# Patient Record
Sex: MALE | Race: WHITE | NOT HISPANIC OR LATINO | Employment: OTHER | URBAN - METROPOLITAN AREA
[De-identification: names, ages, dates, MRNs, and addresses within clinical notes are randomized per-mention and may not be internally consistent; named-entity substitution may affect disease eponyms.]

---

## 2016-02-15 LAB — HM COLONOSCOPY: NORMAL

## 2017-01-26 ENCOUNTER — GENERIC CONVERSION - ENCOUNTER (OUTPATIENT)
Dept: OTHER | Facility: OTHER | Age: 82
End: 2017-01-26

## 2017-03-01 ENCOUNTER — APPOINTMENT (OUTPATIENT)
Dept: LAB | Facility: HOSPITAL | Age: 82
End: 2017-03-01
Payer: COMMERCIAL

## 2017-03-01 ENCOUNTER — GENERIC CONVERSION - ENCOUNTER (OUTPATIENT)
Dept: OTHER | Facility: OTHER | Age: 82
End: 2017-03-01

## 2017-03-01 DIAGNOSIS — I10 ESSENTIAL (PRIMARY) HYPERTENSION: ICD-10-CM

## 2017-03-01 DIAGNOSIS — M25.473 EFFUSION OF ANKLE: ICD-10-CM

## 2017-03-01 DIAGNOSIS — N18.30 CHRONIC KIDNEY DISEASE, STAGE III (MODERATE) (HCC): ICD-10-CM

## 2017-03-01 LAB
ANION GAP SERPL CALCULATED.3IONS-SCNC: 9 MMOL/L (ref 4–13)
BUN SERPL-MCNC: 31 MG/DL (ref 5–25)
CALCIUM SERPL-MCNC: 8.6 MG/DL (ref 8.3–10.1)
CHLORIDE SERPL-SCNC: 106 MMOL/L (ref 100–108)
CO2 SERPL-SCNC: 30 MMOL/L (ref 21–32)
CREAT SERPL-MCNC: 1.8 MG/DL (ref 0.6–1.3)
GFR SERPL CREATININE-BSD FRML MDRD: 36.3 ML/MIN/1.73SQ M
GLUCOSE SERPL-MCNC: 128 MG/DL (ref 65–140)
POTASSIUM SERPL-SCNC: 4 MMOL/L (ref 3.5–5.3)
SODIUM SERPL-SCNC: 145 MMOL/L (ref 136–145)

## 2017-03-01 PROCEDURE — 80048 BASIC METABOLIC PNL TOTAL CA: CPT

## 2017-03-01 PROCEDURE — 36415 COLL VENOUS BLD VENIPUNCTURE: CPT

## 2017-03-17 ENCOUNTER — ALLSCRIPTS OFFICE VISIT (OUTPATIENT)
Dept: OTHER | Facility: OTHER | Age: 82
End: 2017-03-17

## 2017-04-18 ENCOUNTER — GENERIC CONVERSION - ENCOUNTER (OUTPATIENT)
Dept: OTHER | Facility: OTHER | Age: 82
End: 2017-04-18

## 2017-04-29 DIAGNOSIS — R73.01 IMPAIRED FASTING GLUCOSE: ICD-10-CM

## 2017-04-29 DIAGNOSIS — N18.30 CHRONIC KIDNEY DISEASE, STAGE III (MODERATE) (HCC): ICD-10-CM

## 2017-04-29 DIAGNOSIS — E66.9 OBESITY: ICD-10-CM

## 2017-04-29 DIAGNOSIS — M10.9 GOUT: ICD-10-CM

## 2017-04-29 DIAGNOSIS — I10 ESSENTIAL (PRIMARY) HYPERTENSION: ICD-10-CM

## 2017-04-29 DIAGNOSIS — M25.473 EFFUSION OF ANKLE: ICD-10-CM

## 2017-04-29 DIAGNOSIS — K51.90 ULCERATIVE COLITIS WITHOUT COMPLICATIONS (HCC): ICD-10-CM

## 2017-05-11 ENCOUNTER — APPOINTMENT (OUTPATIENT)
Dept: LAB | Facility: HOSPITAL | Age: 82
End: 2017-05-11
Payer: COMMERCIAL

## 2017-05-11 ENCOUNTER — TRANSCRIBE ORDERS (OUTPATIENT)
Dept: ADMINISTRATIVE | Facility: HOSPITAL | Age: 82
End: 2017-05-11

## 2017-05-11 DIAGNOSIS — I10 ESSENTIAL (PRIMARY) HYPERTENSION: ICD-10-CM

## 2017-05-11 DIAGNOSIS — M25.473 EFFUSION OF ANKLE: ICD-10-CM

## 2017-05-11 DIAGNOSIS — K51.90 ULCERATIVE COLITIS WITHOUT COMPLICATIONS (HCC): ICD-10-CM

## 2017-05-11 DIAGNOSIS — R73.01 IMPAIRED FASTING GLUCOSE: ICD-10-CM

## 2017-05-11 DIAGNOSIS — M10.9 GOUT: ICD-10-CM

## 2017-05-11 DIAGNOSIS — E66.9 OBESITY: ICD-10-CM

## 2017-05-11 DIAGNOSIS — N18.30 CHRONIC KIDNEY DISEASE, STAGE III (MODERATE) (HCC): ICD-10-CM

## 2017-05-11 LAB
ALBUMIN SERPL BCP-MCNC: 3.4 G/DL (ref 3.5–5)
ALP SERPL-CCNC: 74 U/L (ref 46–116)
ALT SERPL W P-5'-P-CCNC: 25 U/L (ref 12–78)
ANION GAP SERPL CALCULATED.3IONS-SCNC: 5 MMOL/L (ref 4–13)
AST SERPL W P-5'-P-CCNC: 23 U/L (ref 5–45)
BILIRUB SERPL-MCNC: 0.6 MG/DL (ref 0.2–1)
BUN SERPL-MCNC: 25 MG/DL (ref 5–25)
CALCIUM SERPL-MCNC: 9 MG/DL (ref 8.3–10.1)
CHLORIDE SERPL-SCNC: 106 MMOL/L (ref 100–108)
CHOLEST SERPL-MCNC: 178 MG/DL (ref 50–200)
CO2 SERPL-SCNC: 32 MMOL/L (ref 21–32)
CREAT SERPL-MCNC: 1.5 MG/DL (ref 0.6–1.3)
ERYTHROCYTE [DISTWIDTH] IN BLOOD BY AUTOMATED COUNT: 14.1 % (ref 11.6–15.1)
EST. AVERAGE GLUCOSE BLD GHB EST-MCNC: 97 MG/DL
GFR SERPL CREATININE-BSD FRML MDRD: 44.8 ML/MIN/1.73SQ M
GLUCOSE P FAST SERPL-MCNC: 96 MG/DL (ref 65–99)
HBA1C MFR BLD: 5 % (ref 4.2–6.3)
HCT VFR BLD AUTO: 39.4 % (ref 36.5–49.3)
HDLC SERPL-MCNC: 38 MG/DL (ref 40–60)
HGB BLD-MCNC: 12.8 G/DL (ref 12–17)
LDLC SERPL CALC-MCNC: 105 MG/DL (ref 0–100)
MCH RBC QN AUTO: 29.4 PG (ref 26.8–34.3)
MCHC RBC AUTO-ENTMCNC: 32.5 G/DL (ref 31.4–37.4)
MCV RBC AUTO: 91 FL (ref 82–98)
PLATELET # BLD AUTO: 218 THOUSANDS/UL (ref 149–390)
PMV BLD AUTO: 10 FL (ref 8.9–12.7)
POTASSIUM SERPL-SCNC: 4.7 MMOL/L (ref 3.5–5.3)
PROT SERPL-MCNC: 7.7 G/DL (ref 6.4–8.2)
RBC # BLD AUTO: 4.35 MILLION/UL (ref 3.88–5.62)
SODIUM SERPL-SCNC: 143 MMOL/L (ref 136–145)
TRIGL SERPL-MCNC: 177 MG/DL
TSH SERPL DL<=0.05 MIU/L-ACNC: 3.18 UIU/ML (ref 0.36–3.74)
URATE SERPL-MCNC: 5.7 MG/DL (ref 4.2–8)
WBC # BLD AUTO: 6.76 THOUSAND/UL (ref 4.31–10.16)

## 2017-05-11 PROCEDURE — 80061 LIPID PANEL: CPT

## 2017-05-11 PROCEDURE — 84550 ASSAY OF BLOOD/URIC ACID: CPT

## 2017-05-11 PROCEDURE — 84443 ASSAY THYROID STIM HORMONE: CPT

## 2017-05-11 PROCEDURE — 80053 COMPREHEN METABOLIC PANEL: CPT

## 2017-05-11 PROCEDURE — 85027 COMPLETE CBC AUTOMATED: CPT

## 2017-05-11 PROCEDURE — 36415 COLL VENOUS BLD VENIPUNCTURE: CPT

## 2017-05-11 PROCEDURE — 83036 HEMOGLOBIN GLYCOSYLATED A1C: CPT

## 2017-05-15 ENCOUNTER — ALLSCRIPTS OFFICE VISIT (OUTPATIENT)
Dept: OTHER | Facility: OTHER | Age: 82
End: 2017-05-15

## 2017-06-01 ENCOUNTER — GENERIC CONVERSION - ENCOUNTER (OUTPATIENT)
Dept: OTHER | Facility: OTHER | Age: 82
End: 2017-06-01

## 2017-07-18 ENCOUNTER — GENERIC CONVERSION - ENCOUNTER (OUTPATIENT)
Dept: OTHER | Facility: OTHER | Age: 82
End: 2017-07-18

## 2017-09-12 ENCOUNTER — GENERIC CONVERSION - ENCOUNTER (OUTPATIENT)
Dept: OTHER | Facility: OTHER | Age: 82
End: 2017-09-12

## 2017-11-16 ENCOUNTER — GENERIC CONVERSION - ENCOUNTER (OUTPATIENT)
Dept: OTHER | Facility: OTHER | Age: 82
End: 2017-11-16

## 2017-12-11 ENCOUNTER — ALLSCRIPTS OFFICE VISIT (OUTPATIENT)
Dept: OTHER | Facility: OTHER | Age: 82
End: 2017-12-11

## 2018-01-10 NOTE — PROGRESS NOTES
Assessment    1  Impaired fasting glucose (790 21) (R73 01)   2  Chronic kidney disease, stage 3 (585 3) (N18 3)   3  Gout (274 9) (M10 9)   4  Hypertension (401 9) (I10)   5  Ulcerative colitis without complications (470 7) (I20 05)    Discussion/Summary  Discussion Summary:   IFG - sugars better and in nml range, urged to con't watching sugars/carbs in diet and keep active and get wgt down, recheck BW annually as it has been nml and stable    CKD stage 3 - back to baseline after recent bump, again urged no NSAIDs and avoid contrast, con't current ARB for now, keeping hydrated was encouraged    Gout - uric acid at goal with Uloric, no significant flares recently; con't current meds    HTN - BP at goal; con't current meds    UC - no recent symptoms/flares, on sulfasalazine and has decreased dose w/o SE  Counseling Documentation With Imm: The patient was counseled regarding diagnostic results, instructions for management, risk factor reductions, prognosis, patient and family education, impressions, risks and benefits of treatment options, importance of compliance with treatment  Medication SE Review and Pt Understands Tx: Possible side effects of new medications were reviewed with the patient/guardian today  The treatment plan was reviewed with the patient/guardian  The patient/guardian understands and agrees with the treatment plan   Self Referrals:   Self Referrals: No      Chief Complaint  Chief Complaint Chronic Condition [facility] Caryn: Patient is here today for follow up of chronic conditions described in HPI  History of Present Illness  HPI: Pt here for routine follow upa ppt and BW results    BW results were d/w pt in detail: FBS/A1C improved at 96/5 0, BUN/Cr at baseline at 25/1 5 (GFR 44 8) rest of CMP/CBC/TSH was wnl, FLP with TC/LDL at goal at 178/105 but HDL low at 38 and TG up at 177, uric acid at goal at 5 7  Def of nml vs IFG vs DM was d/w pt in detail   Diet was reviewed - wgt up 4 lbs from last year  He admits he could be better with diet  He [place]  BP at goal today and meds were reviewed and no changes have occurred  He notes no HA/dizziness/double vision/CP  BUN/Cr was reviewed as was CKD stage 3 and avoidance of NSAIDs/contrast and keeping hydrated  He con't to take his Uloric as directed  Goal uric acid with tx was reviewed  He notes some recent pain in his R foot - [place] at big toe  He notes it will get warm but only last a few days or so  He has decreased his sulfasalazine and is now taking 4 tabs a day instead of 6 tab  He has decreased his folic acid to 272 mcg with this decrease  He notes no abd pain/D/F/C/blood in stool  He is UTD on [place]      Review of Systems  Complete-Male:   Constitutional: no fever and no chills  Eyes: no eyesight problems  ENT: no sore throat and no nasal discharge  Cardiovascular: no chest pain, no palpitations and no extremity edema  Respiratory: no shortness of breath, no cough and no wheezing  Gastrointestinal: no abdominal pain, no constipation and no diarrhea  Genitourinary: no dysuria  Musculoskeletal: joint stiffness, but no joint swelling  Integumentary: no rashes  Neurological: no headache and no dizziness  Psychiatric: no anxiety and no depression  Endocrine: no muscle weakness  Hematologic/Lymphatic: no tendency for easy bleeding and no tendency for easy bruising  Active Problems    1  History of Ankle edema (719 07) (M25 473)   2  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)   3  Chronic kidney disease, stage 3 (585 3) (N18 3)   4  Colon cancer screening (V76 51) (Z12 11)   5  Colon cancer screening (V76 51) (Z12 11)   6  Colonoscopy (Fiberoptic)   7  Colonoscopy (Fiberoptic) Screening   8  Colonoscopy (Fiberoptic) Screening   9  Complete tear of rotator cuff, left   10  Flu vaccine need (V04 81) (Z23)   11  Gout (274 9) (M10 9)   12   Hypertension (401 9) (I10)   13  Impaired fasting glucose (790 21) (R73 01)   14  Macular degeneration (362 50) (H35 30)   15  Nocturia (788 43) (R35 1)   16  Obesity (278 00) (E66 9)   17  Pain in joint of left shoulder (719 41) (M25 512)   18  Snoring (786 09) (R06 83)   19  Ulcerative colitis without complications (660 4) (Q67 52)    Past Medical History    1  History of Acute foot pain, left (729 5) (M79 672)   2  History of Acute pain of left knee (719 46) (M25 562)   3  History of Ankle edema (719 07) (M25 473)   4  History of Depression (311) (F32 9)   5  History of Nephrolithiasis (V13 01)   6  History of Posterior tibial tendinitis of right lower extremity (726 72) (M76 821)   7  History of Streptococcus pneumoniae vaccination indicated (V49 89) (Z91 89)  Active Problems And Past Medical History Reviewed: The active problems and past medical history were reviewed and updated today  Surgical History    1  History of Anal Fissurectomy   2  History of Anal Fissurectomy   3  History of Complete Colonoscopy   4  History of Complete Colonoscopy   5  History of Complete Colonoscopy   6  History of Shoulder Surgery  Surgical History Reviewed: The surgical history was reviewed and updated today  Family History  Sister    1  Family history of Diabetes mellitus  Family History Reviewed: The family history was reviewed and updated today  Social History    · Being A Social Drinker   · Marital History - Currently    · Never A Smoker   · Occupation: Retired  Social History Reviewed: The social history was reviewed and is unchanged  Current Meds   1  Folic Acid 735 MCG Oral Tablet; TAKE 1 TABLET DAILY AS DIRECTED; Therapy: 50Knw5189 to (Evaluate:78Wco1878) Recorded   2  Furosemide 20 MG Oral Tablet; 1 tab po q day; Therapy: 61Zyu0355 to (Jez Daniel)  Requested for: 35Khf1696; Last Rx:75Kmp7426   Ordered   3  Losartan Potassium 100 MG Oral Tablet; take 1 tablet by mouth once daily;    Therapy: 58NKG1240 to (Evaluate:12Mar2018)  Requested for: 69YZX7595; Last Rx:17Mar2017   Ordered   4  PreserVision AREDS Oral Capsule; TAKE 1 CAPSULE DAILY; Therapy: 28Bff7783 to Recorded   5  SulfaSALAzine 500 MG Oral Tablet; 1 tab PO q am, 2 tab PO q noon and 1 tab PO q pm;   Therapy: 42HYR4450 to (Evaluate:12Nov2017)  Requested for: 53CQK3163 Recorded   6  Tamsulosin HCl - 0 4 MG Oral Capsule; take 1 capsule daily; Therapy: 85BQW3703 to (Evaluate:10Jun2017)  Requested for: 74Qgm1852; Last Rx:11Hft3865   Ordered   7  Uloric 40 MG Oral Tablet; TAKE 1 TABLET DAILY; Therapy: 08EPX0789 to (Evaluate:12Jan2018)  Requested for: 92JSO2518; Last Rx:17Jan2017   Ordered   8  Vitamin D3 1000 UNIT Oral Tablet; TAKE 1 TABLET DAILY; Therapy: 26Ilf6389 to (Evaluate:19Sep2012) Recorded  Medication List Reviewed: The medication list was reviewed and updated today  Allergies    1  No Known Drug Allergies    Vitals  Vital Signs    Recorded: 41BCM0134 12:56PM   Temperature 98 2 F   Heart Rate 461   Systolic 807   Diastolic 62   Height 5 ft 6 in   Weight 243 lb 12 8 oz   BMI Calculated 39 35   BSA Calculated 2 18     Physical Exam    Constitutional   General appearance: No acute distress, well appearing and well nourished  Pulmonary   Respiratory effort: No increased work of breathing or signs of respiratory distress  Auscultation of lungs: Clear to auscultation, equal breath sounds bilaterally, no wheezes, no rales, no rhonci  Cardiovascular   Auscultation of heart: Normal rate and rhythm, normal S1 and S2, without murmurs  Examination of extremities for edema and/or varicosities: Normal     Abdomen   Abdomen: Non-tender, no masses  obese  Musculoskeletal   Gait and station: Normal     Psychiatric   Mood and affect: Normal          Health Management  Colonoscopy (Fiberoptic) Screening   COLONOSCOPY; every 2 years; Last 13ZHR9113; Next Due: 27MTO5260;  Active    Signatures  Electronically signed by : Massimo Zaman DO Danita; May 15 2017  1:00PM EST                       (Author)  Electronically signed by : Ayla Ribeiro DO; May 15 2017  1:13PM EST                       (Author)

## 2018-01-12 VITALS
DIASTOLIC BLOOD PRESSURE: 78 MMHG | WEIGHT: 240 LBS | TEMPERATURE: 97 F | BODY MASS INDEX: 38.57 KG/M2 | HEART RATE: 82 BPM | HEIGHT: 66 IN | SYSTOLIC BLOOD PRESSURE: 132 MMHG

## 2018-01-13 VITALS
HEIGHT: 66 IN | SYSTOLIC BLOOD PRESSURE: 112 MMHG | HEART RATE: 104 BPM | TEMPERATURE: 98.2 F | WEIGHT: 243.8 LBS | DIASTOLIC BLOOD PRESSURE: 62 MMHG | BODY MASS INDEX: 39.18 KG/M2

## 2018-01-13 NOTE — RESULT NOTES
Verified Results  (1) BASIC METABOLIC PROFILE 24MJY7276 02:55PM Cindy Foster     Test Name Result Flag Reference   GLUCOSE,RANDM 95 mg/dL     If the patient is fasting, the ADA then defines impaired fasting glucose as > 100 mg/dL and diabetes as > or equal to 123 mg/dL  SODIUM 143 mmol/L  136-145   POTASSIUM 4 6 mmol/L  3 5-5 3   CHLORIDE 108 mmol/L  100-108   CARBON DIOXIDE 31 mmol/L  21-32   ANION GAP (CALC) 4 mmol/L  4-13   BLOOD UREA NITROGEN 29 mg/dL H 5-25   CREATININE 1 43 mg/dL H 0 60-1 30   Standardized to IDMS reference method   CALCIUM 9 0 mg/dL  8 3-10 1   eGFR Non-African American 47 3 ml/min/1 73sq [facility] Disease Education Program recommendations are as follows:  GFR calculation is accurate only with a steady state creatinine  Chronic Kidney disease less than 60 ml/min/1 73 sq  meters  Kidney failure less than 15 ml/min/1 73 sq  meters  Discussion/Summary   please notify pt that his Bw showed his kidney tests and electrolytes are stable with the recent med change - how is the swelling in the legs doing?

## 2018-01-15 NOTE — RESULT NOTES
Verified Results  (1) BASIC METABOLIC PROFILE [id number] 01:54PM Ryan Rodriguez Order Number: [id number]     Test Name Result Flag Reference   GLUCOSE,RANDM 128 mg/dL     If the patient is fasting, the ADA then defines impaired fasting glucose as > 100 mg/dL and diabetes as > or equal to 123 mg/dL  SODIUM 145 mmol/L  136-145   POTASSIUM 4 0 mmol/L  3 5-5 3   18Slightly Hemolyzed; Results May be Affected   CHLORIDE 106 mmol/L  100-108   CARBON DIOXIDE 30 mmol/L  21-32   ANION GAP (CALC) 9 mmol/L  4-13   BLOOD UREA NITROGEN 31 mg/dL H 5-25   CREATININE 1 80 mg/dL H 0 60-1 30   Standardized to IDMS reference method   CALCIUM 8 6 mg/dL  8 3-10 1   eGFR Non-African American 36 3 ml/min/1 73sq [facility] Disease Education Program recommendations are as follows:  GFR calculation is accurate only with a steady state creatinine  Chronic Kidney disease less than 60 ml/min/1 73 sq  meters  Kidney failure less than 15 ml/min/1 73 sq  meters         Discussion/Summary   please notify pt that his kidney tests have gone up a little bit from baseline - avoid OTC Ibuprofen/Aleve/Advil; will d/w pt in detail at next appt        Patient notified of results

## 2018-01-16 NOTE — RESULT NOTES
Verified Results  (1) CBC/PLT/DIFF 28Apr2016 12:00AM Robert Balbuena     Test Name Result Flag Reference   WBC 8 0 x10E3/uL  3 4-10 8   RBC 3 81 x10E6/uL L 4 14-5 80   Hemoglobin 10 7 g/dL L 12 6-17 7   Hematocrit 33 5 % L 37 5-51 0   MCV 88 fL  79-97   MCH 28 1 pg  26 6-33 0   MCHC 31 9 g/dL  31 5-35 7   RDW 15 0 %  12 3-15 4   Platelets 396 S47B0/SW  150-379   Neutrophils 68 %     Lymphs 17 %     Monocytes 11 %     Eos 3 %     Basos 1 %     Neutrophils (Absolute) 5 4 x10E3/uL  1 4-7 0   Lymphs (Absolute) 1 4 x10E3/uL  0 7-3 1   Monocytes(Absolute) 0 9 x10E3/uL  0 1-0 9   Eos (Absolute) 0 2 x10E3/uL  0 0-0 4   Baso (Absolute) 0 1 x10E3/uL  0 0-0 2   Immature Granulocytes 0 %     Immature Grans (Abs) 0 0 x10E3/uL  0 0-0 1     (1) COMPREHENSIVE METABOLIC PANEL 19KPW5420 04:91JX Robert Balbuena     Test Name Result Flag Reference   Glucose, Serum 87 mg/dL  65-99   BUN 28 mg/dL H 8-27   Creatinine, Serum 1 38 mg/dL H 0 76-1 27   eGFR If NonAfricn Am 48 mL/min/1 73 L >59   eGFR If Africn Am 55 mL/min/1 73 L >59   BUN/Creatinine Ratio 20  10-22   Sodium, Serum 145 mmol/L H 134-144   Potassium, Serum 5 0 mmol/L  3 5-5 2   Chloride, Serum 107 mmol/L     Carbon Dioxide, Total 22 mmol/L  18-29   Calcium, Serum 8 7 mg/dL  8 6-10 2   Protein, Total, Serum 7 0 g/dL  6 0-8 5   Albumin, Serum 3 8 g/dL  3 5-4 7   Globulin, Total 3 2 g/dL  1 5-4 5   A/G Ratio 1 2  1 1-2 5   Bilirubin, Total 0 2 mg/dL  0 0-1 2   Alkaline Phosphatase, S 55 IU/L     AST (SGOT) 13 IU/L  0-40   ALT (SGPT) 11 IU/L  0-44     (1) URIC ACID 68Efw5116 12:00AM [employer]     Test Name Result Flag Reference   Uric Acid, Serum 4 3 mg/dL  3 7-8 6   Therapeutic target for gout patients: <6 0     (LC) CCP Antibodies IgG/IgA 28Apr2016 12:00AM [employer]     Test Name Result Flag Reference   CCP Antibodies IgG/IgA 3 units  0-19   Negative               <20                                           Weak positive      20 - 39 Moderate positive  40 - 59                                           Strong positive        >59     (1) C-REACTIVE PROTEIN 88Ijv9886 12:00AM Derrick Gorman     Test Name Result Flag Reference   C-Reactive Protein, Quant 66 1 mg/L H 0 0-4 9     (LC) Rheumatoid Arthritis Factor 87Rhv1598 12:00AM Derrick Gorman     Test Name Result Flag Reference   RA Latex Turbid  9 6 IU/mL  0 0-13 9        Pts wife aware1      1 Amended By: Ford Camejo;  May 02 2016 8:52 AM EST    Discussion/Summary   please notify pt or his wife that his blood count was normal - no sign of infection, uric acid for gout was at goal (goal <6 0) and he was at 4 6, inflammatory markers were elvated but rheumatoid labs were negative; no need to increase Uloric at this time; can certainly make appt with Dr Ray Abreu - Rheumatology

## 2018-01-18 NOTE — RESULT NOTES
Verified Results  ECHO COMPLETE WITH CONTRAST IF INDICATED 42Yhp2362 01:42PM Joe Woody     Test Name Result Flag Reference   ECHO COMPLETE WITH CONTRAST IF INDICATED (Report)     [address]   [facility], [address]   [phone]     Transthoracic Echocardiogram   2D, M-mode, Doppler, and Color Doppler     Study date: 26-Sep-2016     Patient: Pieter Dunbar   MR number: [id number]   Account number: [de-identified]   : 1934   Age: 80 years   Gender: Male   Status: Outpatient   Location: Echo lab   Height: 66 in   Weight: 239 6 lb   BP: 132/ 62 mmHg     Indications: Edema-ankle  Diagnoses: M25 473 - Effusion, unspecified ankle     Sonographer: Leala Runner RDCS AE-PE   Primary Physician: Sharon Kinney DO   Referring Physician: Sharon Kinney DO   Group: [facility]   Interpreting Physician: Lonny Hernandez MD     SUMMARY     LEFT VENTRICLE:   Size was normal    Systolic function was normal  Ejection fraction was estimated to be 55 %  Wall thickness was normal    Doppler parameters were consistent with abnormal left ventricular relaxation   (grade 1 diastolic dysfunction)  RIGHT VENTRICLE:   The size was normal    Systolic function was normal      TRICUSPID VALVE:   There was trace regurgitation  HISTORY: PRIOR HISTORY: CKD  Risk factors: hypertension and morbid obesity  PROCEDURE: The procedure was performed in the echo lab  This was a routine   study  The transthoracic approach was used  The study included complete 2D   imaging, M-mode, complete spectral Doppler, and color Doppler  The heart rate   was 101 bpm, at the start of the study  Intravenous contrast (Definity solution   [1 3 ml Definity/8 7ml normal saline solution], 3 ml) was administered to   opacify the left ventricle  Echocardiographic views were limited due to   decreased penetration and lung interference   This was a technically difficult study      LEFT VENTRICLE: Size was normal  Systolic function was normal  Ejection   fraction was estimated to be 55 %  There were no regional wall motion   abnormalities  Wall thickness was normal  DOPPLER: Doppler parameters were   consistent with abnormal left ventricular relaxation (grade 1 diastolic   dysfunction)  RIGHT VENTRICLE: The size was normal  Systolic function was normal  Wall   thickness was normal      LEFT ATRIUM: Size was at the upper limits of normal      RIGHT ATRIUM: Size was normal      MITRAL VALVE: Valve structure was normal  There was normal leaflet separation  DOPPLER: The transmitral velocity was within the normal range  There was no   evidence for stenosis  There was no regurgitation  AORTIC VALVE: The valve was trileaflet  Leaflets exhibited mildly increased   thickness, mild calcification, normal cuspal separation, and sclerosis  DOPPLER: Transaortic velocity was within the normal range  There was no   evidence for stenosis  There was no regurgitation  TRICUSPID VALVE: The valve structure was normal  There was normal leaflet   separation  DOPPLER: The transtricuspid velocity was within the normal range  There was no evidence for stenosis  There was trace regurgitation  Pulmonary   artery systolic pressure was within the normal range  Estimated peak PA   pressure was 35 mmHg  PULMONIC VALVE: Leaflets exhibited normal thickness, no calcification, and   normal cuspal separation  DOPPLER: The transpulmonic velocity was within the   normal range  There was no regurgitation  PERICARDIUM: There was no pericardial effusion  The pericardium was normal in   appearance  AORTA: The root exhibited normal size  SYSTEMIC VEINS: IVC: The inferior vena cava was not well visualized       SYSTEM MEASUREMENT TABLES     2D   %FS: 30 47 %   Ao Diam: 3 43 cm   EDV(Teich): 121 76 ml   EF(Teich): 57 63 %   ESV(Cube): 43 64 ml   ESV(Teich): 51 59 ml   IVSd: 0 96 cm   LA Diam: 4 08 cm   LVEDV MOD A4C: 86 7 ml   LVEF MOD A4C: 64 78 %   LVESV MOD A4C: 30 54 ml   LVIDd: 5 06 cm   LVIDs: 3 52 cm   LVLd A4C: 7 03 cm   LVLs A4C: 5 5 cm   LVPWd: 1 04 cm   SI(Cube): 39 9 ml/m2   SI(Teich): 32 49 ml/m2   SV MOD A4C: 56 16 ml   SV(Cube): 86 19 ml   SV(Teich): 70 17 ml     CW   TR Vmax: 2 54 m/s   TR maxP 8 mmHg     MM   TAPSE: 2 26 cm     PW   E': 0 05 m/s   E/E': 10 92   MV A [place]: 1 08 m/s   MV Dec [place]: 2 73 m/s2   MV DecT: 219 78 ms   MV E [place]: 0 6 m/s   MV E/A Ratio: 0 55     [employer] Accredited Echocardiography Laboratory     Prepared and electronically signed by     Vernon Terrell MD   Signed 26-Sep-2016 15:47:50       Discussion/Summary    please notify pt that his Echo - US of heart - was nml - will discuss results with pt in detail at next appt    pt aware ems 2016

## 2018-01-23 VITALS
DIASTOLIC BLOOD PRESSURE: 60 MMHG | SYSTOLIC BLOOD PRESSURE: 122 MMHG | HEART RATE: 88 BPM | TEMPERATURE: 98.6 F | BODY MASS INDEX: 38.25 KG/M2 | WEIGHT: 238 LBS | HEIGHT: 66 IN

## 2018-02-09 DIAGNOSIS — M10.9 GOUT: ICD-10-CM

## 2018-02-09 DIAGNOSIS — N18.30 CHRONIC KIDNEY DISEASE, STAGE III (MODERATE) (HCC): ICD-10-CM

## 2018-02-22 ENCOUNTER — APPOINTMENT (OUTPATIENT)
Dept: LAB | Facility: HOSPITAL | Age: 83
End: 2018-02-22
Payer: COMMERCIAL

## 2018-02-22 DIAGNOSIS — N18.30 CHRONIC KIDNEY DISEASE, STAGE III (MODERATE) (HCC): ICD-10-CM

## 2018-02-22 DIAGNOSIS — M10.9 GOUT: ICD-10-CM

## 2018-02-22 LAB
ANION GAP SERPL CALCULATED.3IONS-SCNC: 5 MMOL/L (ref 4–13)
BUN SERPL-MCNC: 35 MG/DL (ref 5–25)
CALCIUM SERPL-MCNC: 8.4 MG/DL (ref 8.3–10.1)
CHLORIDE SERPL-SCNC: 109 MMOL/L (ref 100–108)
CO2 SERPL-SCNC: 30 MMOL/L (ref 21–32)
CREAT SERPL-MCNC: 1.75 MG/DL (ref 0.6–1.3)
GFR SERPL CREATININE-BSD FRML MDRD: 35 ML/MIN/1.73SQ M
GLUCOSE SERPL-MCNC: 91 MG/DL (ref 65–140)
POTASSIUM SERPL-SCNC: 4.5 MMOL/L (ref 3.5–5.3)
SODIUM SERPL-SCNC: 144 MMOL/L (ref 136–145)
URATE SERPL-MCNC: 6.5 MG/DL (ref 4.2–8)

## 2018-02-22 PROCEDURE — 84550 ASSAY OF BLOOD/URIC ACID: CPT

## 2018-02-22 PROCEDURE — 36415 COLL VENOUS BLD VENIPUNCTURE: CPT

## 2018-02-22 PROCEDURE — 80048 BASIC METABOLIC PNL TOTAL CA: CPT

## 2018-03-26 DIAGNOSIS — M10.9 GOUT, UNSPECIFIED CAUSE, UNSPECIFIED CHRONICITY, UNSPECIFIED SITE: Primary | ICD-10-CM

## 2018-03-26 RX ORDER — ALLOPURINOL 100 MG/1
TABLET ORAL
Qty: 90 TABLET | Refills: 1 | Status: SHIPPED | OUTPATIENT
Start: 2018-03-26 | End: 2018-09-27 | Stop reason: SDUPTHER

## 2018-04-02 DIAGNOSIS — I10 ESSENTIAL HYPERTENSION: Primary | ICD-10-CM

## 2018-04-02 RX ORDER — MELATONIN
2 DAILY
COMMUNITY
Start: 2012-08-20

## 2018-04-02 RX ORDER — LOSARTAN POTASSIUM 100 MG/1
100 TABLET ORAL DAILY
Qty: 90 TABLET | Refills: 2 | Status: SHIPPED | OUTPATIENT
Start: 2018-04-02 | End: 2019-01-14 | Stop reason: SDUPTHER

## 2018-04-02 RX ORDER — LOSARTAN POTASSIUM 100 MG/1
1 TABLET ORAL DAILY
COMMUNITY
Start: 2016-03-31 | End: 2018-04-02 | Stop reason: SDUPTHER

## 2018-05-11 DIAGNOSIS — R73.01 IMPAIRED FASTING GLUCOSE: ICD-10-CM

## 2018-05-11 DIAGNOSIS — N18.30 CHRONIC KIDNEY DISEASE, STAGE III (MODERATE) (HCC): ICD-10-CM

## 2018-05-11 DIAGNOSIS — H35.30 AGE-RELATED MACULAR DEGENERATION: ICD-10-CM

## 2018-05-11 DIAGNOSIS — M10.9 GOUT: ICD-10-CM

## 2018-05-11 DIAGNOSIS — E66.9 OBESITY: ICD-10-CM

## 2018-05-11 DIAGNOSIS — K51.90 ULCERATIVE COLITIS WITHOUT COMPLICATIONS (HCC): ICD-10-CM

## 2018-05-11 DIAGNOSIS — I10 ESSENTIAL (PRIMARY) HYPERTENSION: ICD-10-CM

## 2018-05-24 ENCOUNTER — APPOINTMENT (OUTPATIENT)
Dept: LAB | Facility: HOSPITAL | Age: 83
End: 2018-05-24
Payer: COMMERCIAL

## 2018-05-24 DIAGNOSIS — H35.30 AGE-RELATED MACULAR DEGENERATION: ICD-10-CM

## 2018-05-24 DIAGNOSIS — N18.30 CHRONIC KIDNEY DISEASE, STAGE III (MODERATE) (HCC): ICD-10-CM

## 2018-05-24 DIAGNOSIS — E66.9 OBESITY: ICD-10-CM

## 2018-05-24 DIAGNOSIS — R73.01 IMPAIRED FASTING GLUCOSE: ICD-10-CM

## 2018-05-24 DIAGNOSIS — M10.9 GOUT: ICD-10-CM

## 2018-05-24 DIAGNOSIS — K51.90 ULCERATIVE COLITIS WITHOUT COMPLICATIONS (HCC): ICD-10-CM

## 2018-05-24 DIAGNOSIS — I10 ESSENTIAL (PRIMARY) HYPERTENSION: ICD-10-CM

## 2018-05-24 LAB
ALBUMIN SERPL BCP-MCNC: 3.4 G/DL (ref 3.5–5)
ALP SERPL-CCNC: 64 U/L (ref 46–116)
ALT SERPL W P-5'-P-CCNC: 24 U/L (ref 12–78)
ANION GAP SERPL CALCULATED.3IONS-SCNC: 8 MMOL/L (ref 4–13)
AST SERPL W P-5'-P-CCNC: 21 U/L (ref 5–45)
BILIRUB SERPL-MCNC: 0.5 MG/DL (ref 0.2–1)
BUN SERPL-MCNC: 29 MG/DL (ref 5–25)
CALCIUM SERPL-MCNC: 8.2 MG/DL (ref 8.3–10.1)
CHLORIDE SERPL-SCNC: 109 MMOL/L (ref 100–108)
CHOLEST SERPL-MCNC: 164 MG/DL (ref 50–200)
CO2 SERPL-SCNC: 27 MMOL/L (ref 21–32)
CREAT SERPL-MCNC: 1.6 MG/DL (ref 0.6–1.3)
ERYTHROCYTE [DISTWIDTH] IN BLOOD BY AUTOMATED COUNT: 14.6 % (ref 11.6–15.1)
EST. AVERAGE GLUCOSE BLD GHB EST-MCNC: 88 MG/DL
GFR SERPL CREATININE-BSD FRML MDRD: 39 ML/MIN/1.73SQ M
GLUCOSE P FAST SERPL-MCNC: 99 MG/DL (ref 65–99)
HBA1C MFR BLD: 4.7 % (ref 4.2–6.3)
HCT VFR BLD AUTO: 39.8 % (ref 36.5–49.3)
HDLC SERPL-MCNC: 33 MG/DL (ref 40–60)
HGB BLD-MCNC: 12.5 G/DL (ref 12–17)
LDLC SERPL CALC-MCNC: 84 MG/DL (ref 0–100)
MCH RBC QN AUTO: 29.6 PG (ref 26.8–34.3)
MCHC RBC AUTO-ENTMCNC: 31.4 G/DL (ref 31.4–37.4)
MCV RBC AUTO: 94 FL (ref 82–98)
NONHDLC SERPL-MCNC: 131 MG/DL
PLATELET # BLD AUTO: 199 THOUSANDS/UL (ref 149–390)
PMV BLD AUTO: 10.2 FL (ref 8.9–12.7)
POTASSIUM SERPL-SCNC: 4.1 MMOL/L (ref 3.5–5.3)
PROT SERPL-MCNC: 7.5 G/DL (ref 6.4–8.2)
RBC # BLD AUTO: 4.23 MILLION/UL (ref 3.88–5.62)
SODIUM SERPL-SCNC: 144 MMOL/L (ref 136–145)
TRIGL SERPL-MCNC: 234 MG/DL
TSH SERPL DL<=0.05 MIU/L-ACNC: 2.4 UIU/ML (ref 0.36–3.74)
URATE SERPL-MCNC: 7 MG/DL (ref 4.2–8)
WBC # BLD AUTO: 6.54 THOUSAND/UL (ref 4.31–10.16)

## 2018-05-24 PROCEDURE — 80053 COMPREHEN METABOLIC PANEL: CPT

## 2018-05-24 PROCEDURE — 85027 COMPLETE CBC AUTOMATED: CPT

## 2018-05-24 PROCEDURE — 84443 ASSAY THYROID STIM HORMONE: CPT

## 2018-05-24 PROCEDURE — 84550 ASSAY OF BLOOD/URIC ACID: CPT

## 2018-05-24 PROCEDURE — 80061 LIPID PANEL: CPT

## 2018-05-24 PROCEDURE — 83036 HEMOGLOBIN GLYCOSYLATED A1C: CPT

## 2018-05-24 PROCEDURE — 36415 COLL VENOUS BLD VENIPUNCTURE: CPT

## 2018-06-04 RX ORDER — TAMSULOSIN HYDROCHLORIDE 0.4 MG/1
1 CAPSULE ORAL DAILY
COMMUNITY
Start: 2016-03-31 | End: 2018-06-11 | Stop reason: SDUPTHER

## 2018-06-04 RX ORDER — VIT A/VIT C/VIT E/ZINC/COPPER 4296-226
1 CAPSULE ORAL DAILY
COMMUNITY
Start: 2016-09-15

## 2018-06-04 RX ORDER — FEBUXOSTAT 40 MG/1
1 TABLET, FILM COATED ORAL DAILY
COMMUNITY
Start: 2016-03-31 | End: 2018-06-14

## 2018-06-04 RX ORDER — FUROSEMIDE 20 MG/1
1 TABLET ORAL DAILY
COMMUNITY
Start: 2016-09-15 | End: 2018-12-18

## 2018-06-11 DIAGNOSIS — N40.0 BENIGN PROSTATIC HYPERPLASIA, UNSPECIFIED WHETHER LOWER URINARY TRACT SYMPTOMS PRESENT: Primary | ICD-10-CM

## 2018-06-12 RX ORDER — TAMSULOSIN HYDROCHLORIDE 0.4 MG/1
0.4 CAPSULE ORAL DAILY
Qty: 90 CAPSULE | Refills: 2 | Status: SHIPPED | OUTPATIENT
Start: 2018-06-12 | End: 2019-02-25 | Stop reason: SDUPTHER

## 2018-06-14 ENCOUNTER — OFFICE VISIT (OUTPATIENT)
Dept: FAMILY MEDICINE CLINIC | Facility: HOSPITAL | Age: 83
End: 2018-06-14
Payer: COMMERCIAL

## 2018-06-14 VITALS
TEMPERATURE: 97.7 F | DIASTOLIC BLOOD PRESSURE: 82 MMHG | HEIGHT: 66 IN | WEIGHT: 239 LBS | SYSTOLIC BLOOD PRESSURE: 132 MMHG | BODY MASS INDEX: 38.41 KG/M2 | HEART RATE: 62 BPM

## 2018-06-14 DIAGNOSIS — I10 ESSENTIAL HYPERTENSION: ICD-10-CM

## 2018-06-14 DIAGNOSIS — M10.9 GOUT, UNSPECIFIED CAUSE, UNSPECIFIED CHRONICITY, UNSPECIFIED SITE: ICD-10-CM

## 2018-06-14 DIAGNOSIS — N18.30 CHRONIC KIDNEY DISEASE, STAGE 3 (HCC): ICD-10-CM

## 2018-06-14 DIAGNOSIS — R73.01 IMPAIRED FASTING GLUCOSE: Primary | ICD-10-CM

## 2018-06-14 DIAGNOSIS — K51.90 ULCERATIVE COLITIS WITHOUT COMPLICATIONS, UNSPECIFIED LOCATION (HCC): ICD-10-CM

## 2018-06-14 PROBLEM — M76.821 POSTERIOR TIBIAL TENDINITIS OF RIGHT LOWER EXTREMITY: Status: RESOLVED | Noted: 2018-06-14 | Resolved: 2018-06-14

## 2018-06-14 PROBLEM — N20.0 NEPHROLITHIASIS: Status: RESOLVED | Noted: 2018-06-14 | Resolved: 2018-06-14

## 2018-06-14 PROBLEM — F32.A DEPRESSION: Status: RESOLVED | Noted: 2018-06-14 | Resolved: 2018-06-14

## 2018-06-14 PROBLEM — M25.473 ANKLE EDEMA: Status: RESOLVED | Noted: 2018-06-14 | Resolved: 2018-06-14

## 2018-06-14 PROCEDURE — 3075F SYST BP GE 130 - 139MM HG: CPT | Performed by: INTERNAL MEDICINE

## 2018-06-14 PROCEDURE — 3008F BODY MASS INDEX DOCD: CPT | Performed by: INTERNAL MEDICINE

## 2018-06-14 PROCEDURE — 3079F DIAST BP 80-89 MM HG: CPT | Performed by: INTERNAL MEDICINE

## 2018-06-14 PROCEDURE — 99214 OFFICE O/P EST MOD 30 MIN: CPT | Performed by: INTERNAL MEDICINE

## 2018-06-14 PROCEDURE — 1036F TOBACCO NON-USER: CPT | Performed by: INTERNAL MEDICINE

## 2018-06-14 PROCEDURE — 1160F RVW MEDS BY RX/DR IN RCRD: CPT | Performed by: INTERNAL MEDICINE

## 2018-06-14 RX ORDER — FUROSEMIDE 40 MG/1
TABLET ORAL
COMMUNITY
Start: 2018-06-11 | End: 2018-06-14

## 2018-06-14 NOTE — ASSESSMENT & PLAN NOTE
Uric acid still a bit high at 7 0 but no recent gout flares and limited d/t CKD - monitor and con't current allopurinol

## 2018-06-14 NOTE — ASSESSMENT & PLAN NOTE
Stable, again Bp/BS control encouraged, advised to con't avoiding NSAIDs, recheck labs in 6 mos - order given

## 2018-06-14 NOTE — ASSESSMENT & PLAN NOTE
Sugars slightly better and FBS/A1C upper end of nml - urged low sugar/carb diet and keep active and get wgt down, recheck BW in 6 mo - order given

## 2018-06-14 NOTE — ASSESSMENT & PLAN NOTE
No current GI symptoms, on Sulfasalzine as directed, due for [place] but pt not sure if he is going to do it d/t age

## 2018-06-14 NOTE — PROGRESS NOTES
Assessment/Plan:    Impaired fasting glucose  Sugars slightly better and FBS/A1C upper end of nml - urged low sugar/carb diet and keep active and get wgt down, recheck BW in 6 mo - order given    Ulcerative colitis without complications (HCC)  No current GI symptoms, on Sulfasalzine as directed, due for [place] but pt not sure if he is going to do it d/t age    Hypertension  Bp at goal, con't current meds    Chronic kidney disease, stage 3  Stable, again Bp/BS control encouraged, advised to con't avoiding NSAIDs, recheck labs in 6 mos - order given    Gout  Uric acid still a bit high at 7 0 but no recent gout flares and limited d/t CKD - monitor and con't current allopurinol       Diagnoses and all orders for this visit:    Impaired fasting glucose  -     Hemoglobin A1C; Future  -     Basic metabolic panel; Future    Chronic kidney disease, stage 3  -     Basic metabolic panel; Future    Gout, unspecified cause, unspecified chronicity, unspecified site    Essential hypertension    Ulcerative colitis without complications, unspecified location [facility])      [place] Feb 2016    Subjective:      Patient ID: Charisse Stapleton is a 80 y o  male  HPI Pt here for follow up appt and BW results    Bw results were d/w pt in detail: FBS/A1C borderline at 99/4 7, BUN/Cr stable at 29/1 60 (GFR 39 and c/w CKD stage 3),  rest of CMP/TSH/CBC was wnl, FLP with elevated TG at 234, HDL low at 33 but TC and LDL were wnl, uric acid stil a bit high at 7 0  Def of nml vs IFG vs DM was d/w pt in detail  Diet/exercise was reviewed - wgt down 1 lb from Dec 2017  He states he does not watch his diet at all and does no formal exercise  He is active around the house  Goal uric acid for gout was d/w pt in detail  He has had no recent major gout flares  Once in a while he will have twinges to joints but notes no significant pain/swelling/redness  He is taking Allopurinol 100 mg 1 tab PO q day      BP at goal today and meds were reviewed and no changes have occurred  He denies missing doses of meds or SE with the meds  He does not check his BP outside the office  He notes no frequent Ha's/dizziness/double vision/CP  He notes no recent abd pain/N/V/d/blood in the stool/F/C with his UC  He is using Sulfasalazine 1 tab PO q am, 2 tab noon, and 1 tab qhs  He is overdue for colonoscopy but "is thinking about it"  Review of Systems   Constitutional: Negative for chills, fatigue and fever  HENT: Negative for congestion and sinus pain  Eyes: Negative for pain and redness  Respiratory: Positive for shortness of breath  Negative for cough and chest tightness  HOOKS - at baseline, not new or worse, denies orthopnea/PND   Cardiovascular: Negative for chest pain, palpitations and leg swelling  Gastrointestinal: Negative for abdominal pain, blood in stool, constipation, diarrhea, nausea and vomiting  Endocrine: Negative for polydipsia and polyuria  Genitourinary: Negative for difficulty urinating and dysuria  Musculoskeletal: Negative for arthralgias and myalgias  Skin: Negative for rash and wound  Neurological: Negative for dizziness and headaches  Hematological: Does not bruise/bleed easily  Psychiatric/Behavioral: Negative for behavioral problems and confusion  Objective:    /82   Pulse 62   Temp 97 7 °F (36 5 °C)   Ht 5' 6" (1 676 m)   Wt 108 kg (239 lb)   BMI 38 58 kg/m²      Physical Exam   Constitutional: He appears well-developed and well-nourished  HENT:   Head: Normocephalic and atraumatic  Eyes: Conjunctivae are normal  Right eye exhibits no discharge  Left eye exhibits no discharge  Neck: Neck supple  No tracheal deviation present  Cardiovascular: Normal rate and regular rhythm  No murmur heard  Pulmonary/Chest: Effort normal and breath sounds normal  No respiratory distress  He has no wheezes  He has no rales  Abdominal: Soft  He exhibits no distension  There is no tenderness  obese   Musculoskeletal: He exhibits no edema  Neurological: He is alert  He exhibits normal muscle tone  Skin: Skin is warm and dry  No rash noted  Psychiatric: He has a normal mood and affect  His behavior is normal    Nursing note and vitals reviewed

## 2018-08-27 DIAGNOSIS — R60.9 EDEMA, UNSPECIFIED TYPE: Primary | ICD-10-CM

## 2018-08-27 RX ORDER — FUROSEMIDE 40 MG/1
TABLET ORAL
Qty: 45 TABLET | Refills: 3 | Status: SHIPPED | OUTPATIENT
Start: 2018-08-27 | End: 2019-08-23 | Stop reason: SDUPTHER

## 2018-08-27 NOTE — TELEPHONE ENCOUNTER
Med list says 20 mg of furosemide - can we verify with pt OR pharmacy which he is really currently taking

## 2018-09-27 DIAGNOSIS — K51.90 ULCERATIVE COLITIS WITHOUT COMPLICATIONS, UNSPECIFIED LOCATION (HCC): Primary | ICD-10-CM

## 2018-09-27 DIAGNOSIS — M10.9 GOUT, UNSPECIFIED CAUSE, UNSPECIFIED CHRONICITY, UNSPECIFIED SITE: ICD-10-CM

## 2018-09-27 RX ORDER — ALLOPURINOL 100 MG/1
100 TABLET ORAL DAILY
Qty: 90 TABLET | Refills: 1 | Status: SHIPPED | OUTPATIENT
Start: 2018-09-27 | End: 2019-04-20 | Stop reason: SDUPTHER

## 2018-09-27 RX ORDER — SULFASALAZINE 500 MG/1
TABLET ORAL
Qty: 360 TABLET | Refills: 1 | Status: SHIPPED | OUTPATIENT
Start: 2018-09-27 | End: 2019-04-20 | Stop reason: SDUPTHER

## 2018-09-27 RX ORDER — ALLOPURINOL 100 MG/1
TABLET ORAL
Qty: 90 TABLET | OUTPATIENT
Start: 2018-09-27

## 2018-10-02 ENCOUNTER — CLINICAL SUPPORT (OUTPATIENT)
Dept: FAMILY MEDICINE CLINIC | Facility: HOSPITAL | Age: 83
End: 2018-10-02
Payer: COMMERCIAL

## 2018-10-02 DIAGNOSIS — Z23 NEEDS FLU SHOT: Primary | ICD-10-CM

## 2018-10-02 PROCEDURE — 90662 IIV NO PRSV INCREASED AG IM: CPT

## 2018-10-02 PROCEDURE — 90471 IMMUNIZATION ADMIN: CPT

## 2018-12-11 ENCOUNTER — APPOINTMENT (OUTPATIENT)
Dept: LAB | Facility: HOSPITAL | Age: 83
End: 2018-12-11
Payer: COMMERCIAL

## 2018-12-11 DIAGNOSIS — N18.30 CHRONIC KIDNEY DISEASE, STAGE 3 (HCC): ICD-10-CM

## 2018-12-11 DIAGNOSIS — R73.01 IMPAIRED FASTING GLUCOSE: ICD-10-CM

## 2018-12-11 LAB
ANION GAP SERPL CALCULATED.3IONS-SCNC: 11 MMOL/L (ref 4–13)
BUN SERPL-MCNC: 25 MG/DL (ref 5–25)
CALCIUM SERPL-MCNC: 8.4 MG/DL (ref 8.3–10.1)
CHLORIDE SERPL-SCNC: 107 MMOL/L (ref 100–108)
CO2 SERPL-SCNC: 27 MMOL/L (ref 21–32)
CREAT SERPL-MCNC: 1.91 MG/DL (ref 0.6–1.3)
EST. AVERAGE GLUCOSE BLD GHB EST-MCNC: 85 MG/DL
GFR SERPL CREATININE-BSD FRML MDRD: 31 ML/MIN/1.73SQ M
GLUCOSE P FAST SERPL-MCNC: 101 MG/DL (ref 65–99)
HBA1C MFR BLD: 4.6 % (ref 4.2–6.3)
POTASSIUM SERPL-SCNC: 4 MMOL/L (ref 3.5–5.3)
SODIUM SERPL-SCNC: 145 MMOL/L (ref 136–145)

## 2018-12-11 PROCEDURE — 83036 HEMOGLOBIN GLYCOSYLATED A1C: CPT

## 2018-12-11 PROCEDURE — 36415 COLL VENOUS BLD VENIPUNCTURE: CPT

## 2018-12-11 PROCEDURE — 80048 BASIC METABOLIC PNL TOTAL CA: CPT

## 2018-12-18 ENCOUNTER — OFFICE VISIT (OUTPATIENT)
Dept: FAMILY MEDICINE CLINIC | Facility: HOSPITAL | Age: 83
End: 2018-12-18
Payer: COMMERCIAL

## 2018-12-18 VITALS
HEART RATE: 91 BPM | TEMPERATURE: 97.8 F | WEIGHT: 230 LBS | BODY MASS INDEX: 36.96 KG/M2 | DIASTOLIC BLOOD PRESSURE: 80 MMHG | HEIGHT: 66 IN | SYSTOLIC BLOOD PRESSURE: 130 MMHG

## 2018-12-18 DIAGNOSIS — I10 ESSENTIAL HYPERTENSION: ICD-10-CM

## 2018-12-18 DIAGNOSIS — R73.01 IMPAIRED FASTING GLUCOSE: Primary | ICD-10-CM

## 2018-12-18 DIAGNOSIS — N18.30 CHRONIC KIDNEY DISEASE, STAGE 3 (HCC): ICD-10-CM

## 2018-12-18 DIAGNOSIS — M10.9 GOUT, UNSPECIFIED CAUSE, UNSPECIFIED CHRONICITY, UNSPECIFIED SITE: ICD-10-CM

## 2018-12-18 DIAGNOSIS — Z00.00 MEDICARE ANNUAL WELLNESS VISIT, SUBSEQUENT: ICD-10-CM

## 2018-12-18 DIAGNOSIS — E66.01 SEVERE OBESITY (BMI 35.0-39.9) WITH COMORBIDITY (HCC): ICD-10-CM

## 2018-12-18 PROCEDURE — 1036F TOBACCO NON-USER: CPT | Performed by: INTERNAL MEDICINE

## 2018-12-18 PROCEDURE — 99214 OFFICE O/P EST MOD 30 MIN: CPT | Performed by: INTERNAL MEDICINE

## 2018-12-18 PROCEDURE — 3079F DIAST BP 80-89 MM HG: CPT | Performed by: INTERNAL MEDICINE

## 2018-12-18 PROCEDURE — 1160F RVW MEDS BY RX/DR IN RCRD: CPT | Performed by: INTERNAL MEDICINE

## 2018-12-18 PROCEDURE — 3008F BODY MASS INDEX DOCD: CPT | Performed by: INTERNAL MEDICINE

## 2018-12-18 PROCEDURE — 3075F SYST BP GE 130 - 139MM HG: CPT | Performed by: INTERNAL MEDICINE

## 2018-12-18 PROCEDURE — G0439 PPPS, SUBSEQ VISIT: HCPCS | Performed by: INTERNAL MEDICINE

## 2018-12-18 NOTE — PROGRESS NOTES
Assessment/Plan:    Impaired fasting glucose  FBS up but A1C well wnl, congratulated on wgt loss and encouraged healthy diet/exerise/wgt loss, recheck BW in 6 mos - order given    Chronic kidney disease, stage 3  Stable, again encouraged BP/BS control and avoiding NSAIDs and contrast, recheck in 6 mo    Hypertension  Bp at goal, con't current meds    Gout  No recent flares, tolerating allopurinol, check uric acid with next labs - order given       Diagnoses and all orders for this visit:    Impaired fasting glucose  -     CBC and differential; Future  -     Comprehensive metabolic panel; Future  -     Hemoglobin A1C; Future  -     Lipid panel; Future  -     TSH, 3rd generation with Free T4 reflex; Future    Chronic kidney disease, stage 3 (HCC)  -     CBC and differential; Future  -     Comprehensive metabolic panel; Future  -     Hemoglobin A1C; Future  -     Lipid panel; Future  -     TSH, 3rd generation with Free T4 reflex; Future    Essential hypertension  -     CBC and differential; Future  -     Comprehensive metabolic panel; Future  -     Hemoglobin A1C; Future  -     Lipid panel; Future  -     TSH, 3rd generation with Free T4 reflex; Future    Gout, unspecified cause, unspecified chronicity, unspecified site  -     CBC and differential; Future  -     Comprehensive metabolic panel; Future  -     Hemoglobin A1C; Future  -     Lipid panel; Future  -     TSH, 3rd generation with Free T4 reflex; Future  -     Uric acid; Future    Medicare annual wellness visit, subsequent    Severe obesity (BMI 35 0-39  9) with comorbidity ([address] )      [place] 2/16    Pt has had his flu vaccine this year already    Subjective:      Patient ID: Elodia Soto is a 80 y o  male  HPI Pt here for follow up appt and BW results and AWV    BW results were d/w pt in detail: FBS up at 101 but A1C wnl at 4 6, BUN/Cr 25/1 91 with GFR 31  Def of nml vs IFG vs DM was d/w pt in detail   Diet/exercise was reviewed - wgt down 9 lbs from June 2018 and 8 lbs from Dec 2017  He has been trying to loose a bit of wgt and is eating less and has cut back on his soda  He does no formal exercise  He notes no dysphagia/abd pain/C/D/blood in stool/GERD symptoms  Def of CKD stage 3 again d/w pt at was importance of BP/BS control  He denies use of NSAIDs daily  BP at goal today and meds were reviewed and no changes have occurred  He denies missing doses of meds or SE with the meds  He does not check his BP outside the office  He notes no frequent HA's/dizziness/double vision/CP  [place] 2/16    Pt has had his flu vaccine this year already    Review of Systems   Constitutional: Negative for appetite change, chills, fatigue and fever  HENT: Positive for hearing loss  Negative for congestion and sinus pain  Eyes: Negative for pain and visual disturbance  Respiratory: Negative for cough, chest tightness and shortness of breath  Cardiovascular: Negative for chest pain, palpitations and leg swelling  Gastrointestinal: Negative for abdominal pain, blood in stool, constipation, diarrhea, nausea and vomiting  Endocrine: Negative for polydipsia and polyuria  Genitourinary: Negative for difficulty urinating and dysuria  Musculoskeletal: Positive for arthralgias  Negative for myalgias  Skin: Negative for rash and wound  Neurological: Negative for dizziness and headaches  Hematological: Does not bruise/bleed easily  Psychiatric/Behavioral: Negative for behavioral problems and confusion  Objective:    /80   Pulse 91   Temp 97 8 °F (36 6 °C)   Ht 5' 6" (1 676 m)   Wt 104 kg (230 lb)   BMI 37 12 kg/m²      Physical Exam   Constitutional: He appears well-developed and well-nourished  No distress  HENT:   Head: Normocephalic and atraumatic     Right Ear: External ear normal    Left Ear: External ear normal    Mouth/Throat: Oropharynx is clear and moist  No oropharyngeal exudate    + dec hearing   Eyes: Conjunctivae are normal  Right eye exhibits no discharge  Left eye exhibits no discharge  Neck: Neck supple  No tracheal deviation present  Cardiovascular: Normal rate, regular rhythm and normal heart sounds  No murmur heard  Neg carotid bruits B/L   Pulmonary/Chest: Effort normal and breath sounds normal  No respiratory distress  He has no wheezes  He has no rales  Abdominal: Soft  He exhibits no distension  There is no tenderness  Obese   Musculoskeletal: He exhibits no deformity  Neurological: He is alert  He exhibits normal muscle tone  Skin: Skin is warm and dry  No rash noted  Psychiatric: He has a normal mood and affect  His behavior is normal    Nursing note and vitals reviewed  BMI Counseling: Body mass index is 37 12 kg/m²  Discussed the patient's BMI with him  The BMI is above average  BMI counseling and education was provided to the patient  Nutrition recommendations include reducing portion sizes, 3-5 servings of fruits/vegetables daily, consuming healthier snacks, moderation in carbohydrate intake, increasing intake of lean protein and reducing intake of saturated fat and trans fat  Exercise recommendations include exercising 3-5 times per week

## 2018-12-18 NOTE — ASSESSMENT & PLAN NOTE
FBS up but A1C well wnl, congratulated on wgt loss and encouraged healthy diet/exerise/wgt loss, recheck BW in 6 mos - order given

## 2018-12-18 NOTE — PROGRESS NOTES
Assessment and Plan:  Problem List Items Addressed This Visit     Impaired fasting glucose - Primary    Relevant Orders    CBC and differential    Comprehensive metabolic panel    Hemoglobin A1C    Lipid panel    TSH, 3rd generation with Free T4 reflex    Hypertension    Relevant Orders    CBC and differential    Comprehensive metabolic panel    Hemoglobin A1C    Lipid panel    TSH, 3rd generation with Free T4 reflex    Gout    Relevant Orders    CBC and differential    Comprehensive metabolic panel    Hemoglobin A1C    Lipid panel    TSH, 3rd generation with Free T4 reflex    Uric acid    Chronic kidney disease, stage 3 (HCC)    Relevant Orders    CBC and differential    Comprehensive metabolic panel    Hemoglobin A1C    Lipid panel    TSH, 3rd generation with Free T4 reflex      Other Visit Diagnoses     Medicare annual wellness visit, subsequent            Health Maintenance Due   Topic Date Due    Depression Screening PHQ  1934    Medicare Annual Wellness Visit (AWV)  1934    DTaP,Tdap,and Td Vaccines (1 - Tdap) 05/28/1955    Fall Risk  05/28/1999    CRC Screening: Colonoscopy  02/15/2018         HPI:  Patient Active Problem List   Diagnosis    Ulcerative colitis without complications ([facility] [address] )    Snoring    Pain in joint of left shoulder    Obesity    Nocturia    Macular degeneration    Impaired fasting glucose    Hypertension    Gout    Complete tear of left rotator cuff    Chronic kidney disease, stage 3 (HCC)    Benign neoplasm of large intestine     Past Medical History:   Diagnosis Date    Ankle edema     last assessed [id number]    Depression     last assessed [id number]    Hypertension     Nephrolithiasis     Nocturia     last assessed [id number]    Posterior tibial tendinitis of right lower extremity     last assessed [id number]     Past Surgical History:   Procedure Laterality Date    ANAL FISSURECTOMY      COLONOSCOPY  04/22/2009    every 2 years    COLONOSCOPY 03/04/2013    2 yrs d/t h/o polyp    COLONOSCOPY  02/15/2016    colo 2/15/16-repeat 2 yrs    SHOULDER SURGERY       Family History   Problem Relation Age of Onset    Diabetes Sister      History   Smoking Status    Never Smoker   Smokeless Tobacco    Not on file     History   Alcohol Use    Yes     Comment: occassionally      History   Drug Use No         Current Outpatient Prescriptions   Medication Sig Dispense Refill    allopurinol (ZYLOPRIM) 100 mg tablet Take 1 tablet (100 mg total) by mouth daily 90 tablet 1    cholecalciferol (VITAMIN D3) 1,000 units tablet Take 2 tablets by mouth daily       ciclopirox (LOPROX) 0 77 % cream       folic acid (FOLVITE) 1 mg tablet Take 1 mg by mouth daily   furosemide (LASIX) 40 mg tablet TAKE ONE-HALF TABLET BY MOUTH ONCE DAILY 45 tablet 3    losartan (COZAAR) 100 MG tablet Take 1 tablet (100 mg total) by mouth daily for 90 days 90 tablet 2    Multiple Vitamins-Minerals (PRESERVISION AREDS) CAPS Take 1 capsule by mouth daily      sulfaSALAzine (AZULFIDINE) 500 mg tablet TAKE 1 TABLET BY MOUTH IN  THE MORNING 2 TABLETS BY  MOUTH AT NOON AND 1 TABLET  BY MOUTH IN THE EVENING 360 tablet 1    tamsulosin (FLOMAX) 0 4 mg Take 1 capsule (0 4 mg total) by mouth daily 90 capsule 2     No current facility-administered medications for this visit  No Known Allergies  Immunization History   Administered Date(s) Administered    Influenza 09/10/2012, 10/04/2013, 09/22/2014, 10/09/2015, 09/15/2016, 09/25/2017    Influenza Split High Dose Preservative Free IM 09/10/2012, 10/04/2013, 09/22/2014, 10/09/2015, 09/15/2016    Influenza, high dose seasonal 0 5 mL 10/02/2018    Pneumococcal Conjugate 13-Valent 12/17/2015    Pneumococcal Polysaccharide PPV23 05/14/2014       Patient Care Team:  Albertus Bernheim, DO as PCP - General    Medicare Screening Tests and Risk Assessments:  Erika Arellano is here for his Subsequent Wellness visit    Last Medicare Wellness visit information reviewed, patient interviewed and updates made to the record today  Health Risk Assessment:  Patient rates overall health as good  Patient feels that their physical health rating is Same  Eyesight was rated as Same  Hearing was rated as Slightly worse  Patient feels that their emotional and mental health rating is Same  Pain experienced by patient in the last 7 days has been Some  Patient's pain rating has been 4/10  Patient states that he has experienced no weight loss or gain in last 6 months  (Additional comments: Intermittent hip pain over the past few weeks when he walks alot)    Emotional/Mental Health:  Patient has been feeling nervous/anxious  PHQ-9 Depression Screening:    Frequency of the following problems over the past two weeks:      1  Little interest or pleasure in doing things: 0 - not at all      2  Feeling down, depressed, or hopeless: 0 - not at all  PHQ-2 Score: 0          Broken Bones/Falls: Fall Risk Assessment:    In the past year, patient has experienced: History of falling in past year     Number of falls: 1  Patient does not feel he is unsteady standing  Patient often has no need to rush to the toilet  visual disturbance    Bladder/Bowel:  Patient has not leaked urine accidently in the last six months  Patient reports no loss of bowel control  Immunizations:  Patient has had a flu vaccination within the last year  Patient has received a pneumonia shot  Patient has not received a shingles shot  Patient has not received tetanus/diphtheria shot  Home Safety:  Patient has trouble with stairs inside or outside of their home  Patient currently reports that there are safety hazards present in home , working smoke alarms, no working carbon monoxide detectors        Preventative Screenings:   no prostate cancer screen performed, colon cancer screen completed, cholesterol screen completed, glaucoma eye exam completed,     Nutrition:  Current diet: Unhealthy and Regular with servings of the following:    Medications:  Patient is not currently taking any over-the-counter supplements  Patient is able to manage medications  Lifestyle Choices:  Patient reports no tobacco use  Patient has not smoked or used tobacco in the past   Patient reports no alcohol use  Patient drives a vehicle  Patient wears seat belt  Current level of exercise of physical activity described by patient as: no formal exercise  Activities of Daily Living:  Can get out of bed by his or her self, able to dress self, able to make own meals, able to do own shopping, able to bathe self, can do own laundry/housekeeping, can manage own money, pay bills and track expenses    Previous Hospitalizations:  No hospitalization or ED visit in past 12 months        Advanced Directives:  Patient has not decided on power of   Patient has spoken to designated power of   Patient has not completed advanced directive          Preventative Screening/Counseling:      Cardiovascular:      General: Risks and Benefits Discussed and Screening Current      Counseling: Healthy Diet, Healthy Weight and Improve Exercise Tolerance          Diabetes:      General: Risks and Benefits Discussed and Screening Current      Counseling: Healthy Diet, Healthy Weight and Improve Physical Activity          Colorectal Cancer:      General: Risks and Benefits Discussed and Screening Current          Prostate Cancer:      General: Risks and Benefits Discussed and Screening Not Indicated          Osteoporosis:      General: Risks and Benefits Discussed and Screening Not Indicated          AAA:      General: Risks and Benefits Discussed and Screening Not Indicated          Glaucoma:      General: Risks and Benefits Discussed and Screening Current          HIV:      General: Risks and Benefits Discussed and Screening Not Indicated          Hepatitis C:      General: Risks and Benefits Discussed and Screening Not Indicated        Advanced Directives:   Patient has no living will for healthcare, does not have durable POA for healthcare, No 5 wishes given  Immunizations:      Influenza: Risks & Benefits Discussed and Influenza UTD This Year      Pneumococcal: Risks & Benefits Discussed and Lifetime Vaccine Completed      Shingrix: Risks & Benefits Discussed and Shingrix Vaccine Needed Today      Hepatitis B (Medium to high risk patients): Vaccine Status Unknown and Risks & Benefits Discussed      Zostavax: Risks & Benefits Discussed and Patient Declines      TD: Risks & Benefits Discussed and Patient Declines             Visual Acuity Screening    Right eye Left eye Both eyes   Without correction:      With correction: 20/200 20/100 20/70       Physical Exam:  Review of Systems   Constitutional: Negative for chills and fever  HENT: Positive for hearing loss  Negative for congestion and sinus pain  Eyes: Negative for pain and visual disturbance  Respiratory: Negative for cough, chest tightness and shortness of breath  Cardiovascular: Negative for chest pain, palpitations and leg swelling  Gastrointestinal: Negative for abdominal pain, blood in stool, bowel incontinence, constipation, diarrhea, nausea and vomiting  Endocrine: Negative for polydipsia and polyuria  Genitourinary: Negative for difficulty urinating and dysuria  Musculoskeletal: Positive for arthralgias  Negative for myalgias  Skin: Negative for rash and wound  Neurological: Negative for dizziness and headaches  Hematological: Does not bruise/bleed easily  Psychiatric/Behavioral: Negative for behavioral problems and confusion  The patient is not nervous/anxious  Vitals:    12/18/18 1249   BP: 130/80   Pulse: 91   Temp: 97 8 °F (36 6 °C)   Weight: 104 kg (230 lb)   Height: 5' 6" (1 676 m)   Body mass index is 37 12 kg/m²  Physical Exam   Constitutional: He appears well-developed and well-nourished  No distress     HENT:   Head: Atraumatic  Right Ear: External ear normal    Left Ear: External ear normal    Mouth/Throat: Oropharynx is clear and moist    + dec hearing   Eyes: Conjunctivae are normal  Right eye exhibits no discharge  Left eye exhibits no discharge  Neck: Neck supple  No tracheal deviation present  Cardiovascular: Normal rate, regular rhythm and normal heart sounds  No murmur heard  Neg carotid bruits B/L   Pulmonary/Chest: Effort normal and breath sounds normal  No respiratory distress  He has no wheezes  Abdominal: Soft  He exhibits no distension  There is no tenderness  There is no guarding  Obese   Musculoskeletal: He exhibits no edema  Lymphadenopathy:     He has no cervical adenopathy  Neurological: He is alert  He exhibits normal muscle tone  Skin: Skin is warm and dry  Psychiatric: He has a normal mood and affect  His behavior is normal  Thought content normal    Nursing note and vitals reviewed

## 2018-12-18 NOTE — PATIENT INSTRUCTIONS
Obesity   AMBULATORY CARE:   Obesity  is when your body mass index (BMI) is greater than 30  Your healthcare provider will use your height and weight to measure your BMI  The risks of obesity include  many health problems, such as injuries or physical disability  You may need tests to check for the following:  · Diabetes     · High blood pressure or high cholesterol     · Heart disease     · Gallbladder or liver disease     · Cancer of the colon, breast, prostate, liver, or kidney     · Sleep apnea     · Arthritis or gout  Seek care immediately if:   · You have a severe headache, confusion, or difficulty speaking  · You have weakness on one side of your body  · You have chest pain, sweating, or shortness of breath  Contact your healthcare provider if:   · You have symptoms of gallbladder or liver disease, such as pain in your upper abdomen  · You have knee or hip pain and discomfort while walking  · You have symptoms of diabetes, such as intense hunger and thirst, and frequent urination  · You have symptoms of sleep apnea, such as snoring or daytime sleepiness  · You have questions or concerns about your condition or care  Treatment for obesity  focuses on helping you lose weight to improve your health  Even a small decrease in BMI can reduce the risk for many health problems  Your healthcare provider will help you set a weight-loss goal   · Lifestyle changes  are the first step in treating obesity  These include making healthy food choices and getting regular physical activity  Your healthcare provider may suggest a weight-loss program that involves coaching, education, and therapy  · Medicine  may help you lose weight when it is used with a healthy diet and physical activity  · Surgery  can help you lose weight if you are very obese and have other health problems  There are several types of weight-loss surgery  Ask your healthcare provider for more information    Be successful losing weight:   · Set small, realistic goals  An example of a small goal is to walk for 20 minutes 5 days a week  Anther goal is to lose 5% of your body weight  · Tell friends, family members, and coworkers about your goals  and ask for their support  Ask a friend to lose weight with you, or join a weight-loss support group  · Identify foods or triggers that may cause you to overeat , and find ways to avoid them  Remove tempting high-calorie foods from your home and workplace  Place a bowl of fresh fruit on your kitchen counter  If stress causes you to eat, then find other ways to cope with stress  · Keep a diary to track what you eat and drink  Also write down how many minutes of physical activity you do each day  Weigh yourself once a week and record it in your diary  Eating changes: You will need to eat 500 to 1,000 fewer calories each day than you currently eat to lose 1 to 2 pounds a week  The following changes will help you cut calories:  · Eat smaller portions  Use small plates, no larger than 9 inches in diameter  Fill your plate half full of fruits and vegetables  Measure your food using measuring cups until you know what a serving size looks like  · Eat 3 meals and 1 or 2 snacks each day  Plan your meals in advance  Roblero[place] Cornelius and eat at home most of the time  Eat slowly  · Eat fruits and vegetables at every meal   They are low in calories and high in fiber, which makes you feel full  Do not add butter, margarine, or cream sauce to vegetables  Use herbs to season steamed vegetables  · Eat less fat and fewer fried foods  Eat more baked or grilled chicken and fish  These protein sources are lower in calories and fat than red meat  Limit fast food  Dress your salads with olive oil and vinegar instead of bottled dressing  · Limit the amount of sugar you eat  Do not drink sugary beverages  Limit alcohol  Activity changes:  Physical activity is good for your body in many ways   It helps you burn calories and build strong muscles  It decreases stress and depression, and improves your mood  It can also help you sleep better  Talk to your healthcare provider before you begin an exercise program   · Exercise for at least 30 minutes 5 days a week  Start slowly  Set aside time each day for physical activity that you enjoy and that is convenient for you  It is best to do both weight training and an activity that increases your heart rate, such as walking, bicycling, or swimming  · Find ways to be more active  Do yard work and housecleaning  Walk up the stairs instead of using elevators  Spend your leisure time going to events that require walking, such as outdoor festivals or fairs  This extra physical activity can help you lose weight and keep it off  Follow up with your healthcare provider as directed: You may need to meet with a dietitian  Write down your questions so you remember to ask them during your visits  © 2017 [address] Information is for End User's use only and may not be sold, redistributed or otherwise used for commercial purposes  All illustrations and images included in CareNotes® are the copyrighted property of [employer]  or Kar Weaver  The above information is an  only  It is not intended as medical advice for individual conditions or treatments  Talk to your doctor, nurse or pharmacist before following any medical regimen to see if it is safe and effective for you  Urinary Incontinence   WHAT YOU NEED TO KNOW:   What is urinary incontinence? Urinary incontinence (UI) is when you lose control of your bladder  What causes UI? UI occurs because your bladder cannot store or empty urine properly  The following are the most common types of UI:  · Stress incontinence  is when you leak urine due to increased bladder pressure  This may happen when you cough, sneeze, or exercise       · Urge incontinence  is when you feel the need to urinate right away and leak urine accidentally  · Mixed incontinence  is when you have both stress and urge UI  What are the signs and symptoms of UI?   · You feel like your bladder does not empty completely when you urinate  · You urinate often and need to urinate immediately  · You leak urine when you sleep, or you wake up with the urge to urinate  · You leak urine when you cough, sneeze, exercise, or laugh  How is UI diagnosed? Your healthcare provider will ask how often you leak urine and whether you have stress or urge symptoms  Tell him which medicines you take, how often you urinate, and how much liquid you drink each day  You may need any of the following tests:  · Urine tests  may show infection or kidney function  · A pelvic exam  may be done to check for blockages  A pelvic exam will also show if your bladder, uterus, or other organs have moved out of place  · An x-ray, ultrasound, or CT  may show problems with parts of your urinary system  You may be given contrast liquid to help your organs show up better in the pictures  Tell the healthcare provider if you have ever had an allergic reaction to contrast liquid  Do not enter the MRI room with anything metal  Metal can cause serious injury  Tell the healthcare provider if you have any metal in or on your body  · A bladder scan  will show how much urine is left in your bladder after you urinate  You will be asked to urinate and then healthcare providers will use a small ultrasound machine to check the urine left in your bladder  · Cystometry  is used to check the function of your urinary system  Your healthcare provider checks the pressure in your bladder while filling it with fluid  Your bladder pressure may also be tested when your bladder is full and while you urinate  How is UI treated? · Medicines  can help strengthen your bladder control      · Electrical stimulation  is used to send a small amount of electrical energy to your pelvic floor muscles  This helps control your bladder function  Electrodes may be placed outside your body or in your rectum  For women, the electrodes may be placed in the vagina  · A bulking agent  may be injected into the wall of your urethra to make it thicker  This helps keep your urethra closed and decreases urine leakage  · Devices  such as a clamp, pessary, or tampon may help stop urine leaks  Ask your healthcare provider for more information about these and other devices  · Surgery  may be needed if other treatments do not work  Several types of surgery can help improve your bladder control  Ask your healthcare provider for more information about the surgery you may need  How can I manage my symptoms? · Do pelvic muscle exercises often  Your pelvic muscles help you stop urinating  Squeeze these muscles tight for 5 seconds, then relax for 5 seconds  Gradually work up to squeezing for 10 seconds  Do 3 sets of 15 repetitions a day, or as directed  This will help strengthen your pelvic muscles and improve bladder control  · A catheter  may be used to help empty your bladder  A catheter is a tiny, plastic tube that is put into your bladder to drain your urine  Your healthcare provider may tell you to use a catheter to prevent your bladder from getting too full and leaking urine  · Keep a UI record  Write down how often you leak urine and how much you leak  Make a note of what you were doing when you leaked urine  · Train your bladder  Go to the bathroom at set times, such as every 2 hours, even if you do not feel the urge to go  You can also try to hold your urine when you feel the urge to go  For example, hold your urine for 5 minutes when you feel the urge to go  As that becomes easier, hold your urine for 10 minutes  · Drink liquids as directed  Ask your healthcare provider how much liquid to drink each day and which liquids are best for you   You may need to limit the amount of liquid you drink to help control your urine leakage  Limit or do not have drinks that contain caffeine or alcohol  Do not drink any liquid right before you go to bed  · Prevent constipation  Eat a variety of high-fiber foods  Good examples are high-fiber cereals, beans, vegetables, and whole-grain breads  Prune juice may help make your bowel movement softer  Walking is the best way to trigger your intestines to have a bowel movement  · Exercise regularly and maintain a healthy weight  Ask your healthcare provider how much you should weigh and about the best exercise plan for you  Weight loss and exercise will decrease pressure on your bladder and help you control your leakage  Ask him to help you create a weight loss plan if you are overweight  When should I seek immediate care? · You have severe pain  · You are confused or cannot think clearly  When should I contact my healthcare provider? · You have a fever  · You see blood in your urine  · You have pain when you urinate  · You have new or worse pain, even after treatment  · Your mouth feels dry or you have vision changes  · Your urine is cloudy or smells bad  · You have questions or concerns about your condition or care  CARE AGREEMENT:   You have the right to help plan your care  Learn about your health condition and how it may be treated  Discuss treatment options with your caregivers to decide what care you want to receive  You always have the right to refuse treatment  The above information is an  only  It is not intended as medical advice for individual conditions or treatments  Talk to your doctor, nurse or pharmacist before following any medical regimen to see if it is safe and effective for you  © 2017 [address] Information is for End User's use only and may not be sold, redistributed or otherwise used for commercial purposes   All illustrations and images included in CareNotes® are the copyrighted property of [employer] M [employer] Inc  or Kar Weaver  Cigarette Smoking and Your Health   AMBULATORY CARE:   Risks to your health if you smoke:  Nicotine and other chemicals found in tobacco damage every cell in your body  Even if you are a light smoker, you have an increased risk for cancer, heart disease, and lung disease  If you are pregnant or have diabetes, smoking increases your risk for complications  Benefits to your health if you stop smoking:   · You decrease respiratory symptoms such as coughing, wheezing, and shortness of breath  · You reduce your risk for cancers of the lung, mouth, throat, kidney, bladder, pancreas, stomach, and cervix  If you already have cancer, you increase the benefits of chemotherapy  You also reduce your risk for cancer returning or a second cancer from developing  · You reduce your risk for heart disease, blood clots, heart attack, and stroke  · You reduce your risk for lung infections, and diseases such as pneumonia, asthma, chronic bronchitis, and emphysema  · Your circulation improves  More oxygen can be delivered to your body  If you have diabetes, you lower your risk for complications, such as kidney, artery, and eye diseases  You also lower your risk for nerve damage  Nerve damage can lead to amputations, poor vision, and blindness  · You improve your body's ability to heal and to fight infections  Benefits to the health of others if you stop smoking:  Tobacco is harmful to nonsmokers who breathe in your secondhand smoke  The following are ways the health of others around you may improve when you stop smoking:  · You lower the risks for lung cancer and heart disease in nonsmoking adults  · If you are pregnant, you lower the risk for miscarriage, early delivery, low birth weight, and stillbirth  You also lower your baby's risk for SIDS, obesity, developmental delay, and neurobehavioral problems, such as ADHD  · If you have children, you lower their risk for ear infections, colds, pneumonia, bronchitis, and asthma  For more information and support to stop smoking:   · Smokefree  gov  Phone: 8- 413 - 201-1386  Web Address: www smokefrOxatis  Follow up with your healthcare provider as directed:  Write down your questions so you remember to ask them during your visits  © 2017 [address] Information is for End User's use only and may not be sold, redistributed or otherwise used for commercial purposes  All illustrations and images included in CareNotes® are the copyrighted property of [employer]  or Kar Weaver  The above information is an  only  It is not intended as medical advice for individual conditions or treatments  Talk to your doctor, nurse or pharmacist before following any medical regimen to see if it is safe and effective for you  Fall Prevention   WHAT YOU NEED TO KNOW:   What is fall prevention? Fall prevention includes ways to make your home and other areas safer  It also includes ways you can move more carefully to prevent a fall  What increases my risk for falls? · Lack of vitamin D    · Not getting enough sleep each night    · Trouble walking or keeping your balance, or foot problems    · Health conditions that cause changes in your blood pressure, vision, or muscle strength and coordination    · Medicines that make you dizzy, weak, or sleepy    · Problems seeing clearly    · Shoes that have high heels or are not supportive    · Tripping hazards, such as items left on the floor, no handrails on the stairs, or broken steps  How can I help protect myself from falls? · Stand or sit up slowly  This may help you keep your balance and prevent falls  If you need to get up during the night, sit up first  Be sure you are fully awake before you stand  Turn on the light before you start walking  Go slowly in case you are still sleepy   Make sure you will not trip over any pets sleeping in the bedroom  · Use assistive devices as directed  Your healthcare provider may suggest that you use a cane or walker to help you keep your balance  You may need to have grab bars put in your bathroom near the toilet or in the shower  · Wear shoes that fit well and have soles that   Wear shoes both inside and outside  Use slippers with good   Do not wear shoes with high heels  · Wear a personal alarm  This is a device that allows you to call 911 if you fall and need help  Ask your healthcare provider for more information  · Stay active  Exercise can help strengthen your muscles and improve your balance  Your healthcare provider may recommend water aerobics or walking  He or she may also recommend physical therapy to improve your coordination  Never start an exercise program without talking to your healthcare provider first      · Manage medical conditions  Keep all appointments with your healthcare providers  Visit your eye doctor as directed  How can I make my home safer? · Add items to prevent falls in the bathroom  Put nonslip strips on your bath or shower floor to prevent you from slipping  Use a bath mat if you do not have carpet in the bathroom  This will prevent you from falling when you step out of the bath or shower  Use a shower seat so you do not need to stand while you shower  Sit on the toilet or a chair in your bathroom to dry yourself and put on clothing  This will prevent you from losing your balance from drying or dressing yourself while you are standing  · Keep paths clear  Remove books, shoes, and other objects from walkways and stairs  Place cords for telephones and lamps out of the way so that you do not need to walk over them  Tape them down if you cannot move them  Remove small rugs  If you cannot remove a rug, secure it with double-sided tape  This will prevent you from tripping  · Install bright lights in your home  Use night lights to help light paths to the bathroom or kitchen  Always turn on the light before you start walking  · Keep items you use often on shelves within reach  Do not use a step stool to help you reach an item  · Paint or place reflective tape on the edges of your stairs  This will help you see the stairs better  Call 911 or have someone else call if:   · You have fallen and are unconscious  · You have fallen and cannot move part of your body  Contact your healthcare provider if:   · You have fallen and have pain or a headache  · You have questions or concerns about your condition or care  CARE AGREEMENT:   You have the right to help plan your care  Learn about your health condition and how it may be treated  Discuss treatment options with your caregivers to decide what care you want to receive  You always have the right to refuse treatment  The above information is an  only  It is not intended as medical advice for individual conditions or treatments  Talk to your doctor, nurse or pharmacist before following any medical regimen to see if it is safe and effective for you  © 2017 [address] [facility] Information is for End User's use only and may not be sold, redistributed or otherwise used for commercial purposes  All illustrations and images included in CareNotes® are the copyrighted property of [employer]  or Kar Weaver  Advance Directives   WHAT YOU NEED TO KNOW:   What are advance directives? Advance directives are legal documents that state your wishes and plans for medical care  These plans are made ahead of time in case you lose your ability to make decisions for yourself  Advance directives can apply to any medical decision, such as the treatments you want, and if you want to donate organs  What are the types of advance directives? There are many types of advance directives, and each state has rules about how to use them   You may choose a combination of any of the following:  · Living will: This is a written record of the treatment you want  You can also choose which treatments you do not want, which to limit, and which to stop at a certain time  This includes surgery, medicine, IV fluid, and tube feedings  · Durable power of  for healthcare [facility]): This is a written record that states who you want to make healthcare choices for you when you are unable to make them for yourself  This person, called a proxy, is usually a family member or a friend  You may choose more than 1 proxy  · Do not resuscitate (DNR) order:  A DNR order is used in case your heart stops beating or you stop breathing  It is a request not to have certain forms of treatment, such as CPR  A DNR order may be included in other types of advance directives  · Medical directive: This covers the care that you want if you are in a coma, near death, or unable to make decisions for yourself  You can list the treatments you want for each condition  Treatment may include pain medicine, surgery, blood transfusions, dialysis, IV or tube feedings, and a ventilator (breathing machine)  · Values history: This document has questions about your views, beliefs, and how you feel and think about life  This information can help others choose the care that you would choose  Why are advance directives important? An advance directive helps you control your care  Although spoken wishes may be used, it is better to have your wishes written down  Spoken wishes can be misunderstood, or not followed  Treatments may be given even if you do not want them  An advance directive may make it easier for your family to make difficult choices about your care  How do I decide what to put in my advance directives? · Make informed decisions:  Make sure you fully understand treatments or care you may receive   Think about the benefits and problems your decisions could cause for you or your family  Talk to healthcare providers if you have concerns or questions before you write down your wishes  You may also want to talk with your [place] or , or a   Check your state laws to make sure that what you put in your advance directive is legal      · Sign all forms:  Sign and date your advance directive when you have finished  You may also need 2 witnesses to sign the forms  Witnesses cannot be your doctor or his staff, your spouse, heirs or beneficiaries, people you owe money to, or your chosen proxy  Talk to your family, proxy, and healthcare providers about your advance directive  Give each person a copy, and keep one for yourself in a place you can get to easily  Do not keep it hidden or locked away  · Review and revise your plans: You can revise your advance directive at any time, as long as you are able to make decisions  Review your plan every year, and when there are changes in your life, or your health  When you make changes, let your family, proxy, and healthcare providers know  Give each a new copy  Where can I find more information? · [employer]  [address] , [address]   Phone: 3- 951 - [phone]  Phone: 6- 959 - [phone]  Web Address: http://www  aafp org  · [address] [facility])  [address] [facility] [place], [address]  Phone: 0- [phone] - [phone]  Phone: [phone]  Web Address: Kavitha epps  [facility] AGREEMENT:   You have the right to help plan your care  To help with this plan, you must learn about your health condition and treatment options  You must also learn about advance directives and how they are used  Work with your healthcare providers to decide what care will be used to treat you  You always have the right to refuse treatment  The above information is an  only   It is not intended as medical advice for individual conditions or treatments  Talk to your doctor, nurse or pharmacist before following any medical regimen to see if it is safe and effective for you  © 2017 [address] Information is for End User's use only and may not be sold, redistributed or otherwise used for commercial purposes  All illustrations and images included in CareNotes® are the copyrighted property of [employer]  or Kar Weaver  Obesity   AMBULATORY CARE:   Obesity  is when your body mass index (BMI) is greater than 30  Your healthcare provider will use your height and weight to measure your BMI  The risks of obesity include  many health problems, such as injuries or physical disability  You may need tests to check for the following:  · Diabetes     · High blood pressure or high cholesterol     · Heart disease     · Gallbladder or liver disease     · Cancer of the colon, breast, prostate, liver, or kidney     · Sleep apnea     · Arthritis or gout  Seek care immediately if:   · You have a severe headache, confusion, or difficulty speaking  · You have weakness on one side of your body  · You have chest pain, sweating, or shortness of breath  Contact your healthcare provider if:   · You have symptoms of gallbladder or liver disease, such as pain in your upper abdomen  · You have knee or hip pain and discomfort while walking  · You have symptoms of diabetes, such as intense hunger and thirst, and frequent urination  · You have symptoms of sleep apnea, such as snoring or daytime sleepiness  · You have questions or concerns about your condition or care  Treatment for obesity  focuses on helping you lose weight to improve your health  Even a small decrease in BMI can reduce the risk for many health problems  Your healthcare provider will help you set a weight-loss goal   · Lifestyle changes  are the first step in treating obesity  These include making healthy food choices and getting regular physical activity  Your healthcare provider may suggest a weight-loss program that involves coaching, education, and therapy  · Medicine  may help you lose weight when it is used with a healthy diet and physical activity  · Surgery  can help you lose weight if you are very obese and have other health problems  There are several types of weight-loss surgery  Ask your healthcare provider for more information  Be successful losing weight:   · Set small, realistic goals  An example of a small goal is to walk for 20 minutes 5 days a week  Anther goal is to lose 5% of your body weight  · Tell friends, family members, and coworkers about your goals  and ask for their support  Ask a friend to lose weight with you, or join a weight-loss support group  · Identify foods or triggers that may cause you to overeat , and find ways to avoid them  Remove tempting high-calorie foods from your home and workplace  Place a bowl of fresh fruit on your kitchen counter  If stress causes you to eat, then find other ways to cope with stress  · Keep a diary to track what you eat and drink  Also write down how many minutes of physical activity you do each day  Weigh yourself once a week and record it in your diary  Eating changes: You will need to eat 500 to 1,000 fewer calories each day than you currently eat to lose 1 to 2 pounds a week  The following changes will help you cut calories:  · Eat smaller portions  Use small plates, no larger than 9 inches in diameter  Fill your plate half full of fruits and vegetables  Measure your food using measuring cups until you know what a serving size looks like  · Eat 3 meals and 1 or 2 snacks each day  Plan your meals in advance  Alessandra Foil and eat at home most of the time  Eat slowly  · Eat fruits and vegetables at every meal   They are low in calories and high in fiber, which makes you feel full  Do not add butter, margarine, or cream sauce to vegetables   Use herbs to season steamed vegetables  · Eat less fat and fewer fried foods  Eat more baked or grilled chicken and fish  These protein sources are lower in calories and fat than red meat  Limit fast food  Dress your salads with olive oil and vinegar instead of bottled dressing  · Limit the amount of sugar you eat  Do not drink sugary beverages  Limit alcohol  Activity changes:  Physical activity is good for your body in many ways  It helps you burn calories and build strong muscles  It decreases stress and depression, and improves your mood  It can also help you sleep better  Talk to your healthcare provider before you begin an exercise program   · Exercise for at least 30 minutes 5 days a week  Start slowly  Set aside time each day for physical activity that you enjoy and that is convenient for you  It is best to do both weight training and an activity that increases your heart rate, such as walking, bicycling, or swimming  · Find ways to be more active  Do yard work and housecleaning  Walk up the stairs instead of using elevators  Spend your leisure time going to events that require walking, such as outdoor festivals or fairs  This extra physical activity can help you lose weight and keep it off  Follow up with your healthcare provider as directed: You may need to meet with a dietitian  Write down your questions so you remember to ask them during your visits  © 2017 [address] Information is for End User's use only and may not be sold, redistributed or otherwise used for commercial purposes  All illustrations and images included in CareNotes® are the copyrighted property of [employer]  or Kar Weaver  The above information is an  only  It is not intended as medical advice for individual conditions or treatments  Talk to your doctor, nurse or pharmacist before following any medical regimen to see if it is safe and effective for you      Weight Management   AMBULATORY CARE: Why it is important to manage your weight:  Being overweight increases your risk of health conditions such as heart disease, high blood pressure, type 2 diabetes, and certain types of cancer  It can also increase your risk for osteoarthritis, sleep apnea, and other respiratory problems  Aim for a slow, steady weight loss  Even a small amount of weight loss can lower your risk of health problems  How to lose weight safely:  A safe and healthy way to lose weight is to eat fewer calories and get regular exercise  You can lose up about 1 pound a week by decreasing the number of calories you eat by 500 calories each day  You can decrease calories by eating smaller portion sizes or by cutting out high-calorie foods  Read labels to find out how many calories are in the foods you eat  You can also burn calories with exercise such as walking, swimming, or biking  You will be more likely to keep weight off if you make these changes part of your lifestyle  Healthy meal plan for weight management:  A healthy meal plan includes a variety of foods, contains fewer calories, and helps you stay healthy  A healthy meal plan includes the following:  · Eat whole-grain foods more often  A healthy meal plan should contain fiber  Fiber is the part of grains, fruits, and vegetables that is not broken down by your body  Whole-grain foods are healthy and provide extra fiber in your diet  Some examples of whole-grain foods are whole-wheat breads and pastas, oatmeal, brown rice, and bulgur  · Eat a variety of vegetables every day  Include dark, leafy greens such as spinach, kale, mikal greens, and mustard greens  Eat yellow and orange vegetables such as carrots, sweet potatoes, and winter squash  · Eat a variety of fruits every day  Choose fresh or canned fruit (canned in its own juice or light syrup) instead of juice  Fruit juice has very little or no fiber  · Eat low-fat dairy foods    Drink fat-free (skim) milk or 1% milk  Eat fat-free yogurt and low-fat cottage cheese  Try low-fat cheeses such as mozzarella and other reduced-fat cheeses  · Choose meat and other protein foods that are low in fat  Choose beans or other legumes such as split peas or lentils  Choose fish, skinless poultry (chicken or turkey), or lean cuts of red meat (beef or pork)  Before you cook meat or poultry, cut off any visible fat  · Use less fat and oil  Try baking foods instead of frying them  Add less fat, such as margarine, sour cream, regular salad dressing and mayonnaise to foods  Eat fewer high-fat foods  Some examples of high-fat foods include french fries, doughnuts, ice cream, and cakes  · Eat fewer sweets  Limit foods and drinks that are high in sugar  This includes candy, cookies, regular soda, and sweetened drinks  Ways to decrease calories:   · Eat smaller portions  ¨ Use a small plate with smaller servings  ¨ Do not eat second helpings  ¨ When you eat at a restaurant, ask for a box and place half of your meal in the box before you eat  ¨ Share an entrée with someone else  · Replace high-calorie snacks with healthy, low-calorie snacks  ¨ Choose fresh fruit, vegetables, fat-free rice cakes, or air-popped popcorn instead of potato chips, nuts, or chocolate  ¨ Choose water or calorie-free drinks instead of soda or sweetened drinks  · Eat regular meals  Skipping meals can lead to overeating later in the day  Eat a healthy snack in place of a meal if you do not have time to eat a regular meal      · Do not shop for groceries when you are hungry  You may be more likely to make unhealthy food choices  Take a grocery list of healthy foods and shop after you have eaten  Exercise:  Exercise at least 30 minutes per day on most days of the week  Some examples of exercise include walking, biking, dancing, and swimming   You can also fit in more physical activity by taking the stairs instead of the elevator or parking farther away from stores  Ask your healthcare provider about the best exercise plan for you  Other things to consider as you try to lose weight:   · Be aware of situations that may give you the urge to overeat, such as eating while watching television  Find ways to avoid these situations  For example, read a book, go for a walk, or do crafts  · Meet with a weight loss support group or friends who are also trying to lose weight  This may help you stay motivated to continue working on your weight loss goals  © 2017 [address] [facility] Information is for End User's use only and may not be sold, redistributed or otherwise used for commercial purposes  All illustrations and images included in CareNotes® are the copyrighted property of [employer]  or Kar Weaver  The above information is an  only  It is not intended as medical advice for individual conditions or treatments  Talk to your doctor, nurse or pharmacist before following any medical regimen to see if it is safe and effective for you  Low Fat Diet   AMBULATORY CARE:   A low-fat diet  is an eating plan that is low in total fat, unhealthy fat, and cholesterol  You may need to follow a low-fat diet if you have trouble digesting or absorbing fat  You may also need to follow this diet if you have high cholesterol  You can also lower your cholesterol by increasing the amount of fiber in your diet  Soluble fiber is a type of fiber that helps to decrease cholesterol levels  Different types of fat in food:   · Limit unhealthy fats  A diet that is high in cholesterol, saturated fat, and trans fat may cause unhealthy cholesterol levels  Unhealthy cholesterol levels increase your risk of heart disease  ¨ Cholesterol:  Limit intake of cholesterol to less than 200 mg per day  Cholesterol is found in meat, eggs, and dairy  ¨ Saturated fat:  Limit saturated fat to less than 7% of your total daily calories   Ask your dietitian how many calories you need each day  Saturated fat is found in butter, cheese, ice cream, whole milk, and palm oil  Saturated fat is also found in meat, such as beef, pork, chicken skin, and processed meats  Processed meats include sausage, hot dogs, and bologna  ¨ Trans fat:  Avoid trans fat as much as possible  Trans fat is used in fried and baked foods  Foods that say trans fat free on the label may still have up to 0 5 grams of trans fat per serving  · Include healthy fats  Replace foods that are high in saturated and trans fat with foods high in healthy fats  This may help to decrease high cholesterol levels  ¨ Monounsaturated fats: These are found in avocados, nuts, and vegetable oils, such as olive, canola, and sunflower oil  ¨ Polyunsaturated fats: These can be found in vegetable oils, such as soybean or corn oil  Omega-3 fats can help to decrease the risk of heart disease  Omega-3 fats are found in fish, such as salmon, herring, trout, and tuna  Omega-3 fats can also be found in plant foods, such as walnuts, flaxseed, soybeans, and canola oil    Foods to limit or avoid:   · Grains:      ¨ Snacks that are made with partially hydrogenated oils, such as chips, regular crackers, and butter-flavored popcorn    ¨ High-fat baked goods, such as biscuits, croissants, doughnuts, pies, cookies, and pastries    · Dairy:      ¨ Whole milk, 2% milk, and yogurt and ice cream made with whole milk    ¨ Half and half creamer, heavy cream, and whipping cream    ¨ Cheese, cream cheese, and sour cream    · Meats and proteins:      ¨ High-fat cuts of meat (T-bone steak, regular hamburger, and ribs)    ¨ Fried meat, poultry (turkey and chicken), and fish    ¨ Poultry (chicken and turkey) with skin    ¨ Cold cuts (salami or bologna), hot dogs, cordova, and sausage    ¨ Whole eggs and egg yolks    · Vegetables and fruits with added fat:      ¨ Fried vegetables or vegetables in butter or high-fat sauces, such as cream or cheese sauces    ¨ Fried fruit or fruit served with butter or cream    · Fats:      ¨ Butter, stick margarine, and shortening    ¨ Coconut, palm oil, and palm kernel oil  Foods to include:   · Grains:      ¨ Whole-grain breads, cereals, pasta, and brown rice    ¨ Low-fat crackers and pretzels    · Vegetables and fruits:      ¨ Fresh, frozen, or canned vegetables (no salt or low-sodium)    ¨ Fresh, frozen, dried, or canned fruit (canned in light syrup or fruit juice)    ¨ Avocado    · Low-fat dairy products:      ¨ Nonfat (skim) or 1% milk    ¨ Nonfat or low-fat cheese, yogurt, and cottage cheese    · Meats and proteins:      ¨ Chicken or turkey with no skin    ¨ Baked or broiled fish    ¨ Lean beef and pork (loin, round, extra lean hamburger)    ¨ Beans and peas, unsalted nuts, soy products    ¨ Egg whites and substitutes    ¨ Seeds and nuts    · Fats:      ¨ Unsaturated oil, such as canola, olive, peanut, soybean, or sunflower oil    ¨ Soft or liquid margarine and vegetable oil spread    ¨ Low-fat salad dressing  Other ways to decrease fat:   · Read food labels before you buy foods  Choose foods that have less than 30% of calories from fat  Choose low-fat or fat-free dairy products  Remember that fat free does not mean calorie free  These foods still contain calories, and too many calories can lead to weight gain  · Trim fat from meat and avoid fried food  Trim all visible fat from meat before you cook it  Remove the skin from poultry  Do not cantrell meat, fish, or poultry  Bake, roast, boil, or broil these foods instead  Avoid fried foods  Eat a baked potato instead of [place] fries  Steam vegetables instead of sautéing them in butter  · Add less fat to foods  Use imitation cordova bits on salads and baked potatoes instead of regular cordova bits  Use fat-free or low-fat salad dressings instead of regular dressings   Use low-fat or nonfat butter-flavored topping instead of regular butter or margarine on popcorn and other foods  Ways to decrease fat in recipes:  Replace high-fat ingredients with low-fat or nonfat ones  This may cause baked goods to be drier than usual  You may need to use nonfat cooking spray on pans to prevent food from sticking  You also may need to change the amount of other ingredients, such as water, in the recipe  Try the following:  · Use low-fat or light margarine instead of regular margarine or shortening  · Use lean ground turkey breast or chicken, or lean ground beef (less than 5% fat) instead of hamburger  · Add 1 teaspoon of canola oil to 8 ounces of skim milk instead of using cream or half and half  · Use grated zucchini, carrots, or apples in breads instead of coconut  · Use blenderized, low-fat cottage cheese, plain tofu, or low-fat ricotta cheese instead of cream cheese  · Use 1 egg white and 1 teaspoon of canola oil, or use ¼ cup (2 ounces) of fat-free egg substitute instead of a whole egg  · Replace half of the oil that is called for in a recipe with applesauce when you bake  Use 3 tablespoons of cocoa powder and 1 tablespoon of canola oil instead of a square of baking chocolate  How to increase fiber:  Eat enough high-fiber foods to get 20 to 30 grams of fiber every day  Slowly increase your fiber intake to avoid stomach cramps, gas, and other problems  · Eat 3 ounces of whole-grain foods each day  An ounce is about 1 slice of bread  Eat whole-grain breads, such as whole-wheat bread  Whole wheat, whole-wheat flour, or other whole grains should be listed as the first ingredient on the food label  Replace white flour with whole-grain flour or use half of each in recipes  Whole-grain flour is heavier than white flour, so you may have to add more yeast or baking powder  · Eat a high-fiber cereal for breakfast   Oatmeal is a good source of soluble fiber  Look for cereals that have bran or fiber in the name   Choose whole-grain products, such as [place] rice, barley, and whole-wheat pasta  · Eat more beans, peas, and lentils  For example, add beans to soups or salads  Eat at least 5 cups of fruits and vegetables each day  Eat fruits and vegetables with the peel because the peel is high in fiber  © 2017 [address]  Information is for End User's use only and may not be sold, redistributed or otherwise used for commercial purposes  All illustrations and images included in CareNotes® are the copyrighted property of [employer]  or Kar Weaver  The above information is an  only  It is not intended as medical advice for individual conditions or treatments  Talk to your doctor, nurse or pharmacist before following any medical regimen to see if it is safe and effective for you  Heart Healthy Diet   AMBULATORY CARE:   A heart healthy diet  is an eating plan low in total fat, unhealthy fats, and sodium (salt)  A heart healthy diet helps decrease your risk for heart disease and stroke  Limit the amount of fat you eat to 25% to 35% of your total daily calories  Limit sodium to less than 2,300 mg each day  Healthy fats:  Healthy fats can help improve cholesterol levels  The risk for heart disease is decreased when cholesterol levels are normal  Choose healthy fats, such as the following:  · Unsaturated fat  is found in foods such as soybean, canola, olive, corn, and safflower oils  It is also found in soft tub margarine that is made with liquid vegetable oil  · Omega-3 fat  is found in certain fish, such as salmon, tuna, and trout, and in walnuts and flaxseed  Unhealthy fats:  Unhealthy fats can cause unhealthy cholesterol levels in your blood and increase your risk of heart disease  Limit unhealthy fats, such as the following:  · Cholesterol  is found in animal foods, such as eggs and lobster, and in dairy products made from whole milk  Limit cholesterol to less than 300 milligrams (mg) each day   You may need to limit cholesterol to 200 mg each day if you have heart disease  · Saturated fat  is found in meats, such as cordova and hamburger  It is also found in chicken or turkey skin, whole milk, and butter  Limit saturated fat to less than 7% of your total daily calories  Limit saturated fat to less than 6% if you have heart disease or are at increased risk for it  · Trans fat  is found in packaged foods, such as potato chips and cookies  It is also in hard margarine, some fried foods, and shortening  Avoid trans fats as much as possible    Heart healthy foods and drinks to include:  Ask your dietitian or healthcare provider how many servings to have from each of the following food groups:  · Grains:      ¨ Whole-wheat breads, cereals, and pastas, and brown rice    ¨ Low-fat, low-sodium crackers and chips    · Vegetables:      ¨ Broccoli, green beans, green peas, and spinach    ¨ Collards, kale, and lima beans    ¨ Carrots, sweet potatoes, tomatoes, and peppers    ¨ Canned vegetables with no salt added    · Fruits:      ¨ Bananas, peaches, pears, and pineapple    ¨ Grapes, raisins, and dates    ¨ Oranges, tangerines, grapefruit, orange juice, and grapefruit juice    ¨ Apricots, mangoes, melons, and papaya    ¨ Raspberries and strawberries    ¨ Canned fruit with no added sugar    · Low-fat dairy products:      ¨ Nonfat (skim) milk, 1% milk, and low-fat almond, cashew, or soy milks fortified with calcium    ¨ Low-fat cheese, regular or frozen yogurt, and cottage cheese    · Meats and proteins , such as lean cuts of beef and pork (loin, leg, round), skinless chicken and turkey, legumes, soy products, egg whites, and nuts  Foods and drinks to limit or avoid:  Ask your dietitian or healthcare provider about these and other foods that are high in unhealthy fat, sodium, and sugar:  · Snack or packaged foods , such as frozen dinners, cookies, macaroni and cheese, and cereals with more than 300 mg of sodium per serving    · Canned or dry mixes  for cakes, soups, sauces, or gravies    · Vegetables with added sodium , such as instant potatoes, vegetables with added sauces, or regular canned vegetables    · Other foods high in sodium , such as ketchup, barbecue sauce, salad dressing, pickles, olives, soy sauce, and miso    · High-fat dairy foods  such as whole or 2% milk, cream cheese, or sour cream, and cheeses     · High-fat protein foods  such as high-fat cuts of beef (T-bone steaks, ribs), chicken or turkey with skin, and organ meats, such as liver    · Cured or smoked meats , such as hot dogs, cordova, and sausage    · Unhealthy fats and oils , such as butter, stick margarine, shortening, and cooking oils such as coconut or palm oil    · Food and drinks high in sugar , such as soft drinks (soda), sports drinks, sweetened tea, candy, cake, cookies, pies, and doughnuts  Other diet guidelines to follow:   · Eat more foods containing omega-3 fats  Eat fish high in omega-3 fats at least 2 times a week  · Limit alcohol  Too much alcohol can damage your heart and raise your blood pressure  Women should limit alcohol to 1 drink a day  Men should limit alcohol to 2 drinks a day  A drink of alcohol is 12 ounces of beer, 5 ounces of wine, or 1½ ounces of liquor  · Choose low-sodium foods  High-sodium foods can lead to high blood pressure  Add little or no salt to food you prepare  Use herbs and spices in place of salt  · Eat more fiber  to help lower cholesterol levels  Eat at least 5 servings of fruits and vegetables each day  Eat 3 ounces of whole-grain foods each day  Legumes (beans) are also a good source of fiber  Lifestyle guidelines:   · Do not smoke  Nicotine and other chemicals in cigarettes and cigars can cause lung and heart damage  Ask your healthcare provider for information if you currently smoke and need help to quit  E-cigarettes or smokeless tobacco still contain nicotine   Talk to your healthcare provider before you use these products  · Exercise regularly  to help you maintain a healthy weight and improve your blood pressure and cholesterol levels  Ask your healthcare provider about the best exercise plan for you  Do not start an exercise program without asking your healthcare provider  Follow up with your healthcare provider as directed:  Write down your questions so you remember to ask them during your visits  © 2017 [address]  Information is for End User's use only and may not be sold, redistributed or otherwise used for commercial purposes  All illustrations and images included in CareNotes® are the copyrighted property of [employer]"  or Kar Weaver  The above information is an  only  It is not intended as medical advice for individual conditions or treatments  Talk to your doctor, nurse or pharmacist before following any medical regimen to see if it is safe and effective for you

## 2019-01-14 DIAGNOSIS — I10 ESSENTIAL HYPERTENSION: ICD-10-CM

## 2019-01-14 RX ORDER — LOSARTAN POTASSIUM 100 MG/1
100 TABLET ORAL DAILY
Qty: 90 TABLET | Refills: 2 | Status: SHIPPED | OUTPATIENT
Start: 2019-01-14 | End: 2019-08-23 | Stop reason: SDUPTHER

## 2019-02-25 DIAGNOSIS — N40.0 BENIGN PROSTATIC HYPERPLASIA, UNSPECIFIED WHETHER LOWER URINARY TRACT SYMPTOMS PRESENT: ICD-10-CM

## 2019-02-25 RX ORDER — TAMSULOSIN HYDROCHLORIDE 0.4 MG/1
0.4 CAPSULE ORAL DAILY
Qty: 90 CAPSULE | Refills: 2 | Status: SHIPPED | OUTPATIENT
Start: 2019-02-25 | End: 2019-11-08 | Stop reason: SDUPTHER

## 2019-04-20 DIAGNOSIS — K51.90 ULCERATIVE COLITIS WITHOUT COMPLICATIONS, UNSPECIFIED LOCATION (HCC): ICD-10-CM

## 2019-04-20 DIAGNOSIS — M10.9 GOUT, UNSPECIFIED CAUSE, UNSPECIFIED CHRONICITY, UNSPECIFIED SITE: ICD-10-CM

## 2019-04-21 RX ORDER — SULFASALAZINE 500 MG/1
TABLET ORAL
Qty: 360 TABLET | Refills: 1 | Status: SHIPPED | OUTPATIENT
Start: 2019-04-21 | End: 2019-05-22 | Stop reason: SDUPTHER

## 2019-04-21 RX ORDER — ALLOPURINOL 100 MG/1
TABLET ORAL
Qty: 90 TABLET | Refills: 1 | Status: SHIPPED | OUTPATIENT
Start: 2019-04-21 | End: 2019-05-22 | Stop reason: SDUPTHER

## 2019-05-22 DIAGNOSIS — K51.90 ULCERATIVE COLITIS WITHOUT COMPLICATIONS, UNSPECIFIED LOCATION (HCC): ICD-10-CM

## 2019-05-22 DIAGNOSIS — M10.9 GOUT, UNSPECIFIED CAUSE, UNSPECIFIED CHRONICITY, UNSPECIFIED SITE: ICD-10-CM

## 2019-05-22 RX ORDER — ALLOPURINOL 100 MG/1
100 TABLET ORAL DAILY
Qty: 90 TABLET | Refills: 1 | Status: SHIPPED | OUTPATIENT
Start: 2019-05-22 | End: 2019-10-16 | Stop reason: SDUPTHER

## 2019-05-22 RX ORDER — SULFASALAZINE 500 MG/1
TABLET ORAL
Qty: 360 TABLET | Refills: 1 | Status: SHIPPED | OUTPATIENT
Start: 2019-05-22 | End: 2019-10-16 | Stop reason: SDUPTHER

## 2019-06-19 ENCOUNTER — APPOINTMENT (OUTPATIENT)
Dept: LAB | Facility: HOSPITAL | Age: 84
End: 2019-06-19
Payer: COMMERCIAL

## 2019-06-19 DIAGNOSIS — I10 ESSENTIAL HYPERTENSION: ICD-10-CM

## 2019-06-19 DIAGNOSIS — M10.9 GOUT, UNSPECIFIED CAUSE, UNSPECIFIED CHRONICITY, UNSPECIFIED SITE: ICD-10-CM

## 2019-06-19 DIAGNOSIS — N18.30 CHRONIC KIDNEY DISEASE, STAGE 3 (HCC): ICD-10-CM

## 2019-06-19 DIAGNOSIS — R73.01 IMPAIRED FASTING GLUCOSE: ICD-10-CM

## 2019-06-19 LAB
ALBUMIN SERPL BCP-MCNC: 3.5 G/DL (ref 3.5–5)
ALP SERPL-CCNC: 72 U/L (ref 46–116)
ALT SERPL W P-5'-P-CCNC: 21 U/L (ref 12–78)
ANION GAP SERPL CALCULATED.3IONS-SCNC: 8 MMOL/L (ref 4–13)
AST SERPL W P-5'-P-CCNC: 20 U/L (ref 5–45)
BASOPHILS # BLD AUTO: 0.06 THOUSANDS/ΜL (ref 0–0.1)
BASOPHILS NFR BLD AUTO: 1 % (ref 0–1)
BILIRUB SERPL-MCNC: 0.5 MG/DL (ref 0.2–1)
BUN SERPL-MCNC: 33 MG/DL (ref 5–25)
CALCIUM SERPL-MCNC: 8.6 MG/DL (ref 8.3–10.1)
CHLORIDE SERPL-SCNC: 108 MMOL/L (ref 100–108)
CHOLEST SERPL-MCNC: 159 MG/DL (ref 50–200)
CO2 SERPL-SCNC: 27 MMOL/L (ref 21–32)
CREAT SERPL-MCNC: 1.75 MG/DL (ref 0.6–1.3)
EOSINOPHIL # BLD AUTO: 0.2 THOUSAND/ΜL (ref 0–0.61)
EOSINOPHIL NFR BLD AUTO: 3 % (ref 0–6)
ERYTHROCYTE [DISTWIDTH] IN BLOOD BY AUTOMATED COUNT: 14.1 % (ref 11.6–15.1)
EST. AVERAGE GLUCOSE BLD GHB EST-MCNC: 85 MG/DL
GFR SERPL CREATININE-BSD FRML MDRD: 35 ML/MIN/1.73SQ M
GLUCOSE P FAST SERPL-MCNC: 80 MG/DL (ref 65–99)
HBA1C MFR BLD: 4.6 % (ref 4.2–6.3)
HCT VFR BLD AUTO: 37.2 % (ref 36.5–49.3)
HDLC SERPL-MCNC: 36 MG/DL (ref 40–60)
HGB BLD-MCNC: 11.6 G/DL (ref 12–17)
IMM GRANULOCYTES # BLD AUTO: 0.03 THOUSAND/UL (ref 0–0.2)
IMM GRANULOCYTES NFR BLD AUTO: 0 % (ref 0–2)
LDLC SERPL CALC-MCNC: 96 MG/DL (ref 0–100)
LYMPHOCYTES # BLD AUTO: 1.47 THOUSANDS/ΜL (ref 0.6–4.47)
LYMPHOCYTES NFR BLD AUTO: 22 % (ref 14–44)
MCH RBC QN AUTO: 29.5 PG (ref 26.8–34.3)
MCHC RBC AUTO-ENTMCNC: 31.2 G/DL (ref 31.4–37.4)
MCV RBC AUTO: 95 FL (ref 82–98)
MONOCYTES # BLD AUTO: 0.61 THOUSAND/ΜL (ref 0.17–1.22)
MONOCYTES NFR BLD AUTO: 9 % (ref 4–12)
NEUTROPHILS # BLD AUTO: 4.3 THOUSANDS/ΜL (ref 1.85–7.62)
NEUTS SEG NFR BLD AUTO: 65 % (ref 43–75)
NONHDLC SERPL-MCNC: 123 MG/DL
NRBC BLD AUTO-RTO: 0 /100 WBCS
PLATELET # BLD AUTO: 208 THOUSANDS/UL (ref 149–390)
PMV BLD AUTO: 11 FL (ref 8.9–12.7)
POTASSIUM SERPL-SCNC: 4.4 MMOL/L (ref 3.5–5.3)
PROT SERPL-MCNC: 7.7 G/DL (ref 6.4–8.2)
RBC # BLD AUTO: 3.93 MILLION/UL (ref 3.88–5.62)
SODIUM SERPL-SCNC: 143 MMOL/L (ref 136–145)
TRIGL SERPL-MCNC: 134 MG/DL
TSH SERPL DL<=0.05 MIU/L-ACNC: 3.39 UIU/ML (ref 0.36–3.74)
URATE SERPL-MCNC: 6.3 MG/DL (ref 4.2–8)
WBC # BLD AUTO: 6.67 THOUSAND/UL (ref 4.31–10.16)

## 2019-06-19 PROCEDURE — 84550 ASSAY OF BLOOD/URIC ACID: CPT

## 2019-06-19 PROCEDURE — 83036 HEMOGLOBIN GLYCOSYLATED A1C: CPT

## 2019-06-19 PROCEDURE — 80053 COMPREHEN METABOLIC PANEL: CPT

## 2019-06-19 PROCEDURE — 85025 COMPLETE CBC W/AUTO DIFF WBC: CPT

## 2019-06-19 PROCEDURE — 36415 COLL VENOUS BLD VENIPUNCTURE: CPT

## 2019-06-19 PROCEDURE — 80061 LIPID PANEL: CPT

## 2019-06-19 PROCEDURE — 84443 ASSAY THYROID STIM HORMONE: CPT

## 2019-06-24 ENCOUNTER — OFFICE VISIT (OUTPATIENT)
Dept: FAMILY MEDICINE CLINIC | Facility: HOSPITAL | Age: 84
End: 2019-06-24
Payer: COMMERCIAL

## 2019-06-24 VITALS
HEART RATE: 83 BPM | WEIGHT: 230 LBS | TEMPERATURE: 97.6 F | DIASTOLIC BLOOD PRESSURE: 84 MMHG | SYSTOLIC BLOOD PRESSURE: 132 MMHG | BODY MASS INDEX: 36.96 KG/M2 | HEIGHT: 66 IN

## 2019-06-24 DIAGNOSIS — R73.01 IMPAIRED FASTING GLUCOSE: Primary | ICD-10-CM

## 2019-06-24 DIAGNOSIS — I10 ESSENTIAL HYPERTENSION: ICD-10-CM

## 2019-06-24 DIAGNOSIS — M10.9 GOUT, UNSPECIFIED CAUSE, UNSPECIFIED CHRONICITY, UNSPECIFIED SITE: ICD-10-CM

## 2019-06-24 DIAGNOSIS — N18.30 CHRONIC KIDNEY DISEASE, STAGE 3 (HCC): ICD-10-CM

## 2019-06-24 PROCEDURE — 1036F TOBACCO NON-USER: CPT | Performed by: INTERNAL MEDICINE

## 2019-06-24 PROCEDURE — 3075F SYST BP GE 130 - 139MM HG: CPT | Performed by: INTERNAL MEDICINE

## 2019-06-24 PROCEDURE — 99214 OFFICE O/P EST MOD 30 MIN: CPT | Performed by: INTERNAL MEDICINE

## 2019-06-24 PROCEDURE — 3079F DIAST BP 80-89 MM HG: CPT | Performed by: INTERNAL MEDICINE

## 2019-08-23 DIAGNOSIS — R60.9 EDEMA, UNSPECIFIED TYPE: ICD-10-CM

## 2019-08-23 DIAGNOSIS — I10 ESSENTIAL HYPERTENSION: ICD-10-CM

## 2019-08-23 RX ORDER — LOSARTAN POTASSIUM 100 MG/1
100 TABLET ORAL DAILY
Qty: 90 TABLET | Refills: 2 | Status: SHIPPED | OUTPATIENT
Start: 2019-08-23 | End: 2019-10-01 | Stop reason: SDUPTHER

## 2019-08-23 RX ORDER — FUROSEMIDE 40 MG/1
20 TABLET ORAL DAILY
Qty: 45 TABLET | Refills: 2 | Status: SHIPPED | OUTPATIENT
Start: 2019-08-23 | End: 2020-03-09 | Stop reason: SDUPTHER

## 2019-09-30 ENCOUNTER — OFFICE VISIT (OUTPATIENT)
Dept: FAMILY MEDICINE CLINIC | Facility: HOSPITAL | Age: 84
End: 2019-09-30
Payer: COMMERCIAL

## 2019-09-30 VITALS
TEMPERATURE: 97.8 F | HEIGHT: 66 IN | WEIGHT: 224 LBS | DIASTOLIC BLOOD PRESSURE: 82 MMHG | HEART RATE: 89 BPM | BODY MASS INDEX: 36 KG/M2 | SYSTOLIC BLOOD PRESSURE: 130 MMHG

## 2019-09-30 DIAGNOSIS — K51.90 ULCERATIVE COLITIS WITHOUT COMPLICATIONS, UNSPECIFIED LOCATION (HCC): ICD-10-CM

## 2019-09-30 DIAGNOSIS — H26.9 CATARACT OF RIGHT EYE, UNSPECIFIED CATARACT TYPE: ICD-10-CM

## 2019-09-30 DIAGNOSIS — Z01.818 PREOPERATIVE CLEARANCE: Primary | ICD-10-CM

## 2019-09-30 DIAGNOSIS — I10 ESSENTIAL HYPERTENSION: ICD-10-CM

## 2019-09-30 DIAGNOSIS — M10.9 GOUT, UNSPECIFIED CAUSE, UNSPECIFIED CHRONICITY, UNSPECIFIED SITE: ICD-10-CM

## 2019-09-30 DIAGNOSIS — N18.30 CHRONIC KIDNEY DISEASE, STAGE 3 (HCC): ICD-10-CM

## 2019-09-30 DIAGNOSIS — Z23 NEED FOR INFLUENZA VACCINATION: ICD-10-CM

## 2019-09-30 DIAGNOSIS — R73.01 IMPAIRED FASTING GLUCOSE: ICD-10-CM

## 2019-09-30 PROCEDURE — 90662 IIV NO PRSV INCREASED AG IM: CPT | Performed by: INTERNAL MEDICINE

## 2019-09-30 PROCEDURE — 99214 OFFICE O/P EST MOD 30 MIN: CPT | Performed by: INTERNAL MEDICINE

## 2019-09-30 PROCEDURE — 3075F SYST BP GE 130 - 139MM HG: CPT | Performed by: INTERNAL MEDICINE

## 2019-09-30 PROCEDURE — 93000 ELECTROCARDIOGRAM COMPLETE: CPT | Performed by: INTERNAL MEDICINE

## 2019-09-30 PROCEDURE — 3079F DIAST BP 80-89 MM HG: CPT | Performed by: INTERNAL MEDICINE

## 2019-09-30 PROCEDURE — 90471 IMMUNIZATION ADMIN: CPT | Performed by: INTERNAL MEDICINE

## 2019-09-30 RX ORDER — OFLOXACIN 3 MG/ML
SOLUTION/ DROPS OPHTHALMIC
Refills: 3 | COMMUNITY
Start: 2019-06-27

## 2019-09-30 NOTE — PROGRESS NOTES
Subjective:     Cristóbal Cruz is a 80 y o  male who presents to the office today for a preoperative consultation at the request of surgeon Dr Lyndon Kidd who plans on performing R cataract extraction with IOLI on October 29, 2019  Prior anesthesia adverse reactions - None    Exercise capacity - Able to walk 4 blocks or climb 2 flights of stairs without symptoms - Yes    Easy bleeding/bruising - No    Chest pain/palpitation/edema - No    Dyspnea/wheezing/cough - No    Sleep apnea/COPD/asthma - No    HPI Pt here for clearance for R cataract surgery with IOLI - he has noted some issues with clarity of the vision  Past Medical History:   Diagnosis Date    Ankle edema     last assessed 17Nov2016    Depression     last assessed [id number]    Hypertension     Nephrolithiasis     Nocturia     last assessed 31Mar2016    Posterior tibial tendinitis of right lower extremity     last assessed [id number]   ispe  Past Surgical History:   Procedure Laterality Date    ANAL FISSURECTOMY      COLONOSCOPY  04/22/2009    every 2 years    COLONOSCOPY  03/04/2013    2 yrs d/t h/o polyp    COLONOSCOPY  02/15/2016    colo 2/15/16-repeat 2 yrs    SHOULDER SURGERY     nse Refill      90 tablet 1                  furosemide (LASIX) 40 mg tablet Take 0 5 tablets (20 mg total) by mouth daily 45 tablet 2    losartan (COZAAR) 100 MG tablet Take 1 tablet (100 mg total) by mouth daily for 90 days 90 tablet 2    Multiple Vitamins-Minerals (PRESERVISION AREDS) CAPS Take 1 capsule by mouth daily      ofloxacin (OCUFLOX) 0 3 % ophthalmic solution Install 1 drop in the L eye 4 times daily for 5 days after eye injection as directed  3    sulfaSALAzine (AZULFIDINE) 500 mg tablet 1 tab PO q am 2 tab PO at noon and 1 tab PO q evening 360 tablet 1    tamsulosin (FLOMAX) 0 4 mg Take 1 capsule (0 4 mg total) by mouth daily 90 capsule 2     No current facility-administered medications for this visit  Patient has no known allergies  Review of Systems  Review of Systems   Constitutional: Negative for chills and fever  HENT: Negative for congestion and sinus pain  Eyes: Positive for visual disturbance  Negative for pain and redness  Respiratory: Negative for cough and shortness of breath  Cardiovascular: Negative for chest pain, palpitations and leg swelling  Gastrointestinal: Negative for abdominal pain, blood in stool, constipation, diarrhea, nausea and vomiting  Endocrine: Negative for polydipsia and polyuria  Genitourinary: Negative for difficulty urinating and dysuria  Musculoskeletal: Negative for arthralgias and myalgias  Skin: Negative for rash and wound  Neurological: Negative for dizziness, syncope and headaches  Hematological: Does not bruise/bleed easily  Psychiatric/Behavioral: Negative for behavioral problems and confusion  Objective:    /82 (BP Location: Right arm, Patient Position: Sitting, Cuff Size: Standard)   Pulse 89   Temp 97 8 °F (36 6 °C)   Ht 5' 6" (1 676 m)   Wt 102 kg (224 lb)   BMI 36 15 kg/m²     Physical Exam   Constitutional: He appears well-developed and well-nourished  No distress  HENT:   Head: Normocephalic and atraumatic  Right Ear: External ear normal    Left Ear: External ear normal    Mouth/Throat: Oropharynx is clear and moist    Eyes: Conjunctivae are normal  Right eye exhibits no discharge  Left eye exhibits no discharge  Neck: Neck supple  No tracheal deviation present  Cardiovascular: Normal rate, regular rhythm and normal heart sounds  No murmur heard  Pulmonary/Chest: Effort normal and breath sounds normal  No respiratory distress  He has no wheezes  He has no rales  Abdominal: Soft  Bowel sounds are normal  There is no tenderness  There is no guarding  Musculoskeletal: Normal range of motion  He exhibits no deformity  Lymphadenopathy:     He has no cervical adenopathy  Neurological: He is alert  He exhibits normal muscle tone  Skin: Skin is warm and dry  No rash noted  Psychiatric: He has a normal mood and affect  His behavior is normal  Thought content normal    Nursing note and vitals reviewed          Cardiographics  EC19:  NSR @ 75 bpm  nml axis, early R waver progression, nml [place] and QTC, T wave flattening III, avL and avF but no acute ischemic changes    Imaging  Chest x-ray: not indicated    Lab Review   Recent labs: CBC/CMP/A1C/TSH/FLP/uric acid from  reviewed    Assessment:    Patient Active Problem List   Diagnosis    Ulcerative colitis without complications ([address] )    Snoring    Pain in joint of left shoulder    Obesity    Nocturia    Macular degeneration    Impaired fasting glucose    Hypertension    Gout    Complete tear of left rotator cuff    Chronic kidney disease, stage 3 (HCC)    Benign neoplasm of large intestine        Plan:     Surgical Clearance - Cleared    Risk - Pt low risk for low risk surgery     Discussion/Summary:   (1) Preoperative clearance - pt low risk for low risk surgery, BW from  and ECG from  reviewed, no further w/u is needed, forms filled out and faxed to Dr Roman Farrell office    (2) Cataract OD - for IOLI 10/19    (3) HTN - BP at goal, con't current meds, diet/exerise/wgt loss encouraged    (4) CKD stage 3 - stable, importance of BP/BS control and avoiding NSAIDs reviewed    (5) IFG - annual labs reviewed, con't healthy diet and increase activity and get wgt down    (6) UC w/o complications - con't sulfasalazine as directed    (7) Gout - no recent flares, Uric acid  6 3, con't Allopurinol as directed    Pt agreeable to flu vaccine today     Myke Grande DO

## 2019-10-01 DIAGNOSIS — I10 ESSENTIAL HYPERTENSION: ICD-10-CM

## 2019-10-01 RX ORDER — LOSARTAN POTASSIUM 100 MG/1
100 TABLET ORAL DAILY
Qty: 90 TABLET | Refills: 1 | Status: SHIPPED | OUTPATIENT
Start: 2019-10-01 | End: 2020-03-09 | Stop reason: SDUPTHER

## 2019-10-16 DIAGNOSIS — K51.90 ULCERATIVE COLITIS WITHOUT COMPLICATIONS, UNSPECIFIED LOCATION (HCC): ICD-10-CM

## 2019-10-16 DIAGNOSIS — M10.9 GOUT, UNSPECIFIED CAUSE, UNSPECIFIED CHRONICITY, UNSPECIFIED SITE: ICD-10-CM

## 2019-10-16 RX ORDER — SULFASALAZINE 500 MG/1
TABLET ORAL
Qty: 360 TABLET | Refills: 1 | Status: SHIPPED | OUTPATIENT
Start: 2019-10-16 | End: 2020-05-10

## 2019-10-16 RX ORDER — ALLOPURINOL 100 MG/1
TABLET ORAL
Qty: 90 TABLET | Refills: 1 | Status: SHIPPED | OUTPATIENT
Start: 2019-10-16 | End: 2020-05-10

## 2019-11-08 DIAGNOSIS — N40.0 BENIGN PROSTATIC HYPERPLASIA, UNSPECIFIED WHETHER LOWER URINARY TRACT SYMPTOMS PRESENT: ICD-10-CM

## 2019-11-08 RX ORDER — TAMSULOSIN HYDROCHLORIDE 0.4 MG/1
0.4 CAPSULE ORAL DAILY
Qty: 90 CAPSULE | Refills: 2 | Status: SHIPPED | OUTPATIENT
Start: 2019-11-08 | End: 2020-03-09 | Stop reason: SDUPTHER

## 2019-12-06 ENCOUNTER — OFFICE VISIT (OUTPATIENT)
Dept: FAMILY MEDICINE CLINIC | Facility: HOSPITAL | Age: 84
End: 2019-12-06
Payer: COMMERCIAL

## 2019-12-06 VITALS
BODY MASS INDEX: 36.64 KG/M2 | SYSTOLIC BLOOD PRESSURE: 132 MMHG | DIASTOLIC BLOOD PRESSURE: 82 MMHG | TEMPERATURE: 98 F | WEIGHT: 228 LBS | HEIGHT: 66 IN | HEART RATE: 92 BPM

## 2019-12-06 DIAGNOSIS — H26.9 CATARACT OF LEFT EYE, UNSPECIFIED CATARACT TYPE: ICD-10-CM

## 2019-12-06 DIAGNOSIS — Z00.00 MEDICARE ANNUAL WELLNESS VISIT, SUBSEQUENT: ICD-10-CM

## 2019-12-06 DIAGNOSIS — M10.9 GOUT, UNSPECIFIED CAUSE, UNSPECIFIED CHRONICITY, UNSPECIFIED SITE: ICD-10-CM

## 2019-12-06 DIAGNOSIS — N18.30 CHRONIC KIDNEY DISEASE, STAGE 3 (HCC): ICD-10-CM

## 2019-12-06 DIAGNOSIS — Z01.818 PREOPERATIVE CLEARANCE: Primary | ICD-10-CM

## 2019-12-06 DIAGNOSIS — E66.01 CLASS 2 SEVERE OBESITY DUE TO EXCESS CALORIES WITH SERIOUS COMORBIDITY AND BODY MASS INDEX (BMI) OF 36.0 TO 36.9 IN ADULT (HCC): ICD-10-CM

## 2019-12-06 DIAGNOSIS — I10 ESSENTIAL HYPERTENSION: ICD-10-CM

## 2019-12-06 DIAGNOSIS — R73.01 IMPAIRED FASTING GLUCOSE: ICD-10-CM

## 2019-12-06 PROCEDURE — G0439 PPPS, SUBSEQ VISIT: HCPCS | Performed by: INTERNAL MEDICINE

## 2019-12-06 PROCEDURE — 1036F TOBACCO NON-USER: CPT | Performed by: INTERNAL MEDICINE

## 2019-12-06 PROCEDURE — 3079F DIAST BP 80-89 MM HG: CPT | Performed by: INTERNAL MEDICINE

## 2019-12-06 PROCEDURE — 3075F SYST BP GE 130 - 139MM HG: CPT | Performed by: INTERNAL MEDICINE

## 2019-12-06 PROCEDURE — 99214 OFFICE O/P EST MOD 30 MIN: CPT | Performed by: INTERNAL MEDICINE

## 2019-12-06 NOTE — PATIENT INSTRUCTIONS

## 2019-12-06 NOTE — PROGRESS NOTES
Subjective:     Deborah Florence is a 80 y o  male who presents to the office today for a preoperative consultation at the request of surgeon Dr Nesha Elizabeth who plans on performing L cataract surgery with IOLI on December 13, 2019  Prior anesthesia adverse reactions - None    Exercise capacity - Able to walk 4 blocks or climb 2 flights of stairs without symptoms - Yes    Easy bleeding/bruising - No    Chest pain/palpitation/edema - No    Dyspnea/wheezing - No, he has an intermittent cough and runny nose but notes no F/C    Sleep apnea/COPD/asthma - No    HPI Pt here for Preoperative eval and AWV  He is having L cataract surgery next week  He notes blurry vision in his L eye currently    Past Medical History:   Diagnosis Date    Ankle edema     last assessed [id number]    Depression     last assessed [id number]    Hypertension     Nephrolithiasis     Nocturia     last assessed [id number]    Posterior tibial tendinitis of right lower extremity     last assessed [id number]       Past Medical History:   Diagnosis Date    Ankle edema     last assessed [id number]    Depression     last assessed [id number]    Hypertension     Nephrolithiasis     Nocturia     last assessed [id number]                            Take 1 mg by mouth daily        furosemide (LASIX) 40 mg tablet Take 0 5 tablets (20 mg total) by mouth daily 45 tablet 2    losartan (COZAAR) 100 MG tablet Take 1 tablet (100 mg total) by mouth daily 90 tablet 1    Multiple Vitamins-Minerals (PRESERVISION AREDS) CAPS Take 1 capsule by mouth daily      ofloxacin (OCUFLOX) 0 3 % ophthalmic solution Install 1 drop in the L eye 4 times daily for 5 days after eye injection as directed  3    sulfaSALAzine (AZULFIDINE) 500 mg tablet TAKE 1 TABLET BY MOUTH  EVERY MORNING , 2 TABLETS  AT NOON AND 1 TABLET EVERY  EVENING 360 tablet 1    tamsulosin (FLOMAX) 0 4 mg Take 1 capsule (0 4 mg total) by mouth daily 90 capsule 2     No current facility-administered medications for this visit  Patient has no known allergies  Review of Systems  Review of Systems   Constitutional: Negative for chills and fever  HENT: Positive for hearing loss  Negative for congestion and sinus pain  Eyes: Positive for visual disturbance  Negative for pain and redness  Respiratory: Positive for cough  Negative for shortness of breath and wheezing  Cardiovascular: Negative for chest pain, palpitations and leg swelling  Gastrointestinal: Negative for abdominal pain, blood in stool, constipation, diarrhea, nausea and vomiting  Endocrine: Negative for polydipsia and polyuria  Genitourinary: Negative for difficulty urinating, dysuria and hematuria  Musculoskeletal: Positive for arthralgias  Negative for myalgias  Skin: Negative for rash and wound  Neurological: Negative for dizziness and headaches  Hematological: Does not bruise/bleed easily  Psychiatric/Behavioral: Negative for behavioral problems, confusion and dysphoric mood  The patient is not nervous/anxious  Objective:    /82 (BP Location: Right arm, Patient Position: Sitting, Cuff Size: Standard)   Pulse 92   Temp 98 °F (36 7 °C)   Ht 5' 6" (1 676 m)   Wt 103 kg (228 lb)   BMI 36 80 kg/m²      Physical Exam   Constitutional: He appears well-developed and well-nourished  No distress  HENT:   Head: Normocephalic and atraumatic  Right Ear: External ear normal    Left Ear: External ear normal    Mouth/Throat: Oropharynx is clear and moist    +[place]   Eyes: Conjunctivae are normal  Right eye exhibits no discharge  Left eye exhibits no discharge  Neck: Neck supple  No tracheal deviation present  Cardiovascular: Normal rate, regular rhythm and normal heart sounds  No murmur heard  Neg carotid bruits B/L   Pulmonary/Chest: Effort normal and breath sounds normal  No respiratory distress  He has no wheezes  He has no rales  Abdominal: Soft  Bowel sounds are normal  There is no tenderness   There is no guarding  obese   Musculoskeletal: Normal range of motion  He exhibits no deformity  Lymphadenopathy:     He has no cervical adenopathy  Neurological: He is alert  He exhibits normal muscle tone  Skin: Skin is warm and dry  No rash noted  Psychiatric: He has a normal mood and affect  His behavior is normal  Thought content normal    Nursing note and vitals reviewed      Cardiographics  EC19    Imaging  Chest x-ray: not indicated    Lab Review   Recent labs: 19    Assessment:    Patient Active Problem List   Diagnosis    Ulcerative colitis without complications ([address] )    Snoring    Pain in joint of left shoulder    Obesity    Nocturia    Macular degeneration    Impaired fasting glucose    Hypertension    Gout    Complete tear of left rotator cuff    Chronic kidney disease, stage 3 (HCC)    Benign neoplasm of large intestine       Plan:    Surgical Clearance - Cleared    Discussion/Summary:  (1) Preoperative Clearance - pt low risk for low risk procedure, ECG and BW from  and  were reviewed respectively, no further w/u is needed, pt is acceptable risk for procedure    (2) Cataract - for L cataract repair with IOLI with optho    (3)HTN - BP at goal, con't current meds, BW due in  - order given    (4) CKD stage 3 - BP/BS control encouraged, recheck BW later this month and every 6 mos, avoid NSAIDs    (5) UC w/o complications - on Sulfasalazine, con't meds and f/u as per GI    (6) Gout - no recent flares, on allopurinol, check uric acid with labs in 6 mo     (7) Obesity - diet/exercise/wgt loss encouraged     Advised pt to check with pharmacy about Shingrix    He had flu vaccine this year already    [employer], DO

## 2019-12-06 NOTE — PROGRESS NOTES
Assessment and Plan:     Problem List Items Addressed This Visit        Endocrine    Impaired fasting glucose    Relevant Orders    CBC and differential    Comprehensive metabolic panel    Hemoglobin A1C    Lipid panel    TSH, 3rd generation with Free T4 reflex    Uric acid       Cardiovascular and Mediastinum    Hypertension    Relevant Orders    CBC and differential    Comprehensive metabolic panel    Hemoglobin A1C    Lipid panel    TSH, 3rd generation with Free T4 reflex    Uric acid       Genitourinary    Chronic kidney disease, stage 3 (HCC)    Relevant Orders    Basic metabolic panel    CBC and differential    Comprehensive metabolic panel    Hemoglobin A1C    Lipid panel    TSH, 3rd generation with Free T4 reflex    Uric acid       Other    Gout    Relevant Orders    CBC and differential    Comprehensive metabolic panel    Hemoglobin A1C    Lipid panel    TSH, 3rd generation with Free T4 reflex    Uric acid      Other Visit Diagnoses     Preoperative clearance    -  Primary    Cataract of left eye, unspecified cataract type        Medicare annual wellness visit, subsequent               Preventive health issues were discussed with patient, and age appropriate screening tests were ordered as noted in patient's After Visit Summary  Personalized health advice and appropriate referrals for health education or preventive services given if needed, as noted in patient's After Visit Summary  History of Present Illness:     Patient presents for Welcome to Medicare visit  Patient Care Team:  Carol Srivastava DO as PCP - General     Review of Systems:     Review of Systems   Constitutional: Negative for chills, fatigue and fever  HENT: Positive for hearing loss  Negative for congestion and sinus pain  Eyes: Positive for visual disturbance  Negative for pain and redness  Respiratory: Negative for cough, chest tightness and shortness of breath      Cardiovascular: Negative for chest pain, palpitations and leg swelling  Gastrointestinal: Negative for abdominal pain, blood in stool, constipation, diarrhea, nausea and vomiting  Endocrine: Negative for polydipsia and polyuria  Genitourinary: Negative for difficulty urinating, dysuria and hematuria  Musculoskeletal: Positive for arthralgias  Negative for myalgias  Skin: Negative for rash and wound  Neurological: Negative for dizziness and headaches  Hematological: Does not bruise/bleed easily  Psychiatric/Behavioral: Positive for sleep disturbance  Negative for behavioral problems and confusion        Problem List:     Patient Active Problem List   Diagnosis    [address] [facility] complications [address] [address]    Snoring    Pain in joint of left shoulder    Obesity    Nocturia    Macular degeneration    Impaired fasting glucose    Hypertension    Gout    Complete tear of left rotator cuff    Chronic kidney disease, stage 3 (HCC)    Benign neoplasm of large intestine      Past Medical and Surgical History:     Past Medical History:   Diagnosis Date    Ankle edema     last assessed [id number]    Depression     last assessed [id number]    Hypertension     Nephrolithiasis     Nocturia     last assessed [id number]    Posterior tibial tendinitis of right lower extremity     last assessed [id number]     Past Surgical History:   Procedure Laterality Date    ANAL FISSURECTOMY      COLONOSCOPY  04/22/2009    every 2 years    COLONOSCOPY  03/04/2013    2 yrs d/t h/o polyp    COLONOSCOPY  02/15/2016    colo 2/15/16-repeat 2 yrs    SHOULDER SURGERY        Family History:     Family History   Problem Relation Age of Onset    Diabetes Sister     Clotting disorder Mother       Social History:     Social History     Socioeconomic History    Marital status: /Civil Union     Spouse name: None    Number of children: None    Years of education: None    Highest education level: None   Occupational History    Occupation: retired   Social Needs    Financial resource strain: None    Food insecurity:     Worry: None     Inability: None    Transportation needs:     Medical: None     Non-medical: None   Tobacco Use    Smoking status: Never Smoker    Smokeless tobacco: Never Used   Substance and Sexual Activity    Alcohol use: Not Currently     Comment: occassionally    Drug use: No    Sexual activity: None   Lifestyle    Physical activity:     Days per week: None     Minutes per session: None    Stress: None   Relationships    Social connections:     Talks on phone: None     Gets together: None     Attends Jew service: None     Active member of club or organization: None     Attends meetings of clubs or organizations: None     Relationship status: None    Intimate partner violence:     Fear of current or ex partner: None     Emotionally abused: None     Physically abused: None     Forced sexual activity: None   Other Topics Concern    None   Social History Narrative    None      Medications and Allergies:     Current Outpatient Medications   Medication Sig Dispense Refill    allopurinol (ZYLOPRIM) 100 mg tablet TAKE 1 TABLET BY MOUTH  DAILY 90 tablet 1    cholecalciferol (VITAMIN D3) 1,000 units tablet Take 2 tablets by mouth daily       folic acid (FOLVITE) 1 mg tablet Take 1 mg by mouth daily        furosemide (LASIX) 40 mg tablet Take 0 5 tablets (20 mg total) by mouth daily 45 tablet 2    losartan (COZAAR) 100 MG tablet Take 1 tablet (100 mg total) by mouth daily 90 tablet 1    Multiple Vitamins-Minerals (PRESERVISION AREDS) CAPS Take 1 capsule by mouth daily      ofloxacin (OCUFLOX) 0 3 % ophthalmic solution Install 1 drop in the L eye 4 times daily for 5 days after eye injection as directed  3    sulfaSALAzine (AZULFIDINE) 500 mg tablet TAKE 1 TABLET BY MOUTH  EVERY MORNING , 2 TABLETS  AT NOON AND 1 TABLET EVERY  EVENING 360 tablet 1    tamsulosin (FLOMAX) 0 4 mg Take 1 capsule (0 4 mg total) by mouth daily 90 capsule 2     No current facility-administered medications for this visit  No Known Allergies   Immunizations:     Immunization History   Administered Date(s) Administered    INFLUENZA 09/10/2012, 10/04/2013, 09/22/2014, 10/09/2015, 09/15/2016, 09/25/2017    Influenza Split High Dose Preservative Free IM 09/10/2012, 10/04/2013, 09/22/2014, 10/09/2015, 09/15/2016    Influenza, high dose seasonal 0 5 mL 10/02/2018, 09/30/2019    Pneumococcal Conjugate 13-Valent 12/17/2015    Pneumococcal Polysaccharide PPV23 05/14/2014      Health Maintenance: There are no preventive care reminders to display for this patient  Topic Date Due    DTaP,Tdap,and Td Vaccines (1 - Tdap) 05/28/1945      Medicare Screening Tests and Risk Assessments:     Emily Boykin is here for his Subsequent Wellness visit  Last Medicare Wellness visit information reviewed, patient interviewed and updates made to the record today  Health Risk Assessment:   Patient rates overall health as excellent  Patient feels that their physical health rating is much better  Eyesight was rated as same  Hearing was rated as slightly worse  Patient feels that their emotional and mental health rating is same  Pain experienced in the last 7 days has been none  Patient states that he has experienced no weight loss or gain in last 6 months  Pt feels much better and states his health is better  Hearing has worsened but he does not like hearing aids "all they do is make the noise louder"    Depression Screening:   PHQ-2 Score: 0      Fall Risk Screening: In the past year, patient has experienced: no history of falling in past year      Home Safety:  Patient does not have trouble with stairs inside or outside of their home  Patient has working smoke alarms and has working carbon monoxide detector  Home safety hazards include: none  Nutrition:   Current diet is Regular and No Added Salt  Medications:   Patient is currently taking over-the-counter supplements   OTC medications include: see medication list  Patient is able to manage medications  Activities of Daily Living (ADLs)/Instrumental Activities of Daily Living (IADLs):   Walk and transfer into and out of bed and chair?: Yes  Dress and groom yourself?: Yes    Bathe or shower yourself?: Yes    Feed yourself? Yes  Do your laundry/housekeeping?: Yes  Manage your money, pay your bills and track your expenses?: Yes  Make your own meals?: Yes    Do your own shopping?: Yes    Previous Hospitalizations:   Any hospitalizations or ED visits within the last 12 months?: No      Advance Care Planning:   Living will: No    Durable POA for healthcare: No    Advanced directive counseling given: No    Five wishes given: Yes      Cognitive Screening:   Provider or family/friend/caregiver concerned regarding cognition?: No    PREVENTIVE SCREENINGS      Cardiovascular Screening:    General: Screening Current and Risks and Benefits Discussed      Diabetes Screening:     General: Screening Current and Risks and Benefits Discussed      Colorectal Cancer Screening:     General: Screening Not Indicated and Risks and Benefits Discussed      Prostate Cancer Screening:    General: Screening Not Indicated and Risks and Benefits Discussed      Osteoporosis Screening:    General: Risks and Benefits Discussed and Patient Declines      Abdominal Aortic Aneurysm (AAA) Screening:        General: Risks and Benefits Discussed and Screening Not Indicated      Lung Cancer Screening:     General: Risks and Benefits Discussed and Screening Not Indicated      Hepatitis C Screening:    General: Risks and Benefits Discussed    Hep C Screening Accepted: No     Other Counseling Topics:   Car/seat belt/driving safety and regular weightbearing exercise        Visual Acuity Screening    Right eye Left eye Both eyes   Without correction:      With correction: 20/50 20/100 20/50        Physical Exam:     /82 (BP Location: Right arm, Patient Position: Sitting, Cuff Size: Standard)   Pulse 92   Temp 98 °F (36 7 °C)   Ht 5' 6" (1 676 m)   Wt 103 kg (228 lb)   BMI 36 80 kg/m²     Physical Exam   Constitutional: He appears well-developed and well-nourished  No distress  HENT:   Head: Normocephalic and atraumatic  Right Ear: External ear normal    Left Ear: External ear normal    Mouth/Throat: Oropharynx is clear and moist  No oropharyngeal exudate    + [place]   Eyes: Conjunctivae are normal  Right eye exhibits no discharge  Left eye exhibits no discharge  Neck: Neck supple  No tracheal deviation present  Cardiovascular: Normal rate, regular rhythm and normal heart sounds  No murmur heard  Neg carotid bruits B/L   Pulmonary/Chest: Effort normal and breath sounds normal  No respiratory distress  He has no wheezes  He has no rales  Abdominal: Soft  He exhibits no distension  There is no tenderness  There is no rebound and no guarding  Obese   Musculoskeletal: He exhibits no deformity  Lymphadenopathy:     He has no cervical adenopathy  Neurological: He is alert  He exhibits normal muscle tone  Skin: Skin is warm and dry  No rash noted  Psychiatric: He has a normal mood and affect  His behavior is normal    Nursing note and vitals reviewed        Aissatou Mark DO

## 2020-01-02 ENCOUNTER — APPOINTMENT (OUTPATIENT)
Dept: LAB | Facility: HOSPITAL | Age: 85
End: 2020-01-02
Payer: COMMERCIAL

## 2020-01-02 DIAGNOSIS — N18.30 CHRONIC KIDNEY DISEASE, STAGE 3 (HCC): ICD-10-CM

## 2020-01-02 LAB
ANION GAP SERPL CALCULATED.3IONS-SCNC: 5 MMOL/L (ref 4–13)
BUN SERPL-MCNC: 35 MG/DL (ref 5–25)
CALCIUM SERPL-MCNC: 8.8 MG/DL (ref 8.3–10.1)
CHLORIDE SERPL-SCNC: 111 MMOL/L (ref 100–108)
CO2 SERPL-SCNC: 26 MMOL/L (ref 21–32)
CREAT SERPL-MCNC: 1.62 MG/DL (ref 0.6–1.3)
GFR SERPL CREATININE-BSD FRML MDRD: 38 ML/MIN/1.73SQ M
GLUCOSE SERPL-MCNC: 93 MG/DL (ref 65–140)
POTASSIUM SERPL-SCNC: 3.9 MMOL/L (ref 3.5–5.3)
SODIUM SERPL-SCNC: 142 MMOL/L (ref 136–145)

## 2020-01-02 PROCEDURE — 36415 COLL VENOUS BLD VENIPUNCTURE: CPT

## 2020-01-02 PROCEDURE — 80048 BASIC METABOLIC PNL TOTAL CA: CPT

## 2020-03-02 ENCOUNTER — TELEPHONE (OUTPATIENT)
Dept: FAMILY MEDICINE CLINIC | Facility: HOSPITAL | Age: 85
End: 2020-03-02

## 2020-03-09 DIAGNOSIS — N40.0 BENIGN PROSTATIC HYPERPLASIA, UNSPECIFIED WHETHER LOWER URINARY TRACT SYMPTOMS PRESENT: ICD-10-CM

## 2020-03-09 DIAGNOSIS — I10 ESSENTIAL HYPERTENSION: ICD-10-CM

## 2020-03-09 DIAGNOSIS — R60.9 EDEMA, UNSPECIFIED TYPE: ICD-10-CM

## 2020-03-09 RX ORDER — FUROSEMIDE 40 MG/1
20 TABLET ORAL DAILY
Qty: 45 TABLET | Refills: 2 | Status: SHIPPED | OUTPATIENT
Start: 2020-03-09 | End: 2020-05-27 | Stop reason: SDUPTHER

## 2020-03-09 RX ORDER — LOSARTAN POTASSIUM 100 MG/1
100 TABLET ORAL DAILY
Qty: 90 TABLET | Refills: 1 | Status: SHIPPED | OUTPATIENT
Start: 2020-03-09 | End: 2020-05-27 | Stop reason: SDUPTHER

## 2020-03-09 RX ORDER — TAMSULOSIN HYDROCHLORIDE 0.4 MG/1
0.4 CAPSULE ORAL DAILY
Qty: 90 CAPSULE | Refills: 2 | Status: SHIPPED | OUTPATIENT
Start: 2020-03-09 | End: 2020-05-27 | Stop reason: SDUPTHER

## 2020-03-09 NOTE — TELEPHONE ENCOUNTER
Pt is completely out, needs refills on tamsulosin, losartan, and furosemide, they are supposed to be sent primarily to future scripts so please make this his main pharm if its not already, however [facility]  Please call pts wife, theyre unsure why this wasn't addressed before  They came into the office 3/2/20 and had a note put in

## 2020-05-10 DIAGNOSIS — M10.9 GOUT, UNSPECIFIED CAUSE, UNSPECIFIED CHRONICITY, UNSPECIFIED SITE: ICD-10-CM

## 2020-05-10 DIAGNOSIS — K51.90 ULCERATIVE COLITIS WITHOUT COMPLICATIONS, UNSPECIFIED LOCATION (HCC): ICD-10-CM

## 2020-05-10 RX ORDER — SULFASALAZINE 500 MG/1
TABLET ORAL
Qty: 360 TABLET | Refills: 1 | Status: SHIPPED | OUTPATIENT
Start: 2020-05-10 | End: 2020-10-07

## 2020-05-10 RX ORDER — ALLOPURINOL 100 MG/1
TABLET ORAL
Qty: 90 TABLET | Refills: 1 | Status: SHIPPED | OUTPATIENT
Start: 2020-05-10 | End: 2020-05-27 | Stop reason: SDUPTHER

## 2020-05-27 DIAGNOSIS — M10.9 GOUT, UNSPECIFIED CAUSE, UNSPECIFIED CHRONICITY, UNSPECIFIED SITE: ICD-10-CM

## 2020-05-27 DIAGNOSIS — N40.0 BENIGN PROSTATIC HYPERPLASIA, UNSPECIFIED WHETHER LOWER URINARY TRACT SYMPTOMS PRESENT: ICD-10-CM

## 2020-05-27 DIAGNOSIS — R60.9 EDEMA, UNSPECIFIED TYPE: ICD-10-CM

## 2020-05-27 DIAGNOSIS — I10 ESSENTIAL HYPERTENSION: ICD-10-CM

## 2020-05-27 RX ORDER — ALLOPURINOL 100 MG/1
100 TABLET ORAL DAILY
Qty: 90 TABLET | Refills: 1 | Status: SHIPPED | OUTPATIENT
Start: 2020-05-27 | End: 2020-10-15 | Stop reason: SDUPTHER

## 2020-05-27 RX ORDER — LOSARTAN POTASSIUM 100 MG/1
100 TABLET ORAL DAILY
Qty: 90 TABLET | Refills: 1 | Status: SHIPPED | OUTPATIENT
Start: 2020-05-27 | End: 2020-10-07

## 2020-05-27 RX ORDER — FUROSEMIDE 40 MG/1
20 TABLET ORAL DAILY
Qty: 45 TABLET | Refills: 2 | Status: SHIPPED | OUTPATIENT
Start: 2020-05-27 | End: 2021-01-26

## 2020-05-27 RX ORDER — TAMSULOSIN HYDROCHLORIDE 0.4 MG/1
0.4 CAPSULE ORAL DAILY
Qty: 90 CAPSULE | Refills: 2 | Status: SHIPPED | OUTPATIENT
Start: 2020-05-27 | End: 2021-01-26

## 2020-06-22 ENCOUNTER — APPOINTMENT (OUTPATIENT)
Dept: LAB | Facility: HOSPITAL | Age: 85
End: 2020-06-22
Payer: COMMERCIAL

## 2020-06-22 DIAGNOSIS — I10 ESSENTIAL HYPERTENSION: ICD-10-CM

## 2020-06-22 DIAGNOSIS — M10.9 GOUT, UNSPECIFIED CAUSE, UNSPECIFIED CHRONICITY, UNSPECIFIED SITE: ICD-10-CM

## 2020-06-22 DIAGNOSIS — R73.01 IMPAIRED FASTING GLUCOSE: ICD-10-CM

## 2020-06-22 DIAGNOSIS — N18.30 CHRONIC KIDNEY DISEASE, STAGE 3 (HCC): ICD-10-CM

## 2020-06-22 LAB
ALBUMIN SERPL BCP-MCNC: 3.4 G/DL (ref 3.5–5)
ALP SERPL-CCNC: 68 U/L (ref 46–116)
ALT SERPL W P-5'-P-CCNC: 17 U/L (ref 12–78)
ANION GAP SERPL CALCULATED.3IONS-SCNC: 5 MMOL/L (ref 4–13)
AST SERPL W P-5'-P-CCNC: 17 U/L (ref 5–45)
BASOPHILS # BLD AUTO: 0.07 THOUSANDS/ΜL (ref 0–0.1)
BASOPHILS NFR BLD AUTO: 1 % (ref 0–1)
BILIRUB SERPL-MCNC: 0.49 MG/DL (ref 0.2–1)
BUN SERPL-MCNC: 40 MG/DL (ref 5–25)
CALCIUM SERPL-MCNC: 8.4 MG/DL (ref 8.3–10.1)
CHLORIDE SERPL-SCNC: 112 MMOL/L (ref 100–108)
CHOLEST SERPL-MCNC: 151 MG/DL (ref 50–200)
CO2 SERPL-SCNC: 25 MMOL/L (ref 21–32)
CREAT SERPL-MCNC: 1.97 MG/DL (ref 0.6–1.3)
EOSINOPHIL # BLD AUTO: 0.31 THOUSAND/ΜL (ref 0–0.61)
EOSINOPHIL NFR BLD AUTO: 5 % (ref 0–6)
ERYTHROCYTE [DISTWIDTH] IN BLOOD BY AUTOMATED COUNT: 13.7 % (ref 11.6–15.1)
EST. AVERAGE GLUCOSE BLD GHB EST-MCNC: 94 MG/DL
GFR SERPL CREATININE-BSD FRML MDRD: 30 ML/MIN/1.73SQ M
GLUCOSE SERPL-MCNC: 93 MG/DL (ref 65–140)
HBA1C MFR BLD: 4.9 %
HCT VFR BLD AUTO: 36.8 % (ref 36.5–49.3)
HDLC SERPL-MCNC: 36 MG/DL
HGB BLD-MCNC: 11.4 G/DL (ref 12–17)
IMM GRANULOCYTES # BLD AUTO: 0.02 THOUSAND/UL (ref 0–0.2)
IMM GRANULOCYTES NFR BLD AUTO: 0 % (ref 0–2)
LDLC SERPL CALC-MCNC: 91 MG/DL (ref 0–100)
LYMPHOCYTES # BLD AUTO: 1.48 THOUSANDS/ΜL (ref 0.6–4.47)
LYMPHOCYTES NFR BLD AUTO: 24 % (ref 14–44)
MCH RBC QN AUTO: 29.8 PG (ref 26.8–34.3)
MCHC RBC AUTO-ENTMCNC: 31 G/DL (ref 31.4–37.4)
MCV RBC AUTO: 96 FL (ref 82–98)
MONOCYTES # BLD AUTO: 0.76 THOUSAND/ΜL (ref 0.17–1.22)
MONOCYTES NFR BLD AUTO: 12 % (ref 4–12)
NEUTROPHILS # BLD AUTO: 3.49 THOUSANDS/ΜL (ref 1.85–7.62)
NEUTS SEG NFR BLD AUTO: 58 % (ref 43–75)
NONHDLC SERPL-MCNC: 115 MG/DL
NRBC BLD AUTO-RTO: 0 /100 WBCS
PLATELET # BLD AUTO: 184 THOUSANDS/UL (ref 149–390)
PMV BLD AUTO: 11 FL (ref 8.9–12.7)
POTASSIUM SERPL-SCNC: 4.7 MMOL/L (ref 3.5–5.3)
PROT SERPL-MCNC: 7.5 G/DL (ref 6.4–8.2)
RBC # BLD AUTO: 3.82 MILLION/UL (ref 3.88–5.62)
SODIUM SERPL-SCNC: 142 MMOL/L (ref 136–145)
TRIGL SERPL-MCNC: 119 MG/DL
TSH SERPL DL<=0.05 MIU/L-ACNC: 2.49 UIU/ML (ref 0.36–3.74)
URATE SERPL-MCNC: 6.4 MG/DL (ref 4.2–8)
WBC # BLD AUTO: 6.13 THOUSAND/UL (ref 4.31–10.16)

## 2020-06-22 PROCEDURE — 80061 LIPID PANEL: CPT

## 2020-06-22 PROCEDURE — 85025 COMPLETE CBC W/AUTO DIFF WBC: CPT

## 2020-06-22 PROCEDURE — 84550 ASSAY OF BLOOD/URIC ACID: CPT

## 2020-06-22 PROCEDURE — 36415 COLL VENOUS BLD VENIPUNCTURE: CPT

## 2020-06-22 PROCEDURE — 83036 HEMOGLOBIN GLYCOSYLATED A1C: CPT

## 2020-06-22 PROCEDURE — 80053 COMPREHEN METABOLIC PANEL: CPT

## 2020-06-22 PROCEDURE — 84443 ASSAY THYROID STIM HORMONE: CPT

## 2020-06-25 ENCOUNTER — OFFICE VISIT (OUTPATIENT)
Dept: FAMILY MEDICINE CLINIC | Facility: HOSPITAL | Age: 85
End: 2020-06-25
Payer: COMMERCIAL

## 2020-06-25 VITALS
HEART RATE: 92 BPM | TEMPERATURE: 97 F | WEIGHT: 231.6 LBS | HEIGHT: 66 IN | SYSTOLIC BLOOD PRESSURE: 128 MMHG | DIASTOLIC BLOOD PRESSURE: 86 MMHG | BODY MASS INDEX: 37.22 KG/M2

## 2020-06-25 DIAGNOSIS — R73.01 IMPAIRED FASTING GLUCOSE: Primary | ICD-10-CM

## 2020-06-25 DIAGNOSIS — E78.6 LOW HDL (UNDER 40): ICD-10-CM

## 2020-06-25 DIAGNOSIS — M10.9 GOUT, UNSPECIFIED CAUSE, UNSPECIFIED CHRONICITY, UNSPECIFIED SITE: ICD-10-CM

## 2020-06-25 DIAGNOSIS — N18.30 CHRONIC KIDNEY DISEASE, STAGE 3 (HCC): ICD-10-CM

## 2020-06-25 DIAGNOSIS — E66.01 SEVERE OBESITY (BMI 35.0-39.9) WITH COMORBIDITY (HCC): ICD-10-CM

## 2020-06-25 DIAGNOSIS — I10 ESSENTIAL HYPERTENSION: ICD-10-CM

## 2020-06-25 DIAGNOSIS — H35.30 MACULAR DEGENERATION OF BOTH EYES, UNSPECIFIED TYPE: ICD-10-CM

## 2020-06-25 PROCEDURE — 3008F BODY MASS INDEX DOCD: CPT | Performed by: INTERNAL MEDICINE

## 2020-06-25 PROCEDURE — 3074F SYST BP LT 130 MM HG: CPT | Performed by: INTERNAL MEDICINE

## 2020-06-25 PROCEDURE — 1160F RVW MEDS BY RX/DR IN RCRD: CPT | Performed by: INTERNAL MEDICINE

## 2020-06-25 PROCEDURE — 4040F PNEUMOC VAC/ADMIN/RCVD: CPT | Performed by: INTERNAL MEDICINE

## 2020-06-25 PROCEDURE — 1036F TOBACCO NON-USER: CPT | Performed by: INTERNAL MEDICINE

## 2020-06-25 PROCEDURE — 99214 OFFICE O/P EST MOD 30 MIN: CPT | Performed by: INTERNAL MEDICINE

## 2020-06-25 PROCEDURE — 3079F DIAST BP 80-89 MM HG: CPT | Performed by: INTERNAL MEDICINE

## 2020-07-15 ENCOUNTER — APPOINTMENT (OUTPATIENT)
Dept: LAB | Facility: HOSPITAL | Age: 85
End: 2020-07-15
Payer: COMMERCIAL

## 2020-07-15 DIAGNOSIS — I10 ESSENTIAL HYPERTENSION: ICD-10-CM

## 2020-07-15 LAB
ANION GAP SERPL CALCULATED.3IONS-SCNC: 7 MMOL/L (ref 4–13)
BUN SERPL-MCNC: 52 MG/DL (ref 5–25)
CALCIUM SERPL-MCNC: 8.7 MG/DL (ref 8.3–10.1)
CHLORIDE SERPL-SCNC: 107 MMOL/L (ref 100–108)
CO2 SERPL-SCNC: 24 MMOL/L (ref 21–32)
CREAT SERPL-MCNC: 1.96 MG/DL (ref 0.6–1.3)
GFR SERPL CREATININE-BSD FRML MDRD: 30 ML/MIN/1.73SQ M
GLUCOSE SERPL-MCNC: 124 MG/DL (ref 65–140)
POTASSIUM SERPL-SCNC: 4.5 MMOL/L (ref 3.5–5.3)
SODIUM SERPL-SCNC: 138 MMOL/L (ref 136–145)

## 2020-07-15 PROCEDURE — 36415 COLL VENOUS BLD VENIPUNCTURE: CPT

## 2020-07-15 PROCEDURE — 80048 BASIC METABOLIC PNL TOTAL CA: CPT

## 2020-08-12 NOTE — ASSESSMENT & PLAN NOTE
On sulfasalazine, con't current meds and f/u as per GI Please call patient and notify her that due to the restrictions in regards to ordering. She will need to contact the [facility] hotline in regards to ordering her COVID testing. Number is [phone]. Another option that they may wish to do. If they are running into resistance there he is [employer] is offering a pay clinic for Flavioport testing I believe in the [facility] area.

## 2020-08-24 ENCOUNTER — TELEPHONE (OUTPATIENT)
Dept: NEPHROLOGY | Facility: CLINIC | Age: 85
End: 2020-08-24

## 2020-08-25 ENCOUNTER — CONSULT (OUTPATIENT)
Dept: NEPHROLOGY | Facility: HOSPITAL | Age: 85
End: 2020-08-25
Payer: COMMERCIAL

## 2020-08-25 VITALS
HEART RATE: 82 BPM | RESPIRATION RATE: 16 BRPM | DIASTOLIC BLOOD PRESSURE: 62 MMHG | WEIGHT: 225 LBS | TEMPERATURE: 97.6 F | SYSTOLIC BLOOD PRESSURE: 114 MMHG | HEIGHT: 66 IN | BODY MASS INDEX: 36.16 KG/M2

## 2020-08-25 DIAGNOSIS — I10 ESSENTIAL HYPERTENSION: ICD-10-CM

## 2020-08-25 DIAGNOSIS — N18.30 CHRONIC KIDNEY DISEASE, STAGE 3 (HCC): Primary | ICD-10-CM

## 2020-08-25 DIAGNOSIS — M10.9 GOUT, UNSPECIFIED CAUSE, UNSPECIFIED CHRONICITY, UNSPECIFIED SITE: ICD-10-CM

## 2020-08-25 LAB
CLARITY, POC: CLEAR
COLOR, POC: YELLOW
EXT BILIRUBIN, UA: NEGATIVE
EXT BLOOD URINE: NEGATIVE
EXT GLUCOSE, UA: NEGATIVE
EXT KETONES: NEGATIVE
EXT NITRITE, UA: NEGATIVE
EXT PH, UA: 5
EXT PROTEIN, UA: 300
EXT SPECIFIC GRAVITY, UA: 1.02
EXT UROBILINOGEN: NEGATIVE
WBC # BLD EST: NEGATIVE 10*3/UL

## 2020-08-25 PROCEDURE — 1036F TOBACCO NON-USER: CPT | Performed by: INTERNAL MEDICINE

## 2020-08-25 PROCEDURE — 3074F SYST BP LT 130 MM HG: CPT | Performed by: INTERNAL MEDICINE

## 2020-08-25 PROCEDURE — 81002 URINALYSIS NONAUTO W/O SCOPE: CPT | Performed by: INTERNAL MEDICINE

## 2020-08-25 PROCEDURE — 4040F PNEUMOC VAC/ADMIN/RCVD: CPT | Performed by: INTERNAL MEDICINE

## 2020-08-25 PROCEDURE — 3008F BODY MASS INDEX DOCD: CPT | Performed by: INTERNAL MEDICINE

## 2020-08-25 PROCEDURE — 3078F DIAST BP <80 MM HG: CPT | Performed by: INTERNAL MEDICINE

## 2020-08-25 PROCEDURE — 99204 OFFICE O/P NEW MOD 45 MIN: CPT | Performed by: INTERNAL MEDICINE

## 2020-08-25 PROCEDURE — 1160F RVW MEDS BY RX/DR IN RCRD: CPT | Performed by: INTERNAL MEDICINE

## 2020-08-25 NOTE — PATIENT INSTRUCTIONS
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 as well as physiologic aging of the kidney   -baseline serum creatinine fluctuates between 1 6-1 9, previously 1 3 in 9943-0114  sCr 1 4 back in 2016  CKD has been present since at least 2016   -in office urinalysis shows protein  -avoid nonsteroidals including ibuprofen, Advil, Motrin, Aleve, Celebrex, Toradol, indomethacin  -will obtain renal ultrasound with PVR to r/o obstruction and to evaluate for kidney stones    2  HTN - BP well controlled on lasix 20mg daily, losartan 100mg daily, flomax    3  Nephrolithiasis - no recent flares  Stay well hydrated  Avoid high salt/sodium diet  4  Gout - continue allopurinol 100mg daily, last uric acid level 6 4, near goal(goal < 6)    5  Ulcerative colitis - on sulfasalazine, no recent flare ups    RTC in 3 months  Obtain blood and urine testing next month  Obtain renal ultrasound with PVR ASAP

## 2020-08-25 NOTE — PROGRESS NOTES
NEPHROLOGY OUTPATIENT CONSULTATION   North Ying 80 y o  male MRN: [id number]  Date: 8/25/2020  Reason for consultation:   Chief Complaint   Patient presents with    Chronic Kidney Disease    Consult       Patient instructions: There are no Patient Instructions on file for this visit  Corina Kahn was seen today for chronic kidney disease and consult  Diagnoses and all orders for this visit:    Chronic kidney disease, stage 3 ([address] )  -     Ambulatory referral to Nephrology  -     US kidney and bladder with pvr; Future    Essential hypertension    Gout, unspecified cause, unspecified chronicity, unspecified site    Other orders  -     POCT urinalysis dipstick        ASSESSMENT and PLAN:  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 as well as physiologic aging of the kidney   -baseline serum creatinine fluctuates between 1 6-1 9, previously 1 3 in [id number]  sCr 1 4 back in 2016  CKD has been present since at least 2016   -in office urinalysis shows protein  -avoid nonsteroidals including ibuprofen, Advil, Motrin, Aleve, Celebrex, Toradol, indomethacin  -will obtain renal ultrasound with PVR to r/o obstruction and to evaluate for kidney stones    2  HTN - BP well controlled on lasix 20mg daily, losartan 100mg daily, flomax    3  Nephrolithiasis - no recent flares  Stay well hydrated  Avoid high salt/sodium diet  4  Gout - continue allopurinol 100mg daily, last uric acid level 6 4, near goal(goal < 6)    5  Ulcerative colitis - on sulfasalazine, no recent flare ups    RTC in 3 months  Obtain blood and urine testing next month  Obtain renal ultrasound with PVR ASAP  HISTORY OF PRESENT ILLNESS:  Requesting Physician: Yash Cornelius DO    North Ying is a 80 y o  male with history of HTN, nephrolithiasis who presents in consultation for CKD  Denies history of recent NSAID use, herbal/OTC medications, history of UTIs   Has had kidney disease for at least 4-5 years, when gout began  HTN - BP > 20 years - BP has been well controlled  Nephrolithiasis - 1st episode in the early 1990s  He stopped drinking tea and has had no further episodes  Gout - for the past 4-5 years, last episode a while ago  He drinks water  He used to drink a lot of soda but no longer drinks that  Avoids tea  He is unaware of heart failure  Was told he has an "Extra beat"  PAST MEDICAL HISTORY:  Past Medical History:   Diagnosis Date    Ankle edema     last assessed [id number]    Depression     last assessed [id number]    Hypertension     Nephrolithiasis     Nocturia     last assessed [id number]    Posterior tibial tendinitis of right lower extremity     last assessed [id number]       PAST SURGICAL HISTORY:  Past Surgical History:   Procedure Laterality Date    ANAL FISSURECTOMY      COLONOSCOPY  04/22/2009    every 2 years    COLONOSCOPY  03/04/2013    2 yrs d/t h/o polyp    COLONOSCOPY  02/15/2016    colo 2/15/16-repeat 2 yrs    SHOULDER SURGERY         ALLERGIES:  No Known Allergies    SOCIAL HISTORY:  Social History     Substance and Sexual Activity   Alcohol Use Not Currently    Comment: occassionally     Social History     Substance and Sexual Activity   Drug Use No     Social History     Tobacco Use   Smoking Status Never Smoker   Smokeless Tobacco Never Used       FAMILY HISTORY:  Family History   Problem Relation Age of Onset    Diabetes Sister     Clotting disorder Mother        MEDICATIONS:    Current Outpatient Medications:     allopurinol (ZYLOPRIM) 100 mg tablet, Take 1 tablet (100 mg total) by mouth daily, Disp: 90 tablet, Rfl: 1    cholecalciferol (VITAMIN D3) 1,000 units tablet, Take 2 tablets by mouth daily , Disp: , Rfl:     folic acid (FOLVITE) 1 mg tablet, Take 1 mg by mouth daily  , Disp: , Rfl:     furosemide (LASIX) 40 mg tablet, Take 0 5 tablets (20 mg total) by mouth daily, Disp: 45 tablet, Rfl: 2    losartan (COZAAR) 100 MG tablet, Take 1 tablet (100 mg total) by mouth daily, Disp: 90 tablet, Rfl: 1    Multiple Vitamins-Minerals (PRESERVISION AREDS) CAPS, Take 1 capsule by mouth daily, Disp: , Rfl:     ofloxacin (OCUFLOX) 0 3 % ophthalmic solution, Install 1 drop in the L eye 4 times daily for 5 days after eye injection as directed, Disp: , Rfl: 3    other medication, see sig,, Medication/product name: Alverto Strength:  Sig (include dose, route, frequency): daily, Disp: , Rfl:     sulfaSALAzine (AZULFIDINE) 500 mg tablet, TAKE 1 TABLET BY MOUTH  EVERY MORNING , 2 TABLETS  AT NOON AND 1 TABLET EVERY  EVENING, Disp: 360 tablet, Rfl: 1    tamsulosin (FLOMAX) 0 4 mg, Take 1 capsule (0 4 mg total) by mouth daily, Disp: 90 capsule, Rfl: 2    REVIEW OF SYSTEMS:  Review of Systems   Constitutional: Negative for chills and fever  HENT: Negative for sore throat  Eyes: Negative for visual disturbance  Respiratory: Negative for cough and shortness of breath  Cardiovascular: Negative for chest pain and leg swelling  Gastrointestinal: Negative for abdominal pain, constipation, diarrhea, nausea and vomiting  Endocrine: Negative for polyuria  Genitourinary: Negative for decreased urine volume, difficulty urinating, dysuria and hematuria  Musculoskeletal: Positive for myalgias  Negative for back pain  Skin: Negative for rash  Neurological: Negative for dizziness, light-headedness and numbness  Hematological: Negative for adenopathy  Does not bruise/bleed easily  Psychiatric/Behavioral: Negative for confusion  PHYSICAL EXAM:   Vitals:    08/25/20 1130   BP: 114/62   BP Location: Left arm   Patient Position: Sitting   Cuff Size: Large   Pulse: 82   Resp: 16   Temp: 97 6 °F (36 4 °C)   TempSrc: Skin   Weight: 102 kg (225 lb)   Height: 5' 6" (1 676 m)     Body mass index is 36 32 kg/m²  Physical Exam  Vitals signs reviewed  Constitutional:       General: He is not in acute distress  Appearance: He is well-developed  He is not diaphoretic  HENT:      Head: Normocephalic and atraumatic  Mouth/Throat:      Comments: Wearing mask  Eyes:      General: No scleral icterus  Right eye: No discharge  Left eye: No discharge  Comments: eyeglasses   Neck:      Musculoskeletal: Normal range of motion and neck supple  Thyroid: No thyromegaly  Cardiovascular:      Rate and Rhythm: Normal rate  Rhythm irregular  Heart sounds: Normal heart sounds  Pulmonary:      Effort: Pulmonary effort is normal       Breath sounds: Normal breath sounds  No wheezing or rales  Abdominal:      General: Bowel sounds are normal  There is no distension  Palpations: Abdomen is soft  Tenderness: There is no abdominal tenderness  Musculoskeletal: Normal range of motion  General: No swelling  Lymphadenopathy:      Cervical: No cervical adenopathy  Skin:     General: Skin is warm and dry  Findings: No rash  Neurological:      General: No focal deficit present  Mental Status: He is alert  Comments: Awake, hard of hearing   Psychiatric:         Mood and Affect: Mood normal          Behavior: Behavior normal            Laboratory results:   Lab Results   Component Value Date    SODIUM 138 07/15/2020    K 4 5 07/15/2020     07/15/2020    CO2 24 07/15/2020    BUN 52 (H) 07/15/2020    CREATININE 1 96 (H) 07/15/2020    GLUC 124 07/15/2020    CALCIUM 8 7 07/15/2020        Imaging: none relevant on file    Portions of the record may have been created with voice recognition software   Occasional wrong word or "sound a like" substitutions may have occurred due to the inherent limitations of voice recognition software   Read the chart carefully and recognize, using context, where substitutions have occurred

## 2020-08-29 ENCOUNTER — HOSPITAL ENCOUNTER (OUTPATIENT)
Dept: ULTRASOUND IMAGING | Facility: HOSPITAL | Age: 85
Discharge: HOME | End: 2020-08-29
Attending: INTERNAL MEDICINE
Payer: COMMERCIAL

## 2020-08-29 DIAGNOSIS — N18.30 CHRONIC KIDNEY DISEASE, STAGE 3 (HCC): ICD-10-CM

## 2020-08-29 PROCEDURE — 51798 US URINE CAPACITY MEASURE: CPT

## 2020-10-07 DIAGNOSIS — K51.90 ULCERATIVE COLITIS WITHOUT COMPLICATIONS, UNSPECIFIED LOCATION (HCC): ICD-10-CM

## 2020-10-07 DIAGNOSIS — I10 ESSENTIAL HYPERTENSION: ICD-10-CM

## 2020-10-07 RX ORDER — LOSARTAN POTASSIUM 100 MG/1
TABLET ORAL
Qty: 90 TABLET | Refills: 1 | Status: SHIPPED | OUTPATIENT
Start: 2020-10-07 | End: 2021-04-11

## 2020-10-07 RX ORDER — SULFASALAZINE 500 MG/1
TABLET ORAL
Qty: 360 TABLET | Refills: 1 | Status: SHIPPED | OUTPATIENT
Start: 2020-10-07 | End: 2021-04-11

## 2020-10-12 ENCOUNTER — LAB (OUTPATIENT)
Dept: LAB | Facility: HOSPITAL | Age: 85
End: 2020-10-12
Attending: INTERNAL MEDICINE
Payer: COMMERCIAL

## 2020-10-12 DIAGNOSIS — N18.30 CHRONIC KIDNEY DISEASE, STAGE 3 (HCC): ICD-10-CM

## 2020-10-12 LAB
ANION GAP SERPL CALCULATED.3IONS-SCNC: 3 MMOL/L (ref 4–13)
BACTERIA UR QL AUTO: ABNORMAL /HPF
BILIRUB UR QL STRIP: NEGATIVE
BUN SERPL-MCNC: 45 MG/DL (ref 5–25)
CALCIUM SERPL-MCNC: 8.9 MG/DL (ref 8.3–10.1)
CHLORIDE SERPL-SCNC: 109 MMOL/L (ref 100–108)
CLARITY UR: CLEAR
CO2 SERPL-SCNC: 27 MMOL/L (ref 21–32)
COLOR UR: YELLOW
CREAT SERPL-MCNC: 1.88 MG/DL (ref 0.6–1.3)
CREAT UR-MCNC: 127 MG/DL
GFR SERPL CREATININE-BSD FRML MDRD: 32 ML/MIN/1.73SQ M
GLUCOSE P FAST SERPL-MCNC: 88 MG/DL (ref 65–99)
GLUCOSE UR STRIP-MCNC: NEGATIVE MG/DL
HGB UR QL STRIP.AUTO: NEGATIVE
HYALINE CASTS #/AREA URNS LPF: ABNORMAL /LPF
KETONES UR STRIP-MCNC: NEGATIVE MG/DL
LEUKOCYTE ESTERASE UR QL STRIP: NEGATIVE
NITRITE UR QL STRIP: NEGATIVE
NON-SQ EPI CELLS URNS QL MICRO: ABNORMAL /HPF
PH UR STRIP.AUTO: 6 [PH]
POTASSIUM SERPL-SCNC: 4.7 MMOL/L (ref 3.5–5.3)
PROT UR STRIP-MCNC: ABNORMAL MG/DL
PROT UR-MCNC: 159 MG/DL
PROT/CREAT UR: 1.25 MG/G{CREAT} (ref 0–0.1)
RBC #/AREA URNS AUTO: ABNORMAL /HPF
SODIUM SERPL-SCNC: 139 MMOL/L (ref 136–145)
SP GR UR STRIP.AUTO: 1.02 (ref 1–1.03)
UROBILINOGEN UR QL STRIP.AUTO: 0.2 E.U./DL
WBC #/AREA URNS AUTO: ABNORMAL /HPF

## 2020-10-12 PROCEDURE — 80048 BASIC METABOLIC PNL TOTAL CA: CPT

## 2020-10-12 PROCEDURE — 82570 ASSAY OF URINE CREATININE: CPT

## 2020-10-12 PROCEDURE — 36415 COLL VENOUS BLD VENIPUNCTURE: CPT

## 2020-10-12 PROCEDURE — 84156 ASSAY OF PROTEIN URINE: CPT

## 2020-10-12 PROCEDURE — 81001 URINALYSIS AUTO W/SCOPE: CPT

## 2020-10-15 DIAGNOSIS — M10.9 GOUT, UNSPECIFIED CAUSE, UNSPECIFIED CHRONICITY, UNSPECIFIED SITE: ICD-10-CM

## 2020-10-15 RX ORDER — ALLOPURINOL 100 MG/1
100 TABLET ORAL DAILY
Qty: 15 TABLET | Refills: 0 | Status: SHIPPED | OUTPATIENT
Start: 2020-10-15 | End: 2021-02-08 | Stop reason: SDUPTHER

## 2020-11-02 ENCOUNTER — OFFICE VISIT (OUTPATIENT)
Dept: NEPHROLOGY | Facility: HOSPITAL | Age: 85
End: 2020-11-02
Payer: COMMERCIAL

## 2020-11-02 VITALS
HEIGHT: 66 IN | DIASTOLIC BLOOD PRESSURE: 84 MMHG | RESPIRATION RATE: 16 BRPM | TEMPERATURE: 97.4 F | HEART RATE: 94 BPM | BODY MASS INDEX: 36.48 KG/M2 | WEIGHT: 227 LBS | SYSTOLIC BLOOD PRESSURE: 128 MMHG

## 2020-11-02 DIAGNOSIS — N18.31 STAGE 3A CHRONIC KIDNEY DISEASE (HCC): Primary | ICD-10-CM

## 2020-11-02 DIAGNOSIS — E66.01 CLASS 2 SEVERE OBESITY DUE TO EXCESS CALORIES WITH SERIOUS COMORBIDITY AND BODY MASS INDEX (BMI) OF 36.0 TO 36.9 IN ADULT (HCC): ICD-10-CM

## 2020-11-02 DIAGNOSIS — M10.9 GOUT, UNSPECIFIED CAUSE, UNSPECIFIED CHRONICITY, UNSPECIFIED SITE: ICD-10-CM

## 2020-11-02 DIAGNOSIS — I10 ESSENTIAL HYPERTENSION: ICD-10-CM

## 2020-11-02 PROCEDURE — 99214 OFFICE O/P EST MOD 30 MIN: CPT | Performed by: INTERNAL MEDICINE

## 2020-11-02 PROCEDURE — 3074F SYST BP LT 130 MM HG: CPT | Performed by: INTERNAL MEDICINE

## 2020-11-02 PROCEDURE — 3079F DIAST BP 80-89 MM HG: CPT | Performed by: INTERNAL MEDICINE

## 2020-11-02 PROCEDURE — 1036F TOBACCO NON-USER: CPT | Performed by: INTERNAL MEDICINE

## 2020-11-02 PROCEDURE — 1160F RVW MEDS BY RX/DR IN RCRD: CPT | Performed by: INTERNAL MEDICINE

## 2020-12-28 ENCOUNTER — OFFICE VISIT (OUTPATIENT)
Dept: FAMILY MEDICINE CLINIC | Facility: HOSPITAL | Age: 85
End: 2020-12-28
Payer: COMMERCIAL

## 2020-12-28 VITALS
DIASTOLIC BLOOD PRESSURE: 84 MMHG | HEART RATE: 96 BPM | BODY MASS INDEX: 39.29 KG/M2 | TEMPERATURE: 96.5 F | SYSTOLIC BLOOD PRESSURE: 132 MMHG | HEIGHT: 65 IN | WEIGHT: 235.8 LBS

## 2020-12-28 DIAGNOSIS — H35.30 MACULAR DEGENERATION OF BOTH EYES, UNSPECIFIED TYPE: ICD-10-CM

## 2020-12-28 DIAGNOSIS — I10 ESSENTIAL HYPERTENSION: ICD-10-CM

## 2020-12-28 DIAGNOSIS — N18.32 STAGE 3B CHRONIC KIDNEY DISEASE (HCC): Primary | ICD-10-CM

## 2020-12-28 DIAGNOSIS — M10.9 GOUT, UNSPECIFIED CAUSE, UNSPECIFIED CHRONICITY, UNSPECIFIED SITE: ICD-10-CM

## 2020-12-28 DIAGNOSIS — E66.01 SEVERE OBESITY (BMI 35.0-39.9) WITH COMORBIDITY (HCC): ICD-10-CM

## 2020-12-28 DIAGNOSIS — Z00.00 MEDICARE ANNUAL WELLNESS VISIT, SUBSEQUENT: ICD-10-CM

## 2020-12-28 DIAGNOSIS — K51.90 ULCERATIVE COLITIS WITHOUT COMPLICATIONS, UNSPECIFIED LOCATION (HCC): ICD-10-CM

## 2020-12-28 PROCEDURE — 3288F FALL RISK ASSESSMENT DOCD: CPT | Performed by: INTERNAL MEDICINE

## 2020-12-28 PROCEDURE — 3725F SCREEN DEPRESSION PERFORMED: CPT | Performed by: INTERNAL MEDICINE

## 2020-12-28 PROCEDURE — 1036F TOBACCO NON-USER: CPT | Performed by: INTERNAL MEDICINE

## 2020-12-28 PROCEDURE — 3079F DIAST BP 80-89 MM HG: CPT | Performed by: INTERNAL MEDICINE

## 2020-12-28 PROCEDURE — 1125F AMNT PAIN NOTED PAIN PRSNT: CPT | Performed by: INTERNAL MEDICINE

## 2020-12-28 PROCEDURE — 99214 OFFICE O/P EST MOD 30 MIN: CPT | Performed by: INTERNAL MEDICINE

## 2020-12-28 PROCEDURE — G0439 PPPS, SUBSEQ VISIT: HCPCS | Performed by: INTERNAL MEDICINE

## 2020-12-28 PROCEDURE — 3075F SYST BP GE 130 - 139MM HG: CPT | Performed by: INTERNAL MEDICINE

## 2020-12-28 PROCEDURE — 1101F PT FALLS ASSESS-DOCD LE1/YR: CPT | Performed by: INTERNAL MEDICINE

## 2020-12-28 PROCEDURE — 1160F RVW MEDS BY RX/DR IN RCRD: CPT | Performed by: INTERNAL MEDICINE

## 2020-12-28 PROCEDURE — 1170F FXNL STATUS ASSESSED: CPT | Performed by: INTERNAL MEDICINE

## 2021-01-26 DIAGNOSIS — R60.9 EDEMA, UNSPECIFIED TYPE: ICD-10-CM

## 2021-01-26 DIAGNOSIS — N40.0 BENIGN PROSTATIC HYPERPLASIA, UNSPECIFIED WHETHER LOWER URINARY TRACT SYMPTOMS PRESENT: ICD-10-CM

## 2021-01-26 RX ORDER — FUROSEMIDE 40 MG/1
TABLET ORAL
Qty: 45 TABLET | Refills: 2 | Status: SHIPPED | OUTPATIENT
Start: 2021-01-26 | End: 2021-11-13

## 2021-01-26 RX ORDER — TAMSULOSIN HYDROCHLORIDE 0.4 MG/1
CAPSULE ORAL
Qty: 90 CAPSULE | Refills: 2 | Status: SHIPPED | OUTPATIENT
Start: 2021-01-26 | End: 2021-11-13

## 2021-02-08 ENCOUNTER — TELEPHONE (OUTPATIENT)
Dept: NEPHROLOGY | Facility: HOSPITAL | Age: 86
End: 2021-02-08

## 2021-02-08 DIAGNOSIS — M10.9 GOUT, UNSPECIFIED CAUSE, UNSPECIFIED CHRONICITY, UNSPECIFIED SITE: ICD-10-CM

## 2021-02-08 RX ORDER — ALLOPURINOL 100 MG/1
100 TABLET ORAL DAILY
Qty: 90 TABLET | Refills: 1 | Status: SHIPPED | OUTPATIENT
Start: 2021-02-08 | End: 2021-02-08 | Stop reason: SDUPTHER

## 2021-02-08 RX ORDER — ALLOPURINOL 100 MG/1
100 TABLET ORAL DAILY
Qty: 15 TABLET | Refills: 0 | Status: SHIPPED | OUTPATIENT
Start: 2021-02-08 | End: 2021-02-23 | Stop reason: SDUPTHER

## 2021-02-08 NOTE — TELEPHONE ENCOUNTER
Left message for patient to remind him of his appointment on 2/12/21 and there is blood and urine tests to be done for the appointment

## 2021-02-23 DIAGNOSIS — M10.9 GOUT, UNSPECIFIED CAUSE, UNSPECIFIED CHRONICITY, UNSPECIFIED SITE: ICD-10-CM

## 2021-02-23 RX ORDER — ALLOPURINOL 100 MG/1
100 TABLET ORAL DAILY
Qty: 15 TABLET | Refills: 0 | Status: SHIPPED | OUTPATIENT
Start: 2021-02-23 | End: 2021-05-11

## 2021-03-03 DIAGNOSIS — Z23 ENCOUNTER FOR IMMUNIZATION: ICD-10-CM

## 2021-03-09 ENCOUNTER — IMMUNIZATIONS (OUTPATIENT)
Dept: FAMILY MEDICINE CLINIC | Facility: HOSPITAL | Age: 86
End: 2021-03-09

## 2021-03-09 DIAGNOSIS — Z23 ENCOUNTER FOR IMMUNIZATION: Primary | ICD-10-CM

## 2021-03-09 PROCEDURE — 91300 SARS-COV-2 / COVID-19 MRNA VACCINE (PFIZER-BIONTECH) 30 MCG: CPT

## 2021-03-09 PROCEDURE — 0001A SARS-COV-2 / COVID-19 MRNA VACCINE (PFIZER-BIONTECH) 30 MCG: CPT

## 2021-03-30 ENCOUNTER — IMMUNIZATIONS (OUTPATIENT)
Dept: FAMILY MEDICINE CLINIC | Facility: HOSPITAL | Age: 86
End: 2021-03-30

## 2021-03-30 DIAGNOSIS — Z23 ENCOUNTER FOR IMMUNIZATION: Primary | ICD-10-CM

## 2021-03-30 PROCEDURE — 0002A SARS-COV-2 / COVID-19 MRNA VACCINE (PFIZER-BIONTECH) 30 MCG: CPT

## 2021-03-30 PROCEDURE — 91300 SARS-COV-2 / COVID-19 MRNA VACCINE (PFIZER-BIONTECH) 30 MCG: CPT

## 2021-04-11 DIAGNOSIS — I10 ESSENTIAL HYPERTENSION: ICD-10-CM

## 2021-04-11 DIAGNOSIS — K51.90 ULCERATIVE COLITIS WITHOUT COMPLICATIONS, UNSPECIFIED LOCATION (HCC): ICD-10-CM

## 2021-04-11 RX ORDER — SULFASALAZINE 500 MG/1
TABLET ORAL
Qty: 360 TABLET | Refills: 1 | Status: SHIPPED | OUTPATIENT
Start: 2021-04-11 | End: 2021-11-13

## 2021-04-11 RX ORDER — LOSARTAN POTASSIUM 100 MG/1
TABLET ORAL
Qty: 90 TABLET | Refills: 1 | Status: SHIPPED | OUTPATIENT
Start: 2021-04-11 | End: 2021-11-13

## 2021-05-11 DIAGNOSIS — M10.9 GOUT, UNSPECIFIED CAUSE, UNSPECIFIED CHRONICITY, UNSPECIFIED SITE: ICD-10-CM

## 2021-05-11 RX ORDER — ALLOPURINOL 100 MG/1
TABLET ORAL
Qty: 90 TABLET | Refills: 2 | Status: SHIPPED | OUTPATIENT
Start: 2021-05-11 | End: 2022-02-12

## 2021-06-23 ENCOUNTER — APPOINTMENT (OUTPATIENT)
Dept: LAB | Facility: HOSPITAL | Age: 86
End: 2021-06-23
Attending: INTERNAL MEDICINE
Payer: COMMERCIAL

## 2021-06-23 DIAGNOSIS — K51.90 ULCERATIVE COLITIS WITHOUT COMPLICATIONS, UNSPECIFIED LOCATION (HCC): ICD-10-CM

## 2021-06-23 DIAGNOSIS — N18.32 STAGE 3B CHRONIC KIDNEY DISEASE (HCC): ICD-10-CM

## 2021-06-23 DIAGNOSIS — M10.9 GOUT, UNSPECIFIED CAUSE, UNSPECIFIED CHRONICITY, UNSPECIFIED SITE: ICD-10-CM

## 2021-06-23 DIAGNOSIS — N18.31 STAGE 3A CHRONIC KIDNEY DISEASE (HCC): ICD-10-CM

## 2021-06-23 DIAGNOSIS — Z00.00 MEDICARE ANNUAL WELLNESS VISIT, SUBSEQUENT: ICD-10-CM

## 2021-06-23 DIAGNOSIS — H35.30 MACULAR DEGENERATION OF BOTH EYES, UNSPECIFIED TYPE: ICD-10-CM

## 2021-06-23 DIAGNOSIS — I10 ESSENTIAL HYPERTENSION: ICD-10-CM

## 2021-06-23 DIAGNOSIS — E66.01 SEVERE OBESITY (BMI 35.0-39.9) WITH COMORBIDITY (HCC): ICD-10-CM

## 2021-06-23 LAB
ALBUMIN SERPL BCP-MCNC: 3.4 G/DL (ref 3.5–5)
ALP SERPL-CCNC: 76 U/L (ref 46–116)
ALT SERPL W P-5'-P-CCNC: 16 U/L (ref 12–78)
ANION GAP SERPL CALCULATED.3IONS-SCNC: 5 MMOL/L (ref 4–13)
AST SERPL W P-5'-P-CCNC: 15 U/L (ref 5–45)
BACTERIA UR QL AUTO: ABNORMAL /HPF
BASOPHILS # BLD AUTO: 0.11 THOUSANDS/ΜL (ref 0–0.1)
BASOPHILS NFR BLD AUTO: 2 % (ref 0–1)
BILIRUB SERPL-MCNC: 0.51 MG/DL (ref 0.2–1)
BILIRUB UR QL STRIP: NEGATIVE
BUN SERPL-MCNC: 37 MG/DL (ref 5–25)
CALCIUM ALBUM COR SERPL-MCNC: 9.1 MG/DL (ref 8.3–10.1)
CALCIUM SERPL-MCNC: 8.6 MG/DL (ref 8.3–10.1)
CHLORIDE SERPL-SCNC: 112 MMOL/L (ref 100–108)
CHOLEST SERPL-MCNC: 164 MG/DL (ref 50–200)
CLARITY UR: CLEAR
CO2 SERPL-SCNC: 25 MMOL/L (ref 21–32)
COLOR UR: YELLOW
CREAT SERPL-MCNC: 1.82 MG/DL (ref 0.6–1.3)
CREAT UR-MCNC: 127 MG/DL
EOSINOPHIL # BLD AUTO: 0.28 THOUSAND/ΜL (ref 0–0.61)
EOSINOPHIL NFR BLD AUTO: 4 % (ref 0–6)
ERYTHROCYTE [DISTWIDTH] IN BLOOD BY AUTOMATED COUNT: 13.6 % (ref 11.6–15.1)
EST. AVERAGE GLUCOSE BLD GHB EST-MCNC: 82 MG/DL
GFR SERPL CREATININE-BSD FRML MDRD: 33 ML/MIN/1.73SQ M
GLUCOSE P FAST SERPL-MCNC: 86 MG/DL (ref 65–99)
GLUCOSE UR STRIP-MCNC: NEGATIVE MG/DL
HBA1C MFR BLD: 4.5 %
HCT VFR BLD AUTO: 37.2 % (ref 36.5–49.3)
HDLC SERPL-MCNC: 39 MG/DL
HGB BLD-MCNC: 11.4 G/DL (ref 12–17)
HGB UR QL STRIP.AUTO: NEGATIVE
HYALINE CASTS #/AREA URNS LPF: ABNORMAL /LPF
IMM GRANULOCYTES # BLD AUTO: 0.03 THOUSAND/UL (ref 0–0.2)
IMM GRANULOCYTES NFR BLD AUTO: 0 % (ref 0–2)
KETONES UR STRIP-MCNC: NEGATIVE MG/DL
LDLC SERPL CALC-MCNC: 96 MG/DL (ref 0–100)
LEUKOCYTE ESTERASE UR QL STRIP: NEGATIVE
LYMPHOCYTES # BLD AUTO: 1.65 THOUSANDS/ΜL (ref 0.6–4.47)
LYMPHOCYTES NFR BLD AUTO: 22 % (ref 14–44)
MCH RBC QN AUTO: 29.5 PG (ref 26.8–34.3)
MCHC RBC AUTO-ENTMCNC: 30.6 G/DL (ref 31.4–37.4)
MCV RBC AUTO: 96 FL (ref 82–98)
MONOCYTES # BLD AUTO: 0.74 THOUSAND/ΜL (ref 0.17–1.22)
MONOCYTES NFR BLD AUTO: 10 % (ref 4–12)
NEUTROPHILS # BLD AUTO: 4.64 THOUSANDS/ΜL (ref 1.85–7.62)
NEUTS SEG NFR BLD AUTO: 62 % (ref 43–75)
NITRITE UR QL STRIP: NEGATIVE
NON-SQ EPI CELLS URNS QL MICRO: ABNORMAL /HPF
NONHDLC SERPL-MCNC: 125 MG/DL
NRBC BLD AUTO-RTO: 0 /100 WBCS
PH UR STRIP.AUTO: 6 [PH]
PLATELET # BLD AUTO: 217 THOUSANDS/UL (ref 149–390)
PMV BLD AUTO: 11 FL (ref 8.9–12.7)
POTASSIUM SERPL-SCNC: 5 MMOL/L (ref 3.5–5.3)
PROT SERPL-MCNC: 7.9 G/DL (ref 6.4–8.2)
PROT UR STRIP-MCNC: ABNORMAL MG/DL
PROT UR-MCNC: 227 MG/DL
PROT/CREAT UR: 1.79 MG/G{CREAT} (ref 0–0.1)
RBC # BLD AUTO: 3.86 MILLION/UL (ref 3.88–5.62)
RBC #/AREA URNS AUTO: ABNORMAL /HPF
SODIUM SERPL-SCNC: 142 MMOL/L (ref 136–145)
SP GR UR STRIP.AUTO: 1.02 (ref 1–1.03)
TRIGL SERPL-MCNC: 147 MG/DL
TSH SERPL DL<=0.05 MIU/L-ACNC: 3.5 UIU/ML (ref 0.36–3.74)
URATE SERPL-MCNC: 5.5 MG/DL (ref 4.2–8)
UROBILINOGEN UR QL STRIP.AUTO: 0.2 E.U./DL
WBC # BLD AUTO: 7.45 THOUSAND/UL (ref 4.31–10.16)
WBC #/AREA URNS AUTO: ABNORMAL /HPF

## 2021-06-23 PROCEDURE — 84443 ASSAY THYROID STIM HORMONE: CPT

## 2021-06-23 PROCEDURE — 85025 COMPLETE CBC W/AUTO DIFF WBC: CPT

## 2021-06-23 PROCEDURE — 36415 COLL VENOUS BLD VENIPUNCTURE: CPT

## 2021-06-23 PROCEDURE — 84550 ASSAY OF BLOOD/URIC ACID: CPT

## 2021-06-23 PROCEDURE — 80053 COMPREHEN METABOLIC PANEL: CPT

## 2021-06-23 PROCEDURE — 84156 ASSAY OF PROTEIN URINE: CPT

## 2021-06-23 PROCEDURE — 82570 ASSAY OF URINE CREATININE: CPT

## 2021-06-23 PROCEDURE — 81001 URINALYSIS AUTO W/SCOPE: CPT

## 2021-06-23 PROCEDURE — 80061 LIPID PANEL: CPT

## 2021-06-23 PROCEDURE — 83036 HEMOGLOBIN GLYCOSYLATED A1C: CPT

## 2021-06-24 ENCOUNTER — TELEPHONE (OUTPATIENT)
Dept: NEPHROLOGY | Facility: CLINIC | Age: 86
End: 2021-06-24

## 2021-06-24 NOTE — TELEPHONE ENCOUNTER
----- Message from [id number] sent at 6/24/2021  5:08 AM EDT -----  HI! Patient does not have any follow up appt  His prior return follow up appt in feb  Was cancelled    Please call and reschedule for ckd III  Thanks  Mandy Burks

## 2021-06-28 NOTE — TELEPHONE ENCOUNTER
Spoke with both pt and his wife  At this time they do not wish to reschedule his missed appointment  Pt will be seeing his PCP tomorrow

## 2021-06-29 ENCOUNTER — OFFICE VISIT (OUTPATIENT)
Dept: FAMILY MEDICINE CLINIC | Facility: HOSPITAL | Age: 86
End: 2021-06-29
Payer: COMMERCIAL

## 2021-06-29 VITALS
DIASTOLIC BLOOD PRESSURE: 82 MMHG | BODY MASS INDEX: 37.89 KG/M2 | TEMPERATURE: 97.9 F | WEIGHT: 227.4 LBS | HEART RATE: 92 BPM | SYSTOLIC BLOOD PRESSURE: 124 MMHG | HEIGHT: 65 IN

## 2021-06-29 DIAGNOSIS — R73.01 IMPAIRED FASTING GLUCOSE: Primary | ICD-10-CM

## 2021-06-29 DIAGNOSIS — K51.90 ULCERATIVE COLITIS WITHOUT COMPLICATIONS, UNSPECIFIED LOCATION (HCC): ICD-10-CM

## 2021-06-29 DIAGNOSIS — E78.6 LOW HDL (UNDER 40): ICD-10-CM

## 2021-06-29 DIAGNOSIS — I10 ESSENTIAL HYPERTENSION: ICD-10-CM

## 2021-06-29 DIAGNOSIS — N18.32 STAGE 3B CHRONIC KIDNEY DISEASE (HCC): ICD-10-CM

## 2021-06-29 DIAGNOSIS — E66.01 SEVERE OBESITY (BMI 35.0-39.9) WITH COMORBIDITY (HCC): ICD-10-CM

## 2021-06-29 PROCEDURE — 1160F RVW MEDS BY RX/DR IN RCRD: CPT | Performed by: INTERNAL MEDICINE

## 2021-06-29 PROCEDURE — 99214 OFFICE O/P EST MOD 30 MIN: CPT | Performed by: INTERNAL MEDICINE

## 2021-06-29 PROCEDURE — 1036F TOBACCO NON-USER: CPT | Performed by: INTERNAL MEDICINE

## 2021-06-29 NOTE — PROGRESS NOTES
Assessment/Plan:    Impaired fasting glucose  Both FBS/A1c wnl, con't annual labs, urged low sugar/carb diet as well as exercise and wgt loss    Ulcerative colitis without complications (HCC)  No current GI symptoms, on Sulfasalazine, con't current regimen, call with any new symptoms    Hypertension  BP at goal, con't current meds, diet/exercise/wgt loss encouraged    Chronic kidney disease, stage 3  Lab Results   Component Value Date    EGFR 33 06/23/2021    EGFR 32 10/12/2020    EGFR 30 07/15/2020    CREATININE 1 82 (H) 06/23/2021    CREATININE 1 88 (H) 10/12/2020    CREATININE 1 96 (H) 07/15/2020   Stable, pt deferring Nephro eval for now, urged to avoid NSAIDs, keep hydrated and importance of BP/BS control reviewed    Low HDL (under 40)  Encouraged healthy diet and increase activity level       Diagnoses and all orders for this visit:    Impaired fasting glucose    Stage 3b chronic kidney disease (HCC)    Low HDL (under 40)    Essential hypertension    Ulcerative colitis without complications, unspecified location (HCC)    Severe obesity (BMI 35 0-39  9) with comorbidity (HCC)    BMI 37 0-37 9, adult  Comments:  diet/exercise/wgt loss encouraged      Has had both COVID vaccine's        Subjective:      Patient ID: Elodia Soto is a 80 y o  male  HPI Pt here for follow up appt and BW results    BW results were d/w pt in detail: FBS/A1c 86/ BUN/Cr 37/1 82 (GFR 33), Alb 3 4, CBC with hgb 11 4 and RBC 3 86, rest of CMP/CBC/TSH/uric acid was wnl, FLP with HDL low at 39 but TC/TG/LDL all wnl  Def of nml vs IFG vs DM was d/w pt in detail  Diet/exercise was reviewed - wgt down 8 lbs from Dec 2020  BMI reviewed  He admits he is not watching carbs as much but has increased salads  He does no formal exercise  CKD stage 3 was reviewed  He was supposed to see Nephro in Feb but cancelled appt  Nephro has called to reschedule and pt has deferred further f/u with them at this time    Importance of BP/BS control as well as keeping hydrated and avoiding NSAIDs reviewed  BP at goal today and meds were reviewed and no changes have occurred  He denies missing doses of meds or SE with the meds  He does not check his BP outside the office  He notes no frequent HA's/dizziness/double vision/CP  Pt con't to take his Sulfasalazine daily for his UC  He notes no recent GI symptoms  He denies diarrhea/F/C/blood in stool  He has not seen GI in some time  Has had both COVID vaccine's      Review of Systems   Constitutional: Negative for chills, fever and unexpected weight change  HENT: Negative for congestion and sinus pain  Eyes: Positive for visual disturbance  Negative for pain  Respiratory: Negative for cough and shortness of breath  Cardiovascular: Negative for chest pain, palpitations and leg swelling  Gastrointestinal: Negative for abdominal pain, blood in stool, constipation, diarrhea, nausea and vomiting  Genitourinary: Negative for difficulty urinating and dysuria  Musculoskeletal: Positive for arthralgias  Negative for myalgias  Skin: Negative for rash and wound  Neurological: Negative for dizziness and headaches  Hematological: Does not bruise/bleed easily  Psychiatric/Behavioral: Negative for behavioral problems and confusion  Objective:    /82   Pulse 92   Temp 97 9 °F (36 6 °C) (Temporal)   Ht 5' 5" (1 651 m)   Wt 103 kg (227 lb 6 4 oz)   BMI 37 84 kg/m²      Physical Exam  Vitals and nursing note reviewed  Constitutional:       General: He is not in acute distress  Appearance: He is well-developed  He is obese  He is not ill-appearing  HENT:      Head: Normocephalic and atraumatic  Eyes:      General:         Right eye: No discharge  Left eye: No discharge  Conjunctiva/sclera: Conjunctivae normal    Neck:      Trachea: No tracheal deviation  Cardiovascular:      Rate and Rhythm: Normal rate and regular rhythm  Heart sounds: No murmur heard  Pulmonary:      Effort: Pulmonary effort is normal  No respiratory distress  Breath sounds: Normal breath sounds  No wheezing, rhonchi or rales  Abdominal:      General: There is no distension  Palpations: Abdomen is soft  Tenderness: There is no abdominal tenderness  There is no guarding or rebound  Musculoskeletal:         General: No deformity or signs of injury  Cervical back: Neck supple  Skin:     General: Skin is warm and dry  Coloration: Skin is not pale  Findings: No rash  Neurological:      General: No focal deficit present  Mental Status: He is alert  Motor: No abnormal muscle tone  Gait: Gait normal    Psychiatric:         Mood and Affect: Mood normal          Behavior: Behavior normal          Thought Content: Thought content normal          Judgment: Judgment normal          BMI Counseling: Body mass index is 37 84 kg/m²  The BMI is above normal  Nutrition recommendations include reducing portion sizes, 3-5 servings of fruits/vegetables daily, consuming healthier snacks, moderation in carbohydrate intake, increasing intake of lean protein and reducing intake of saturated fat and trans fat  Exercise recommendations include exercising 3-5 times per week

## 2021-06-30 NOTE — PATIENT INSTRUCTIONS
Obesity   AMBULATORY CARE:   Obesity  is when your body mass index (BMI) is greater than 30  Your healthcare provider will use your height and weight to measure your BMI  The risks of obesity include  many health problems, such as injuries or physical disability  You may need tests to check for the following:  · Diabetes    · High blood pressure or high cholesterol    · Heart disease    · Gallbladder or liver disease    · Cancer of the colon, breast, prostate, liver, or kidney    · Sleep apnea    · Arthritis or gout    Seek care immediately if:   · You have a severe headache, confusion, or difficulty speaking  · You have weakness on one side of your body  · You have chest pain, sweating, or shortness of breath  Contact your healthcare provider if:   · You have symptoms of gallbladder or liver disease, such as pain in your upper abdomen  · You have knee or hip pain and discomfort while walking  · You have symptoms of diabetes, such as intense hunger and thirst, and frequent urination  · You have symptoms of sleep apnea, such as snoring or daytime sleepiness  · You have questions or concerns about your condition or care  Treatment for obesity  focuses on helping you lose weight to improve your health  Even a small decrease in BMI can reduce the risk for many health problems  Your healthcare provider will help you set a weight-loss goal   · Lifestyle changes  are the first step in treating obesity  These include making healthy food choices and getting regular physical activity  Your healthcare provider may suggest a weight-loss program that involves coaching, education, and therapy  · Medicine  may help you lose weight when it is used with a healthy diet and physical activity  · Surgery  can help you lose weight if you are very obese and have other health problems  There are several types of weight-loss surgery  Ask your healthcare provider for more information      Be successful losing weight:   · Set small, realistic goals  An example of a small goal is to walk for 20 minutes 5 days a week  Anther goal is to lose 5% of your body weight  · Tell friends, family members, and coworkers about your goals  and ask for their support  Ask a friend to lose weight with you, or join a weight-loss support group  · Identify foods or triggers that may cause you to overeat , and find ways to avoid them  Remove tempting high-calorie foods from your home and workplace  Place a bowl of fresh fruit on your kitchen counter  If stress causes you to eat, then find other ways to cope with stress  · Keep a diary to track what you eat and drink  Also write down how many minutes of physical activity you do each day  Weigh yourself once a week and record it in your diary  Eating changes: You will need to eat 500 to 1,000 fewer calories each day than you currently eat to lose 1 to 2 pounds a week  The following changes will help you cut calories:  · Eat smaller portions  Use small plates, no larger than 9 inches in diameter  Fill your plate half full of fruits and vegetables  Measure your food using measuring cups until you know what a serving size looks like  · Eat 3 meals and 1 or 2 snacks each day  Plan your meals in advance  Marcie Craig and eat at home most of the time  Eat slowly  Do not skip meals  Skipping meals can lead to overeating later in the day  This can make it harder for you to lose weight  Talk with a dietitian to help you make a meal plan and schedule that is right for you  · Eat fruits and vegetables at every meal   They are low in calories and high in fiber, which makes you feel full  Do not add butter, margarine, or cream sauce to vegetables  Use herbs to season steamed vegetables  · Eat less fat and fewer fried foods  Eat more baked or grilled chicken and fish  These protein sources are lower in calories and fat than red meat  Limit fast food   Dress your salads with olive oil and vinegar instead of bottled dressing  · Limit the amount of sugar you eat  Do not drink sugary beverages  Limit alcohol  Activity changes:  Physical activity is good for your body in many ways  It helps you burn calories and build strong muscles  It decreases stress and depression, and improves your mood  It can also help you sleep better  Talk to your healthcare provider before you begin an exercise program   · Exercise for at least 30 minutes 5 days a week  Start slowly  Set aside time each day for physical activity that you enjoy and that is convenient for you  It is best to do both weight training and an activity that increases your heart rate, such as walking, bicycling, or swimming  · Find ways to be more active  Do yard work and housecleaning  Walk up the stairs instead of using elevators  Spend your leisure time going to events that require walking, such as outdoor festivals or fairs  This extra physical activity can help you lose weight and keep it off  Follow up with your healthcare provider as directed: You may need to meet with a dietitian  Write down your questions so you remember to ask them during your visits  © Copyright [address] [place] [address] Drive Information is for End User's use only and may not be sold, redistributed or otherwise used for commercial purposes  All illustrations and images included in CareNotes® are the copyrighted property of [employer]  or [address] [place] [address]  The above information is an  only  It is not intended as medical advice for individual conditions or treatments  Talk to your doctor, nurse or pharmacist before following any medical regimen to see if it is safe and effective for you  Heart Healthy Diet   AMBULATORY CARE:   A heart healthy diet  is an eating plan low in unhealthy fats and sodium (salt)  The plan is high in healthy fats and fiber   A heart healthy diet helps improve your cholesterol levels and lowers your risk for heart disease and stroke  A dietitian will teach you how to read and understand food labels  Heart healthy diet guidelines to follow:   · Choose foods that contain healthy fats  ? Unsaturated fats  include monounsaturated and polyunsaturated fats  Unsaturated fat is found in foods such as soybean, canola, olive, corn, and safflower oils  It is also found in soft tub margarine that is made with liquid vegetable oil  ? Omega-3 fat  is found in certain fish, such as salmon, tuna, and trout, and in walnuts and flaxseed  Eat fish high in omega-3 fats at least 2 times a week  · Get 20 to 30 grams of fiber each day  Fruits, vegetables, whole-grain foods, and legumes (cooked beans) are good sources of fiber  · Limit or do not have unhealthy fats  ? Cholesterol  is found in animal foods, such as eggs and lobster, and in dairy products made from whole milk  Limit cholesterol to less than 200 mg each day  ? Saturated fat  is found in meats, such as cordova and hamburger  It is also found in chicken or turkey skin, whole milk, and butter  Limit saturated fat to less than 7% of your total daily calories  ? Trans fat  is found in packaged foods, such as potato chips and cookies  It is also in hard margarine, some fried foods, and shortening  Do not eat foods that contain trans fats  · Limit sodium as directed  You may be told to limit sodium to 2,000 to 2,300 mg each day  Choose low-sodium or no-salt-added foods  Add little or no salt to food you prepare  Use herbs and spices in place of salt  Include the following in your heart healthy plan:  Ask your dietitian or healthcare provider how many servings to have from each of the following food groups:  · Grains:      ? Whole-wheat breads, cereals, and pastas, and brown rice    ? Low-fat, low-sodium crackers and chips    · Vegetables:      ? Broccoli, green beans, green peas, and spinach    ? Collards, kale, and lima beans    ?  Carrots, sweet potatoes, tomatoes, and peppers    ? Canned vegetables with no salt added    · Fruits:      ? Bananas, peaches, pears, and pineapple    ? Grapes, raisins, and dates    ? Oranges, tangerines, grapefruit, orange juice, and grapefruit juice    ? Apricots, mangoes, melons, and papaya    ? Raspberries and strawberries    ? Canned fruit with no added sugar    · Low-fat dairy:      ? Nonfat (skim) milk, 1% milk, and low-fat almond, cashew, or soy milks fortified with calcium    ? Low-fat cheese, regular or frozen yogurt, and cottage cheese    · Meats and proteins:      ? Lean cuts of beef and pork (loin, leg, round), skinless chicken and turkey    ? Legumes, soy products, egg whites, or nuts    Limit or do not include the following in your heart healthy plan:   · Unhealthy fats and oils:      ? Whole or 2% milk, cream cheese, sour cream, or cheese    ? High-fat cuts of beef (T-bone steaks, ribs), chicken or turkey with skin, and organ meats such as liver    ? Butter, stick margarine, shortening, and cooking oils such as coconut or palm oil    · Foods and liquids high in sodium:      ? Packaged foods, such as frozen dinners, cookies, macaroni and cheese, and cereals with more than 300 mg of sodium per serving    ? Vegetables with added sodium, such as instant potatoes, vegetables with added sauces, or regular canned vegetables    ? Cured or smoked meats, such as hot dogs, cordova, and sausage    ? High-sodium ketchup, barbecue sauce, salad dressing, pickles, olives, soy sauce, or miso    · Foods and liquids high in sugar:      ? Candy, cake, cookies, pies, or doughnuts    ? Soft drinks (soda), sports drinks, or sweetened tea    ? Canned or dry mixes for cakes, soups, sauces, or gravies    Other healthy heart guidelines:   · Do not smoke  Nicotine and other chemicals in cigarettes and cigars can cause lung and heart damage  Ask your healthcare provider for information if you currently smoke and need help to quit   E-cigarettes or smokeless tobacco still contain nicotine  Talk to your healthcare provider before you use these products  · Limit or do not drink alcohol as directed  Alcohol can damage your heart and raise your blood pressure  Your healthcare provider may give you specific daily and weekly limits  The general recommended limit is 1 drink a day for women 21 or older and for men 72 or older  Do not have more than 3 drinks in a day or 7 in a week  The recommended limit is 2 drinks a day for men 24to 59years of age  Do not have more than 4 drinks in a day or 14 in a week  A drink of alcohol is 12 ounces of beer, 5 ounces of wine, or 1½ ounces of liquor  · Exercise regularly  Exercise can help you maintain a healthy weight and improve your blood pressure and cholesterol levels  Regular exercise can also decrease your risk for heart problems  Ask your healthcare provider about the best exercise plan for you  Do not start an exercise program without asking your healthcare provider  Follow up with your doctor or cardiologist as directed:  Write down your questions so you remember to ask them during your visits  © Copyright [address] Information is for End User's use only and may not be sold, redistributed or otherwise used for commercial purposes  All illustrations and images included in CareNotes® are the copyrighted property of [employer]  or [address] [place] [address]  The above information is an  only  It is not intended as medical advice for individual conditions or treatments  Talk to your doctor, nurse or pharmacist before following any medical regimen to see if it is safe and effective for you

## 2021-06-30 NOTE — ASSESSMENT & PLAN NOTE
Lab Results   Component Value Date    EGFR 33 06/23/2021    EGFR 32 10/12/2020    EGFR 30 07/15/2020    CREATININE 1 82 (H) 06/23/2021    CREATININE 1 88 (H) 10/12/2020    CREATININE 1 96 (H) 07/15/2020   Stable, pt deferring Nephro eval for now, urged to avoid NSAIDs, keep hydrated and importance of BP/BS control reviewed

## 2021-11-13 DIAGNOSIS — I10 ESSENTIAL HYPERTENSION: ICD-10-CM

## 2021-11-13 DIAGNOSIS — R60.9 EDEMA, UNSPECIFIED TYPE: ICD-10-CM

## 2021-11-13 DIAGNOSIS — K51.90 ULCERATIVE COLITIS WITHOUT COMPLICATIONS, UNSPECIFIED LOCATION (HCC): ICD-10-CM

## 2021-11-13 DIAGNOSIS — N40.0 BENIGN PROSTATIC HYPERPLASIA, UNSPECIFIED WHETHER LOWER URINARY TRACT SYMPTOMS PRESENT: ICD-10-CM

## 2021-11-13 RX ORDER — LOSARTAN POTASSIUM 100 MG/1
TABLET ORAL
Qty: 90 TABLET | Refills: 1 | Status: SHIPPED | OUTPATIENT
Start: 2021-11-13 | End: 2022-05-01

## 2021-11-13 RX ORDER — TAMSULOSIN HYDROCHLORIDE 0.4 MG/1
CAPSULE ORAL
Qty: 90 CAPSULE | Refills: 2 | Status: SHIPPED | OUTPATIENT
Start: 2021-11-13

## 2021-11-13 RX ORDER — FUROSEMIDE 40 MG/1
TABLET ORAL
Qty: 45 TABLET | Refills: 2 | Status: SHIPPED | OUTPATIENT
Start: 2021-11-13 | End: 2022-05-03

## 2021-11-13 RX ORDER — SULFASALAZINE 500 MG/1
TABLET ORAL
Qty: 360 TABLET | Refills: 1 | Status: SHIPPED | OUTPATIENT
Start: 2021-11-13 | End: 2022-02-23 | Stop reason: SDUPTHER

## 2021-12-30 ENCOUNTER — OFFICE VISIT (OUTPATIENT)
Dept: FAMILY MEDICINE CLINIC | Facility: HOSPITAL | Age: 86
End: 2021-12-30
Payer: COMMERCIAL

## 2021-12-30 VITALS
BODY MASS INDEX: 37.62 KG/M2 | HEIGHT: 65 IN | TEMPERATURE: 98.2 F | HEART RATE: 50 BPM | SYSTOLIC BLOOD PRESSURE: 124 MMHG | WEIGHT: 225.8 LBS | DIASTOLIC BLOOD PRESSURE: 60 MMHG

## 2021-12-30 DIAGNOSIS — Z00.00 MEDICARE ANNUAL WELLNESS VISIT, SUBSEQUENT: ICD-10-CM

## 2021-12-30 DIAGNOSIS — I10 PRIMARY HYPERTENSION: ICD-10-CM

## 2021-12-30 DIAGNOSIS — E66.01 SEVERE OBESITY (BMI 35.0-39.9) WITH COMORBIDITY (HCC): ICD-10-CM

## 2021-12-30 DIAGNOSIS — M10.9 GOUT, UNSPECIFIED CAUSE, UNSPECIFIED CHRONICITY, UNSPECIFIED SITE: ICD-10-CM

## 2021-12-30 DIAGNOSIS — N18.32 STAGE 3B CHRONIC KIDNEY DISEASE (HCC): ICD-10-CM

## 2021-12-30 DIAGNOSIS — H35.30 MACULAR DEGENERATION OF BOTH EYES, UNSPECIFIED TYPE: Primary | ICD-10-CM

## 2021-12-30 DIAGNOSIS — R73.01 IMPAIRED FASTING GLUCOSE: ICD-10-CM

## 2021-12-30 PROCEDURE — 99214 OFFICE O/P EST MOD 30 MIN: CPT | Performed by: INTERNAL MEDICINE

## 2021-12-30 PROCEDURE — 1101F PT FALLS ASSESS-DOCD LE1/YR: CPT | Performed by: INTERNAL MEDICINE

## 2021-12-30 PROCEDURE — G0439 PPPS, SUBSEQ VISIT: HCPCS | Performed by: INTERNAL MEDICINE

## 2022-02-12 DIAGNOSIS — M10.9 GOUT, UNSPECIFIED CAUSE, UNSPECIFIED CHRONICITY, UNSPECIFIED SITE: ICD-10-CM

## 2022-02-12 RX ORDER — ALLOPURINOL 100 MG/1
TABLET ORAL
Qty: 90 TABLET | Refills: 2 | Status: SHIPPED | OUTPATIENT
Start: 2022-02-12

## 2022-02-23 ENCOUNTER — TELEPHONE (OUTPATIENT)
Dept: FAMILY MEDICINE CLINIC | Facility: HOSPITAL | Age: 87
End: 2022-02-23

## 2022-02-23 DIAGNOSIS — K51.90 ULCERATIVE COLITIS WITHOUT COMPLICATIONS, UNSPECIFIED LOCATION (HCC): ICD-10-CM

## 2022-02-23 NOTE — TELEPHONE ENCOUNTER
Pt needs sulfaSALAzine (AZULFIDINE) 500 mg tablet Refill if the doctor wants him to take it still  Send to [facility] in [place] 3 month supply      [phone] if you have questions

## 2022-02-24 RX ORDER — SULFASALAZINE 500 MG/1
TABLET ORAL
Qty: 360 TABLET | Refills: 1 | Status: SHIPPED | OUTPATIENT
Start: 2022-02-24 | End: 2022-06-23 | Stop reason: SDUPTHER

## 2022-05-01 DIAGNOSIS — I10 ESSENTIAL HYPERTENSION: ICD-10-CM

## 2022-05-01 RX ORDER — LOSARTAN POTASSIUM 100 MG/1
TABLET ORAL
Qty: 90 TABLET | Refills: 1 | Status: SHIPPED | OUTPATIENT
Start: 2022-05-01

## 2022-05-03 DIAGNOSIS — R60.9 EDEMA, UNSPECIFIED TYPE: ICD-10-CM

## 2022-05-03 RX ORDER — FUROSEMIDE 40 MG/1
TABLET ORAL
Qty: 45 TABLET | Refills: 2 | Status: SHIPPED | OUTPATIENT
Start: 2022-05-03

## 2022-06-23 DIAGNOSIS — K51.90 ULCERATIVE COLITIS WITHOUT COMPLICATIONS, UNSPECIFIED LOCATION (HCC): ICD-10-CM

## 2022-06-23 RX ORDER — SULFASALAZINE 500 MG/1
TABLET ORAL
Qty: 360 TABLET | Refills: 1 | Status: SHIPPED | OUTPATIENT
Start: 2022-06-23 | End: 2022-07-13 | Stop reason: SDUPTHER

## 2022-06-29 ENCOUNTER — LAB (OUTPATIENT)
Dept: LAB | Facility: HOSPITAL | Age: 87
End: 2022-06-29
Payer: COMMERCIAL

## 2022-06-29 DIAGNOSIS — I10 PRIMARY HYPERTENSION: ICD-10-CM

## 2022-06-29 DIAGNOSIS — H35.30 MACULAR DEGENERATION OF BOTH EYES, UNSPECIFIED TYPE: ICD-10-CM

## 2022-06-29 DIAGNOSIS — N18.32 STAGE 3B CHRONIC KIDNEY DISEASE (HCC): ICD-10-CM

## 2022-06-29 DIAGNOSIS — R73.01 IMPAIRED FASTING GLUCOSE: ICD-10-CM

## 2022-06-29 DIAGNOSIS — M10.9 GOUT, UNSPECIFIED CAUSE, UNSPECIFIED CHRONICITY, UNSPECIFIED SITE: ICD-10-CM

## 2022-06-29 LAB
ALBUMIN SERPL BCP-MCNC: 3.2 G/DL (ref 3.5–5)
ALP SERPL-CCNC: 79 U/L (ref 46–116)
ALT SERPL W P-5'-P-CCNC: 19 U/L (ref 12–78)
ANION GAP SERPL CALCULATED.3IONS-SCNC: 2 MMOL/L (ref 4–13)
AST SERPL W P-5'-P-CCNC: 20 U/L (ref 5–45)
BILIRUB SERPL-MCNC: 0.47 MG/DL (ref 0.2–1)
BUN SERPL-MCNC: 40 MG/DL (ref 5–25)
CALCIUM ALBUM COR SERPL-MCNC: 8.9 MG/DL (ref 8.3–10.1)
CALCIUM SERPL-MCNC: 8.3 MG/DL (ref 8.3–10.1)
CHLORIDE SERPL-SCNC: 114 MMOL/L (ref 100–108)
CHOLEST SERPL-MCNC: 144 MG/DL
CO2 SERPL-SCNC: 26 MMOL/L (ref 21–32)
CREAT SERPL-MCNC: 1.99 MG/DL (ref 0.6–1.3)
ERYTHROCYTE [DISTWIDTH] IN BLOOD BY AUTOMATED COUNT: 13.5 % (ref 11.6–15.1)
EST. AVERAGE GLUCOSE BLD GHB EST-MCNC: 85 MG/DL
GFR SERPL CREATININE-BSD FRML MDRD: 29 ML/MIN/1.73SQ M
GLUCOSE P FAST SERPL-MCNC: 87 MG/DL (ref 65–99)
HBA1C MFR BLD: 4.6 %
HCT VFR BLD AUTO: 35.2 % (ref 36.5–49.3)
HDLC SERPL-MCNC: 31 MG/DL
HGB BLD-MCNC: 11 G/DL (ref 12–17)
LDLC SERPL CALC-MCNC: 79 MG/DL (ref 0–100)
MCH RBC QN AUTO: 29.3 PG (ref 26.8–34.3)
MCHC RBC AUTO-ENTMCNC: 31.3 G/DL (ref 31.4–37.4)
MCV RBC AUTO: 94 FL (ref 82–98)
NONHDLC SERPL-MCNC: 113 MG/DL
PLATELET # BLD AUTO: 190 THOUSANDS/UL (ref 149–390)
PMV BLD AUTO: 10.8 FL (ref 8.9–12.7)
POTASSIUM SERPL-SCNC: 4.5 MMOL/L (ref 3.5–5.3)
PROT SERPL-MCNC: 7.5 G/DL (ref 6.4–8.2)
RBC # BLD AUTO: 3.76 MILLION/UL (ref 3.88–5.62)
SODIUM SERPL-SCNC: 142 MMOL/L (ref 136–145)
TRIGL SERPL-MCNC: 170 MG/DL
TSH SERPL DL<=0.05 MIU/L-ACNC: 2.71 UIU/ML (ref 0.45–4.5)
WBC # BLD AUTO: 7.93 THOUSAND/UL (ref 4.31–10.16)

## 2022-06-29 PROCEDURE — 80061 LIPID PANEL: CPT

## 2022-06-29 PROCEDURE — 85027 COMPLETE CBC AUTOMATED: CPT

## 2022-06-29 PROCEDURE — 36415 COLL VENOUS BLD VENIPUNCTURE: CPT

## 2022-06-29 PROCEDURE — 80053 COMPREHEN METABOLIC PANEL: CPT

## 2022-06-29 PROCEDURE — 84443 ASSAY THYROID STIM HORMONE: CPT

## 2022-06-29 PROCEDURE — 83036 HEMOGLOBIN GLYCOSYLATED A1C: CPT

## 2022-06-30 ENCOUNTER — OFFICE VISIT (OUTPATIENT)
Dept: FAMILY MEDICINE CLINIC | Facility: HOSPITAL | Age: 87
End: 2022-06-30
Payer: COMMERCIAL

## 2022-06-30 VITALS
HEART RATE: 92 BPM | SYSTOLIC BLOOD PRESSURE: 118 MMHG | DIASTOLIC BLOOD PRESSURE: 86 MMHG | BODY MASS INDEX: 37.72 KG/M2 | WEIGHT: 226.4 LBS | HEIGHT: 65 IN | TEMPERATURE: 97.7 F

## 2022-06-30 DIAGNOSIS — E78.6 LOW HDL (UNDER 40): ICD-10-CM

## 2022-06-30 DIAGNOSIS — R73.01 IMPAIRED FASTING GLUCOSE: Primary | ICD-10-CM

## 2022-06-30 DIAGNOSIS — N18.4 CHRONIC RENAL DISEASE, STAGE IV (HCC): ICD-10-CM

## 2022-06-30 DIAGNOSIS — E66.01 SEVERE OBESITY (BMI 35.0-39.9) WITH COMORBIDITY (HCC): ICD-10-CM

## 2022-06-30 DIAGNOSIS — N18.32 STAGE 3B CHRONIC KIDNEY DISEASE (HCC): ICD-10-CM

## 2022-06-30 DIAGNOSIS — E78.1 HYPERTRIGLYCERIDEMIA: ICD-10-CM

## 2022-06-30 DIAGNOSIS — K51.90 ULCERATIVE COLITIS WITHOUT COMPLICATIONS, UNSPECIFIED LOCATION (HCC): ICD-10-CM

## 2022-06-30 DIAGNOSIS — M10.9 GOUT, UNSPECIFIED CAUSE, UNSPECIFIED CHRONICITY, UNSPECIFIED SITE: ICD-10-CM

## 2022-06-30 DIAGNOSIS — I10 PRIMARY HYPERTENSION: ICD-10-CM

## 2022-06-30 DIAGNOSIS — D64.9 NORMOCYTIC ANEMIA: ICD-10-CM

## 2022-06-30 PROCEDURE — 99215 OFFICE O/P EST HI 40 MIN: CPT | Performed by: INTERNAL MEDICINE

## 2022-06-30 PROCEDURE — 1160F RVW MEDS BY RX/DR IN RCRD: CPT | Performed by: INTERNAL MEDICINE

## 2022-06-30 PROCEDURE — 1036F TOBACCO NON-USER: CPT | Performed by: INTERNAL MEDICINE

## 2022-06-30 NOTE — ASSESSMENT & PLAN NOTE
No recent gout flares, on long term Allopurinol w/o SE, will check uric acid with next labs - order given, call with any flares, will follow

## 2022-06-30 NOTE — ASSESSMENT & PLAN NOTE
Lab Results   Component Value Date    EGFR 29 06/29/2022    EGFR 33 06/23/2021    EGFR 32 10/12/2020    CREATININE 1 99 (H) 06/29/2022    CREATININE 1 82 (H) 06/23/2021    CREATININE 1 88 (H) 10/12/2020   Cr bumped up a bit - Stage 3b/4 CKD - has not seen Nephro in over 2 yrs - importance of f/u reviewed, BS/BP control as well as avoiding NSAIDs and keeping hydrated reviewed, will follow

## 2022-06-30 NOTE — PATIENT INSTRUCTIONS
Obesity   AMBULATORY CARE:   Obesity  means your body mass index (BMI) is greater than 30  Your healthcare provider will use your height and weight to measure your BMI  The risks of obesity include  many health problems, including injuries or physical disability  · Diabetes (high blood sugar level)    · High blood pressure or high cholesterol    · Heart disease    · Stroke    · Gallbladder or liver disease    · Cancer of the colon, breast, prostate, liver, or kidney    · Sleep apnea    · Arthritis or gout    Screening  is done to check for health conditions before you have signs or symptoms  If you are 28to 79years old, your blood sugar level may be checked every 3 years for signs of prediabetes or diabetes  Your healthcare provider will check your blood pressure at each visit  High blood pressure can lead to a stroke or other problems  Your provider may check for signs of heart disease, cancer, or other health problems  Seek care immediately if:   · You have a severe headache, confusion, or difficulty speaking  · You have weakness on one side of your body  · You have chest pain, sweating, or shortness of breath  Call your doctor if:   · You have symptoms of gallbladder or liver disease, such as pain in your upper abdomen  · You have knee or hip pain and discomfort while walking  · You have symptoms of diabetes, such as intense hunger and thirst, and frequent urination  · You have symptoms of sleep apnea, such as snoring or daytime sleepiness  · You have questions or concerns about your condition or care  Treatment for obesity  focuses on helping you lose weight to improve your health  Even a small decrease in BMI can reduce the risk for many health problems  Your healthcare provider will help you set a weight-loss goal   · Lifestyle changes  are the first step in treating obesity  These include making healthy food choices and getting regular physical activity   Your healthcare provider may suggest a weight-loss program that involves coaching, education, and therapy  · Medicine  may help you lose weight when it is used with a healthy foods and physical activity  · Surgery  can help you lose weight if you are very obese and have other health problems  There are several types of weight-loss surgery  Ask your healthcare provider for more information  Tips for safe weight loss:   · Set small, realistic goals  An example of a small goal is to walk for 20 minutes 5 days a week  Anther goal is to lose 5% of your body weight  · Tell friends, family members, and coworkers about your goals  and ask for their support  Ask a friend to lose weight with you, or join a weight-loss support group  · Identify foods or triggers that may cause you to overeat , and find ways to avoid them  Remove tempting high-calorie foods from your home and workplace  Place a bowl of fresh fruit on your kitchen counter  If stress causes you to eat, then find other ways to cope with stress  A counselor or therapist may be able to help you  · Keep a diary to track what you eat and drink  Also write down how many minutes of physical activity you do each day  Weigh yourself once a week and record it in your diary  Eating changes: You will need to eat 500 to 1,000 fewer calories each day than you currently eat to lose 1 to 2 pounds a week  The following changes will help you cut calories:  · Eat smaller portions  Use small plates, no larger than 9 inches in diameter  Fill your plate half full of fruits and vegetables  Measure your food using measuring cups until you know what a serving size looks like  · Eat 3 meals and 1 or 2 snacks each day  Plan your meals in advance  Dayron Mustache and eat at home most of the time  Eat slowly  Do not skip meals  Skipping meals can lead to overeating later in the day  This can make it harder for you to lose weight   Talk with a dietitian to help you make a meal plan and schedule that is right for you  · Eat fruits and vegetables at every meal   They are low in calories and high in fiber, which makes you feel full  Do not add butter, margarine, or cream sauce to vegetables  Use herbs to season steamed vegetables  · Eat less fat and fewer fried foods  Eat more baked or grilled chicken and fish  These protein sources are lower in calories and fat than red meat  Limit fast food  Dress your salads with olive oil and vinegar instead of bottled dressing  · Limit the amount of sugar you eat  Do not drink sugary beverages  Limit alcohol  Activity changes:  Physical activity is good for your body in many ways  It helps you burn calories and build strong muscles  It decreases stress and depression, and improves your mood  It can also help you sleep better  Talk to your healthcare provider before you begin an exercise program   · Exercise for at least 30 minutes 5 days a week  Start slowly  Set aside time each day for physical activity that you enjoy and that is convenient for you  It is best to do both weight training and an activity that increases your heart rate, such as walking, bicycling, or swimming  · Find ways to be more active  Do yard work and housecleaning  Walk up the stairs instead of using elevators  Spend your leisure time going to events that require walking, such as outdoor festivals or fairs  This extra physical activity can help you lose weight and keep it off  Follow up with your doctor as directed: You may need to meet with a dietitian  Write down your questions so you remember to ask them during your visits  © Copyright [employer] 2022 Information is for End User's use only and may not be sold, redistributed or otherwise used for commercial purposes  All illustrations and images included in CareNotes® are the copyrighted property of [employer]  or Megan Leigh   The above information is an  only   It is not intended as medical advice for individual conditions or treatments  Talk to your doctor, nurse or pharmacist before following any medical regimen to see if it is safe and effective for you  Heart Healthy Diet   AMBULATORY CARE:   A heart healthy diet  is an eating plan low in unhealthy fats and sodium (salt)  The plan is high in healthy fats and fiber  A heart healthy diet helps improve your cholesterol levels and lowers your risk for heart disease and stroke  A dietitian will teach you how to read and understand food labels  Heart healthy diet guidelines to follow:   · Choose foods that contain healthy fats  ? Unsaturated fats  include monounsaturated and polyunsaturated fats  Unsaturated fat is found in foods such as soybean, canola, olive, corn, and safflower oils  It is also found in soft tub margarine that is made with liquid vegetable oil  ? Omega-3 fat  is found in certain fish, such as salmon, tuna, and trout, and in walnuts and flaxseed  Eat fish high in omega-3 fats at least 2 times a week  · Get 20 to 30 grams of fiber each day  Fruits, vegetables, whole-grain foods, and legumes (cooked beans) are good sources of fiber  · Limit or do not have unhealthy fats  ? Cholesterol  is found in animal foods, such as eggs and lobster, and in dairy products made from whole milk  Limit cholesterol to less than 200 mg each day  ? Saturated fat  is found in meats, such as cordova and hamburger  It is also found in chicken or turkey skin, whole milk, and butter  Limit saturated fat to less than 7% of your total daily calories  ? Trans fat  is found in packaged foods, such as potato chips and cookies  It is also in hard margarine, some fried foods, and shortening  Do not eat foods that contain trans fats  · Limit sodium as directed  You may be told to limit sodium to 2,000 to 2,300 mg each day  Choose low-sodium or no-salt-added foods  Add little or no salt to food you prepare   Use herbs and spices in place of salt  Include the following in your heart healthy plan:  Ask your dietitian or healthcare provider how many servings to have from each of the following food groups:  · Grains:      ? Whole-wheat breads, cereals, and pastas, and brown rice    ? Low-fat, low-sodium crackers and chips    · Vegetables:      ? Broccoli, green beans, green peas, and spinach    ? Collards, kale, and lima beans    ? Carrots, sweet potatoes, tomatoes, and peppers    ? Canned vegetables with no salt added    · Fruits:      ? Bananas, peaches, pears, and pineapple    ? Grapes, raisins, and dates    ? Oranges, tangerines, grapefruit, orange juice, and grapefruit juice    ? Apricots, mangoes, melons, and papaya    ? Raspberries and strawberries    ? Canned fruit with no added sugar    · Low-fat dairy:      ? Nonfat (skim) milk, 1% milk, and low-fat almond, cashew, or soy milks fortified with calcium    ? Low-fat cheese, regular or frozen yogurt, and cottage cheese    · Meats and proteins:      ? Lean cuts of beef and pork (loin, leg, round), skinless chicken and turkey    ? Legumes, soy products, egg whites, or nuts    Limit or do not include the following in your heart healthy plan:   · Unhealthy fats and oils:      ? Whole or 2% milk, cream cheese, sour cream, or cheese    ? High-fat cuts of beef (T-bone steaks, ribs), chicken or turkey with skin, and organ meats such as liver    ? Butter, stick margarine, shortening, and cooking oils such as coconut or palm oil    · Foods and liquids high in sodium:      ? Packaged foods, such as frozen dinners, cookies, macaroni and cheese, and cereals with more than 300 mg of sodium per serving    ? Vegetables with added sodium, such as instant potatoes, vegetables with added sauces, or regular canned vegetables    ? Cured or smoked meats, such as hot dogs, cordova, and sausage    ?  High-sodium ketchup, barbecue sauce, salad dressing, pickles, olives, soy sauce, or miso    · Foods and liquids high in sugar:      ? Candy, cake, cookies, pies, or doughnuts    ? Soft drinks (soda), sports drinks, or sweetened tea    ? Canned or dry mixes for cakes, soups, sauces, or gravies    Other healthy heart guidelines:   · Do not smoke  Nicotine and other chemicals in cigarettes and cigars can cause lung and heart damage  Ask your healthcare provider for information if you currently smoke and need help to quit  E-cigarettes or smokeless tobacco still contain nicotine  Talk to your healthcare provider before you use these products  · Limit or do not drink alcohol as directed  Alcohol can damage your heart and raise your blood pressure  Your healthcare provider may give you specific daily and weekly limits  The general recommended limit is 1 drink a day for women 21 or older and for men 72 or older  Do not have more than 3 drinks in a day or 7 in a week  The recommended limit is 2 drinks a day for men 24to 59years of age  Do not have more than 4 drinks in a day or 14 in a week  A drink of alcohol is 12 ounces of beer, 5 ounces of wine, or 1½ ounces of liquor  · Exercise regularly  Exercise can help you maintain a healthy weight and improve your blood pressure and cholesterol levels  Regular exercise can also decrease your risk for heart problems  Ask your healthcare provider about the best exercise plan for you  Do not start an exercise program without asking your healthcare provider  Follow up with your doctor or cardiologist as directed:  Write down your questions so you remember to ask them during your visits  © Copyright [employer] 2022 Information is for End User's use only and may not be sold, redistributed or otherwise used for commercial purposes  All illustrations and images included in CareNotes® are the copyrighted property of [employer]  or [facility] [place]   The above information is an  only   It is not intended as medical advice for individual conditions or treatments  Talk to your doctor, nurse or pharmacist before following any medical regimen to see if it is safe and effective for you

## 2022-06-30 NOTE — PROGRESS NOTES
Assessment/Plan:    Chronic renal disease, stage IV [facility])  Lab Results   Component Value Date    EGFR 29 06/29/2022    EGFR 33 06/23/2021    EGFR 32 10/12/2020    CREATININE 1 99 (H) 06/29/2022    CREATININE 1 82 (H) 06/23/2021    CREATININE 1 88 (H) 10/12/2020   Cr bumped up a bit - Stage 3b/4 CKD - has not seen Nephro in over 2 yrs - importance of f/u reviewed, BS/BP control as well as avoiding NSAIDs and keeping hydrated reviewed, will follow    Impaired fasting glucose  Both FBS/A1C wnl, urged healthy diet/exercise/healthy wgt, annual FBS/A1C recommended    Low HDL (under 40)  Diet/exercise/wgt loss encouraged, monitor off meds for now, recheck FLP annually    Hypertriglyceridemia  Diet/exercise/wgt loss encouraged, monitor off meds for now, recheck FLP annually    Chronic kidney disease, stage 3  Lab Results   Component Value Date    EGFR 29 06/29/2022    EGFR 33 06/23/2021    EGFR 32 10/12/2020    CREATININE 1 99 (H) 06/29/2022    CREATININE 1 82 (H) 06/23/2021    CREATININE 1 88 (H) 10/12/2020   Stage 3b/4 CKD - has not seen Nephro in over 2 yrs - importance of f/u reviewed, BS/BP control as well as avoiding NSAIDs and keeping hydrated reviewed, will follow    Ulcerative colitis without complications (HCC)  No current GI symptoms on long time sulfasalazine w/o SE, con't current rx, call with relapse/new/worse symptoms    Hypertension  BP at goal by end of appt, con't current meds, diet/exercise/wgt loss encouraged, will follow    Severe obesity (BMI 35 0-39  9) with comorbidity ([facility] [address] )  Diet/exercise/wgt loss encouraged    Gout  No recent gout flares, on long term Allopurinol w/o SE, will check uric acid with next labs - order given, call with any flares, will follow       Diagnoses and all orders for this visit:    Impaired fasting glucose    Low HDL (under 40)    Hypertriglyceridemia    Stage 3b chronic kidney disease ([facility] [address] )  -     Ambulatory Referral to Nephrology;  Future    Chronic renal disease, stage IV [facility])  -     Ambulatory Referral to Nephrology; Future    Normocytic anemia  Comments:  Likely d/t CKD but has never had iron/B12/folate checked - BW order given, importance of f/u with Nephro reviewed, s/sx of anemia reviewed - call if they occur  Orders:  -     Iron; Future  -     Ferritin; Future  -     Vitamin B12  -     Folate    Primary hypertension    Ulcerative colitis without complications, unspecified location (HCC)    Gout, unspecified cause, unspecified chronicity, unspecified site  -     Uric acid; Future    Severe obesity (BMI 35 0-39  9) with comorbidity (HCC)    BMI 37 0-37 9, adult          Subjective:      Patient ID: Suman Muse is a 80 y o  male  HPI Pt here for follow up appt and BW results    BW results were d/w pt in detail: FBS/A1C 87/4 6 , BUN/Cr 40/1 99 (GFR 29), Alb 3 2, rest of CMP/TSH was wnl, CBC with H/H 11 0/35 2 (MCV 94) and stable, WBC/plt wnl, FLP with  and HDL 31, TC and LDL at goal      Def of nml vs IFG vs DM was d/w pt in detail  Diet/exercise was reviewed - wgt down 1 lb from June 21  He admits diet has not been good  He admits to having a sweet tooth  He states he walks a lot but cannot tell me time wise how much  CKD stage 3b/4 was reviewed  He has not seen nephro (Dr Maria Luisa Kauffman) since Nov 2020  Importance of f/u reviewed  He notes no issues urinating  Goal FLP was d/w pt in detail  Diet/exercise reviewed as noted above  He is not on a statin daily  He notes no stroke/TIA symptoms/CP  Nml H/H and normocytic anemia reviewed  Anemia stable since 2019 but no iron studies/B12/folate have been done  He does have above noted CKD stage 3b/4  DBP above goal today and meds were reviewed and no changes have occurred  He denies missing doses of meds or SE with the meds  He does check his BP outside the office but cannot recall what he gets  He notes no frequent HA's/dizziness/double vision/CP  He con't to take his sulfasalazine as directed    He notes no issues with his UC for years  He notes no diarrhea/abd pain/blood in stool/F/C  He notes no issues with gout recently  He is taking his allopurinol daily w/o SE  His last uric acid level was last year  Review of Systems   Constitutional: Negative for chills, fatigue, fever and unexpected weight change  HENT: Negative for congestion and trouble swallowing  Eyes: Positive for visual disturbance  Negative for pain  Respiratory: Negative for cough, shortness of breath and wheezing  Cardiovascular: Negative for chest pain, palpitations and leg swelling  Gastrointestinal: Negative for abdominal pain, blood in stool, constipation, diarrhea, nausea and vomiting  Endocrine: Negative for polydipsia and polyuria  Genitourinary: Negative for decreased urine volume, difficulty urinating and dysuria  Musculoskeletal: Negative for arthralgias and myalgias  Skin: Negative for rash and wound  Neurological: Negative for dizziness and headaches  Hematological: Does not bruise/bleed easily  Psychiatric/Behavioral: Negative for behavioral problems and confusion  Objective:    /86   Pulse 92   Temp 97 7 °F (36 5 °C) (Tympanic)   Ht 5' 5" (1 651 m)   Wt 103 kg (226 lb 6 4 oz)   BMI 37 67 kg/m²      Physical Exam  Vitals and nursing note reviewed  Constitutional:       General: He is not in acute distress  Appearance: He is well-developed  He is obese  He is not ill-appearing  HENT:      Head: Normocephalic and atraumatic  Ears:      Comments: [place]  Eyes:      General:         Right eye: No discharge  Left eye: No discharge  Conjunctiva/sclera: Conjunctivae normal    Neck:      Trachea: No tracheal deviation  Cardiovascular:      Rate and Rhythm: Normal rate and regular rhythm  Heart sounds: No murmur heard  Pulmonary:      Effort: Pulmonary effort is normal  No respiratory distress  Breath sounds: Normal breath sounds   No wheezing, rhonchi or rales  Abdominal:      General: There is no distension  Palpations: Abdomen is soft  Tenderness: There is no abdominal tenderness  There is no guarding or rebound  Musculoskeletal:         General: No deformity or signs of injury  Cervical back: Neck supple  Lymphadenopathy:      Cervical: No cervical adenopathy  Skin:     General: Skin is warm and dry  Coloration: Skin is not pale  Findings: No rash  Neurological:      General: No focal deficit present  Mental Status: He is alert  Mental status is at baseline  Motor: No abnormal muscle tone  Gait: Gait normal    Psychiatric:         Mood and Affect: Mood normal          Behavior: Behavior normal          Thought Content: Thought content normal          Judgment: Judgment normal          BMI Counseling: Body mass index is 37 67 kg/m²  The BMI is above normal  Nutrition recommendations include reducing portion sizes, 3-5 servings of fruits/vegetables daily, consuming healthier snacks, moderation in carbohydrate intake, increasing intake of lean protein and reducing intake of saturated fat and trans fat  Exercise recommendations include exercising 3-5 times per week

## 2022-06-30 NOTE — ASSESSMENT & PLAN NOTE
No current GI symptoms on long time sulfasalazine w/o SE, con't current rx, call with relapse/new/worse symptoms

## 2022-06-30 NOTE — ASSESSMENT & PLAN NOTE
Lab Results   Component Value Date    EGFR 29 06/29/2022    EGFR 33 06/23/2021    EGFR 32 10/12/2020    CREATININE 1 99 (H) 06/29/2022    CREATININE 1 82 (H) 06/23/2021    CREATININE 1 88 (H) 10/12/2020   Stage 3b/4 CKD - has not seen Nephro in over 2 yrs - importance of f/u reviewed, BS/BP control as well as avoiding NSAIDs and keeping hydrated reviewed, will follow

## 2022-07-05 ENCOUNTER — TELEPHONE (OUTPATIENT)
Dept: NEPHROLOGY | Facility: HOSPITAL | Age: 87
End: 2022-07-05

## 2022-07-05 NOTE — TELEPHONE ENCOUNTER
Received a referral but patient is already established with Dr America Cedeno  I called and left a message for patient to call and schedule a follow up  This is the first attempt

## 2022-07-13 ENCOUNTER — TELEPHONE (OUTPATIENT)
Dept: FAMILY MEDICINE CLINIC | Facility: HOSPITAL | Age: 87
End: 2022-07-13

## 2022-07-13 DIAGNOSIS — K51.90 ULCERATIVE COLITIS WITHOUT COMPLICATIONS, UNSPECIFIED LOCATION (HCC): ICD-10-CM

## 2022-07-14 DIAGNOSIS — K51.90 ULCERATIVE COLITIS WITHOUT COMPLICATIONS, UNSPECIFIED LOCATION (HCC): ICD-10-CM

## 2022-07-14 RX ORDER — SULFASALAZINE 500 MG/1
TABLET ORAL
Qty: 360 TABLET | Refills: 1 | Status: SHIPPED | OUTPATIENT
Start: 2022-07-14 | End: 2022-07-14 | Stop reason: SDUPTHER

## 2022-07-14 RX ORDER — SULFASALAZINE 500 MG/1
TABLET ORAL
Qty: 360 TABLET | Refills: 1 | Status: SHIPPED | OUTPATIENT
Start: 2022-07-14

## 2022-07-14 NOTE — TELEPHONE ENCOUNTER
Requested medication(s) are due for refill today: Yes  Patient has already received a courtesy refill: No  Other reason request has been forwarded to provider: [facility] does not have them

## 2022-07-28 ENCOUNTER — TELEPHONE (OUTPATIENT)
Dept: NEPHROLOGY | Facility: CLINIC | Age: 87
End: 2022-07-28

## 2022-07-28 NOTE — TELEPHONE ENCOUNTER
Phone call from wife Franklyn Parker stating she is having trouble getting into MyChart- gave her the number for help

## 2022-07-28 NOTE — TELEPHONE ENCOUNTER
Spoke with Kailey Baldwin and schedule appointment for 12/20 with Dr Wolf Downs in the [facility] location   I did offer a sooner appt with an NP but patient is requesting Dr Wolf Downs

## 2022-08-27 DIAGNOSIS — M10.9 GOUT, UNSPECIFIED CAUSE, UNSPECIFIED CHRONICITY, UNSPECIFIED SITE: ICD-10-CM

## 2022-08-27 DIAGNOSIS — N40.0 BENIGN PROSTATIC HYPERPLASIA, UNSPECIFIED WHETHER LOWER URINARY TRACT SYMPTOMS PRESENT: ICD-10-CM

## 2022-08-27 RX ORDER — ALLOPURINOL 100 MG/1
TABLET ORAL
Qty: 90 TABLET | Refills: 2 | Status: SHIPPED | OUTPATIENT
Start: 2022-08-27

## 2022-08-27 RX ORDER — TAMSULOSIN HYDROCHLORIDE 0.4 MG/1
CAPSULE ORAL
Qty: 90 CAPSULE | Refills: 2 | Status: SHIPPED | OUTPATIENT
Start: 2022-08-27

## 2022-09-13 ENCOUNTER — APPOINTMENT (OUTPATIENT)
Dept: LAB | Facility: HOSPITAL | Age: 87
End: 2022-09-13
Payer: COMMERCIAL

## 2022-09-13 DIAGNOSIS — M10.9 GOUT, UNSPECIFIED CAUSE, UNSPECIFIED CHRONICITY, UNSPECIFIED SITE: ICD-10-CM

## 2022-09-13 DIAGNOSIS — D64.9 NORMOCYTIC ANEMIA: ICD-10-CM

## 2022-09-13 LAB
FERRITIN SERPL-MCNC: 242 NG/ML (ref 8–388)
FOLATE SERPL-MCNC: >20 NG/ML (ref 3.1–17.5)
IRON SERPL-MCNC: 77 UG/DL (ref 65–175)
URATE SERPL-MCNC: 6.9 MG/DL (ref 3.5–8.5)
VIT B12 SERPL-MCNC: 262 PG/ML (ref 100–900)

## 2022-09-13 PROCEDURE — 82728 ASSAY OF FERRITIN: CPT

## 2022-09-13 PROCEDURE — 36415 COLL VENOUS BLD VENIPUNCTURE: CPT

## 2022-09-13 PROCEDURE — 83540 ASSAY OF IRON: CPT

## 2022-09-13 PROCEDURE — 84550 ASSAY OF BLOOD/URIC ACID: CPT

## 2022-09-22 ENCOUNTER — TELEPHONE (OUTPATIENT)
Dept: NEPHROLOGY | Facility: HOSPITAL | Age: 87
End: 2022-09-22

## 2022-09-22 DIAGNOSIS — N18.4 CHRONIC RENAL DISEASE, STAGE IV (HCC): ICD-10-CM

## 2022-09-22 DIAGNOSIS — I10 PRIMARY HYPERTENSION: ICD-10-CM

## 2022-09-22 DIAGNOSIS — N18.32 STAGE 3B CHRONIC KIDNEY DISEASE (HCC): Primary | ICD-10-CM

## 2022-09-22 NOTE — TELEPHONE ENCOUNTER
Called patient and spoke with his wife Bernice Underwood and asked he please have blood work drawn before the follow up appointment

## 2022-09-23 ENCOUNTER — APPOINTMENT (OUTPATIENT)
Dept: LAB | Facility: HOSPITAL | Age: 87
End: 2022-09-23
Attending: INTERNAL MEDICINE
Payer: COMMERCIAL

## 2022-09-23 DIAGNOSIS — N18.4 CHRONIC RENAL DISEASE, STAGE IV (HCC): ICD-10-CM

## 2022-09-23 DIAGNOSIS — I10 PRIMARY HYPERTENSION: ICD-10-CM

## 2022-09-23 DIAGNOSIS — N18.32 STAGE 3B CHRONIC KIDNEY DISEASE (HCC): ICD-10-CM

## 2022-09-23 LAB
ANION GAP SERPL CALCULATED.3IONS-SCNC: 4 MMOL/L (ref 4–13)
BACTERIA UR QL AUTO: ABNORMAL /HPF
BILIRUB UR QL STRIP: NEGATIVE
BUN SERPL-MCNC: 48 MG/DL (ref 5–25)
CALCIUM SERPL-MCNC: 8.8 MG/DL (ref 8.3–10.1)
CHLORIDE SERPL-SCNC: 112 MMOL/L (ref 96–108)
CLARITY UR: CLEAR
CO2 SERPL-SCNC: 24 MMOL/L (ref 21–32)
COLOR UR: ABNORMAL
CREAT SERPL-MCNC: 2.18 MG/DL (ref 0.6–1.3)
CREAT UR-MCNC: 84.6 MG/DL
GFR SERPL CREATININE-BSD FRML MDRD: 26 ML/MIN/1.73SQ M
GLUCOSE SERPL-MCNC: 99 MG/DL (ref 65–140)
GLUCOSE UR STRIP-MCNC: NEGATIVE MG/DL
HGB UR QL STRIP.AUTO: NEGATIVE
KETONES UR STRIP-MCNC: NEGATIVE MG/DL
LEUKOCYTE ESTERASE UR QL STRIP: NEGATIVE
NITRITE UR QL STRIP: NEGATIVE
NON-SQ EPI CELLS URNS QL MICRO: ABNORMAL /HPF
PH UR STRIP.AUTO: 6 [PH]
POTASSIUM SERPL-SCNC: 4.7 MMOL/L (ref 3.5–5.3)
PROT UR STRIP-MCNC: ABNORMAL MG/DL
PROT UR-MCNC: 108 MG/DL
PROT/CREAT UR: 1.28 MG/G{CREAT} (ref 0–0.1)
RBC #/AREA URNS AUTO: ABNORMAL /HPF
SODIUM SERPL-SCNC: 140 MMOL/L (ref 135–147)
SP GR UR STRIP.AUTO: 1.02 (ref 1–1.03)
UROBILINOGEN UR STRIP-ACNC: <2 MG/DL
WBC #/AREA URNS AUTO: ABNORMAL /HPF

## 2022-09-23 PROCEDURE — 80048 BASIC METABOLIC PNL TOTAL CA: CPT

## 2022-09-23 PROCEDURE — 84156 ASSAY OF PROTEIN URINE: CPT

## 2022-09-23 PROCEDURE — 81001 URINALYSIS AUTO W/SCOPE: CPT

## 2022-09-23 PROCEDURE — 82570 ASSAY OF URINE CREATININE: CPT

## 2022-09-23 PROCEDURE — 36415 COLL VENOUS BLD VENIPUNCTURE: CPT

## 2022-09-27 ENCOUNTER — TELEPHONE (OUTPATIENT)
Dept: NEPHROLOGY | Facility: CLINIC | Age: 87
End: 2022-09-27

## 2022-09-28 ENCOUNTER — OFFICE VISIT (OUTPATIENT)
Dept: NEPHROLOGY | Facility: HOSPITAL | Age: 87
End: 2022-09-28
Payer: COMMERCIAL

## 2022-09-28 VITALS
HEIGHT: 65 IN | BODY MASS INDEX: 37.15 KG/M2 | WEIGHT: 223 LBS | SYSTOLIC BLOOD PRESSURE: 124 MMHG | HEART RATE: 88 BPM | DIASTOLIC BLOOD PRESSURE: 72 MMHG

## 2022-09-28 DIAGNOSIS — R80.9 PROTEINURIA, UNSPECIFIED TYPE: ICD-10-CM

## 2022-09-28 DIAGNOSIS — M10.9 GOUT, UNSPECIFIED CAUSE, UNSPECIFIED CHRONICITY, UNSPECIFIED SITE: ICD-10-CM

## 2022-09-28 DIAGNOSIS — I10 PRIMARY HYPERTENSION: ICD-10-CM

## 2022-09-28 DIAGNOSIS — N18.4 STAGE 4 CHRONIC KIDNEY DISEASE (HCC): Primary | ICD-10-CM

## 2022-09-28 PROCEDURE — 1160F RVW MEDS BY RX/DR IN RCRD: CPT | Performed by: INTERNAL MEDICINE

## 2022-09-28 PROCEDURE — 99214 OFFICE O/P EST MOD 30 MIN: CPT | Performed by: INTERNAL MEDICINE

## 2022-09-28 NOTE — PROGRESS NOTES
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 as well as physiologic aging of the kidney +/- diuretic use/cardiorenal syndrome  -baseline serum creatinine fluctuates between 1 6-1 9, previously 1 3 in [id number]  sCr 1 4 back in 2016  CKD has been present since at least 2014  Last sCr 2 18 as of 9/23/22   -I suspect progression of CKD, now with eGFR in mid to high 20s, possibly due to higher diuretic dosing  -recommend increased hydration and repeat BMP in 3 months  -in office urinalysis shows protein  -formal UA with microscopy shows 2+ protein with 1-2 WBCs  -August 2020 renal u/s shows right kidney 12 8cm, left kidney 13 8cm with normal echogenicity and multiple b/l renal cysts without hydronephrosis  -avoid nonsteroidals including ibuprofen, Advil, Motrin, Aleve, Celebrex, Toradol, indomethacin  -renal ultrasound with PVR showed b/l renal cysts without obstruction or stones     2  HTN - BP well controlled on lasix 40mg daily, losartan 100mg daily, flomax     3  Nephrolithiasis - no recent flares  Stay well hydrated  Avoid high salt/sodium diet  4  Gout - continue allopurinol 100mg daily, last uric acid level 6 9, above goal(goal < 6)     5  Ulcerative colitis - on sulfasalazine, no recent flare ups     6  Proteinuria - UpCr 1 28g as of 9/23/22, already on losartan  Recommend taking fish oil  Proteinuria has remained stable for 2 years  Would not increase ARB dose as BP already low normal range for age/CKD history  RTC in 4 months  Obtain blood and urine testing prior to next office visit in Dec  2022  Aissatou Shukla was seen today for chronic kidney disease and follow-up      Diagnoses and all orders for this visit:    Stage 4 chronic kidney disease ([address] )  -     Cancel: Basic metabolic panel; Future  -     Cancel: Urinalysis with microscopic; Future  -     Cancel: Protein / creatinine ratio, urine; Future  -     Basic metabolic panel; Future  -     Protein / creatinine ratio, urine; Future  -     Urinalysis with microscopic; Future  -     Ambulatory Referral to CKD Education Program; Future    Primary hypertension    Gout, unspecified cause, unspecified chronicity, unspecified site    Proteinuria, unspecified type  -     Cancel: Protein / creatinine ratio, urine; Future  -     Protein / creatinine ratio, urine; Future        Assessment/Plan:  1   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 as well as physiologic aging of the kidney +/- diuretic use/cardiorenal syndrome  -baseline serum creatinine fluctuates between 1 6-1 9, previously 1 3 in [id number]  sCr 1 4 back in 2016  CKD has been present since at least 2014  Last sCr 2 18 as of 9/23/22   -I suspect progression of CKD, now with eGFR in mid to high 20s, possibly due to higher diuretic dosing  -recommend increased hydration and repeat BMP in 3 months  -in office urinalysis shows protein  -formal UA with microscopy shows 2+ protein with 1-2 WBCs  -August 2020 renal u/s shows right kidney 12 8cm, left kidney 13 8cm with normal echogenicity and multiple b/l renal cysts without hydronephrosis  -avoid nonsteroidals including ibuprofen, Advil, Motrin, Aleve, Celebrex, Toradol, indomethacin  -renal ultrasound with PVR showed b/l renal cysts without obstruction or stones  -CKD education/kidney smart consult placed     2  HTN - BP well controlled on lasix 40mg daily, losartan 100mg daily, flomax     3  Nephrolithiasis - no recent flares  Stay well hydrated  Avoid high salt/sodium diet      4  Gout - continue allopurinol 100mg daily, last uric acid level 6 9, above goal(goal < 6)     5  Ulcerative colitis - on sulfasalazine, no recent flare ups     6  Proteinuria - UpCr 1 28g as of 9/23/22, already on losartan   Recommend taking fish oil  Proteinuria has remained stable for 2 years  Would not increase ARB dose as BP already low normal range for age/CKD history      RTC in 4 months  Obtain blood and urine testing prior to next office visit in Dec  2022  SUBJECTIVE / INTERVAL HISTORY:  80 y o  male presents in follow up of CKD  Blair Thompson denies any recent illness/hospitalizations/medication changes since last office visit  Denies NSAID use  Occasional tylenol use  HTN - BP at home well controlled  Denies recent UC flares  Has decreased dosage  Denies recent gout flares  Denies recent kidney stones  Review of Systems   Constitutional: Negative for chills and fever  HENT: Negative for sore throat  Eyes: Negative for visual disturbance  Respiratory: Positive for shortness of breath (with exertion/physical activity)  Negative for cough  Cardiovascular: Negative for chest pain and leg swelling  Gastrointestinal: Positive for constipation  Negative for abdominal pain, diarrhea, nausea and vomiting         "loose stools at times"   Endocrine: Negative for polyuria  Genitourinary: Positive for frequency  Negative for decreased urine volume, difficulty urinating and dysuria  +nocturia - wakes 2-3x/night to urinate   Musculoskeletal: Negative for back pain and myalgias  Skin: Negative for rash  Neurological: Negative for dizziness, light-headedness and numbness  Psychiatric/Behavioral: Negative for confusion  OBJECTIVE:  /72 (BP Location: Left arm, Patient Position: Sitting, Cuff Size: Standard)   Pulse 88   Ht 5' 5" (1 651 m)   Wt 101 kg (223 lb)   BMI 37 11 kg/m²  Body mass index is 37 11 kg/m²  Physical exam:  Physical Exam  Vitals reviewed  Constitutional:       General: He is not in acute distress  Appearance: Normal appearance  He is well-developed  He is not diaphoretic  HENT:      Head: Normocephalic and atraumatic        Nose: Nose normal       Mouth/Throat:      Mouth: Mucous membranes are moist       Pharynx: No oropharyngeal exudate  Eyes:      General: No scleral icterus  Right eye: No discharge  Left eye: No discharge  Comments: eyeglasses   Neck:      Thyroid: No thyromegaly  Cardiovascular:      Rate and Rhythm: Normal rate and regular rhythm  Heart sounds: Normal heart sounds  Pulmonary:      Effort: Pulmonary effort is normal       Breath sounds: Normal breath sounds  No wheezing or rales  Abdominal:      General: Bowel sounds are normal  There is no distension  Palpations: Abdomen is soft  Tenderness: There is no abdominal tenderness  Musculoskeletal:         General: No swelling  Normal range of motion  Cervical back: Neck supple  Lymphadenopathy:      Cervical: No cervical adenopathy  Skin:     General: Skin is warm and dry  Findings: No rash  Neurological:      Mental Status: He is alert  Comments: Awake, hard of hearing   Psychiatric:         Mood and Affect: Mood normal          Behavior: Behavior normal          Medications:    Current Outpatient Medications:     allopurinol (ZYLOPRIM) 100 mg tablet, TAKE 1 TABLET BY MOUTH  DAILY, Disp: 90 tablet, Rfl: 2    cholecalciferol (VITAMIN D3) 1,000 units tablet, Take 2 tablets by mouth daily , Disp: , Rfl:     folic acid (FOLVITE) 1 mg tablet, Take 1 mg by mouth daily  , Disp: , Rfl:     furosemide (LASIX) 40 mg tablet, TAKE ONE-HALF TABLET BY  MOUTH DAILY, Disp: 45 tablet, Rfl: 2    losartan (COZAAR) 100 MG tablet, TAKE 1 TABLET BY MOUTH  DAILY, Disp: 90 tablet, Rfl: 1    Multiple Vitamins-Minerals (PRESERVISION AREDS) CAPS, Take 1 capsule by mouth daily, Disp: , Rfl:     ofloxacin (OCUFLOX) 0 3 % ophthalmic solution, Install 1 drop in the L eye 4 times daily for 5 days after eye injection as directed, Disp: , Rfl: 3    other medication, see sig,, Medication/product name: Alverto Strength:  Sig (include dose, route, frequency): daily, Disp: , Rfl:   sulfaSALAzine (AZULFIDINE) 500 mg tablet, 1 tab PO q am, 2 tab PO q noon, 1 tab PO q evening, Disp: 360 tablet, Rfl: 1    tamsulosin (FLOMAX) 0 4 mg, TAKE 1 CAPSULE BY MOUTH  DAILY, Disp: 90 capsule, Rfl: 2    Allergies: Allergies as of 09/28/2022    (No Known Allergies)       The following portions of the patient's history were reviewed and updated as appropriate: past family history, past surgical history and problem list     Laboratory Results:  Lab Results   Component Value Date    SODIUM 140 09/23/2022    K 4 7 09/23/2022     (H) 09/23/2022    CO2 24 09/23/2022    BUN 48 (H) 09/23/2022    CREATININE 2 18 (H) 09/23/2022    GLUC 99 09/23/2022    CALCIUM 8 8 09/23/2022        Lab Results   Component Value Date    CALCIUM 8 8 09/23/2022       Portions of the record may have been created with voice recognition software   Occasional wrong word or "sound a like" substitutions may have occurred due to the inherent limitations of voice recognition software   Read the chart carefully and recognize, using context, where substitutions have occurred

## 2022-09-28 NOTE — PATIENT INSTRUCTIONS
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 as well as physiologic aging of the kidney +/- diuretic use/cardiorenal syndrome  -baseline serum creatinine fluctuates between 1 6-1 9, previously 1 3 in [id number]  sCr 1 4 back in 2016  CKD has been present since at least 2014  Last sCr 2 18 as of 9/23/22   -I suspect progression of CKD, now with eGFR in mid to high 20s, possibly due to higher diuretic dosing  -recommend increased hydration and repeat BMP in 3 months  -in office urinalysis shows protein  -formal UA with microscopy shows 2+ protein with 1-2 WBCs  -August 2020 renal u/s shows right kidney 12 8cm, left kidney 13 8cm with normal echogenicity and multiple b/l renal cysts without hydronephrosis  -avoid nonsteroidals including ibuprofen, Advil, Motrin, Aleve, Celebrex, Toradol, indomethacin  -renal ultrasound with PVR showed b/l renal cysts without obstruction or stones     2  HTN - BP well controlled on lasix 40mg daily, losartan 100mg daily, flomax     3  Nephrolithiasis - no recent flares  Stay well hydrated  Avoid high salt/sodium diet  4  Gout - continue allopurinol 100mg daily, last uric acid level 6 9, above goal(goal < 6)     5  Ulcerative colitis - on sulfasalazine, no recent flare ups     6  Proteinuria - UpCr 1 28g as of 9/23/22, already on losartan  Recommend taking fish oil  Proteinuria has remained stable for 2 years  Would not increase ARB dose as BP already low normal range for age/CKD history  RTC in 4 months  Obtain blood and urine testing prior to next office visit in Dec  2022

## 2022-10-04 ENCOUNTER — TELEPHONE (OUTPATIENT)
Dept: NEPHROLOGY | Facility: CLINIC | Age: 87
End: 2022-10-04

## 2022-10-04 NOTE — TELEPHONE ENCOUNTER
Patient wife call the [facility] office and wanted to know how the how the kidney smart  education program works

## 2022-11-07 ENCOUNTER — IMMUNIZATIONS (OUTPATIENT)
Dept: FAMILY MEDICINE CLINIC | Facility: HOSPITAL | Age: 87
End: 2022-11-07

## 2022-11-07 DIAGNOSIS — Z23 ENCOUNTER FOR IMMUNIZATION: Primary | ICD-10-CM

## 2022-11-19 DIAGNOSIS — I10 ESSENTIAL HYPERTENSION: ICD-10-CM

## 2022-11-19 RX ORDER — LOSARTAN POTASSIUM 100 MG/1
TABLET ORAL
Qty: 90 TABLET | Refills: 1 | Status: SHIPPED | OUTPATIENT
Start: 2022-11-19

## 2022-12-28 DIAGNOSIS — K51.90 ULCERATIVE COLITIS WITHOUT COMPLICATIONS, UNSPECIFIED LOCATION (HCC): ICD-10-CM

## 2022-12-29 RX ORDER — SULFASALAZINE 500 MG/1
TABLET ORAL
Qty: 360 TABLET | Refills: 1 | Status: SHIPPED | OUTPATIENT
Start: 2022-12-29

## 2022-12-30 ENCOUNTER — APPOINTMENT (OUTPATIENT)
Dept: LAB | Facility: HOSPITAL | Age: 87
End: 2022-12-30
Attending: INTERNAL MEDICINE

## 2022-12-30 DIAGNOSIS — R80.9 PROTEINURIA, UNSPECIFIED TYPE: ICD-10-CM

## 2022-12-30 DIAGNOSIS — N18.4 STAGE 4 CHRONIC KIDNEY DISEASE (HCC): ICD-10-CM

## 2022-12-30 LAB
ANION GAP SERPL CALCULATED.3IONS-SCNC: 8 MMOL/L (ref 4–13)
BACTERIA UR QL AUTO: ABNORMAL /HPF
BILIRUB UR QL STRIP: NEGATIVE
BUN SERPL-MCNC: 46 MG/DL (ref 5–25)
CALCIUM SERPL-MCNC: 9.1 MG/DL (ref 8.3–10.1)
CHLORIDE SERPL-SCNC: 112 MMOL/L (ref 96–108)
CLARITY UR: CLEAR
CO2 SERPL-SCNC: 23 MMOL/L (ref 21–32)
COLOR UR: ABNORMAL
CREAT SERPL-MCNC: 2.21 MG/DL (ref 0.6–1.3)
CREAT UR-MCNC: 168 MG/DL
GFR SERPL CREATININE-BSD FRML MDRD: 25 ML/MIN/1.73SQ M
GLUCOSE P FAST SERPL-MCNC: 97 MG/DL (ref 65–99)
GLUCOSE UR STRIP-MCNC: NEGATIVE MG/DL
HGB UR QL STRIP.AUTO: NEGATIVE
KETONES UR STRIP-MCNC: NEGATIVE MG/DL
LEUKOCYTE ESTERASE UR QL STRIP: NEGATIVE
MUCOUS THREADS UR QL AUTO: ABNORMAL
NITRITE UR QL STRIP: NEGATIVE
NON-SQ EPI CELLS URNS QL MICRO: ABNORMAL /HPF
PH UR STRIP.AUTO: 6 [PH]
POTASSIUM SERPL-SCNC: 5.1 MMOL/L (ref 3.5–5.3)
PROT UR STRIP-MCNC: ABNORMAL MG/DL
PROT UR-MCNC: 232 MG/DL
PROT/CREAT UR: 1.38 MG/G{CREAT} (ref 0–0.1)
RBC #/AREA URNS AUTO: ABNORMAL /HPF
SODIUM SERPL-SCNC: 143 MMOL/L (ref 135–147)
SP GR UR STRIP.AUTO: 1.02 (ref 1–1.03)
UROBILINOGEN UR STRIP-ACNC: <2 MG/DL
WBC #/AREA URNS AUTO: ABNORMAL /HPF

## 2023-01-01 ENCOUNTER — APPOINTMENT (INPATIENT)
Dept: RADIOLOGY | Facility: HOSPITAL | Age: 88
DRG: 028 | End: 2023-01-01
Attending: EMERGENCY MEDICINE
Payer: COMMERCIAL

## 2023-01-01 ENCOUNTER — APPOINTMENT (INPATIENT)
Dept: NON INVASIVE DIAGNOSTICS | Facility: HOSPITAL | Age: 88
DRG: 028 | End: 2023-01-01
Payer: COMMERCIAL

## 2023-01-01 ENCOUNTER — APPOINTMENT (INPATIENT)
Dept: RADIOLOGY | Facility: HOSPITAL | Age: 88
DRG: 028 | End: 2023-01-01
Payer: COMMERCIAL

## 2023-01-01 ENCOUNTER — APPOINTMENT (OUTPATIENT)
Dept: RADIOLOGY | Facility: HOSPITAL | Age: 88
DRG: 028 | End: 2023-01-01
Payer: COMMERCIAL

## 2023-01-01 ENCOUNTER — HOSPITAL ENCOUNTER (INPATIENT)
Facility: HOSPITAL | Age: 88
LOS: 2 days | DRG: 189 | End: 2023-07-21
Attending: EMERGENCY MEDICINE | Admitting: FAMILY MEDICINE
Payer: COMMERCIAL

## 2023-01-01 ENCOUNTER — TELEPHONE (OUTPATIENT)
Dept: NEPHROLOGY | Facility: CLINIC | Age: 88
End: 2023-01-01

## 2023-01-01 ENCOUNTER — APPOINTMENT (INPATIENT)
Dept: RADIOLOGY | Facility: HOSPITAL | Age: 88
DRG: 189 | End: 2023-01-01
Attending: FAMILY MEDICINE
Payer: COMMERCIAL

## 2023-01-01 ENCOUNTER — TELEPHONE (OUTPATIENT)
Dept: NEUROSURGERY | Facility: CLINIC | Age: 88
End: 2023-01-01

## 2023-01-01 ENCOUNTER — APPOINTMENT (INPATIENT)
Dept: RADIOLOGY | Facility: HOSPITAL | Age: 88
DRG: 189 | End: 2023-01-01
Payer: COMMERCIAL

## 2023-01-01 ENCOUNTER — HOSPITAL ENCOUNTER (INPATIENT)
Facility: HOSPITAL | Age: 88
LOS: 14 days | Discharge: NURSING, REHAB OR LONG-TERM CARE | DRG: 028 | End: 2023-07-17
Attending: SURGERY | Admitting: SURGERY
Payer: COMMERCIAL

## 2023-01-01 ENCOUNTER — APPOINTMENT (EMERGENCY)
Dept: RADIOLOGY | Facility: HOSPITAL | Age: 88
DRG: 189 | End: 2023-01-01
Payer: COMMERCIAL

## 2023-01-01 VITALS
BODY MASS INDEX: 36.8 KG/M2 | HEIGHT: 65 IN | OXYGEN SATURATION: 93 % | SYSTOLIC BLOOD PRESSURE: 139 MMHG | DIASTOLIC BLOOD PRESSURE: 70 MMHG | HEART RATE: 86 BPM | TEMPERATURE: 96 F | RESPIRATION RATE: 18 BRPM | WEIGHT: 220.9 LBS

## 2023-01-01 VITALS
SYSTOLIC BLOOD PRESSURE: 124 MMHG | OXYGEN SATURATION: 98 % | DIASTOLIC BLOOD PRESSURE: 74 MMHG | WEIGHT: 229 LBS | HEART RATE: 93 BPM | TEMPERATURE: 99 F | RESPIRATION RATE: 52 BRPM | BODY MASS INDEX: 38.11 KG/M2

## 2023-01-01 DIAGNOSIS — N20.0 NEPHROLITHIASIS: ICD-10-CM

## 2023-01-01 DIAGNOSIS — J96.01 ACUTE RESPIRATORY FAILURE WITH HYPOXIA AND HYPERCAPNIA (HCC): ICD-10-CM

## 2023-01-01 DIAGNOSIS — I48.91 ATRIAL FIBRILLATION (HCC): ICD-10-CM

## 2023-01-01 DIAGNOSIS — M54.2 NECK PAIN: Primary | ICD-10-CM

## 2023-01-01 DIAGNOSIS — S14.121A: ICD-10-CM

## 2023-01-01 DIAGNOSIS — R53.1 GENERALIZED WEAKNESS: Primary | ICD-10-CM

## 2023-01-01 DIAGNOSIS — E87.5 HYPERKALEMIA: ICD-10-CM

## 2023-01-01 DIAGNOSIS — R06.89 HYPERCARBIA: ICD-10-CM

## 2023-01-01 DIAGNOSIS — J96.02 ACUTE RESPIRATORY FAILURE WITH HYPOXIA AND HYPERCAPNIA (HCC): ICD-10-CM

## 2023-01-01 DIAGNOSIS — R29.898 WEAKNESS OF BOTH UPPER EXTREMITIES: ICD-10-CM

## 2023-01-01 DIAGNOSIS — J90 PLEURAL EFFUSION ON RIGHT: ICD-10-CM

## 2023-01-01 DIAGNOSIS — S14.129A CENTRAL CORD SYNDROME, INITIAL ENCOUNTER (HCC): ICD-10-CM

## 2023-01-01 DIAGNOSIS — J96.01 ACUTE RESPIRATORY FAILURE WITH HYPOXIA (HCC): ICD-10-CM

## 2023-01-01 DIAGNOSIS — I50.32 CHRONIC DIASTOLIC CONGESTIVE HEART FAILURE (HCC): ICD-10-CM

## 2023-01-01 DIAGNOSIS — Z51.5 END OF LIFE CARE: ICD-10-CM

## 2023-01-01 DIAGNOSIS — N18.4 STAGE 4 CHRONIC KIDNEY DISEASE (HCC): ICD-10-CM

## 2023-01-01 DIAGNOSIS — N18.4 STAGE 4 CHRONIC KIDNEY DISEASE (HCC): Primary | ICD-10-CM
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
## 2023-01-01 DIAGNOSIS — S14.129A CENTRAL CORD SYNDROME (HCC): ICD-10-CM

## 2023-01-01 DIAGNOSIS — R53.83 FATIGUE: ICD-10-CM

## 2023-01-01 LAB
2HR DELTA HS TROPONIN: 1 NG/L
4HR DELTA HS TROPONIN: 4 NG/L
ABO GROUP BLD: NORMAL
ALBUMIN SERPL BCP-MCNC: 2.6 G/DL (ref 3.5–5)
ALBUMIN SERPL BCP-MCNC: 3 G/DL (ref 3.5–5)
ALP SERPL-CCNC: 78 U/L (ref 46–116)
ALP SERPL-CCNC: 86 U/L (ref 46–116)
ALT SERPL W P-5'-P-CCNC: 21 U/L (ref 12–78)
ALT SERPL W P-5'-P-CCNC: 31 U/L (ref 12–78)
ANION GAP SERPL CALCULATED.3IONS-SCNC: -1 MMOL/L
ANION GAP SERPL CALCULATED.3IONS-SCNC: -1 MMOL/L
ANION GAP SERPL CALCULATED.3IONS-SCNC: -4 MMOL/L
ANION GAP SERPL CALCULATED.3IONS-SCNC: 0 MMOL/L
ANION GAP SERPL CALCULATED.3IONS-SCNC: 1 MMOL/L
ANION GAP SERPL CALCULATED.3IONS-SCNC: 1 MMOL/L
ANION GAP SERPL CALCULATED.3IONS-SCNC: 2 MMOL/L
ANION GAP SERPL CALCULATED.3IONS-SCNC: 3 MMOL/L
ANION GAP SERPL CALCULATED.3IONS-SCNC: 4 MMOL/L
ANION GAP SERPL CALCULATED.3IONS-SCNC: 4 MMOL/L
ANION GAP SERPL CALCULATED.3IONS-SCNC: 5 MMOL/L
ANION GAP SERPL CALCULATED.3IONS-SCNC: 6 MMOL/L
AORTIC ROOT: 3.6 CM
AORTIC VALVE MEAN VELOCITY: 9.5 M/S
APICAL FOUR CHAMBER EJECTION FRACTION: 45 %
APTT PPP: 141 SECONDS (ref 23–37)
APTT PPP: 146 SECONDS (ref 23–37)
APTT PPP: 60 SECONDS (ref 23–37)
APTT PPP: 72 SECONDS (ref 23–37)
APTT PPP: 75 SECONDS (ref 23–37)
ASCENDING AORTA: 3.2 CM
AST SERPL W P-5'-P-CCNC: 25 U/L (ref 5–45)
AST SERPL W P-5'-P-CCNC: 31 U/L (ref 5–45)
ATRIAL RATE: 340 BPM
ATRIAL RATE: 357 BPM
ATRIAL RATE: 48 BPM
ATRIAL RATE: 51 BPM
ATRIAL RATE: 54 BPM
ATRIAL RATE: 57 BPM
ATRIAL RATE: 88 BPM
ATRIAL RATE: 89 BPM
AV AREA BY CONTINUOUS VTI: 2.3 CM2
AV AREA PEAK VELOCITY: 2 CM2
AV LVOT MEAN GRADIENT: 1 MMHG
AV LVOT PEAK GRADIENT: 2 MMHG
AV MEAN GRADIENT: 4 MMHG
AV PEAK GRADIENT: 8 MMHG
AV VALVE AREA: 2.34 CM2
AV VELOCITY RATIO: 0.54
BACTERIA BLD CULT: NORMAL
BACTERIA BLD CULT: NORMAL
BACTERIA SPT RESP CULT: ABNORMAL
BACTERIA SPT RESP CULT: ABNORMAL
BACTERIA UR QL AUTO: ABNORMAL /HPF
BASE EX.OXY STD BLDV CALC-SCNC: 84.6 % (ref 60–80)
BASE EX.OXY STD BLDV CALC-SCNC: 94.5 % (ref 60–80)
BASE EXCESS BLDA CALC-SCNC: -2.9 MMOL/L
BASE EXCESS BLDA CALC-SCNC: -3.4 MMOL/L
BASE EXCESS BLDA CALC-SCNC: -5.6 MMOL/L
BASE EXCESS BLDA CALC-SCNC: 4 MMOL/L (ref -2–3)
BASE EXCESS BLDV CALC-SCNC: -0.1 MMOL/L
BASE EXCESS BLDV CALC-SCNC: 0.9 MMOL/L
BASOPHILS # BLD AUTO: 0.03 THOUSANDS/ÂΜL (ref 0–0.1)
BASOPHILS # BLD AUTO: 0.04 THOUSANDS/ÂΜL (ref 0–0.1)
BASOPHILS # BLD AUTO: 0.04 THOUSANDS/ÂΜL (ref 0–0.1)
BASOPHILS # BLD AUTO: 0.05 THOUSANDS/ÂΜL (ref 0–0.1)
BASOPHILS # BLD AUTO: 0.06 THOUSANDS/ÂΜL (ref 0–0.1)
BASOPHILS # BLD AUTO: 0.07 THOUSANDS/ÂΜL (ref 0–0.1)
BASOPHILS NFR BLD AUTO: 0 % (ref 0–1)
BASOPHILS NFR BLD AUTO: 1 % (ref 0–1)
BASOPHILS NFR BLD AUTO: 1 % (ref 0–1)
BILIRUB SERPL-MCNC: 0.42 MG/DL (ref 0.2–1)
BILIRUB SERPL-MCNC: 0.5 MG/DL (ref 0.2–1)
BILIRUB UR QL STRIP: NEGATIVE
BLD GP AB SCN SERPL QL: NEGATIVE
BNP SERPL-MCNC: 232 PG/ML (ref 0–100)
BUN SERPL-MCNC: 100 MG/DL (ref 5–25)
BUN SERPL-MCNC: 102 MG/DL (ref 5–25)
BUN SERPL-MCNC: 107 MG/DL (ref 5–25)
BUN SERPL-MCNC: 34 MG/DL (ref 5–25)
BUN SERPL-MCNC: 34 MG/DL (ref 5–25)
BUN SERPL-MCNC: 36 MG/DL (ref 5–25)
BUN SERPL-MCNC: 37 MG/DL (ref 5–25)
BUN SERPL-MCNC: 38 MG/DL (ref 5–25)
BUN SERPL-MCNC: 49 MG/DL (ref 5–25)
BUN SERPL-MCNC: 61 MG/DL (ref 5–25)
BUN SERPL-MCNC: 68 MG/DL (ref 5–25)
BUN SERPL-MCNC: 79 MG/DL (ref 5–25)
BUN SERPL-MCNC: 80 MG/DL (ref 5–25)
BUN SERPL-MCNC: 82 MG/DL (ref 5–25)
BUN SERPL-MCNC: 82 MG/DL (ref 5–25)
BUN SERPL-MCNC: 84 MG/DL (ref 5–25)
BUN SERPL-MCNC: 86 MG/DL (ref 5–25)
BUN SERPL-MCNC: 88 MG/DL (ref 5–25)
BUN SERPL-MCNC: 89 MG/DL (ref 5–25)
BUN SERPL-MCNC: 92 MG/DL (ref 5–25)
BUN SERPL-MCNC: 92 MG/DL (ref 5–25)
BUN SERPL-MCNC: 94 MG/DL (ref 5–25)
BUN SERPL-MCNC: 99 MG/DL (ref 5–25)
CA-I BLD-SCNC: 0.97 MMOL/L (ref 1.12–1.32)
CA-I BLD-SCNC: 1.31 MMOL/L (ref 1.12–1.32)
CALCIUM ALBUM COR SERPL-MCNC: 10.1 MG/DL (ref 8.3–10.1)
CALCIUM ALBUM COR SERPL-MCNC: 9.4 MG/DL (ref 8.3–10.1)
CALCIUM SERPL-MCNC: 7.7 MG/DL (ref 8.3–10.1)
CALCIUM SERPL-MCNC: 7.7 MG/DL (ref 8.3–10.1)
CALCIUM SERPL-MCNC: 8 MG/DL (ref 8.3–10.1)
CALCIUM SERPL-MCNC: 8.1 MG/DL (ref 8.3–10.1)
CALCIUM SERPL-MCNC: 8.3 MG/DL (ref 8.3–10.1)
CALCIUM SERPL-MCNC: 8.3 MG/DL (ref 8.3–10.1)
CALCIUM SERPL-MCNC: 8.4 MG/DL (ref 8.3–10.1)
CALCIUM SERPL-MCNC: 8.4 MG/DL (ref 8.3–10.1)
CALCIUM SERPL-MCNC: 8.5 MG/DL (ref 8.3–10.1)
CALCIUM SERPL-MCNC: 8.5 MG/DL (ref 8.3–10.1)
CALCIUM SERPL-MCNC: 8.6 MG/DL (ref 8.3–10.1)
CALCIUM SERPL-MCNC: 8.7 MG/DL (ref 8.3–10.1)
CALCIUM SERPL-MCNC: 8.8 MG/DL (ref 8.3–10.1)
CALCIUM SERPL-MCNC: 8.8 MG/DL (ref 8.3–10.1)
CALCIUM SERPL-MCNC: 8.9 MG/DL (ref 8.3–10.1)
CALCIUM SERPL-MCNC: 9 MG/DL (ref 8.3–10.1)
CALCIUM SERPL-MCNC: 9.1 MG/DL (ref 8.3–10.1)
CALCIUM SERPL-MCNC: 9.2 MG/DL (ref 8.3–10.1)
CALCIUM SERPL-MCNC: 9.2 MG/DL (ref 8.3–10.1)
CARDIAC TROPONIN I PNL SERPL HS: 27 NG/L
CARDIAC TROPONIN I PNL SERPL HS: 28 NG/L
CARDIAC TROPONIN I PNL SERPL HS: 31 NG/L
CHLORIDE SERPL-SCNC: 108 MMOL/L (ref 96–108)
CHLORIDE SERPL-SCNC: 109 MMOL/L (ref 96–108)
CHLORIDE SERPL-SCNC: 110 MMOL/L (ref 96–108)
CHLORIDE SERPL-SCNC: 111 MMOL/L (ref 96–108)
CHLORIDE SERPL-SCNC: 112 MMOL/L (ref 96–108)
CHLORIDE SERPL-SCNC: 113 MMOL/L (ref 96–108)
CHLORIDE SERPL-SCNC: 114 MMOL/L (ref 96–108)
CHLORIDE SERPL-SCNC: 114 MMOL/L (ref 96–108)
CHLORIDE SERPL-SCNC: 115 MMOL/L (ref 96–108)
CHLORIDE SERPL-SCNC: 115 MMOL/L (ref 96–108)
CHLORIDE SERPL-SCNC: 116 MMOL/L (ref 96–108)
CK SERPL-CCNC: 22 U/L (ref 39–308)
CLARITY UR: CLEAR
CO2 SERPL-SCNC: 23 MMOL/L (ref 21–32)
CO2 SERPL-SCNC: 24 MMOL/L (ref 21–32)
CO2 SERPL-SCNC: 26 MMOL/L (ref 21–32)
CO2 SERPL-SCNC: 26 MMOL/L (ref 21–32)
CO2 SERPL-SCNC: 27 MMOL/L (ref 21–32)
CO2 SERPL-SCNC: 28 MMOL/L (ref 21–32)
CO2 SERPL-SCNC: 28 MMOL/L (ref 21–32)
CO2 SERPL-SCNC: 29 MMOL/L (ref 21–32)
CO2 SERPL-SCNC: 29 MMOL/L (ref 21–32)
CO2 SERPL-SCNC: 30 MMOL/L (ref 21–32)
CO2 SERPL-SCNC: 31 MMOL/L (ref 21–32)
COLOR UR: ABNORMAL
CREAT SERPL-MCNC: 1.96 MG/DL (ref 0.6–1.3)
CREAT SERPL-MCNC: 2.03 MG/DL (ref 0.6–1.3)
CREAT SERPL-MCNC: 2.06 MG/DL (ref 0.6–1.3)
CREAT SERPL-MCNC: 2.11 MG/DL (ref 0.6–1.3)
CREAT SERPL-MCNC: 2.13 MG/DL (ref 0.6–1.3)
CREAT SERPL-MCNC: 2.16 MG/DL (ref 0.6–1.3)
CREAT SERPL-MCNC: 2.21 MG/DL (ref 0.6–1.3)
CREAT SERPL-MCNC: 2.24 MG/DL (ref 0.6–1.3)
CREAT SERPL-MCNC: 2.29 MG/DL (ref 0.6–1.3)
CREAT SERPL-MCNC: 2.3 MG/DL (ref 0.6–1.3)
CREAT SERPL-MCNC: 2.31 MG/DL (ref 0.6–1.3)
CREAT SERPL-MCNC: 2.32 MG/DL (ref 0.6–1.3)
CREAT SERPL-MCNC: 2.37 MG/DL (ref 0.6–1.3)
CREAT SERPL-MCNC: 2.41 MG/DL (ref 0.6–1.3)
CREAT SERPL-MCNC: 2.43 MG/DL (ref 0.6–1.3)
CREAT SERPL-MCNC: 2.55 MG/DL (ref 0.6–1.3)
CREAT SERPL-MCNC: 2.57 MG/DL (ref 0.6–1.3)
CREAT SERPL-MCNC: 2.6 MG/DL (ref 0.6–1.3)
CREAT SERPL-MCNC: 2.69 MG/DL (ref 0.6–1.3)
CREAT SERPL-MCNC: 2.77 MG/DL (ref 0.6–1.3)
DOP CALC AO PEAK VEL: 1.4 M/S
DOP CALC AO VTI: 27.52 CM
DOP CALC LVOT AREA: 3.8 CM2
DOP CALC LVOT CARDIAC INDEX: 2.23 L/MIN/M2
DOP CALC LVOT CARDIAC OUTPUT: 4.68 L/MIN
DOP CALC LVOT DIAMETER: 2.2 CM
DOP CALC LVOT PEAK VEL VTI: 16.93 CM
DOP CALC LVOT PEAK VEL: 0.75 M/S
DOP CALC LVOT STROKE INDEX: 31 ML/M2
DOP CALC LVOT STROKE VOLUME: 64.32 CM3
E WAVE DECELERATION TIME: 198 MS
EOSINOPHIL # BLD AUTO: 0.03 THOUSAND/ÂΜL (ref 0–0.61)
EOSINOPHIL # BLD AUTO: 0.22 THOUSAND/ÂΜL (ref 0–0.61)
EOSINOPHIL # BLD AUTO: 0.27 THOUSAND/ÂΜL (ref 0–0.61)
EOSINOPHIL # BLD AUTO: 0.28 THOUSAND/ÂΜL (ref 0–0.61)
EOSINOPHIL # BLD AUTO: 0.35 THOUSAND/ÂΜL (ref 0–0.61)
EOSINOPHIL # BLD AUTO: 0.37 THOUSAND/ÂΜL (ref 0–0.61)
EOSINOPHIL NFR BLD AUTO: 0 % (ref 0–6)
EOSINOPHIL NFR BLD AUTO: 2 % (ref 0–6)
EOSINOPHIL NFR BLD AUTO: 3 % (ref 0–6)
ERYTHROCYTE [DISTWIDTH] IN BLOOD BY AUTOMATED COUNT: 13.4 % (ref 11.6–15.1)
ERYTHROCYTE [DISTWIDTH] IN BLOOD BY AUTOMATED COUNT: 13.5 % (ref 11.6–15.1)
ERYTHROCYTE [DISTWIDTH] IN BLOOD BY AUTOMATED COUNT: 13.6 % (ref 11.6–15.1)
ERYTHROCYTE [DISTWIDTH] IN BLOOD BY AUTOMATED COUNT: 13.6 % (ref 11.6–15.1)
ERYTHROCYTE [DISTWIDTH] IN BLOOD BY AUTOMATED COUNT: 13.7 % (ref 11.6–15.1)
ERYTHROCYTE [DISTWIDTH] IN BLOOD BY AUTOMATED COUNT: 13.8 % (ref 11.6–15.1)
ERYTHROCYTE [DISTWIDTH] IN BLOOD BY AUTOMATED COUNT: 13.9 % (ref 11.6–15.1)
ERYTHROCYTE [DISTWIDTH] IN BLOOD BY AUTOMATED COUNT: 14 % (ref 11.6–15.1)
ERYTHROCYTE [DISTWIDTH] IN BLOOD BY AUTOMATED COUNT: 14.1 % (ref 11.6–15.1)
ERYTHROCYTE [DISTWIDTH] IN BLOOD BY AUTOMATED COUNT: 14.1 % (ref 11.6–15.1)
ERYTHROCYTE [DISTWIDTH] IN BLOOD BY AUTOMATED COUNT: 14.2 % (ref 11.6–15.1)
ERYTHROCYTE [DISTWIDTH] IN BLOOD BY AUTOMATED COUNT: 14.2 % (ref 11.6–15.1)
EST. AVERAGE GLUCOSE BLD GHB EST-MCNC: 100 MG/DL
FLUAV RNA RESP QL NAA+PROBE: NEGATIVE
FLUBV RNA RESP QL NAA+PROBE: NEGATIVE
FRACTIONAL SHORTENING: 18 % (ref 28–44)
GFR SERPL CREATININE-BSD FRML MDRD: 19 ML/MIN/1.73SQ M
GFR SERPL CREATININE-BSD FRML MDRD: 20 ML/MIN/1.73SQ M
GFR SERPL CREATININE-BSD FRML MDRD: 20 ML/MIN/1.73SQ M
GFR SERPL CREATININE-BSD FRML MDRD: 21 ML/MIN/1.73SQ M
GFR SERPL CREATININE-BSD FRML MDRD: 21 ML/MIN/1.73SQ M
GFR SERPL CREATININE-BSD FRML MDRD: 22 ML/MIN/1.73SQ M
GFR SERPL CREATININE-BSD FRML MDRD: 22 ML/MIN/1.73SQ M
GFR SERPL CREATININE-BSD FRML MDRD: 23 ML/MIN/1.73SQ M
GFR SERPL CREATININE-BSD FRML MDRD: 24 ML/MIN/1.73SQ M
GFR SERPL CREATININE-BSD FRML MDRD: 25 ML/MIN/1.73SQ M
GFR SERPL CREATININE-BSD FRML MDRD: 25 ML/MIN/1.73SQ M
GFR SERPL CREATININE-BSD FRML MDRD: 26 ML/MIN/1.73SQ M
GFR SERPL CREATININE-BSD FRML MDRD: 27 ML/MIN/1.73SQ M
GFR SERPL CREATININE-BSD FRML MDRD: 28 ML/MIN/1.73SQ M
GFR SERPL CREATININE-BSD FRML MDRD: 29 ML/MIN/1.73SQ M
GLUCOSE SERPL-MCNC: 101 MG/DL (ref 65–140)
GLUCOSE SERPL-MCNC: 105 MG/DL (ref 65–140)
GLUCOSE SERPL-MCNC: 112 MG/DL (ref 65–140)
GLUCOSE SERPL-MCNC: 113 MG/DL (ref 65–140)
GLUCOSE SERPL-MCNC: 113 MG/DL (ref 65–140)
GLUCOSE SERPL-MCNC: 130 MG/DL (ref 65–140)
GLUCOSE SERPL-MCNC: 161 MG/DL (ref 65–140)
GLUCOSE SERPL-MCNC: 165 MG/DL (ref 65–140)
GLUCOSE SERPL-MCNC: 172 MG/DL (ref 65–140)
GLUCOSE SERPL-MCNC: 181 MG/DL (ref 65–140)
GLUCOSE SERPL-MCNC: 181 MG/DL (ref 65–140)
GLUCOSE SERPL-MCNC: 183 MG/DL (ref 65–140)
GLUCOSE SERPL-MCNC: 188 MG/DL (ref 65–140)
GLUCOSE SERPL-MCNC: 196 MG/DL (ref 65–140)
GLUCOSE SERPL-MCNC: 196 MG/DL (ref 65–140)
GLUCOSE SERPL-MCNC: 84 MG/DL (ref 65–140)
GLUCOSE SERPL-MCNC: 91 MG/DL (ref 65–140)
GLUCOSE SERPL-MCNC: 91 MG/DL (ref 65–140)
GLUCOSE SERPL-MCNC: 92 MG/DL (ref 65–140)
GLUCOSE SERPL-MCNC: 93 MG/DL (ref 65–140)
GLUCOSE SERPL-MCNC: 95 MG/DL (ref 65–140)
GLUCOSE SERPL-MCNC: 96 MG/DL (ref 65–140)
GLUCOSE SERPL-MCNC: 96 MG/DL (ref 65–140)
GLUCOSE SERPL-MCNC: 98 MG/DL (ref 65–140)
GLUCOSE UR STRIP-MCNC: NEGATIVE MG/DL
GRAM STN SPEC: ABNORMAL
HBA1C MFR BLD: 5.1 %
HCO3 BLDA-SCNC: 21 MMOL/L (ref 22–28)
HCO3 BLDA-SCNC: 22.1 MMOL/L (ref 22–28)
HCO3 BLDA-SCNC: 24.6 MMOL/L (ref 22–28)
HCO3 BLDA-SCNC: 33.8 MMOL/L (ref 22–28)
HCO3 BLDV-SCNC: 26.6 MMOL/L (ref 24–30)
HCO3 BLDV-SCNC: 28.7 MMOL/L (ref 24–30)
HCT VFR BLD AUTO: 29.8 % (ref 36.5–49.3)
HCT VFR BLD AUTO: 31.4 % (ref 36.5–49.3)
HCT VFR BLD AUTO: 31.8 % (ref 36.5–49.3)
HCT VFR BLD AUTO: 32.1 % (ref 36.5–49.3)
HCT VFR BLD AUTO: 32.6 % (ref 36.5–49.3)
HCT VFR BLD AUTO: 32.8 % (ref 36.5–49.3)
HCT VFR BLD AUTO: 33.9 % (ref 36.5–49.3)
HCT VFR BLD AUTO: 34.8 % (ref 36.5–49.3)
HCT VFR BLD AUTO: 35.3 % (ref 36.5–49.3)
HCT VFR BLD AUTO: 35.6 % (ref 36.5–49.3)
HCT VFR BLD AUTO: 35.8 % (ref 36.5–49.3)
HCT VFR BLD AUTO: 36.2 % (ref 36.5–49.3)
HCT VFR BLD AUTO: 36.5 % (ref 36.5–49.3)
HCT VFR BLD AUTO: 36.9 % (ref 36.5–49.3)
HCT VFR BLD CALC: 33 % (ref 36.5–49.3)
HGB BLD-MCNC: 10 G/DL (ref 12–17)
HGB BLD-MCNC: 10.2 G/DL (ref 12–17)
HGB BLD-MCNC: 10.3 G/DL (ref 12–17)
HGB BLD-MCNC: 10.6 G/DL (ref 12–17)
HGB BLD-MCNC: 10.7 G/DL (ref 12–17)
HGB BLD-MCNC: 10.8 G/DL (ref 12–17)
HGB BLD-MCNC: 10.8 G/DL (ref 12–17)
HGB BLD-MCNC: 10.9 G/DL (ref 12–17)
HGB BLD-MCNC: 10.9 G/DL (ref 12–17)
HGB BLD-MCNC: 9.5 G/DL (ref 12–17)
HGB BLD-MCNC: 9.7 G/DL (ref 12–17)
HGB BLD-MCNC: 9.7 G/DL (ref 12–17)
HGB BLDA-MCNC: 11.2 G/DL (ref 12–17)
HGB UR QL STRIP.AUTO: ABNORMAL
IMM GRANULOCYTES # BLD AUTO: 0.04 THOUSAND/UL (ref 0–0.2)
IMM GRANULOCYTES # BLD AUTO: 0.06 THOUSAND/UL (ref 0–0.2)
IMM GRANULOCYTES # BLD AUTO: 0.11 THOUSAND/UL (ref 0–0.2)
IMM GRANULOCYTES # BLD AUTO: 0.13 THOUSAND/UL (ref 0–0.2)
IMM GRANULOCYTES # BLD AUTO: 0.14 THOUSAND/UL (ref 0–0.2)
IMM GRANULOCYTES # BLD AUTO: 0.14 THOUSAND/UL (ref 0–0.2)
IMM GRANULOCYTES NFR BLD AUTO: 0 % (ref 0–2)
IMM GRANULOCYTES NFR BLD AUTO: 1 % (ref 0–2)
INR PPP: 0.94 (ref 0.84–1.19)
INR PPP: 1 (ref 0.84–1.19)
INR PPP: 1.03 (ref 0.84–1.19)
INTERVENTRICULAR SEPTUM IN DIASTOLE (PARASTERNAL SHORT AXIS VIEW): 1 CM
INTERVENTRICULAR SEPTUM: 1 CM (ref 0.6–1.1)
KETONES UR STRIP-MCNC: NEGATIVE MG/DL
LAAS-AP2: 21.4 CM2
LAAS-AP4: 18.6 CM2
LACTATE SERPL-SCNC: 1.2 MMOL/L (ref 0.5–2)
LACTATE SERPL-SCNC: 1.5 MMOL/L (ref 0.5–2)
LACTATE SERPL-SCNC: 2.3 MMOL/L (ref 0.5–2)
LACTATE SERPL-SCNC: 2.9 MMOL/L (ref 0.5–2)
LEFT ATRIUM SIZE: 3.3 CM
LEFT ATRIUM VOLUME (MOD BIPLANE): 55 ML
LEFT INTERNAL DIMENSION IN SYSTOLE: 4 CM (ref 2.1–4)
LEFT VENTRICULAR INTERNAL DIMENSION IN DIASTOLE: 4.9 CM (ref 3.5–6)
LEFT VENTRICULAR POSTERIOR WALL IN END DIASTOLE: 1.5 CM
LEFT VENTRICULAR STROKE VOLUME: 40 ML
LEUKOCYTE ESTERASE UR QL STRIP: ABNORMAL
LVSV (TEICH): 40 ML
LYMPHOCYTES # BLD AUTO: 0.79 THOUSANDS/ÂΜL (ref 0.6–4.47)
LYMPHOCYTES # BLD AUTO: 0.82 THOUSANDS/ÂΜL (ref 0.6–4.47)
LYMPHOCYTES # BLD AUTO: 0.83 THOUSANDS/ÂΜL (ref 0.6–4.47)
LYMPHOCYTES # BLD AUTO: 1.02 THOUSANDS/ÂΜL (ref 0.6–4.47)
LYMPHOCYTES # BLD AUTO: 1.1 THOUSANDS/ÂΜL (ref 0.6–4.47)
LYMPHOCYTES # BLD AUTO: 1.13 THOUSANDS/ÂΜL (ref 0.6–4.47)
LYMPHOCYTES NFR BLD AUTO: 13 % (ref 14–44)
LYMPHOCYTES NFR BLD AUTO: 7 % (ref 14–44)
LYMPHOCYTES NFR BLD AUTO: 7 % (ref 14–44)
LYMPHOCYTES NFR BLD AUTO: 8 % (ref 14–44)
LYMPHOCYTES NFR BLD AUTO: 8 % (ref 14–44)
LYMPHOCYTES NFR BLD AUTO: 9 % (ref 14–44)
MAGNESIUM SERPL-MCNC: 2.1 MG/DL (ref 1.6–2.6)
MAGNESIUM SERPL-MCNC: 2.3 MG/DL (ref 1.6–2.6)
MAGNESIUM SERPL-MCNC: 2.4 MG/DL (ref 1.6–2.6)
MAGNESIUM SERPL-MCNC: 2.7 MG/DL (ref 1.6–2.6)
MAGNESIUM SERPL-MCNC: 2.7 MG/DL (ref 1.6–2.6)
MCH RBC QN AUTO: 28.4 PG (ref 26.8–34.3)
MCH RBC QN AUTO: 28.7 PG (ref 26.8–34.3)
MCH RBC QN AUTO: 28.7 PG (ref 26.8–34.3)
MCH RBC QN AUTO: 28.8 PG (ref 26.8–34.3)
MCH RBC QN AUTO: 28.8 PG (ref 26.8–34.3)
MCH RBC QN AUTO: 28.9 PG (ref 26.8–34.3)
MCH RBC QN AUTO: 29 PG (ref 26.8–34.3)
MCH RBC QN AUTO: 29.1 PG (ref 26.8–34.3)
MCH RBC QN AUTO: 29.3 PG (ref 26.8–34.3)
MCH RBC QN AUTO: 29.7 PG (ref 26.8–34.3)
MCHC RBC AUTO-ENTMCNC: 28.9 G/DL (ref 31.4–37.4)
MCHC RBC AUTO-ENTMCNC: 29 G/DL (ref 31.4–37.4)
MCHC RBC AUTO-ENTMCNC: 29.3 G/DL (ref 31.4–37.4)
MCHC RBC AUTO-ENTMCNC: 29.6 G/DL (ref 31.4–37.4)
MCHC RBC AUTO-ENTMCNC: 29.6 G/DL (ref 31.4–37.4)
MCHC RBC AUTO-ENTMCNC: 30 G/DL (ref 31.4–37.4)
MCHC RBC AUTO-ENTMCNC: 30 G/DL (ref 31.4–37.4)
MCHC RBC AUTO-ENTMCNC: 30.1 G/DL (ref 31.4–37.4)
MCHC RBC AUTO-ENTMCNC: 30.2 G/DL (ref 31.4–37.4)
MCHC RBC AUTO-ENTMCNC: 30.4 G/DL (ref 31.4–37.4)
MCHC RBC AUTO-ENTMCNC: 30.6 G/DL (ref 31.4–37.4)
MCHC RBC AUTO-ENTMCNC: 30.7 G/DL (ref 31.4–37.4)
MCHC RBC AUTO-ENTMCNC: 31.3 G/DL (ref 31.4–37.4)
MCHC RBC AUTO-ENTMCNC: 31.4 G/DL (ref 31.4–37.4)
MCHC RBC AUTO-ENTMCNC: 31.8 G/DL (ref 31.4–37.4)
MCHC RBC AUTO-ENTMCNC: 31.9 G/DL (ref 31.4–37.4)
MCV RBC AUTO: 100 FL (ref 82–98)
MCV RBC AUTO: 91 FL (ref 82–98)
MCV RBC AUTO: 92 FL (ref 82–98)
MCV RBC AUTO: 92 FL (ref 82–98)
MCV RBC AUTO: 93 FL (ref 82–98)
MCV RBC AUTO: 94 FL (ref 82–98)
MCV RBC AUTO: 94 FL (ref 82–98)
MCV RBC AUTO: 96 FL (ref 82–98)
MCV RBC AUTO: 97 FL (ref 82–98)
MCV RBC AUTO: 97 FL (ref 82–98)
MCV RBC AUTO: 98 FL (ref 82–98)
MCV RBC AUTO: 99 FL (ref 82–98)
MONOCYTES # BLD AUTO: 0.82 THOUSAND/ÂΜL (ref 0.17–1.22)
MONOCYTES # BLD AUTO: 0.9 THOUSAND/ÂΜL (ref 0.17–1.22)
MONOCYTES # BLD AUTO: 1.14 THOUSAND/ÂΜL (ref 0.17–1.22)
MONOCYTES # BLD AUTO: 1.18 THOUSAND/ÂΜL (ref 0.17–1.22)
MONOCYTES # BLD AUTO: 1.25 THOUSAND/ÂΜL (ref 0.17–1.22)
MONOCYTES # BLD AUTO: 1.41 THOUSAND/ÂΜL (ref 0.17–1.22)
MONOCYTES NFR BLD AUTO: 10 % (ref 4–12)
MONOCYTES NFR BLD AUTO: 11 % (ref 4–12)
MONOCYTES NFR BLD AUTO: 12 % (ref 4–12)
MONOCYTES NFR BLD AUTO: 8 % (ref 4–12)
MRSA NOSE QL CULT: NORMAL
MRSA NOSE QL CULT: NORMAL
MUCOUS THREADS UR QL AUTO: ABNORMAL
MV E'TISSUE VEL-SEP: 8 CM/S
MV PEAK A VEL: 0.98 M/S
MV PEAK E VEL: 110 CM/S
MV STENOSIS PRESSURE HALF TIME: 57 MS
MV VALVE AREA P 1/2 METHOD: 3.86 CM2
NEUTROPHILS # BLD AUTO: 10.41 THOUSANDS/ÂΜL (ref 1.85–7.62)
NEUTROPHILS # BLD AUTO: 6.12 THOUSANDS/ÂΜL (ref 1.85–7.62)
NEUTROPHILS # BLD AUTO: 7.99 THOUSANDS/ÂΜL (ref 1.85–7.62)
NEUTROPHILS # BLD AUTO: 8.37 THOUSANDS/ÂΜL (ref 1.85–7.62)
NEUTROPHILS # BLD AUTO: 8.38 THOUSANDS/ÂΜL (ref 1.85–7.62)
NEUTROPHILS # BLD AUTO: 8.92 THOUSANDS/ÂΜL (ref 1.85–7.62)
NEUTS SEG NFR BLD AUTO: 72 % (ref 43–75)
NEUTS SEG NFR BLD AUTO: 75 % (ref 43–75)
NEUTS SEG NFR BLD AUTO: 76 % (ref 43–75)
NEUTS SEG NFR BLD AUTO: 78 % (ref 43–75)
NEUTS SEG NFR BLD AUTO: 79 % (ref 43–75)
NEUTS SEG NFR BLD AUTO: 84 % (ref 43–75)
NITRITE UR QL STRIP: NEGATIVE
NON-SQ EPI CELLS URNS QL MICRO: ABNORMAL /HPF
NRBC BLD AUTO-RTO: 0 /100 WBCS
O2 CT BLDA-SCNC: 15 ML/DL (ref 16–23)
O2 CT BLDA-SCNC: 15 ML/DL (ref 16–23)
O2 CT BLDA-SCNC: 15.1 ML/DL (ref 16–23)
O2 CT BLDV-SCNC: 12.9 ML/DL
O2 CT BLDV-SCNC: 15 ML/DL
OXYHGB MFR BLDA: 94 % (ref 94–97)
OXYHGB MFR BLDA: 96.7 % (ref 94–97)
OXYHGB MFR BLDA: 96.7 % (ref 94–97)
P AXIS: 33 DEGREES
P AXIS: 36 DEGREES
P AXIS: 46 DEGREES
P AXIS: 55 DEGREES
PCO2 BLD: 36 MMOL/L (ref 21–32)
PCO2 BLD: 79 MM HG (ref 36–44)
PCO2 BLDA: 39.4 MM HG (ref 36–44)
PCO2 BLDA: 45.4 MM HG (ref 36–44)
PCO2 BLDA: 59.5 MM HG (ref 36–44)
PCO2 BLDV: 53.5 MM HG (ref 42–50)
PCO2 BLDV: 62.7 MM HG (ref 42–50)
PH BLD: 7.24 [PH] (ref 7.35–7.45)
PH BLDA: 7.24 [PH] (ref 7.35–7.45)
PH BLDA: 7.28 [PH] (ref 7.35–7.45)
PH BLDA: 7.37 [PH] (ref 7.35–7.45)
PH BLDV: 7.28 [PH] (ref 7.3–7.4)
PH BLDV: 7.32 [PH] (ref 7.3–7.4)
PH UR STRIP.AUTO: 5.5 [PH]
PHOSPHATE SERPL-MCNC: 3.6 MG/DL (ref 2.3–4.1)
PHOSPHATE SERPL-MCNC: 3.7 MG/DL (ref 2.3–4.1)
PHOSPHATE SERPL-MCNC: 4.7 MG/DL (ref 2.3–4.1)
PHOSPHATE SERPL-MCNC: 4.9 MG/DL (ref 2.3–4.1)
PLATELET # BLD AUTO: 190 THOUSANDS/UL (ref 149–390)
PLATELET # BLD AUTO: 194 THOUSANDS/UL (ref 149–390)
PLATELET # BLD AUTO: 196 THOUSANDS/UL (ref 149–390)
PLATELET # BLD AUTO: 210 THOUSANDS/UL (ref 149–390)
PLATELET # BLD AUTO: 214 THOUSANDS/UL (ref 149–390)
PLATELET # BLD AUTO: 217 THOUSANDS/UL (ref 149–390)
PLATELET # BLD AUTO: 222 THOUSANDS/UL (ref 149–390)
PLATELET # BLD AUTO: 231 THOUSANDS/UL (ref 149–390)
PLATELET # BLD AUTO: 232 THOUSANDS/UL (ref 149–390)
PLATELET # BLD AUTO: 235 THOUSANDS/UL (ref 149–390)
PLATELET # BLD AUTO: 236 THOUSANDS/UL (ref 149–390)
PLATELET # BLD AUTO: 239 THOUSANDS/UL (ref 149–390)
PLATELET # BLD AUTO: 240 THOUSANDS/UL (ref 149–390)
PLATELET # BLD AUTO: 241 THOUSANDS/UL (ref 149–390)
PLATELET # BLD AUTO: 249 THOUSANDS/UL (ref 149–390)
PLATELET # BLD AUTO: 251 THOUSANDS/UL (ref 149–390)
PMV BLD AUTO: 10 FL (ref 8.9–12.7)
PMV BLD AUTO: 10 FL (ref 8.9–12.7)
PMV BLD AUTO: 10.1 FL (ref 8.9–12.7)
PMV BLD AUTO: 10.2 FL (ref 8.9–12.7)
PMV BLD AUTO: 10.5 FL (ref 8.9–12.7)
PMV BLD AUTO: 10.5 FL (ref 8.9–12.7)
PMV BLD AUTO: 10.7 FL (ref 8.9–12.7)
PMV BLD AUTO: 9.8 FL (ref 8.9–12.7)
PMV BLD AUTO: 9.9 FL (ref 8.9–12.7)
PO2 BLD: 130 MM HG (ref 75–129)
PO2 BLDA: 102 MM HG (ref 75–129)
PO2 BLDA: 112.6 MM HG (ref 75–129)
PO2 BLDA: 86.7 MM HG (ref 75–129)
PO2 BLDV: 175.4 MM HG (ref 35–45)
PO2 BLDV: 56.1 MM HG (ref 35–45)
POTASSIUM BLD-SCNC: 6.2 MMOL/L (ref 3.5–5.3)
POTASSIUM SERPL-SCNC: 4.5 MMOL/L (ref 3.5–5.3)
POTASSIUM SERPL-SCNC: 4.7 MMOL/L (ref 3.5–5.3)
POTASSIUM SERPL-SCNC: 4.7 MMOL/L (ref 3.5–5.3)
POTASSIUM SERPL-SCNC: 4.8 MMOL/L (ref 3.5–5.3)
POTASSIUM SERPL-SCNC: 4.8 MMOL/L (ref 3.5–5.3)
POTASSIUM SERPL-SCNC: 4.9 MMOL/L (ref 3.5–5.3)
POTASSIUM SERPL-SCNC: 5 MMOL/L (ref 3.5–5.3)
POTASSIUM SERPL-SCNC: 5.1 MMOL/L (ref 3.5–5.3)
POTASSIUM SERPL-SCNC: 5.1 MMOL/L (ref 3.5–5.3)
POTASSIUM SERPL-SCNC: 5.2 MMOL/L (ref 3.5–5.3)
POTASSIUM SERPL-SCNC: 5.3 MMOL/L (ref 3.5–5.3)
POTASSIUM SERPL-SCNC: 5.3 MMOL/L (ref 3.5–5.3)
POTASSIUM SERPL-SCNC: 5.5 MMOL/L (ref 3.5–5.3)
POTASSIUM SERPL-SCNC: 5.5 MMOL/L (ref 3.5–5.3)
POTASSIUM SERPL-SCNC: 5.7 MMOL/L (ref 3.5–5.3)
POTASSIUM SERPL-SCNC: 5.8 MMOL/L (ref 3.5–5.3)
POTASSIUM SERPL-SCNC: 6 MMOL/L (ref 3.5–5.3)
POTASSIUM SERPL-SCNC: 6.2 MMOL/L (ref 3.5–5.3)
PR INTERVAL: 0 MS
PR INTERVAL: 0 MS
PR INTERVAL: 204 MS
PR INTERVAL: 216 MS
PR INTERVAL: 250 MS
PR INTERVAL: 283 MS
PROCALCITONIN SERPL-MCNC: 0.15 NG/ML
PROCALCITONIN SERPL-MCNC: 0.2 NG/ML
PROT SERPL-MCNC: 7.2 G/DL (ref 6.4–8.4)
PROT SERPL-MCNC: 7.4 G/DL (ref 6.4–8.4)
PROT UR STRIP-MCNC: ABNORMAL MG/DL
PROTHROMBIN TIME: 12.8 SECONDS (ref 11.6–14.5)
PROTHROMBIN TIME: 13.3 SECONDS (ref 11.6–14.5)
PROTHROMBIN TIME: 13.8 SECONDS (ref 11.6–14.5)
QRS AXIS: -6 DEGREES
QRS AXIS: -7 DEGREES
QRS AXIS: 20 DEGREES
QRS AXIS: 21 DEGREES
QRS AXIS: 68 DEGREES
QRS AXIS: 72 DEGREES
QRS AXIS: 74 DEGREES
QRS AXIS: 76 DEGREES
QRSD INTERVAL: 125 MS
QRSD INTERVAL: 71 MS
QRSD INTERVAL: 76 MS
QRSD INTERVAL: 78 MS
QRSD INTERVAL: 79 MS
QRSD INTERVAL: 80 MS
QRSD INTERVAL: 80 MS
QRSD INTERVAL: 88 MS
QT INTERVAL: 322 MS
QT INTERVAL: 324 MS
QT INTERVAL: 362 MS
QT INTERVAL: 364 MS
QT INTERVAL: 450 MS
QT INTERVAL: 450 MS
QT INTERVAL: 463 MS
QT INTERVAL: 483 MS
QTC INTERVAL: 396 MS
QTC INTERVAL: 398 MS
QTC INTERVAL: 402 MS
QTC INTERVAL: 439 MS
QTC INTERVAL: 439 MS
QTC INTERVAL: 440 MS
QTC INTERVAL: 440 MS
QTC INTERVAL: 445 MS
RBC # BLD AUTO: 3.24 MILLION/UL (ref 3.88–5.62)
RBC # BLD AUTO: 3.35 MILLION/UL (ref 3.88–5.62)
RBC # BLD AUTO: 3.35 MILLION/UL (ref 3.88–5.62)
RBC # BLD AUTO: 3.45 MILLION/UL (ref 3.88–5.62)
RBC # BLD AUTO: 3.47 MILLION/UL (ref 3.88–5.62)
RBC # BLD AUTO: 3.48 MILLION/UL (ref 3.88–5.62)
RBC # BLD AUTO: 3.53 MILLION/UL (ref 3.88–5.62)
RBC # BLD AUTO: 3.57 MILLION/UL (ref 3.88–5.62)
RBC # BLD AUTO: 3.58 MILLION/UL (ref 3.88–5.62)
RBC # BLD AUTO: 3.64 MILLION/UL (ref 3.88–5.62)
RBC # BLD AUTO: 3.65 MILLION/UL (ref 3.88–5.62)
RBC # BLD AUTO: 3.72 MILLION/UL (ref 3.88–5.62)
RBC # BLD AUTO: 3.73 MILLION/UL (ref 3.88–5.62)
RBC # BLD AUTO: 3.8 MILLION/UL (ref 3.88–5.62)
RBC #/AREA URNS AUTO: ABNORMAL /HPF
RH BLD: POSITIVE
RIGHT ATRIUM AREA SYSTOLE A4C: 16.7 CM2
RIGHT VENTRICLE ID DIMENSION: 4.1 CM
RSV RNA RESP QL NAA+PROBE: NEGATIVE
SAO2 % BLD FROM PO2: 98 % (ref 60–85)
SARS-COV-2 RNA RESP QL NAA+PROBE: NEGATIVE
SL CV LEFT ATRIUM LENGTH A2C: 5.7 CM
SL CV LV EF: 50
SL CV PED ECHO LEFT VENTRICLE DIASTOLIC VOLUME (MOD BIPLANE) 2D: 111 ML
SL CV PED ECHO LEFT VENTRICLE SYSTOLIC VOLUME (MOD BIPLANE) 2D: 71 ML
SODIUM BLD-SCNC: 140 MMOL/L (ref 136–145)
SODIUM SERPL-SCNC: 139 MMOL/L (ref 135–147)
SODIUM SERPL-SCNC: 140 MMOL/L (ref 135–147)
SODIUM SERPL-SCNC: 141 MMOL/L (ref 135–147)
SODIUM SERPL-SCNC: 142 MMOL/L (ref 135–147)
SODIUM SERPL-SCNC: 143 MMOL/L (ref 135–147)
SODIUM SERPL-SCNC: 144 MMOL/L (ref 135–147)
SODIUM SERPL-SCNC: 144 MMOL/L (ref 135–147)
SP GR UR STRIP.AUTO: 1.02 (ref 1–1.03)
SPECIMEN EXPIRATION DATE: NORMAL
SPECIMEN SOURCE: ABNORMAL
T WAVE AXIS: -5 DEGREES
T WAVE AXIS: 13 DEGREES
T WAVE AXIS: 13 DEGREES
T WAVE AXIS: 15 DEGREES
T WAVE AXIS: 59 DEGREES
T WAVE AXIS: 60 DEGREES
T WAVE AXIS: 66 DEGREES
T WAVE AXIS: 73 DEGREES
TR MAX PG: 38 MMHG
TR PEAK VELOCITY: 3.1 M/S
TRICUSPID ANNULAR PLANE SYSTOLIC EXCURSION: 2.3 CM
TRICUSPID VALVE PEAK REGURGITATION VELOCITY: 3.09 M/S
UROBILINOGEN UR STRIP-ACNC: <2 MG/DL
VENTRICULAR RATE: 48 BPM
VENTRICULAR RATE: 51 BPM
VENTRICULAR RATE: 54 BPM
VENTRICULAR RATE: 57 BPM
VENTRICULAR RATE: 88 BPM
VENTRICULAR RATE: 89 BPM
VENTRICULAR RATE: 91 BPM
VENTRICULAR RATE: 91 BPM
WBC # BLD AUTO: 10.88 THOUSAND/UL (ref 4.31–10.16)
WBC # BLD AUTO: 10.95 THOUSAND/UL (ref 4.31–10.16)
WBC # BLD AUTO: 11.15 THOUSAND/UL (ref 4.31–10.16)
WBC # BLD AUTO: 11.15 THOUSAND/UL (ref 4.31–10.16)
WBC # BLD AUTO: 11.19 THOUSAND/UL (ref 4.31–10.16)
WBC # BLD AUTO: 11.25 THOUSAND/UL (ref 4.31–10.16)
WBC # BLD AUTO: 11.35 THOUSAND/UL (ref 4.31–10.16)
WBC # BLD AUTO: 11.4 THOUSAND/UL (ref 4.31–10.16)
WBC # BLD AUTO: 13.38 THOUSAND/UL (ref 4.31–10.16)
WBC # BLD AUTO: 17.56 THOUSAND/UL (ref 4.31–10.16)
WBC # BLD AUTO: 6.85 THOUSAND/UL (ref 4.31–10.16)
WBC # BLD AUTO: 7.93 THOUSAND/UL (ref 4.31–10.16)
WBC # BLD AUTO: 8.11 THOUSAND/UL (ref 4.31–10.16)
WBC # BLD AUTO: 8.51 THOUSAND/UL (ref 4.31–10.16)
WBC # BLD AUTO: 9.62 THOUSAND/UL (ref 4.31–10.16)
WBC # BLD AUTO: 9.74 THOUSAND/UL (ref 4.31–10.16)
WBC #/AREA URNS AUTO: ABNORMAL /HPF

## 2023-01-01 PROCEDURE — 72141 MRI NECK SPINE W/O DYE: CPT

## 2023-01-01 PROCEDURE — 94668 MNPJ CHEST WALL SBSQ: CPT

## 2023-01-01 PROCEDURE — 99223 1ST HOSP IP/OBS HIGH 75: CPT | Performed by: NURSE PRACTITIONER

## 2023-01-01 PROCEDURE — 86901 BLOOD TYPING SEROLOGIC RH(D): CPT | Performed by: PHYSICIAN ASSISTANT

## 2023-01-01 PROCEDURE — 85027 COMPLETE CBC AUTOMATED: CPT

## 2023-01-01 PROCEDURE — 87184 SC STD DISK METHOD PER PLATE: CPT

## 2023-01-01 PROCEDURE — 94760 N-INVAS EAR/PLS OXIMETRY 1: CPT

## 2023-01-01 PROCEDURE — 99232 SBSQ HOSP IP/OBS MODERATE 35: CPT | Performed by: INTERNAL MEDICINE

## 2023-01-01 PROCEDURE — 97535 SELF CARE MNGMENT TRAINING: CPT

## 2023-01-01 PROCEDURE — 99291 CRITICAL CARE FIRST HOUR: CPT | Performed by: EMERGENCY MEDICINE

## 2023-01-01 PROCEDURE — 84100 ASSAY OF PHOSPHORUS: CPT

## 2023-01-01 PROCEDURE — 80053 COMPREHEN METABOLIC PANEL: CPT

## 2023-01-01 PROCEDURE — 97167 OT EVAL HIGH COMPLEX 60 MIN: CPT

## 2023-01-01 PROCEDURE — 84484 ASSAY OF TROPONIN QUANT: CPT

## 2023-01-01 PROCEDURE — 72040 X-RAY EXAM NECK SPINE 2-3 VW: CPT

## 2023-01-01 PROCEDURE — 82330 ASSAY OF CALCIUM: CPT

## 2023-01-01 PROCEDURE — 94002 VENT MGMT INPAT INIT DAY: CPT

## 2023-01-01 PROCEDURE — 82803 BLOOD GASES ANY COMBINATION: CPT

## 2023-01-01 PROCEDURE — 93010 ELECTROCARDIOGRAM REPORT: CPT | Performed by: INTERNAL MEDICINE

## 2023-01-01 PROCEDURE — 0241U HB NFCT DS VIR RESP RNA 4 TRGT: CPT

## 2023-01-01 PROCEDURE — 71045 X-RAY EXAM CHEST 1 VIEW: CPT

## 2023-01-01 PROCEDURE — 92610 EVALUATE SWALLOWING FUNCTION: CPT

## 2023-01-01 PROCEDURE — 80048 BASIC METABOLIC PNL TOTAL CA: CPT | Performed by: PHYSICIAN ASSISTANT

## 2023-01-01 PROCEDURE — G1004 CDSM NDSC: HCPCS

## 2023-01-01 PROCEDURE — 99024 POSTOP FOLLOW-UP VISIT: CPT | Performed by: STUDENT IN AN ORGANIZED HEALTH CARE EDUCATION/TRAINING PROGRAM

## 2023-01-01 PROCEDURE — 93005 ELECTROCARDIOGRAM TRACING: CPT

## 2023-01-01 PROCEDURE — 99223 1ST HOSP IP/OBS HIGH 75: CPT | Performed by: INTERNAL MEDICINE

## 2023-01-01 PROCEDURE — 83880 ASSAY OF NATRIURETIC PEPTIDE: CPT

## 2023-01-01 PROCEDURE — 82805 BLOOD GASES W/O2 SATURATION: CPT

## 2023-01-01 PROCEDURE — 80048 BASIC METABOLIC PNL TOTAL CA: CPT | Performed by: REGISTERED NURSE

## 2023-01-01 PROCEDURE — 99285 EMERGENCY DEPT VISIT HI MDM: CPT

## 2023-01-01 PROCEDURE — 87185 SC STD ENZYME DETCJ PER NZM: CPT

## 2023-01-01 PROCEDURE — NC001 PR NO CHARGE: Performed by: NURSE PRACTITIONER

## 2023-01-01 PROCEDURE — 83605 ASSAY OF LACTIC ACID: CPT | Performed by: NURSE PRACTITIONER

## 2023-01-01 PROCEDURE — 97112 NEUROMUSCULAR REEDUCATION: CPT

## 2023-01-01 PROCEDURE — 99222 1ST HOSP IP/OBS MODERATE 55: CPT | Performed by: SURGERY

## 2023-01-01 PROCEDURE — 99233 SBSQ HOSP IP/OBS HIGH 50: CPT | Performed by: SURGERY

## 2023-01-01 PROCEDURE — 94664 DEMO&/EVAL PT USE INHALER: CPT

## 2023-01-01 PROCEDURE — 83735 ASSAY OF MAGNESIUM: CPT

## 2023-01-01 PROCEDURE — 80048 BASIC METABOLIC PNL TOTAL CA: CPT

## 2023-01-01 PROCEDURE — 80048 BASIC METABOLIC PNL TOTAL CA: CPT | Performed by: NURSE PRACTITIONER

## 2023-01-01 PROCEDURE — NC001 PR NO CHARGE: Performed by: PHYSICIAN ASSISTANT

## 2023-01-01 PROCEDURE — 85025 COMPLETE CBC W/AUTO DIFF WBC: CPT | Performed by: SURGERY

## 2023-01-01 PROCEDURE — 20930 SP BONE ALGRFT MORSEL ADD-ON: CPT | Performed by: NEUROLOGICAL SURGERY

## 2023-01-01 PROCEDURE — 36600 WITHDRAWAL OF ARTERIAL BLOOD: CPT | Performed by: SOCIAL WORKER

## 2023-01-01 PROCEDURE — 99497 ADVNCD CARE PLAN 30 MIN: CPT | Performed by: INTERNAL MEDICINE

## 2023-01-01 PROCEDURE — 87081 CULTURE SCREEN ONLY: CPT | Performed by: PHYSICIAN ASSISTANT

## 2023-01-01 PROCEDURE — 82948 REAGENT STRIP/BLOOD GLUCOSE: CPT

## 2023-01-01 PROCEDURE — 97530 THERAPEUTIC ACTIVITIES: CPT

## 2023-01-01 PROCEDURE — 94660 CPAP INITIATION&MGMT: CPT

## 2023-01-01 PROCEDURE — 85025 COMPLETE CBC W/AUTO DIFF WBC: CPT

## 2023-01-01 PROCEDURE — 22326 TREAT NECK SPINE FRACTURE: CPT | Performed by: NEUROLOGICAL SURGERY

## 2023-01-01 PROCEDURE — 70551 MRI BRAIN STEM W/O DYE: CPT

## 2023-01-01 PROCEDURE — 85610 PROTHROMBIN TIME: CPT | Performed by: PHYSICIAN ASSISTANT

## 2023-01-01 PROCEDURE — 99233 SBSQ HOSP IP/OBS HIGH 50: CPT | Performed by: INTERNAL MEDICINE

## 2023-01-01 PROCEDURE — 84132 ASSAY OF SERUM POTASSIUM: CPT | Performed by: FAMILY MEDICINE

## 2023-01-01 PROCEDURE — 94669 MECHANICAL CHEST WALL OSCILL: CPT

## 2023-01-01 PROCEDURE — 84132 ASSAY OF SERUM POTASSIUM: CPT

## 2023-01-01 PROCEDURE — 85610 PROTHROMBIN TIME: CPT | Performed by: FAMILY MEDICINE

## 2023-01-01 PROCEDURE — 87070 CULTURE OTHR SPECIMN AEROBIC: CPT

## 2023-01-01 PROCEDURE — 82805 BLOOD GASES W/O2 SATURATION: CPT | Performed by: INTERNAL MEDICINE

## 2023-01-01 PROCEDURE — 80048 BASIC METABOLIC PNL TOTAL CA: CPT | Performed by: INTERNAL MEDICINE

## 2023-01-01 PROCEDURE — 80048 BASIC METABOLIC PNL TOTAL CA: CPT | Performed by: SURGERY

## 2023-01-01 PROCEDURE — 80048 BASIC METABOLIC PNL TOTAL CA: CPT | Performed by: FAMILY MEDICINE

## 2023-01-01 PROCEDURE — 36600 WITHDRAWAL OF ARTERIAL BLOOD: CPT

## 2023-01-01 PROCEDURE — 99222 1ST HOSP IP/OBS MODERATE 55: CPT | Performed by: INTERNAL MEDICINE

## 2023-01-01 PROCEDURE — 32555 ASPIRATE PLEURA W/ IMAGING: CPT

## 2023-01-01 PROCEDURE — 99239 HOSP IP/OBS DSCHRG MGMT >30: CPT | Performed by: INTERNAL MEDICINE

## 2023-01-01 PROCEDURE — 99291 CRITICAL CARE FIRST HOUR: CPT | Performed by: STUDENT IN AN ORGANIZED HEALTH CARE EDUCATION/TRAINING PROGRAM

## 2023-01-01 PROCEDURE — 99232 SBSQ HOSP IP/OBS MODERATE 35: CPT | Performed by: SURGERY

## 2023-01-01 PROCEDURE — 97163 PT EVAL HIGH COMPLEX 45 MIN: CPT

## 2023-01-01 PROCEDURE — 99238 HOSP IP/OBS DSCHRG MGMT 30/<: CPT | Performed by: PHYSICIAN ASSISTANT

## 2023-01-01 PROCEDURE — 87040 BLOOD CULTURE FOR BACTERIA: CPT

## 2023-01-01 PROCEDURE — 85014 HEMATOCRIT: CPT

## 2023-01-01 PROCEDURE — 83605 ASSAY OF LACTIC ACID: CPT | Performed by: REGISTERED NURSE

## 2023-01-01 PROCEDURE — 70450 CT HEAD/BRAIN W/O DYE: CPT

## 2023-01-01 PROCEDURE — 93306 TTE W/DOPPLER COMPLETE: CPT | Performed by: INTERNAL MEDICINE

## 2023-01-01 PROCEDURE — 99223 1ST HOSP IP/OBS HIGH 75: CPT | Performed by: FAMILY MEDICINE

## 2023-01-01 PROCEDURE — 82805 BLOOD GASES W/O2 SATURATION: CPT | Performed by: PHYSICIAN ASSISTANT

## 2023-01-01 PROCEDURE — 99024 POSTOP FOLLOW-UP VISIT: CPT | Performed by: NEUROLOGICAL SURGERY

## 2023-01-01 PROCEDURE — 22842 INSERT SPINE FIXATION DEVICE: CPT | Performed by: NEUROLOGICAL SURGERY

## 2023-01-01 PROCEDURE — 99291 CRITICAL CARE FIRST HOUR: CPT

## 2023-01-01 PROCEDURE — 84295 ASSAY OF SERUM SODIUM: CPT

## 2023-01-01 PROCEDURE — 85027 COMPLETE CBC AUTOMATED: CPT | Performed by: PHYSICIAN ASSISTANT

## 2023-01-01 PROCEDURE — 22600 ARTHRD PST TQ 1NTRSPC CRV: CPT | Performed by: NEUROLOGICAL SURGERY

## 2023-01-01 PROCEDURE — 85027 COMPLETE CBC AUTOMATED: CPT | Performed by: FAMILY MEDICINE

## 2023-01-01 PROCEDURE — 99447 NTRPROF PH1/NTRNET/EHR 11-20: CPT

## 2023-01-01 PROCEDURE — 93306 TTE W/DOPPLER COMPLETE: CPT

## 2023-01-01 PROCEDURE — 84100 ASSAY OF PHOSPHORUS: CPT | Performed by: PHYSICIAN ASSISTANT

## 2023-01-01 PROCEDURE — 80053 COMPREHEN METABOLIC PANEL: CPT | Performed by: PHYSICIAN ASSISTANT

## 2023-01-01 PROCEDURE — 85730 THROMBOPLASTIN TIME PARTIAL: CPT | Performed by: FAMILY MEDICINE

## 2023-01-01 PROCEDURE — 99285 EMERGENCY DEPT VISIT HI MDM: CPT | Performed by: EMERGENCY MEDICINE

## 2023-01-01 PROCEDURE — 82330 ASSAY OF CALCIUM: CPT | Performed by: REGISTERED NURSE

## 2023-01-01 PROCEDURE — 99223 1ST HOSP IP/OBS HIGH 75: CPT

## 2023-01-01 PROCEDURE — 99232 SBSQ HOSP IP/OBS MODERATE 35: CPT | Performed by: EMERGENCY MEDICINE

## 2023-01-01 PROCEDURE — NC001 PR NO CHARGE: Performed by: STUDENT IN AN ORGANIZED HEALTH CARE EDUCATION/TRAINING PROGRAM

## 2023-01-01 PROCEDURE — 87077 CULTURE AEROBIC IDENTIFY: CPT

## 2023-01-01 PROCEDURE — 94003 VENT MGMT INPAT SUBQ DAY: CPT

## 2023-01-01 PROCEDURE — 85730 THROMBOPLASTIN TIME PARTIAL: CPT | Performed by: PHYSICIAN ASSISTANT

## 2023-01-01 PROCEDURE — 71250 CT THORAX DX C-: CPT

## 2023-01-01 PROCEDURE — NC001 PR NO CHARGE: Performed by: INTERNAL MEDICINE

## 2023-01-01 PROCEDURE — 99024 POSTOP FOLLOW-UP VISIT: CPT | Performed by: PHYSICIAN ASSISTANT

## 2023-01-01 PROCEDURE — 85730 THROMBOPLASTIN TIME PARTIAL: CPT | Performed by: INTERNAL MEDICINE

## 2023-01-01 PROCEDURE — C1713 ANCHOR/SCREW BN/BN,TIS/BN: HCPCS | Performed by: NEUROLOGICAL SURGERY

## 2023-01-01 PROCEDURE — 84100 ASSAY OF PHOSPHORUS: CPT | Performed by: REGISTERED NURSE

## 2023-01-01 PROCEDURE — 83605 ASSAY OF LACTIC ACID: CPT

## 2023-01-01 PROCEDURE — 85027 COMPLETE CBC AUTOMATED: CPT | Performed by: NURSE PRACTITIONER

## 2023-01-01 PROCEDURE — 36415 COLL VENOUS BLD VENIPUNCTURE: CPT

## 2023-01-01 PROCEDURE — 22614 ARTHRD PST TQ 1NTRSPC EA ADD: CPT | Performed by: NEUROLOGICAL SURGERY

## 2023-01-01 PROCEDURE — 81001 URINALYSIS AUTO W/SCOPE: CPT

## 2023-01-01 PROCEDURE — 86900 BLOOD TYPING SEROLOGIC ABO: CPT | Performed by: PHYSICIAN ASSISTANT

## 2023-01-01 PROCEDURE — 20936 SP BONE AGRFT LOCAL ADD-ON: CPT | Performed by: NEUROLOGICAL SURGERY

## 2023-01-01 PROCEDURE — 97110 THERAPEUTIC EXERCISES: CPT

## 2023-01-01 PROCEDURE — 87081 CULTURE SCREEN ONLY: CPT

## 2023-01-01 PROCEDURE — 82805 BLOOD GASES W/O2 SATURATION: CPT | Performed by: NURSE PRACTITIONER

## 2023-01-01 PROCEDURE — 76604 US EXAM CHEST: CPT | Performed by: RADIOLOGY

## 2023-01-01 PROCEDURE — 83036 HEMOGLOBIN GLYCOSYLATED A1C: CPT | Performed by: PHYSICIAN ASSISTANT

## 2023-01-01 PROCEDURE — 84145 PROCALCITONIN (PCT): CPT

## 2023-01-01 PROCEDURE — 82947 ASSAY GLUCOSE BLOOD QUANT: CPT

## 2023-01-01 PROCEDURE — 94640 AIRWAY INHALATION TREATMENT: CPT

## 2023-01-01 PROCEDURE — 94760 N-INVAS EAR/PLS OXIMETRY 1: CPT | Performed by: SOCIAL WORKER

## 2023-01-01 PROCEDURE — 83735 ASSAY OF MAGNESIUM: CPT | Performed by: REGISTERED NURSE

## 2023-01-01 PROCEDURE — 0RG20AJ FUSION OF 2 OR MORE CERVICAL VERTEBRAL JOINTS WITH INTERBODY FUSION DEVICE, POSTERIOR APPROACH, ANTERIOR COLUMN, OPEN APPROACH: ICD-10-PCS | Performed by: NEUROLOGICAL SURGERY

## 2023-01-01 PROCEDURE — 99223 1ST HOSP IP/OBS HIGH 75: CPT | Performed by: NEUROLOGICAL SURGERY

## 2023-01-01 PROCEDURE — 82550 ASSAY OF CK (CPK): CPT | Performed by: INTERNAL MEDICINE

## 2023-01-01 PROCEDURE — 84145 PROCALCITONIN (PCT): CPT | Performed by: FAMILY MEDICINE

## 2023-01-01 PROCEDURE — 83735 ASSAY OF MAGNESIUM: CPT | Performed by: PHYSICIAN ASSISTANT

## 2023-01-01 PROCEDURE — 86850 RBC ANTIBODY SCREEN: CPT | Performed by: PHYSICIAN ASSISTANT

## 2023-01-01 PROCEDURE — 87205 SMEAR GRAM STAIN: CPT

## 2023-01-01 PROCEDURE — 97116 GAIT TRAINING THERAPY: CPT

## 2023-01-01 DEVICE — ROD 3603750 PRE-CUT 3.5MM X 50MM
Type: IMPLANTABLE DEVICE | Site: POSTERIOR CERVICAL | Status: FUNCTIONAL
Brand: INFINITY™ OCCIPITOCERVICAL UPPER THORACIC SYSTEM

## 2023-01-01 DEVICE — DBM T45010 10CC PASTE GRAFTON PLUS
Type: IMPLANTABLE DEVICE | Status: FUNCTIONAL
Brand: GRAFTON®AND GRAFTON PLUS®DEMINERALIZED BONE MATRIX (DBM)

## 2023-01-01 RX ORDER — HEPARIN SODIUM 5000 [USP'U]/ML
5000 INJECTION, SOLUTION INTRAVENOUS; SUBCUTANEOUS EVERY 8 HOURS SCHEDULED
Status: DISCONTINUED | OUTPATIENT
Start: 2023-01-01 | End: 2023-01-01 | Stop reason: HOSPADM

## 2023-01-01 RX ORDER — HEPARIN SODIUM 5000 [USP'U]/ML
5000 INJECTION, SOLUTION INTRAVENOUS; SUBCUTANEOUS EVERY 8 HOURS SCHEDULED
Qty: 1 ML | Refills: 0 | Status: SHIPPED | OUTPATIENT
Start: 2023-01-01 | End: 2023-01-01

## 2023-01-01 RX ORDER — SENNOSIDES 8.6 MG
1 TABLET ORAL DAILY
Status: DISCONTINUED | OUTPATIENT
Start: 2023-01-01 | End: 2023-01-01

## 2023-01-01 RX ORDER — DEXMEDETOMIDINE HYDROCHLORIDE 4 UG/ML
.1-.7 INJECTION, SOLUTION INTRAVENOUS
Status: DISCONTINUED | OUTPATIENT
Start: 2023-01-01 | End: 2023-01-01

## 2023-01-01 RX ORDER — AMOXICILLIN 250 MG
1 CAPSULE ORAL 2 TIMES DAILY
Status: DISCONTINUED | OUTPATIENT
Start: 2023-01-01 | End: 2023-01-01 | Stop reason: HOSPADM

## 2023-01-01 RX ORDER — FUROSEMIDE 20 MG/1
20 TABLET ORAL DAILY
Status: DISCONTINUED | OUTPATIENT
Start: 2023-01-01 | End: 2023-01-01

## 2023-01-01 RX ORDER — OXYCODONE HYDROCHLORIDE 5 MG/1
5 TABLET ORAL EVERY 4 HOURS PRN
Status: DISCONTINUED | OUTPATIENT
Start: 2023-01-01 | End: 2023-01-01

## 2023-01-01 RX ORDER — TAMSULOSIN HYDROCHLORIDE 0.4 MG/1
0.4 CAPSULE ORAL DAILY
Status: DISCONTINUED | OUTPATIENT
Start: 2023-01-01 | End: 2023-01-01

## 2023-01-01 RX ORDER — LIDOCAINE 50 MG/G
1 PATCH TOPICAL DAILY
Refills: 0
Start: 2023-01-01 | End: 2023-01-01

## 2023-01-01 RX ORDER — GLYCOPYRROLATE 0.2 MG/ML
0.1 INJECTION INTRAMUSCULAR; INTRAVENOUS EVERY 4 HOURS PRN
Status: DISCONTINUED | OUTPATIENT
Start: 2023-01-01 | End: 2023-01-01 | Stop reason: HOSPADM

## 2023-01-01 RX ORDER — CALCIUM GLUCONATE 20 MG/ML
2 INJECTION, SOLUTION INTRAVENOUS ONCE
Status: COMPLETED | OUTPATIENT
Start: 2023-01-01 | End: 2023-01-01

## 2023-01-01 RX ORDER — AMOXICILLIN 250 MG
1 CAPSULE ORAL 2 TIMES DAILY
Refills: 0
Start: 2023-01-01 | End: 2023-01-01

## 2023-01-01 RX ORDER — NOREPINEPHRINE BITARTRATE 1 MG/ML
INJECTION, SOLUTION INTRAVENOUS
Status: COMPLETED
Start: 2023-01-01 | End: 2023-01-01

## 2023-01-01 RX ORDER — FUROSEMIDE 10 MG/ML
40 INJECTION INTRAMUSCULAR; INTRAVENOUS ONCE
Status: COMPLETED | OUTPATIENT
Start: 2023-01-01 | End: 2023-01-01

## 2023-01-01 RX ORDER — TAMSULOSIN HYDROCHLORIDE 0.4 MG/1
0.4 CAPSULE ORAL DAILY
Status: DISCONTINUED | OUTPATIENT
Start: 2023-01-01 | End: 2023-01-01 | Stop reason: HOSPADM

## 2023-01-01 RX ORDER — SULFASALAZINE 500 MG/1
500 TABLET ORAL 2 TIMES DAILY
Status: DISCONTINUED | OUTPATIENT
Start: 2023-01-01 | End: 2023-01-01 | Stop reason: HOSPADM

## 2023-01-01 RX ORDER — HYDRALAZINE HYDROCHLORIDE 20 MG/ML
5 INJECTION INTRAMUSCULAR; INTRAVENOUS EVERY 4 HOURS PRN
Status: DISCONTINUED | OUTPATIENT
Start: 2023-01-01 | End: 2023-01-01

## 2023-01-01 RX ORDER — METOLAZONE 5 MG/1
5 TABLET ORAL ONCE
Status: COMPLETED | OUTPATIENT
Start: 2023-01-01 | End: 2023-01-01

## 2023-01-01 RX ORDER — SODIUM CHLORIDE, SODIUM GLUCONATE, SODIUM ACETATE, POTASSIUM CHLORIDE, MAGNESIUM CHLORIDE, SODIUM PHOSPHATE, DIBASIC, AND POTASSIUM PHOSPHATE .53; .5; .37; .037; .03; .012; .00082 G/100ML; G/100ML; G/100ML; G/100ML; G/100ML; G/100ML; G/100ML
1000 INJECTION, SOLUTION INTRAVENOUS ONCE
Status: COMPLETED | OUTPATIENT
Start: 2023-01-01 | End: 2023-01-01

## 2023-01-01 RX ORDER — ONDANSETRON 2 MG/ML
4 INJECTION INTRAMUSCULAR; INTRAVENOUS EVERY 6 HOURS PRN
Status: DISCONTINUED | OUTPATIENT
Start: 2023-01-01 | End: 2023-01-01 | Stop reason: HOSPADM

## 2023-01-01 RX ORDER — GUAIFENESIN 600 MG/1
600 TABLET, EXTENDED RELEASE ORAL EVERY 12 HOURS SCHEDULED
Refills: 0
Start: 2023-01-01 | End: 2023-01-01

## 2023-01-01 RX ORDER — CALCIUM GLUCONATE 20 MG/ML
1 INJECTION, SOLUTION INTRAVENOUS ONCE
Status: COMPLETED | OUTPATIENT
Start: 2023-01-01 | End: 2023-01-01

## 2023-01-01 RX ORDER — METHOCARBAMOL 500 MG/1
250 TABLET, FILM COATED ORAL EVERY 6 HOURS PRN
Status: DISCONTINUED | OUTPATIENT
Start: 2023-01-01 | End: 2023-01-01 | Stop reason: HOSPADM

## 2023-01-01 RX ORDER — HEPARIN SODIUM 10000 [USP'U]/100ML
3-20 INJECTION, SOLUTION INTRAVENOUS
Status: DISCONTINUED | OUTPATIENT
Start: 2023-01-01 | End: 2023-01-01

## 2023-01-01 RX ORDER — LOSARTAN POTASSIUM 50 MG/1
100 TABLET ORAL DAILY
Status: DISCONTINUED | OUTPATIENT
Start: 2023-01-01 | End: 2023-01-01

## 2023-01-01 RX ORDER — ENOXAPARIN SODIUM 100 MG/ML
30 INJECTION SUBCUTANEOUS EVERY 12 HOURS
Status: DISCONTINUED | OUTPATIENT
Start: 2023-01-01 | End: 2023-01-01

## 2023-01-01 RX ORDER — AMOXICILLIN 250 MG
1 CAPSULE ORAL 2 TIMES DAILY
Status: DISCONTINUED | OUTPATIENT
Start: 2023-01-01 | End: 2023-01-01

## 2023-01-01 RX ORDER — FOLIC ACID 1 MG/1
1 TABLET ORAL DAILY
Status: DISCONTINUED | OUTPATIENT
Start: 2023-01-01 | End: 2023-01-01 | Stop reason: HOSPADM

## 2023-01-01 RX ORDER — CEFDINIR 300 MG/1
300 CAPSULE ORAL EVERY 24 HOURS
Qty: 3 CAPSULE | Refills: 0 | Status: SHIPPED | OUTPATIENT
Start: 2023-01-01 | End: 2023-01-01

## 2023-01-01 RX ORDER — BUPIVACAINE HYDROCHLORIDE AND EPINEPHRINE 5; 5 MG/ML; UG/ML
INJECTION, SOLUTION EPIDURAL; INTRACAUDAL; PERINEURAL AS NEEDED
Status: DISCONTINUED | OUTPATIENT
Start: 2023-01-01 | End: 2023-01-01 | Stop reason: HOSPADM

## 2023-01-01 RX ORDER — PROPOFOL 10 MG/ML
5-50 INJECTION, EMULSION INTRAVENOUS
Status: DISCONTINUED | OUTPATIENT
Start: 2023-01-01 | End: 2023-01-01

## 2023-01-01 RX ORDER — DEXTROSE MONOHYDRATE 25 G/50ML
25 INJECTION, SOLUTION INTRAVENOUS ONCE
Status: COMPLETED | OUTPATIENT
Start: 2023-01-01 | End: 2023-01-01

## 2023-01-01 RX ORDER — SULFASALAZINE 500 MG/1
1000 TABLET ORAL DAILY
Status: DISCONTINUED | OUTPATIENT
Start: 2023-01-01 | End: 2023-01-01 | Stop reason: HOSPADM

## 2023-01-01 RX ORDER — SODIUM CHLORIDE 9 MG/ML
125 INJECTION, SOLUTION INTRAVENOUS CONTINUOUS
Status: DISCONTINUED | OUTPATIENT
Start: 2023-01-01 | End: 2023-01-01

## 2023-01-01 RX ORDER — FENTANYL CITRATE 50 UG/ML
50 INJECTION, SOLUTION INTRAMUSCULAR; INTRAVENOUS
Status: DISCONTINUED | OUTPATIENT
Start: 2023-01-01 | End: 2023-01-01

## 2023-01-01 RX ORDER — SODIUM POLYSTYRENE SULFONATE 4.1 MEQ/G
15 POWDER, FOR SUSPENSION ORAL; RECTAL ONCE
Status: DISCONTINUED | OUTPATIENT
Start: 2023-01-01 | End: 2023-01-01

## 2023-01-01 RX ORDER — AMOXICILLIN 250 MG
1 CAPSULE ORAL
Status: DISCONTINUED | OUTPATIENT
Start: 2023-01-01 | End: 2023-01-01

## 2023-01-01 RX ORDER — TORSEMIDE 20 MG/1
20 TABLET ORAL DAILY
Refills: 0
Start: 2023-01-01 | End: 2023-01-01

## 2023-01-01 RX ORDER — ALLOPURINOL 100 MG/1
100 TABLET ORAL DAILY
Status: DISCONTINUED | OUTPATIENT
Start: 2023-01-01 | End: 2023-01-01

## 2023-01-01 RX ORDER — BISACODYL 10 MG
10 SUPPOSITORY, RECTAL RECTAL DAILY PRN
Status: DISCONTINUED | OUTPATIENT
Start: 2023-01-01 | End: 2023-01-01 | Stop reason: HOSPADM

## 2023-01-01 RX ORDER — SULFASALAZINE 500 MG/1
500 TABLET ORAL 2 TIMES DAILY
Status: DISCONTINUED | OUTPATIENT
Start: 2023-01-01 | End: 2023-01-01

## 2023-01-01 RX ORDER — METHOCARBAMOL 500 MG/1
500 TABLET, FILM COATED ORAL 4 TIMES DAILY PRN
Status: DISCONTINUED | OUTPATIENT
Start: 2023-01-01 | End: 2023-01-01

## 2023-01-01 RX ORDER — DOCUSATE SODIUM 100 MG/1
100 CAPSULE, LIQUID FILLED ORAL 2 TIMES DAILY
Status: DISCONTINUED | OUTPATIENT
Start: 2023-01-01 | End: 2023-01-01

## 2023-01-01 RX ORDER — LANOLIN ALCOHOL/MO/W.PET/CERES
6 CREAM (GRAM) TOPICAL
Status: DISCONTINUED | OUTPATIENT
Start: 2023-01-01 | End: 2023-01-01 | Stop reason: HOSPADM

## 2023-01-01 RX ORDER — HYDROMORPHONE HCL IN WATER/PF 6 MG/30 ML
0.2 PATIENT CONTROLLED ANALGESIA SYRINGE INTRAVENOUS EVERY 4 HOURS PRN
Status: DISCONTINUED | OUTPATIENT
Start: 2023-01-01 | End: 2023-01-01

## 2023-01-01 RX ORDER — HEPARIN SODIUM 1000 [USP'U]/ML
2000 INJECTION, SOLUTION INTRAVENOUS; SUBCUTANEOUS EVERY 6 HOURS PRN
Status: DISCONTINUED | OUTPATIENT
Start: 2023-01-01 | End: 2023-01-01

## 2023-01-01 RX ORDER — GABAPENTIN 100 MG/1
100 CAPSULE ORAL 3 TIMES DAILY
Status: DISCONTINUED | OUTPATIENT
Start: 2023-01-01 | End: 2023-01-01

## 2023-01-01 RX ORDER — ACETAMINOPHEN 325 MG/1
975 TABLET ORAL EVERY 8 HOURS SCHEDULED
Refills: 0
Start: 2023-01-01 | End: 2023-01-01

## 2023-01-01 RX ORDER — SODIUM CHLORIDE, SODIUM GLUCONATE, SODIUM ACETATE, POTASSIUM CHLORIDE, MAGNESIUM CHLORIDE, SODIUM PHOSPHATE, DIBASIC, AND POTASSIUM PHOSPHATE .53; .5; .37; .037; .03; .012; .00082 G/100ML; G/100ML; G/100ML; G/100ML; G/100ML; G/100ML; G/100ML
75 INJECTION, SOLUTION INTRAVENOUS CONTINUOUS
Status: DISCONTINUED | OUTPATIENT
Start: 2023-01-01 | End: 2023-01-01

## 2023-01-01 RX ORDER — DEXAMETHASONE SODIUM PHOSPHATE 4 MG/ML
4 INJECTION, SOLUTION INTRA-ARTICULAR; INTRALESIONAL; INTRAMUSCULAR; INTRAVENOUS; SOFT TISSUE EVERY 6 HOURS SCHEDULED
Status: DISCONTINUED | OUTPATIENT
Start: 2023-01-01 | End: 2023-01-01

## 2023-01-01 RX ORDER — CEFAZOLIN SODIUM 2 G/50ML
2000 SOLUTION INTRAVENOUS ONCE
Status: DISCONTINUED | OUTPATIENT
Start: 2023-01-01 | End: 2023-01-01

## 2023-01-01 RX ORDER — POLYETHYLENE GLYCOL 3350 17 G/17G
17 POWDER, FOR SOLUTION ORAL DAILY
Status: DISCONTINUED | OUTPATIENT
Start: 2023-01-01 | End: 2023-01-01 | Stop reason: HOSPADM

## 2023-01-01 RX ORDER — CEFDINIR 300 MG/1
300 CAPSULE ORAL EVERY 24 HOURS
Status: COMPLETED | OUTPATIENT
Start: 2023-01-01 | End: 2023-01-01

## 2023-01-01 RX ORDER — CEFAZOLIN SODIUM 1 G/50ML
1000 SOLUTION INTRAVENOUS EVERY 8 HOURS
Status: COMPLETED | OUTPATIENT
Start: 2023-01-01 | End: 2023-01-01

## 2023-01-01 RX ORDER — FUROSEMIDE 10 MG/ML
40 INJECTION INTRAMUSCULAR; INTRAVENOUS
Status: DISCONTINUED | OUTPATIENT
Start: 2023-01-01 | End: 2023-01-01

## 2023-01-01 RX ORDER — SULFASALAZINE 500 MG/1
1000 TABLET ORAL
Status: DISCONTINUED | OUTPATIENT
Start: 2023-01-01 | End: 2023-01-01

## 2023-01-01 RX ORDER — LIDOCAINE HYDROCHLORIDE AND EPINEPHRINE 10; 10 MG/ML; UG/ML
INJECTION, SOLUTION INFILTRATION; PERINEURAL AS NEEDED
Status: DISCONTINUED | OUTPATIENT
Start: 2023-01-01 | End: 2023-01-01 | Stop reason: HOSPADM

## 2023-01-01 RX ORDER — FOLIC ACID 1 MG/1
1 TABLET ORAL DAILY
Status: DISCONTINUED | OUTPATIENT
Start: 2023-01-01 | End: 2023-01-01

## 2023-01-01 RX ORDER — BISACODYL 10 MG
10 SUPPOSITORY, RECTAL RECTAL DAILY PRN
Status: DISCONTINUED | OUTPATIENT
Start: 2023-01-01 | End: 2023-01-01

## 2023-01-01 RX ORDER — MAGNESIUM HYDROXIDE 1200 MG/15ML
LIQUID ORAL AS NEEDED
Status: DISCONTINUED | OUTPATIENT
Start: 2023-01-01 | End: 2023-01-01 | Stop reason: HOSPADM

## 2023-01-01 RX ORDER — MELATONIN
2000 DAILY
Status: DISCONTINUED | OUTPATIENT
Start: 2023-01-01 | End: 2023-01-01

## 2023-01-01 RX ORDER — BISACODYL 10 MG
10 SUPPOSITORY, RECTAL RECTAL DAILY PRN
Qty: 12 SUPPOSITORY | Refills: 0 | Status: SHIPPED | OUTPATIENT
Start: 2023-01-01 | End: 2023-01-01

## 2023-01-01 RX ORDER — ONDANSETRON 2 MG/ML
4 INJECTION INTRAMUSCULAR; INTRAVENOUS EVERY 6 HOURS PRN
Status: DISCONTINUED | OUTPATIENT
Start: 2023-01-01 | End: 2023-01-01

## 2023-01-01 RX ORDER — SODIUM POLYSTYRENE SULFONATE 4.1 MEQ/G
15 POWDER, FOR SUSPENSION ORAL; RECTAL ONCE
Status: COMPLETED | OUTPATIENT
Start: 2023-01-01 | End: 2023-01-01

## 2023-01-01 RX ORDER — CHLORHEXIDINE GLUCONATE 0.12 MG/ML
15 RINSE ORAL EVERY 12 HOURS SCHEDULED
Status: DISCONTINUED | OUTPATIENT
Start: 2023-01-01 | End: 2023-01-01

## 2023-01-01 RX ORDER — METHOCARBAMOL 500 MG/1
500 TABLET, FILM COATED ORAL EVERY 6 HOURS PRN
Status: DISCONTINUED | OUTPATIENT
Start: 2023-01-01 | End: 2023-01-01

## 2023-01-01 RX ORDER — GUAIFENESIN 600 MG/1
600 TABLET, EXTENDED RELEASE ORAL EVERY 12 HOURS SCHEDULED
Status: DISCONTINUED | OUTPATIENT
Start: 2023-01-01 | End: 2023-01-01 | Stop reason: HOSPADM

## 2023-01-01 RX ORDER — HALOPERIDOL 5 MG/ML
0.5 INJECTION INTRAMUSCULAR EVERY 2 HOUR PRN
Status: DISCONTINUED | OUTPATIENT
Start: 2023-01-01 | End: 2023-01-01 | Stop reason: HOSPADM

## 2023-01-01 RX ORDER — DEXAMETHASONE SODIUM PHOSPHATE 4 MG/ML
2 INJECTION, SOLUTION INTRA-ARTICULAR; INTRALESIONAL; INTRAMUSCULAR; INTRAVENOUS; SOFT TISSUE EVERY 8 HOURS SCHEDULED
Status: COMPLETED | OUTPATIENT
Start: 2023-01-01 | End: 2023-01-01

## 2023-01-01 RX ORDER — HEPARIN SODIUM 5000 [USP'U]/ML
5000 INJECTION, SOLUTION INTRAVENOUS; SUBCUTANEOUS EVERY 8 HOURS SCHEDULED
Status: DISCONTINUED | OUTPATIENT
Start: 2023-01-01 | End: 2023-01-01

## 2023-01-01 RX ORDER — PHENYLEPHRINE HYDROCHLORIDE 10 MG/ML
INJECTION INTRAVENOUS
Status: COMPLETED
Start: 2023-01-01 | End: 2023-01-01

## 2023-01-01 RX ORDER — GUAIFENESIN 600 MG/1
600 TABLET, EXTENDED RELEASE ORAL EVERY 12 HOURS SCHEDULED
Status: DISCONTINUED | OUTPATIENT
Start: 2023-01-01 | End: 2023-01-01

## 2023-01-01 RX ORDER — CHLORHEXIDINE GLUCONATE 0.12 MG/ML
15 RINSE ORAL ONCE
Status: DISCONTINUED | OUTPATIENT
Start: 2023-01-01 | End: 2023-01-01 | Stop reason: HOSPADM

## 2023-01-01 RX ORDER — CEFAZOLIN SODIUM 2 G/50ML
2000 SOLUTION INTRAVENOUS ONCE
Status: DISCONTINUED | OUTPATIENT
Start: 2023-01-01 | End: 2023-01-01 | Stop reason: HOSPADM

## 2023-01-01 RX ORDER — ACETAMINOPHEN 325 MG/1
650 TABLET ORAL EVERY 6 HOURS PRN
Status: DISCONTINUED | OUTPATIENT
Start: 2023-01-01 | End: 2023-01-01

## 2023-01-01 RX ORDER — NICARDIPINE HYDROCHLORIDE 2.5 MG/ML
INJECTION INTRAVENOUS
Status: DISPENSED
Start: 2023-01-01 | End: 2023-01-01

## 2023-01-01 RX ORDER — HEPARIN SODIUM 1000 [USP'U]/ML
4000 INJECTION, SOLUTION INTRAVENOUS; SUBCUTANEOUS EVERY 6 HOURS PRN
Status: DISCONTINUED | OUTPATIENT
Start: 2023-01-01 | End: 2023-01-01

## 2023-01-01 RX ORDER — TORSEMIDE 20 MG/1
20 TABLET ORAL DAILY
Status: DISCONTINUED | OUTPATIENT
Start: 2023-01-01 | End: 2023-01-01 | Stop reason: HOSPADM

## 2023-01-01 RX ORDER — FENTANYL CITRATE 50 UG/ML
25 INJECTION, SOLUTION INTRAMUSCULAR; INTRAVENOUS ONCE
Status: COMPLETED | OUTPATIENT
Start: 2023-01-01 | End: 2023-01-01

## 2023-01-01 RX ORDER — POLYETHYLENE GLYCOL 3350 17 G/17G
17 POWDER, FOR SOLUTION ORAL DAILY
Refills: 0
Start: 2023-01-01 | End: 2023-01-01

## 2023-01-01 RX ORDER — LIDOCAINE 50 MG/G
1 PATCH TOPICAL DAILY
Status: DISCONTINUED | OUTPATIENT
Start: 2023-01-01 | End: 2023-01-01 | Stop reason: HOSPADM

## 2023-01-01 RX ORDER — CHLORHEXIDINE GLUCONATE 0.12 MG/ML
15 RINSE ORAL ONCE
Status: COMPLETED | OUTPATIENT
Start: 2023-01-01 | End: 2023-01-01

## 2023-01-01 RX ORDER — ALBUMIN, HUMAN INJ 5% 5 %
25 SOLUTION INTRAVENOUS ONCE
Status: COMPLETED | OUTPATIENT
Start: 2023-01-01 | End: 2023-01-01

## 2023-01-01 RX ORDER — LORAZEPAM 2 MG/ML
1 INJECTION INTRAMUSCULAR
Status: DISCONTINUED | OUTPATIENT
Start: 2023-01-01 | End: 2023-01-01 | Stop reason: HOSPADM

## 2023-01-01 RX ORDER — ACETAMINOPHEN 325 MG/1
975 TABLET ORAL EVERY 8 HOURS SCHEDULED
Status: DISCONTINUED | OUTPATIENT
Start: 2023-01-01 | End: 2023-01-01 | Stop reason: HOSPADM

## 2023-01-01 RX ORDER — POLYETHYLENE GLYCOL 3350 17 G/17G
17 POWDER, FOR SOLUTION ORAL DAILY
Status: DISCONTINUED | OUTPATIENT
Start: 2023-01-01 | End: 2023-01-01

## 2023-01-01 RX ADMIN — SULFASALAZINE 500 MG: 500 TABLET ORAL at 08:53

## 2023-01-01 RX ADMIN — POLYETHYLENE GLYCOL 3350 17 G: 17 POWDER, FOR SOLUTION ORAL at 09:24

## 2023-01-01 RX ADMIN — ACETAMINOPHEN 975 MG: 325 TABLET, FILM COATED ORAL at 21:00

## 2023-01-01 RX ADMIN — SODIUM POLYSTYRENE SULFONATE 15 G: 4.1 POWDER, FOR SUSPENSION ORAL; RECTAL at 14:00

## 2023-01-01 RX ADMIN — SODIUM CHLORIDE, SODIUM GLUCONATE, SODIUM ACETATE, POTASSIUM CHLORIDE, MAGNESIUM CHLORIDE, SODIUM PHOSPHATE, DIBASIC, AND POTASSIUM PHOSPHATE 75 ML/HR: .53; .5; .37; .037; .03; .012; .00082 INJECTION, SOLUTION INTRAVENOUS at 20:35

## 2023-01-01 RX ADMIN — TORSEMIDE 20 MG: 20 TABLET ORAL at 09:04

## 2023-01-01 RX ADMIN — DEXAMETHASONE SODIUM PHOSPHATE 2 MG: 4 INJECTION INTRA-ARTICULAR; INTRALESIONAL; INTRAMUSCULAR; INTRAVENOUS; SOFT TISSUE at 06:38

## 2023-01-01 RX ADMIN — SENNOSIDES AND DOCUSATE SODIUM 1 TABLET: 8.6; 5 TABLET ORAL at 10:00

## 2023-01-01 RX ADMIN — GUAIFENESIN 600 MG: 600 TABLET, EXTENDED RELEASE ORAL at 21:08

## 2023-01-01 RX ADMIN — MORPHINE SULFATE 2 MG: 2 INJECTION, SOLUTION INTRAMUSCULAR; INTRAVENOUS at 15:38

## 2023-01-01 RX ADMIN — SULFASALAZINE 500 MG: 500 TABLET ORAL at 17:18

## 2023-01-01 RX ADMIN — LIDOCAINE 5% 1 PATCH: 700 PATCH TOPICAL at 20:37

## 2023-01-01 RX ADMIN — ACETAMINOPHEN 975 MG: 325 TABLET, FILM COATED ORAL at 06:20

## 2023-01-01 RX ADMIN — ACETAMINOPHEN 975 MG: 325 TABLET, FILM COATED ORAL at 22:03

## 2023-01-01 RX ADMIN — SENNOSIDES AND DOCUSATE SODIUM 1 TABLET: 8.6; 5 TABLET ORAL at 09:58

## 2023-01-01 RX ADMIN — SENNOSIDES AND DOCUSATE SODIUM 1 TABLET: 8.6; 5 TABLET ORAL at 18:38

## 2023-01-01 RX ADMIN — ACETAMINOPHEN 975 MG: 325 TABLET, FILM COATED ORAL at 21:10

## 2023-01-01 RX ADMIN — MORPHINE SULFATE 2 MG: 2 INJECTION, SOLUTION INTRAMUSCULAR; INTRAVENOUS at 17:27

## 2023-01-01 RX ADMIN — INSULIN HUMAN 10 UNITS: 100 INJECTION, SOLUTION PARENTERAL at 16:57

## 2023-01-01 RX ADMIN — SULFASALAZINE 1000 MG: 500 TABLET ORAL at 12:05

## 2023-01-01 RX ADMIN — FOLIC ACID 1 MG: 1 TABLET ORAL at 08:52

## 2023-01-01 RX ADMIN — METHOCARBAMOL 500 MG: 500 TABLET ORAL at 20:43

## 2023-01-01 RX ADMIN — HEPARIN SODIUM 5000 UNITS: 5000 INJECTION INTRAVENOUS; SUBCUTANEOUS at 05:18

## 2023-01-01 RX ADMIN — SULFASALAZINE 500 MG: 500 TABLET ORAL at 09:05

## 2023-01-01 RX ADMIN — ACETAMINOPHEN 975 MG: 325 TABLET, FILM COATED ORAL at 05:18

## 2023-01-01 RX ADMIN — SENNOSIDES AND DOCUSATE SODIUM 1 TABLET: 8.6; 5 TABLET ORAL at 17:53

## 2023-01-01 RX ADMIN — SULFASALAZINE 500 MG: 500 TABLET ORAL at 10:16

## 2023-01-01 RX ADMIN — MORPHINE SULFATE 2 MG: 2 INJECTION, SOLUTION INTRAMUSCULAR; INTRAVENOUS at 21:26

## 2023-01-01 RX ADMIN — Medication 20 MG: at 08:25

## 2023-01-01 RX ADMIN — ENOXAPARIN SODIUM 30 MG: 30 INJECTION SUBCUTANEOUS at 20:38

## 2023-01-01 RX ADMIN — DEXAMETHASONE SODIUM PHOSPHATE 2 MG: 4 INJECTION INTRA-ARTICULAR; INTRALESIONAL; INTRAMUSCULAR; INTRAVENOUS; SOFT TISSUE at 22:18

## 2023-01-01 RX ADMIN — SODIUM ZIRCONIUM CYCLOSILICATE 10 G: 10 POWDER, FOR SUSPENSION ORAL at 12:02

## 2023-01-01 RX ADMIN — FUROSEMIDE 40 MG: 10 INJECTION, SOLUTION INTRAMUSCULAR; INTRAVENOUS at 15:48

## 2023-01-01 RX ADMIN — GUAIFENESIN 600 MG: 600 TABLET, EXTENDED RELEASE ORAL at 22:04

## 2023-01-01 RX ADMIN — METHOCARBAMOL 500 MG: 500 TABLET ORAL at 04:02

## 2023-01-01 RX ADMIN — SULFASALAZINE 500 MG: 500 TABLET ORAL at 09:59

## 2023-01-01 RX ADMIN — GABAPENTIN 100 MG: 100 CAPSULE ORAL at 16:44

## 2023-01-01 RX ADMIN — ACETAMINOPHEN 975 MG: 325 TABLET, FILM COATED ORAL at 15:00

## 2023-01-01 RX ADMIN — HEPARIN SODIUM 5000 UNITS: 5000 INJECTION INTRAVENOUS; SUBCUTANEOUS at 18:03

## 2023-01-01 RX ADMIN — CEFAZOLIN SODIUM 1000 MG: 1 SOLUTION INTRAVENOUS at 02:34

## 2023-01-01 RX ADMIN — MELATONIN TAB 3 MG 6 MG: 3 TAB at 22:18

## 2023-01-01 RX ADMIN — DEXTROSE MONOHYDRATE 25 ML: 25 INJECTION, SOLUTION INTRAVENOUS at 16:01

## 2023-01-01 RX ADMIN — ACETAMINOPHEN 975 MG: 325 TABLET, FILM COATED ORAL at 13:24

## 2023-01-01 RX ADMIN — SULFASALAZINE 500 MG: 500 TABLET ORAL at 18:00

## 2023-01-01 RX ADMIN — FOLIC ACID 1 MG: 1 TABLET ORAL at 09:25

## 2023-01-01 RX ADMIN — SENNOSIDES AND DOCUSATE SODIUM 1 TABLET: 8.6; 5 TABLET ORAL at 17:52

## 2023-01-01 RX ADMIN — ACETAMINOPHEN 975 MG: 325 TABLET, FILM COATED ORAL at 21:15

## 2023-01-01 RX ADMIN — FOLIC ACID 1 MG: 1 TABLET ORAL at 10:15

## 2023-01-01 RX ADMIN — HEPARIN SODIUM 5000 UNITS: 5000 INJECTION INTRAVENOUS; SUBCUTANEOUS at 15:00

## 2023-01-01 RX ADMIN — HEPARIN SODIUM 5000 UNITS: 5000 INJECTION INTRAVENOUS; SUBCUTANEOUS at 22:04

## 2023-01-01 RX ADMIN — DEXTROSE MONOHYDRATE 25 ML: 25 INJECTION, SOLUTION INTRAVENOUS at 10:24

## 2023-01-01 RX ADMIN — GUAIFENESIN 600 MG: 600 TABLET, EXTENDED RELEASE ORAL at 08:21

## 2023-01-01 RX ADMIN — CALCIUM GLUCONATE 1 G: 20 INJECTION, SOLUTION INTRAVENOUS at 09:51

## 2023-01-01 RX ADMIN — DEXAMETHASONE SODIUM PHOSPHATE 2 MG: 4 INJECTION INTRA-ARTICULAR; INTRALESIONAL; INTRAMUSCULAR; INTRAVENOUS; SOFT TISSUE at 06:16

## 2023-01-01 RX ADMIN — HEPARIN SODIUM 5000 UNITS: 5000 INJECTION INTRAVENOUS; SUBCUTANEOUS at 14:27

## 2023-01-01 RX ADMIN — DEXAMETHASONE SODIUM PHOSPHATE 2 MG: 4 INJECTION INTRA-ARTICULAR; INTRALESIONAL; INTRAMUSCULAR; INTRAVENOUS; SOFT TISSUE at 05:15

## 2023-01-01 RX ADMIN — HEPARIN SODIUM 5000 UNITS: 5000 INJECTION INTRAVENOUS; SUBCUTANEOUS at 13:51

## 2023-01-01 RX ADMIN — SULFASALAZINE 500 MG: 500 TABLET ORAL at 08:24

## 2023-01-01 RX ADMIN — CALCIUM GLUCONATE 2 G: 20 INJECTION, SOLUTION INTRAVENOUS at 06:18

## 2023-01-01 RX ADMIN — SULFASALAZINE 500 MG: 500 TABLET ORAL at 20:31

## 2023-01-01 RX ADMIN — TAMSULOSIN HYDROCHLORIDE 0.4 MG: 0.4 CAPSULE ORAL at 08:21

## 2023-01-01 RX ADMIN — FUROSEMIDE 40 MG: 10 INJECTION, SOLUTION INTRAMUSCULAR; INTRAVENOUS at 23:03

## 2023-01-01 RX ADMIN — SODIUM CHLORIDE, SODIUM GLUCONATE, SODIUM ACETATE, POTASSIUM CHLORIDE, MAGNESIUM CHLORIDE, SODIUM PHOSPHATE, DIBASIC, AND POTASSIUM PHOSPHATE 75 ML/HR: .53; .5; .37; .037; .03; .012; .00082 INJECTION, SOLUTION INTRAVENOUS at 09:18

## 2023-01-01 RX ADMIN — FUROSEMIDE 20 MG: 20 TABLET ORAL at 10:15

## 2023-01-01 RX ADMIN — CHLORHEXIDINE GLUCONATE 0.12% ORAL RINSE 15 ML: 1.2 LIQUID ORAL at 08:23

## 2023-01-01 RX ADMIN — MELATONIN TAB 3 MG 6 MG: 3 TAB at 22:03

## 2023-01-01 RX ADMIN — GUAIFENESIN 600 MG: 600 TABLET, EXTENDED RELEASE ORAL at 09:59

## 2023-01-01 RX ADMIN — POLYETHYLENE GLYCOL 3350 17 G: 17 POWDER, FOR SOLUTION ORAL at 08:05

## 2023-01-01 RX ADMIN — GUAIFENESIN 600 MG: 600 TABLET, EXTENDED RELEASE ORAL at 20:12

## 2023-01-01 RX ADMIN — CHLORHEXIDINE GLUCONATE 0.12% ORAL RINSE 15 ML: 1.2 LIQUID ORAL at 20:45

## 2023-01-01 RX ADMIN — APIXABAN 2.5 MG: 2.5 TABLET, FILM COATED ORAL at 08:21

## 2023-01-01 RX ADMIN — FUROSEMIDE 20 MG: 20 TABLET ORAL at 08:13

## 2023-01-01 RX ADMIN — ACETAMINOPHEN 975 MG: 325 TABLET, FILM COATED ORAL at 14:18

## 2023-01-01 RX ADMIN — Medication 20 MG: at 08:19

## 2023-01-01 RX ADMIN — SENNOSIDES AND DOCUSATE SODIUM 1 TABLET: 8.6; 5 TABLET ORAL at 09:04

## 2023-01-01 RX ADMIN — SODIUM CHLORIDE 1000 ML: 0.9 INJECTION, SOLUTION INTRAVENOUS at 15:03

## 2023-01-01 RX ADMIN — SULFASALAZINE 1000 MG: 500 TABLET ORAL at 13:23

## 2023-01-01 RX ADMIN — MELATONIN TAB 3 MG 6 MG: 3 TAB at 21:53

## 2023-01-01 RX ADMIN — Medication 2.5 MG: at 20:00

## 2023-01-01 RX ADMIN — B-COMPLEX W/ C & FOLIC ACID TAB 1 TABLET: TAB at 18:21

## 2023-01-01 RX ADMIN — GUAIFENESIN 600 MG: 600 TABLET, EXTENDED RELEASE ORAL at 20:10

## 2023-01-01 RX ADMIN — ALLOPURINOL 100 MG: 100 TABLET ORAL at 08:16

## 2023-01-01 RX ADMIN — HEPARIN SODIUM 5000 UNITS: 5000 INJECTION INTRAVENOUS; SUBCUTANEOUS at 21:53

## 2023-01-01 RX ADMIN — HEPARIN SODIUM 5000 UNITS: 5000 INJECTION INTRAVENOUS; SUBCUTANEOUS at 21:00

## 2023-01-01 RX ADMIN — CHLORHEXIDINE GLUCONATE 0.12% ORAL RINSE 15 ML: 1.2 LIQUID ORAL at 20:11

## 2023-01-01 RX ADMIN — FUROSEMIDE 20 MG: 20 TABLET ORAL at 08:06

## 2023-01-01 RX ADMIN — HEPARIN SODIUM 5000 UNITS: 5000 INJECTION INTRAVENOUS; SUBCUTANEOUS at 22:05

## 2023-01-01 RX ADMIN — CEFAZOLIN SODIUM 1000 MG: 1 SOLUTION INTRAVENOUS at 19:40

## 2023-01-01 RX ADMIN — FOLIC ACID 1 MG: 1 TABLET ORAL at 09:59

## 2023-01-01 RX ADMIN — SULFASALAZINE 500 MG: 500 TABLET ORAL at 17:29

## 2023-01-01 RX ADMIN — ACETAMINOPHEN 975 MG: 325 TABLET, FILM COATED ORAL at 14:26

## 2023-01-01 RX ADMIN — ACETAMINOPHEN 975 MG: 325 TABLET, FILM COATED ORAL at 06:02

## 2023-01-01 RX ADMIN — ACETAMINOPHEN 975 MG: 325 TABLET, FILM COATED ORAL at 14:13

## 2023-01-01 RX ADMIN — FOLIC ACID 1 MG: 1 TABLET ORAL at 18:21

## 2023-01-01 RX ADMIN — SENNOSIDES AND DOCUSATE SODIUM 1 TABLET: 8.6; 5 TABLET ORAL at 08:17

## 2023-01-01 RX ADMIN — LORAZEPAM 1 MG: 2 INJECTION INTRAMUSCULAR; INTRAVENOUS at 18:50

## 2023-01-01 RX ADMIN — SENNOSIDES AND DOCUSATE SODIUM 1 TABLET: 50; 8.6 TABLET ORAL at 18:21

## 2023-01-01 RX ADMIN — METHOCARBAMOL 250 MG: 500 TABLET ORAL at 07:42

## 2023-01-01 RX ADMIN — SENNOSIDES AND DOCUSATE SODIUM 1 TABLET: 8.6; 5 TABLET ORAL at 08:21

## 2023-01-01 RX ADMIN — SULFASALAZINE 1000 MG: 500 TABLET ORAL at 12:30

## 2023-01-01 RX ADMIN — FOLIC ACID 1 MG: 1 TABLET ORAL at 08:21

## 2023-01-01 RX ADMIN — ACETAMINOPHEN 975 MG: 325 TABLET, FILM COATED ORAL at 05:36

## 2023-01-01 RX ADMIN — FUROSEMIDE 40 MG: 10 INJECTION, SOLUTION INTRAMUSCULAR; INTRAVENOUS at 10:57

## 2023-01-01 RX ADMIN — GABAPENTIN 100 MG: 100 CAPSULE ORAL at 20:12

## 2023-01-01 RX ADMIN — MORPHINE SULFATE 2 MG: 2 INJECTION, SOLUTION INTRAMUSCULAR; INTRAVENOUS at 04:40

## 2023-01-01 RX ADMIN — FUROSEMIDE 40 MG: 10 INJECTION, SOLUTION INTRAMUSCULAR; INTRAVENOUS at 11:43

## 2023-01-01 RX ADMIN — HEPARIN SODIUM 5000 UNITS: 5000 INJECTION INTRAVENOUS; SUBCUTANEOUS at 05:14

## 2023-01-01 RX ADMIN — Medication 10 MG: at 16:23

## 2023-01-01 RX ADMIN — CEFEPIME 1000 MG: 1 INJECTION, POWDER, FOR SOLUTION INTRAMUSCULAR; INTRAVENOUS at 09:28

## 2023-01-01 RX ADMIN — SULFASALAZINE 500 MG: 500 TABLET ORAL at 17:53

## 2023-01-01 RX ADMIN — FENTANYL CITRATE 50 MCG: 50 INJECTION INTRAMUSCULAR; INTRAVENOUS at 02:34

## 2023-01-01 RX ADMIN — DEXMEDETOMIDINE HYDROCHLORIDE 0.5 MCG/KG/HR: 400 INJECTION INTRAVENOUS at 04:52

## 2023-01-01 RX ADMIN — POLYETHYLENE GLYCOL 3350 17 G: 17 POWDER, FOR SOLUTION ORAL at 18:21

## 2023-01-01 RX ADMIN — METOLAZONE 5 MG: 5 TABLET ORAL at 17:29

## 2023-01-01 RX ADMIN — FOLIC ACID 1 MG: 1 TABLET ORAL at 10:00

## 2023-01-01 RX ADMIN — CEFDINIR 300 MG: 300 CAPSULE ORAL at 18:21

## 2023-01-01 RX ADMIN — HEPARIN SODIUM 5000 UNITS: 5000 INJECTION INTRAVENOUS; SUBCUTANEOUS at 16:09

## 2023-01-01 RX ADMIN — MORPHINE SULFATE 2 MG: 2 INJECTION, SOLUTION INTRAMUSCULAR; INTRAVENOUS at 22:37

## 2023-01-01 RX ADMIN — GUAIFENESIN 600 MG: 600 TABLET, EXTENDED RELEASE ORAL at 22:05

## 2023-01-01 RX ADMIN — SULFASALAZINE 1000 MG: 500 TABLET ORAL at 11:57

## 2023-01-01 RX ADMIN — FUROSEMIDE 40 MG: 10 INJECTION, SOLUTION INTRAMUSCULAR; INTRAVENOUS at 15:56

## 2023-01-01 RX ADMIN — SULFASALAZINE 1000 MG: 500 TABLET ORAL at 13:27

## 2023-01-01 RX ADMIN — DEXAMETHASONE SODIUM PHOSPHATE 2 MG: 4 INJECTION INTRA-ARTICULAR; INTRALESIONAL; INTRAMUSCULAR; INTRAVENOUS; SOFT TISSUE at 15:00

## 2023-01-01 RX ADMIN — SULFASALAZINE 500 MG: 500 TABLET ORAL at 18:37

## 2023-01-01 RX ADMIN — GUAIFENESIN 600 MG: 600 TABLET, EXTENDED RELEASE ORAL at 08:13

## 2023-01-01 RX ADMIN — HEPARIN SODIUM 5000 UNITS: 5000 INJECTION INTRAVENOUS; SUBCUTANEOUS at 06:02

## 2023-01-01 RX ADMIN — ACETAMINOPHEN 975 MG: 325 TABLET, FILM COATED ORAL at 05:14

## 2023-01-01 RX ADMIN — SENNOSIDES AND DOCUSATE SODIUM 1 TABLET: 8.6; 5 TABLET ORAL at 17:18

## 2023-01-01 RX ADMIN — SENNOSIDES AND DOCUSATE SODIUM 1 TABLET: 8.6; 5 TABLET ORAL at 08:06

## 2023-01-01 RX ADMIN — POLYETHYLENE GLYCOL 3350 17 G: 17 POWDER, FOR SOLUTION ORAL at 08:53

## 2023-01-01 RX ADMIN — SENNOSIDES AND DOCUSATE SODIUM 1 TABLET: 8.6; 5 TABLET ORAL at 08:52

## 2023-01-01 RX ADMIN — HEPARIN SODIUM 5000 UNITS: 5000 INJECTION INTRAVENOUS; SUBCUTANEOUS at 13:49

## 2023-01-01 RX ADMIN — FENTANYL CITRATE 50 MCG: 50 INJECTION INTRAMUSCULAR; INTRAVENOUS at 17:12

## 2023-01-01 RX ADMIN — SENNOSIDES AND DOCUSATE SODIUM 1 TABLET: 8.6; 5 TABLET ORAL at 17:29

## 2023-01-01 RX ADMIN — CEFEPIME 1000 MG: 1 INJECTION, POWDER, FOR SOLUTION INTRAMUSCULAR; INTRAVENOUS at 21:15

## 2023-01-01 RX ADMIN — PHENYLEPHRINE HYDROCHLORIDE: 10 INJECTION INTRAVENOUS at 15:05

## 2023-01-01 RX ADMIN — FUROSEMIDE 40 MG: 10 INJECTION, SOLUTION INTRAMUSCULAR; INTRAVENOUS at 10:15

## 2023-01-01 RX ADMIN — SULFASALAZINE 1000 MG: 500 TABLET ORAL at 14:05

## 2023-01-01 RX ADMIN — TAMSULOSIN HYDROCHLORIDE 0.4 MG: 0.4 CAPSULE ORAL at 09:04

## 2023-01-01 RX ADMIN — MORPHINE SULFATE 2 MG: 2 INJECTION, SOLUTION INTRAMUSCULAR; INTRAVENOUS at 10:58

## 2023-01-01 RX ADMIN — FENTANYL CITRATE 50 MCG: 50 INJECTION INTRAMUSCULAR; INTRAVENOUS at 00:24

## 2023-01-01 RX ADMIN — LIDOCAINE 5% 1 PATCH: 700 PATCH TOPICAL at 21:09

## 2023-01-01 RX ADMIN — FOLIC ACID 1 MG: 1 TABLET ORAL at 11:51

## 2023-01-01 RX ADMIN — CHLORHEXIDINE GLUCONATE 0.12% ORAL RINSE 15 ML: 1.2 LIQUID ORAL at 20:31

## 2023-01-01 RX ADMIN — ALBUMIN (HUMAN) 25 G: 12.5 INJECTION, SOLUTION INTRAVENOUS at 13:22

## 2023-01-01 RX ADMIN — POLYETHYLENE GLYCOL 3350 17 G: 17 POWDER, FOR SOLUTION ORAL at 09:58

## 2023-01-01 RX ADMIN — FUROSEMIDE 20 MG: 20 TABLET ORAL at 14:27

## 2023-01-01 RX ADMIN — MORPHINE SULFATE 2 MG: 2 INJECTION, SOLUTION INTRAMUSCULAR; INTRAVENOUS at 02:23

## 2023-01-01 RX ADMIN — ALLOPURINOL 100 MG: 100 TABLET ORAL at 18:21

## 2023-01-01 RX ADMIN — SODIUM ZIRCONIUM CYCLOSILICATE 10 G: 10 POWDER, FOR SUSPENSION ORAL at 10:21

## 2023-01-01 RX ADMIN — CHLORHEXIDINE GLUCONATE 15 ML: 1.2 SOLUTION ORAL at 09:42

## 2023-01-01 RX ADMIN — FUROSEMIDE 20 MG: 20 TABLET ORAL at 08:21

## 2023-01-01 RX ADMIN — SULFASALAZINE 1000 MG: 500 TABLET ORAL at 11:44

## 2023-01-01 RX ADMIN — TAMSULOSIN HYDROCHLORIDE 0.4 MG: 0.4 CAPSULE ORAL at 10:00

## 2023-01-01 RX ADMIN — TAMSULOSIN HYDROCHLORIDE 0.4 MG: 0.4 CAPSULE ORAL at 18:21

## 2023-01-01 RX ADMIN — ACETAMINOPHEN 975 MG: 325 TABLET, FILM COATED ORAL at 14:05

## 2023-01-01 RX ADMIN — ACETAMINOPHEN 975 MG: 325 TABLET, FILM COATED ORAL at 05:53

## 2023-01-01 RX ADMIN — CHLORHEXIDINE GLUCONATE 0.12% ORAL RINSE 15 ML: 1.2 LIQUID ORAL at 11:52

## 2023-01-01 RX ADMIN — SENNOSIDES AND DOCUSATE SODIUM 1 TABLET: 8.6; 5 TABLET ORAL at 17:12

## 2023-01-01 RX ADMIN — VANCOMYCIN HYDROCHLORIDE 2000 MG: 1 INJECTION, POWDER, LYOPHILIZED, FOR SOLUTION INTRAVENOUS at 12:02

## 2023-01-01 RX ADMIN — Medication 2000 UNITS: at 18:21

## 2023-01-01 RX ADMIN — MELATONIN TAB 3 MG 6 MG: 3 TAB at 21:47

## 2023-01-01 RX ADMIN — INSULIN HUMAN 10 UNITS: 100 INJECTION, SOLUTION PARENTERAL at 10:48

## 2023-01-01 RX ADMIN — ACETAMINOPHEN 975 MG: 325 TABLET, FILM COATED ORAL at 21:47

## 2023-01-01 RX ADMIN — SODIUM CHLORIDE, SODIUM GLUCONATE, SODIUM ACETATE, POTASSIUM CHLORIDE, MAGNESIUM CHLORIDE, SODIUM PHOSPHATE, DIBASIC, AND POTASSIUM PHOSPHATE 1000 ML: .53; .5; .37; .037; .03; .012; .00082 INJECTION, SOLUTION INTRAVENOUS at 21:15

## 2023-01-01 RX ADMIN — HEPARIN SODIUM 5000 UNITS: 5000 INJECTION INTRAVENOUS; SUBCUTANEOUS at 06:19

## 2023-01-01 RX ADMIN — CHLORHEXIDINE GLUCONATE 0.12% ORAL RINSE 15 ML: 1.2 LIQUID ORAL at 10:00

## 2023-01-01 RX ADMIN — SULFASALAZINE 500 MG: 500 TABLET ORAL at 10:00

## 2023-01-01 RX ADMIN — MELATONIN TAB 3 MG 6 MG: 3 TAB at 22:05

## 2023-01-01 RX ADMIN — GABAPENTIN 100 MG: 100 CAPSULE ORAL at 12:54

## 2023-01-01 RX ADMIN — NOREPINEPHRINE BITARTRATE 4 MG: 1 SOLUTION INTRAVENOUS at 06:41

## 2023-01-01 RX ADMIN — SENNOSIDES AND DOCUSATE SODIUM 1 TABLET: 8.6; 5 TABLET ORAL at 18:03

## 2023-01-01 RX ADMIN — HEPARIN SODIUM 5000 UNITS: 5000 INJECTION INTRAVENOUS; SUBCUTANEOUS at 05:53

## 2023-01-01 RX ADMIN — PHENYLEPHRINE HYDROCHLORIDE 25 MCG/MIN: 10 INJECTION INTRAVENOUS at 03:52

## 2023-01-01 RX ADMIN — CHLORHEXIDINE GLUCONATE 0.12% ORAL RINSE 15 ML: 1.2 LIQUID ORAL at 22:13

## 2023-01-01 RX ADMIN — ACETAMINOPHEN 975 MG: 325 TABLET, FILM COATED ORAL at 13:51

## 2023-01-01 RX ADMIN — ACETAMINOPHEN 975 MG: 325 TABLET, FILM COATED ORAL at 16:32

## 2023-01-01 RX ADMIN — HEPARIN SODIUM 5000 UNITS: 5000 INJECTION INTRAVENOUS; SUBCUTANEOUS at 21:15

## 2023-01-01 RX ADMIN — ACETAMINOPHEN 975 MG: 325 TABLET, FILM COATED ORAL at 22:05

## 2023-01-01 RX ADMIN — GUAIFENESIN 600 MG: 600 TABLET, EXTENDED RELEASE ORAL at 20:49

## 2023-01-01 RX ADMIN — FUROSEMIDE 20 MG: 20 TABLET ORAL at 09:58

## 2023-01-01 RX ADMIN — Medication 2.5 MG: at 15:07

## 2023-01-01 RX ADMIN — ACETAMINOPHEN 975 MG: 325 TABLET, FILM COATED ORAL at 15:11

## 2023-01-01 RX ADMIN — METHOCARBAMOL 500 MG: 500 TABLET ORAL at 17:12

## 2023-01-01 RX ADMIN — GUAIFENESIN 600 MG: 600 TABLET, EXTENDED RELEASE ORAL at 20:00

## 2023-01-01 RX ADMIN — DEXAMETHASONE SODIUM PHOSPHATE 2 MG: 4 INJECTION INTRA-ARTICULAR; INTRALESIONAL; INTRAMUSCULAR; INTRAVENOUS; SOFT TISSUE at 21:08

## 2023-01-01 RX ADMIN — GUAIFENESIN 600 MG: 600 TABLET, EXTENDED RELEASE ORAL at 08:52

## 2023-01-01 RX ADMIN — ALLOPURINOL 100 MG: 100 TABLET ORAL at 09:59

## 2023-01-01 RX ADMIN — SENNOSIDES AND DOCUSATE SODIUM 1 TABLET: 8.6; 5 TABLET ORAL at 09:25

## 2023-01-01 RX ADMIN — Medication 2000 UNITS: at 09:59

## 2023-01-01 RX ADMIN — APIXABAN 2.5 MG: 2.5 TABLET, FILM COATED ORAL at 15:11

## 2023-01-01 RX ADMIN — FUROSEMIDE 40 MG: 10 INJECTION, SOLUTION INTRAMUSCULAR; INTRAVENOUS at 17:29

## 2023-01-01 RX ADMIN — TAMSULOSIN HYDROCHLORIDE 0.4 MG: 0.4 CAPSULE ORAL at 08:53

## 2023-01-01 RX ADMIN — ACETAMINOPHEN 975 MG: 325 TABLET, FILM COATED ORAL at 06:38

## 2023-01-01 RX ADMIN — POLYETHYLENE GLYCOL 3350 17 G: 17 POWDER, FOR SOLUTION ORAL at 11:51

## 2023-01-01 RX ADMIN — CEFEPIME 1000 MG: 1 INJECTION, POWDER, FOR SOLUTION INTRAMUSCULAR; INTRAVENOUS at 10:53

## 2023-01-01 RX ADMIN — FOLIC ACID 1 MG: 1 TABLET ORAL at 09:04

## 2023-01-01 RX ADMIN — FUROSEMIDE 40 MG: 10 INJECTION, SOLUTION INTRAMUSCULAR; INTRAVENOUS at 14:05

## 2023-01-01 RX ADMIN — LIDOCAINE 5% 1 PATCH: 700 PATCH TOPICAL at 20:01

## 2023-01-01 RX ADMIN — TAMSULOSIN HYDROCHLORIDE 0.4 MG: 0.4 CAPSULE ORAL at 09:59

## 2023-01-01 RX ADMIN — CHLORHEXIDINE GLUCONATE 0.12% ORAL RINSE 15 ML: 1.2 LIQUID ORAL at 08:12

## 2023-01-01 RX ADMIN — ACETAMINOPHEN 975 MG: 325 TABLET, FILM COATED ORAL at 05:22

## 2023-01-01 RX ADMIN — PHENYLEPHRINE HYDROCHLORIDE 50 MCG/MIN: 10 INJECTION INTRAVENOUS at 15:04

## 2023-01-01 RX ADMIN — ACETAMINOPHEN 975 MG: 325 TABLET, FILM COATED ORAL at 05:11

## 2023-01-01 RX ADMIN — HEPARIN SODIUM 5000 UNITS: 5000 INJECTION INTRAVENOUS; SUBCUTANEOUS at 14:18

## 2023-01-01 RX ADMIN — FOLIC ACID 1 MG: 1 TABLET ORAL at 08:06

## 2023-01-01 RX ADMIN — ALLOPURINOL 100 MG: 100 TABLET ORAL at 08:23

## 2023-01-01 RX ADMIN — FOLIC ACID 1 MG: 1 TABLET ORAL at 08:13

## 2023-01-01 RX ADMIN — GUAIFENESIN 600 MG: 600 TABLET, EXTENDED RELEASE ORAL at 09:25

## 2023-01-01 RX ADMIN — SENNOSIDES AND DOCUSATE SODIUM 1 TABLET: 8.6; 5 TABLET ORAL at 08:23

## 2023-01-01 RX ADMIN — LIDOCAINE 5% 1 PATCH: 700 PATCH TOPICAL at 20:12

## 2023-01-01 RX ADMIN — DEXAMETHASONE SODIUM PHOSPHATE 2 MG: 4 INJECTION INTRA-ARTICULAR; INTRALESIONAL; INTRAMUSCULAR; INTRAVENOUS; SOFT TISSUE at 13:48

## 2023-01-01 RX ADMIN — GUAIFENESIN 600 MG: 600 TABLET, EXTENDED RELEASE ORAL at 20:33

## 2023-01-01 RX ADMIN — SULFASALAZINE 1000 MG: 500 TABLET ORAL at 12:55

## 2023-01-01 RX ADMIN — MELATONIN TAB 3 MG 6 MG: 3 TAB at 21:38

## 2023-01-01 RX ADMIN — HEPARIN SODIUM 5000 UNITS: 5000 INJECTION INTRAVENOUS; SUBCUTANEOUS at 06:16

## 2023-01-01 RX ADMIN — TAMSULOSIN HYDROCHLORIDE 0.4 MG: 0.4 CAPSULE ORAL at 10:15

## 2023-01-01 RX ADMIN — ACETAMINOPHEN 975 MG: 325 TABLET, FILM COATED ORAL at 21:53

## 2023-01-01 RX ADMIN — SENNOSIDES AND DOCUSATE SODIUM 1 TABLET: 8.6; 5 TABLET ORAL at 08:13

## 2023-01-01 RX ADMIN — POLYETHYLENE GLYCOL 3350 17 G: 17 POWDER, FOR SOLUTION ORAL at 08:21

## 2023-01-01 RX ADMIN — HEPARIN SODIUM 5000 UNITS: 5000 INJECTION INTRAVENOUS; SUBCUTANEOUS at 05:02

## 2023-01-01 RX ADMIN — ACETAMINOPHEN 975 MG: 325 TABLET, FILM COATED ORAL at 05:02

## 2023-01-01 RX ADMIN — TAMSULOSIN HYDROCHLORIDE 0.4 MG: 0.4 CAPSULE ORAL at 08:04

## 2023-01-01 RX ADMIN — GUAIFENESIN 600 MG: 600 TABLET, EXTENDED RELEASE ORAL at 21:38

## 2023-01-01 RX ADMIN — ACETAMINOPHEN 975 MG: 325 TABLET, FILM COATED ORAL at 13:27

## 2023-01-01 RX ADMIN — CHLORHEXIDINE GLUCONATE 0.12% ORAL RINSE 15 ML: 1.2 LIQUID ORAL at 08:17

## 2023-01-01 RX ADMIN — TAMSULOSIN HYDROCHLORIDE 0.4 MG: 0.4 CAPSULE ORAL at 08:23

## 2023-01-01 RX ADMIN — B-COMPLEX W/ C & FOLIC ACID TAB 1 TABLET: TAB at 09:59

## 2023-01-01 RX ADMIN — SULFASALAZINE 1000 MG: 500 TABLET ORAL at 12:17

## 2023-01-01 RX ADMIN — HEPARIN SODIUM 5000 UNITS: 5000 INJECTION INTRAVENOUS; SUBCUTANEOUS at 05:30

## 2023-01-01 RX ADMIN — CEFEPIME 1000 MG: 1 INJECTION, POWDER, FOR SOLUTION INTRAMUSCULAR; INTRAVENOUS at 09:51

## 2023-01-01 RX ADMIN — HEPARIN SODIUM 5000 UNITS: 5000 INJECTION INTRAVENOUS; SUBCUTANEOUS at 13:25

## 2023-01-01 RX ADMIN — SULFASALAZINE 500 MG: 500 TABLET ORAL at 08:06

## 2023-01-01 RX ADMIN — HYDRALAZINE HYDROCHLORIDE 5 MG: 20 INJECTION, SOLUTION INTRAMUSCULAR; INTRAVENOUS at 03:52

## 2023-01-01 RX ADMIN — ACETAMINOPHEN 975 MG: 325 TABLET, FILM COATED ORAL at 16:09

## 2023-01-01 RX ADMIN — ACETAMINOPHEN 975 MG: 325 TABLET, FILM COATED ORAL at 22:26

## 2023-01-01 RX ADMIN — DEXAMETHASONE SODIUM PHOSPHATE 2 MG: 4 INJECTION INTRA-ARTICULAR; INTRALESIONAL; INTRAMUSCULAR; INTRAVENOUS; SOFT TISSUE at 22:04

## 2023-01-01 RX ADMIN — SENNOSIDES AND DOCUSATE SODIUM 1 TABLET: 8.6; 5 TABLET ORAL at 18:00

## 2023-01-01 RX ADMIN — TAMSULOSIN HYDROCHLORIDE 0.4 MG: 0.4 CAPSULE ORAL at 09:58

## 2023-01-01 RX ADMIN — HEPARIN SODIUM 5000 UNITS: 5000 INJECTION INTRAVENOUS; SUBCUTANEOUS at 14:06

## 2023-01-01 RX ADMIN — CEFEPIME 1000 MG: 1 INJECTION, POWDER, FOR SOLUTION INTRAMUSCULAR; INTRAVENOUS at 22:07

## 2023-01-01 RX ADMIN — Medication 2.5 MG: at 07:41

## 2023-01-01 RX ADMIN — POLYETHYLENE GLYCOL 3350 17 G: 17 POWDER, FOR SOLUTION ORAL at 08:12

## 2023-01-01 RX ADMIN — ACETAMINOPHEN 975 MG: 325 TABLET, FILM COATED ORAL at 06:42

## 2023-01-01 RX ADMIN — GUAIFENESIN 600 MG: 600 TABLET, EXTENDED RELEASE ORAL at 08:06

## 2023-01-01 RX ADMIN — MELATONIN TAB 3 MG 6 MG: 3 TAB at 21:10

## 2023-01-01 RX ADMIN — HEPARIN SODIUM 5000 UNITS: 5000 INJECTION INTRAVENOUS; SUBCUTANEOUS at 21:10

## 2023-01-01 RX ADMIN — FENTANYL CITRATE 25 MCG: 50 INJECTION, SOLUTION INTRAMUSCULAR; INTRAVENOUS at 18:21

## 2023-01-01 RX ADMIN — GABAPENTIN 100 MG: 100 CAPSULE ORAL at 08:13

## 2023-01-01 RX ADMIN — GUAIFENESIN 600 MG: 600 TABLET, EXTENDED RELEASE ORAL at 09:58

## 2023-01-01 RX ADMIN — DEXAMETHASONE SODIUM PHOSPHATE 2 MG: 4 INJECTION INTRA-ARTICULAR; INTRALESIONAL; INTRAMUSCULAR; INTRAVENOUS; SOFT TISSUE at 14:27

## 2023-01-01 RX ADMIN — HEPARIN SODIUM 11.1 UNITS/KG/HR: 10000 INJECTION, SOLUTION INTRAVENOUS at 17:58

## 2023-01-01 RX ADMIN — ACETAMINOPHEN 975 MG: 325 TABLET, FILM COATED ORAL at 21:38

## 2023-01-01 RX ADMIN — METHOCARBAMOL 250 MG: 500 TABLET ORAL at 21:11

## 2023-01-01 RX ADMIN — Medication 20 MG: at 11:52

## 2023-01-01 RX ADMIN — HEPARIN SODIUM 5000 UNITS: 5000 INJECTION INTRAVENOUS; SUBCUTANEOUS at 22:18

## 2023-01-01 RX ADMIN — SULFASALAZINE 500 MG: 500 TABLET ORAL at 17:51

## 2023-01-01 RX ADMIN — FUROSEMIDE 40 MG: 10 INJECTION, SOLUTION INTRAMUSCULAR; INTRAVENOUS at 10:22

## 2023-01-01 RX ADMIN — DEXMEDETOMIDINE HYDROCHLORIDE 0.2 MCG/KG/HR: 400 INJECTION INTRAVENOUS at 00:58

## 2023-01-01 RX ADMIN — SENNOSIDES AND DOCUSATE SODIUM 1 TABLET: 50; 8.6 TABLET ORAL at 09:59

## 2023-01-01 RX ADMIN — METHOCARBAMOL 250 MG: 500 TABLET ORAL at 15:07

## 2023-01-01 RX ADMIN — SENNOSIDES AND DOCUSATE SODIUM 1 TABLET: 8.6; 5 TABLET ORAL at 11:51

## 2023-01-01 RX ADMIN — TORSEMIDE 20 MG: 20 TABLET ORAL at 08:52

## 2023-01-01 RX ADMIN — SULFASALAZINE 500 MG: 500 TABLET ORAL at 08:14

## 2023-01-01 RX ADMIN — HEPARIN SODIUM 5000 UNITS: 5000 INJECTION INTRAVENOUS; SUBCUTANEOUS at 22:03

## 2023-01-01 RX ADMIN — FUROSEMIDE 40 MG: 10 INJECTION, SOLUTION INTRAMUSCULAR; INTRAVENOUS at 10:13

## 2023-01-01 RX ADMIN — FUROSEMIDE 40 MG: 10 INJECTION, SOLUTION INTRAMUSCULAR; INTRAVENOUS at 12:55

## 2023-01-01 RX ADMIN — MELATONIN TAB 3 MG 6 MG: 3 TAB at 21:00

## 2023-01-01 RX ADMIN — MELATONIN TAB 3 MG 6 MG: 3 TAB at 22:04

## 2023-01-01 RX ADMIN — ACETAMINOPHEN 975 MG: 325 TABLET, FILM COATED ORAL at 22:04

## 2023-01-01 RX ADMIN — HEPARIN SODIUM 5000 UNITS: 5000 INJECTION INTRAVENOUS; SUBCUTANEOUS at 13:17

## 2023-01-01 RX ADMIN — GUAIFENESIN 600 MG: 600 TABLET, EXTENDED RELEASE ORAL at 11:51

## 2023-01-01 RX ADMIN — ACETAMINOPHEN 975 MG: 325 TABLET, FILM COATED ORAL at 13:13

## 2023-01-01 RX ADMIN — GUAIFENESIN 600 MG: 600 TABLET, EXTENDED RELEASE ORAL at 21:47

## 2023-01-01 RX ADMIN — GUAIFENESIN 600 MG: 600 TABLET, EXTENDED RELEASE ORAL at 20:43

## 2023-01-01 RX ADMIN — ACETAMINOPHEN 975 MG: 325 TABLET, FILM COATED ORAL at 06:17

## 2023-01-01 RX ADMIN — CALCIUM GLUCONATE 1 G: 20 INJECTION, SOLUTION INTRAVENOUS at 15:48

## 2023-01-01 RX ADMIN — LIDOCAINE 5% 1 PATCH: 700 PATCH TOPICAL at 22:13

## 2023-01-01 RX ADMIN — HEPARIN SODIUM 5000 UNITS: 5000 INJECTION INTRAVENOUS; SUBCUTANEOUS at 05:22

## 2023-01-01 RX ADMIN — SULFASALAZINE 500 MG: 500 TABLET ORAL at 18:21

## 2023-01-01 RX ADMIN — FUROSEMIDE 20 MG: 20 TABLET ORAL at 10:00

## 2023-01-01 RX ADMIN — GUAIFENESIN 600 MG: 600 TABLET, EXTENDED RELEASE ORAL at 09:04

## 2023-01-01 RX ADMIN — MORPHINE SULFATE 2 MG: 2 INJECTION, SOLUTION INTRAMUSCULAR; INTRAVENOUS at 08:07

## 2023-01-01 RX ADMIN — SULFASALAZINE 500 MG: 500 TABLET ORAL at 08:08

## 2023-01-01 RX ADMIN — DEXAMETHASONE SODIUM PHOSPHATE 4 MG: 4 INJECTION INTRA-ARTICULAR; INTRALESIONAL; INTRAMUSCULAR; INTRAVENOUS; SOFT TISSUE at 12:55

## 2023-01-01 RX ADMIN — HEPARIN SODIUM 5000 UNITS: 5000 INJECTION INTRAVENOUS; SUBCUTANEOUS at 06:38

## 2023-01-01 RX ADMIN — HEPARIN SODIUM 5000 UNITS: 5000 INJECTION INTRAVENOUS; SUBCUTANEOUS at 06:42

## 2023-01-01 RX ADMIN — TORSEMIDE 20 MG: 20 TABLET ORAL at 09:24

## 2023-01-01 RX ADMIN — DEXMEDETOMIDINE HYDROCHLORIDE 0.3 MCG/KG/HR: 400 INJECTION INTRAVENOUS at 17:12

## 2023-01-01 RX ADMIN — CHLORHEXIDINE GLUCONATE 0.12% ORAL RINSE 15 ML: 1.2 LIQUID ORAL at 20:36

## 2023-01-01 RX ADMIN — LIDOCAINE 5% 1 PATCH: 700 PATCH TOPICAL at 20:50

## 2023-01-01 RX ADMIN — METHOCARBAMOL 250 MG: 500 TABLET ORAL at 17:18

## 2023-01-01 RX ADMIN — POLYETHYLENE GLYCOL 3350 17 G: 17 POWDER, FOR SOLUTION ORAL at 09:04

## 2023-01-01 RX ADMIN — TAMSULOSIN HYDROCHLORIDE 0.4 MG: 0.4 CAPSULE ORAL at 08:17

## 2023-01-01 RX ADMIN — FOLIC ACID 1 MG: 1 TABLET ORAL at 08:23

## 2023-01-01 RX ADMIN — MELATONIN TAB 3 MG 6 MG: 3 TAB at 21:07

## 2023-01-01 RX ADMIN — GUAIFENESIN 600 MG: 600 TABLET, EXTENDED RELEASE ORAL at 10:00

## 2023-01-01 RX ADMIN — HEPARIN SODIUM 5000 UNITS: 5000 INJECTION INTRAVENOUS; SUBCUTANEOUS at 21:08

## 2023-01-01 RX ADMIN — ACETAMINOPHEN 975 MG: 325 TABLET, FILM COATED ORAL at 22:18

## 2023-01-01 RX ADMIN — LIDOCAINE 5% 1 PATCH: 700 PATCH TOPICAL at 20:43

## 2023-01-01 RX ADMIN — GUAIFENESIN 600 MG: 600 TABLET, EXTENDED RELEASE ORAL at 10:15

## 2023-01-01 RX ADMIN — HEPARIN SODIUM 5000 UNITS: 5000 INJECTION INTRAVENOUS; SUBCUTANEOUS at 14:13

## 2023-01-01 RX ADMIN — SULFASALAZINE 500 MG: 500 TABLET ORAL at 18:03

## 2023-01-01 RX ADMIN — FOLIC ACID 1 MG: 1 TABLET ORAL at 09:58

## 2023-01-01 RX ADMIN — TAMSULOSIN HYDROCHLORIDE 0.4 MG: 0.4 CAPSULE ORAL at 09:24

## 2023-01-01 RX ADMIN — FENTANYL CITRATE 50 MCG: 50 INJECTION INTRAMUSCULAR; INTRAVENOUS at 03:13

## 2023-01-01 RX ADMIN — TAMSULOSIN HYDROCHLORIDE 0.4 MG: 0.4 CAPSULE ORAL at 08:13

## 2023-01-01 RX ADMIN — SULFASALAZINE 500 MG: 500 TABLET ORAL at 09:27

## 2023-01-01 RX ADMIN — FUROSEMIDE 40 MG: 10 INJECTION, SOLUTION INTRAMUSCULAR; INTRAVENOUS at 16:09

## 2023-01-03 ENCOUNTER — OFFICE VISIT (OUTPATIENT)
Dept: FAMILY MEDICINE CLINIC | Facility: HOSPITAL | Age: 88
End: 2023-01-03

## 2023-01-03 VITALS
DIASTOLIC BLOOD PRESSURE: 78 MMHG | SYSTOLIC BLOOD PRESSURE: 126 MMHG | HEIGHT: 65 IN | BODY MASS INDEX: 38.55 KG/M2 | TEMPERATURE: 98.1 F | HEART RATE: 90 BPM | WEIGHT: 231.4 LBS

## 2023-01-03 DIAGNOSIS — I10 PRIMARY HYPERTENSION: ICD-10-CM

## 2023-01-03 DIAGNOSIS — Z00.00 MEDICARE ANNUAL WELLNESS VISIT, SUBSEQUENT: ICD-10-CM

## 2023-01-03 DIAGNOSIS — N18.4 CHRONIC RENAL DISEASE, STAGE IV (HCC): Primary | ICD-10-CM

## 2023-01-03 DIAGNOSIS — H35.30 MACULAR DEGENERATION OF BOTH EYES, UNSPECIFIED TYPE: ICD-10-CM

## 2023-01-03 DIAGNOSIS — R05.3 CHRONIC COUGH: ICD-10-CM

## 2023-01-03 DIAGNOSIS — K51.90 ULCERATIVE COLITIS WITHOUT COMPLICATIONS, UNSPECIFIED LOCATION (HCC): ICD-10-CM

## 2023-01-03 DIAGNOSIS — E66.01 SEVERE OBESITY (BMI 35.0-39.9) WITH COMORBIDITY (HCC): ICD-10-CM

## 2023-01-03 NOTE — PATIENT INSTRUCTIONS
Medicare Preventive Visit Patient Instructions  Thank you for completing your Welcome to Medicare Visit or Medicare Annual Wellness Visit today  Your next wellness visit will be due in one year (1/4/2024)  The screening/preventive services that you may require over the next 5-10 years are detailed below  Some tests may not apply to you based off risk factors and/or age  Screening tests ordered at today's visit but not completed yet may show as past due  Also, please note that scanned in results may not display below  Preventive Screenings:  Service Recommendations Previous Testing/Comments   Colorectal Cancer Screening  · Colonoscopy    · Fecal Occult Blood Test (FOBT)/Fecal Immunochemical Test (FIT)  · Fecal DNA/Cologuard Test  · Flexible Sigmoidoscopy Age: 39-70 years old   Colonoscopy: every 10 years (May be performed more frequently if at higher risk)  OR  FOBT/FIT: every 1 year  OR  Cologuard: every 3 years  OR  Sigmoidoscopy: every 5 years  Screening may be recommended earlier than age 39 if at higher risk for colorectal cancer  Also, an individualized decision between you and your healthcare provider will decide whether screening between the ages of 74-80 would be appropriate   Colonoscopy: 02/15/2016  FOBT/FIT: Not on file  Cologuard: Not on file  Sigmoidoscopy: Not on file    Screening Not Indicated     Prostate Cancer Screening Individualized decision between patient and health care provider in men between ages of 53-78   Medicare will cover every 12 months beginning on the day after your 50th birthday PSA: No results in last 5 years     Screening Not Indicated     Hepatitis C Screening Once for adults born between 1945 and 1965  More frequently in patients at high risk for Hepatitis C Hep C Antibody: Not on file        Diabetes Screening 1-2 times per year if you're at risk for diabetes or have pre-diabetes Fasting glucose: 97 mg/dL (12/30/2022)  A1C: 4 6 % (6/29/2022)  Screening Current   Cholesterol Screening Once every 5 years if you don't have a lipid disorder  May order more often based on risk factors  Lipid panel: 06/29/2022  Screening Not Indicated  History Lipid Disorder      Other Preventive Screenings Covered by Medicare:  1  Abdominal Aortic Aneurysm (AAA) Screening: covered once if your at risk  You're considered to be at risk if you have a family history of AAA or a male between the age of 73-68 who smoking at least 100 cigarettes in your lifetime  2  Lung Cancer Screening: covers low dose CT scan once per year if you meet all of the following conditions: (1) Age 50-69; (2) No signs or symptoms of lung cancer; (3) Current smoker or have quit smoking within the last 15 years; (4) You have a tobacco smoking history of at least 20 pack years (packs per day x number of years you smoked); (5) You get a written order from a healthcare provider  3  Glaucoma Screening: covered annually if you're considered high risk: (1) You have diabetes OR (2) Family history of glaucoma OR (3)  aged 48 and older OR (3)  American aged 72 and older  3  Osteoporosis Screening: covered every 2 years if you meet one of the following conditions: (1) Have a vertebral abnormality; (2) On glucocorticoid therapy for more than 3 months; (3) Have primary hyperparathyroidism; (4) On osteoporosis medications and need to assess response to drug therapy  5  HIV Screening: covered annually if you're between the age of 12-76  Also covered annually if you are younger than 13 and older than 72 with risk factors for HIV infection  For pregnant patients, it is covered up to 3 times per pregnancy      Immunizations:  Immunization Recommendations   Influenza Vaccine Annual influenza vaccination during flu season is recommended for all persons aged >= 6 months who do not have contraindications   Pneumococcal Vaccine   * Pneumococcal conjugate vaccine = PCV13 (Prevnar 13), PCV15 (Vaxneuvance), PCV20 (Prevnar 20)  * Pneumococcal polysaccharide vaccine = PPSV23 (Pneumovax) Adults 2364 years old: 1-3 doses may be recommended based on certain risk factors  Adults 72 years old: 1-2 doses may be recommended based off what pneumonia vaccine you previously received   Hepatitis B Vaccine 3 dose series if at intermediate or high risk (ex: diabetes, end stage renal disease, liver disease)   Tetanus (Td) Vaccine - COST NOT COVERED BY MEDICARE PART B Following completion of primary series, a booster dose should be given every 10 years to maintain immunity against tetanus  Td may also be given as tetanus wound prophylaxis  Tdap Vaccine - COST NOT COVERED BY MEDICARE PART B Recommended at least once for all adults  For pregnant patients, recommended with each pregnancy  Shingles Vaccine (Shingrix) - COST NOT COVERED BY MEDICARE PART B  2 shot series recommended in those aged 48 and above     Health Maintenance Due:  There are no preventive care reminders to display for this patient  Immunizations Due:      Topic Date Due   • Hepatitis B Vaccine (1 of 3 - 3-dose series) Never done   • COVID-19 Vaccine (3 - Booster for Pfizer series) 05/25/2021     Advance Directives   What are advance directives? Advance directives are legal documents that state your wishes and plans for medical care  These plans are made ahead of time in case you lose your ability to make decisions for yourself  Advance directives can apply to any medical decision, such as the treatments you want, and if you want to donate organs  What are the types of advance directives? There are many types of advance directives, and each state has rules about how to use them  You may choose a combination of any of the following:  · Living will: This is a written record of the treatment you want  You can also choose which treatments you do not want, which to limit, and which to stop at a certain time  This includes surgery, medicine, IV fluid, and tube feedings     · Durable power of  for healthcare [facility]): This is a written record that states who you want to make healthcare choices for you when you are unable to make them for yourself  This person, called a proxy, is usually a family member or a friend  You may choose more than 1 proxy  · Do not resuscitate (DNR) order:  A DNR order is used in case your heart stops beating or you stop breathing  It is a request not to have certain forms of treatment, such as CPR  A DNR order may be included in other types of advance directives  · Medical directive: This covers the care that you want if you are in a coma, near death, or unable to make decisions for yourself  You can list the treatments you want for each condition  Treatment may include pain medicine, surgery, blood transfusions, dialysis, IV or tube feedings, and a ventilator (breathing machine)  · Values history: This document has questions about your views, beliefs, and how you feel and think about life  This information can help others choose the care that you would choose  Why are advance directives important? An advance directive helps you control your care  Although spoken wishes may be used, it is better to have your wishes written down  Spoken wishes can be misunderstood, or not followed  Treatments may be given even if you do not want them  An advance directive may make it easier for your family to make difficult choices about your care  Weight Management   Why it is important to manage your weight:  Being overweight increases your risk of health conditions such as heart disease, high blood pressure, type 2 diabetes, and certain types of cancer  It can also increase your risk for osteoarthritis, sleep apnea, and other respiratory problems  Aim for a slow, steady weight loss  Even a small amount of weight loss can lower your risk of health problems    How to lose weight safely:  A safe and healthy way to lose weight is to eat fewer calories and get regular exercise  You can lose up about 1 pound a week by decreasing the number of calories you eat by 500 calories each day  Healthy meal plan for weight management:  A healthy meal plan includes a variety of foods, contains fewer calories, and helps you stay healthy  A healthy meal plan includes the following:  · Eat whole-grain foods more often  A healthy meal plan should contain fiber  Fiber is the part of grains, fruits, and vegetables that is not broken down by your body  Whole-grain foods are healthy and provide extra fiber in your diet  Some examples of whole-grain foods are whole-wheat breads and pastas, oatmeal, brown rice, and bulgur  · Eat a variety of vegetables every day  Include dark, leafy greens such as spinach, kale, mikal greens, and mustard greens  Eat yellow and orange vegetables such as carrots, sweet potatoes, and winter squash  · Eat a variety of fruits every day  Choose fresh or canned fruit (canned in its own juice or light syrup) instead of juice  Fruit juice has very little or no fiber  · Eat low-fat dairy foods  Drink fat-free (skim) milk or 1% milk  Eat fat-free yogurt and low-fat cottage cheese  Try low-fat cheeses such as mozzarella and other reduced-fat cheeses  · Choose meat and other protein foods that are low in fat  Choose beans or other legumes such as split peas or lentils  Choose fish, skinless poultry (chicken or turkey), or lean cuts of red meat (beef or pork)  Before you cook meat or poultry, cut off any visible fat  · Use less fat and oil  Try baking foods instead of frying them  Add less fat, such as margarine, sour cream, regular salad dressing and mayonnaise to foods  Eat fewer high-fat foods  Some examples of high-fat foods include french fries, doughnuts, ice cream, and cakes  · Eat fewer sweets  Limit foods and drinks that are high in sugar  This includes candy, cookies, regular soda, and sweetened drinks    Exercise:  Exercise at least 30 minutes per day on most days of the week  Some examples of exercise include walking, biking, dancing, and swimming  You can also fit in more physical activity by taking the stairs instead of the elevator or parking farther away from stores  Ask your healthcare provider about the best exercise plan for you  © Copyright [employer] 2018 Information is for End User's use only and may not be sold, redistributed or otherwise used for commercial purposes  All illustrations and images included in CareNotes® are the copyrighted property of [employer]  or [facility] Preventive Visit Patient Instructions  Thank you for completing your Welcome to Medicare Visit or Medicare Annual Wellness Visit today  Your next wellness visit will be due in one year (1/4/2024)  The screening/preventive services that you may require over the next 5-10 years are detailed below  Some tests may not apply to you based off risk factors and/or age  Screening tests ordered at today's visit but not completed yet may show as past due  Also, please note that scanned in results may not display below  Preventive Screenings:  Service Recommendations Previous Testing/Comments   Colorectal Cancer Screening  · Colonoscopy    · Fecal Occult Blood Test (FOBT)/Fecal Immunochemical Test (FIT)  · Fecal DNA/Cologuard Test  · Flexible Sigmoidoscopy Age: 39-70 years old   Colonoscopy: every 10 years (May be performed more frequently if at higher risk)  OR  FOBT/FIT: every 1 year  OR  Cologuard: every 3 years  OR  Sigmoidoscopy: every 5 years  Screening may be recommended earlier than age 39 if at higher risk for colorectal cancer  Also, an individualized decision between you and your healthcare provider will decide whether screening between the ages of 74-80 would be appropriate   Colonoscopy: 02/15/2016  FOBT/FIT: Not on file  Cologuard: Not on file  Sigmoidoscopy: Not on file    Screening Not Indicated     Prostate Cancer Screening Individualized decision between patient and health care provider in men between ages of 53-78   Medicare will cover every 12 months beginning on the day after your 50th birthday PSA: No results in last 5 years     Screening Not Indicated     Hepatitis C Screening Once for adults born between 1945 and 1965  More frequently in patients at high risk for Hepatitis C Hep C Antibody: Not on file        Diabetes Screening 1-2 times per year if you're at risk for diabetes or have pre-diabetes Fasting glucose: 97 mg/dL (12/30/2022)  A1C: 4 6 % (6/29/2022)  Screening Current   Cholesterol Screening Once every 5 years if you don't have a lipid disorder  May order more often based on risk factors  Lipid panel: 06/29/2022  Screening Not Indicated  History Lipid Disorder      Other Preventive Screenings Covered by Medicare:  6  Abdominal Aortic Aneurysm (AAA) Screening: covered once if your at risk  You're considered to be at risk if you have a family history of AAA or a male between the age of 73-68 who smoking at least 100 cigarettes in your lifetime  7  Lung Cancer Screening: covers low dose CT scan once per year if you meet all of the following conditions: (1) Age 50-69; (2) No signs or symptoms of lung cancer; (3) Current smoker or have quit smoking within the last 15 years; (4) You have a tobacco smoking history of at least 20 pack years (packs per day x number of years you smoked); (5) You get a written order from a healthcare provider  8  Glaucoma Screening: covered annually if you're considered high risk: (1) You have diabetes OR (2) Family history of glaucoma OR (3)  aged 48 and older OR (3)  American aged 72 and older  5   Osteoporosis Screening: covered every 2 years if you meet one of the following conditions: (1) Have a vertebral abnormality; (2) On glucocorticoid therapy for more than 3 months; (3) Have primary hyperparathyroidism; (4) On osteoporosis medications and need to assess response to drug therapy  10  HIV Screening: covered annually if you're between the age of 12-76  Also covered annually if you are younger than 13 and older than 72 with risk factors for HIV infection  For pregnant patients, it is covered up to 3 times per pregnancy  Immunizations:  Immunization Recommendations   Influenza Vaccine Annual influenza vaccination during flu season is recommended for all persons aged >= 6 months who do not have contraindications   Pneumococcal Vaccine   * Pneumococcal conjugate vaccine = PCV13 (Prevnar 13), PCV15 (Vaxneuvance), PCV20 (Prevnar 20)  * Pneumococcal polysaccharide vaccine = PPSV23 (Pneumovax) Adults 25-60 years old: 1-3 doses may be recommended based on certain risk factors  Adults 72 years old: 1-2 doses may be recommended based off what pneumonia vaccine you previously received   Hepatitis B Vaccine 3 dose series if at intermediate or high risk (ex: diabetes, end stage renal disease, liver disease)   Tetanus (Td) Vaccine - COST NOT COVERED BY MEDICARE PART B Following completion of primary series, a booster dose should be given every 10 years to maintain immunity against tetanus  Td may also be given as tetanus wound prophylaxis  Tdap Vaccine - COST NOT COVERED BY MEDICARE PART B Recommended at least once for all adults  For pregnant patients, recommended with each pregnancy  Shingles Vaccine (Shingrix) - COST NOT COVERED BY MEDICARE PART B  2 shot series recommended in those aged 48 and above     Health Maintenance Due:  There are no preventive care reminders to display for this patient  Immunizations Due:      Topic Date Due   • Hepatitis B Vaccine (1 of 3 - 3-dose series) Never done   • COVID-19 Vaccine (3 - Booster for Pfizer series) 05/25/2021     Advance Directives   What are advance directives? Advance directives are legal documents that state your wishes and plans for medical care   These plans are made ahead of time in case you lose your ability to make decisions for yourself  Advance directives can apply to any medical decision, such as the treatments you want, and if you want to donate organs  What are the types of advance directives? There are many types of advance directives, and each state has rules about how to use them  You may choose a combination of any of the following:  · Living will: This is a written record of the treatment you want  You can also choose which treatments you do not want, which to limit, and which to stop at a certain time  This includes surgery, medicine, IV fluid, and tube feedings  · Durable power of  for healthcare [facility]): This is a written record that states who you want to make healthcare choices for you when you are unable to make them for yourself  This person, called a proxy, is usually a family member or a friend  You may choose more than 1 proxy  · Do not resuscitate (DNR) order:  A DNR order is used in case your heart stops beating or you stop breathing  It is a request not to have certain forms of treatment, such as CPR  A DNR order may be included in other types of advance directives  · Medical directive: This covers the care that you want if you are in a coma, near death, or unable to make decisions for yourself  You can list the treatments you want for each condition  Treatment may include pain medicine, surgery, blood transfusions, dialysis, IV or tube feedings, and a ventilator (breathing machine)  · Values history: This document has questions about your views, beliefs, and how you feel and think about life  This information can help others choose the care that you would choose  Why are advance directives important? An advance directive helps you control your care  Although spoken wishes may be used, it is better to have your wishes written down  Spoken wishes can be misunderstood, or not followed  Treatments may be given even if you do not want them   An advance directive may make it easier for your family to make difficult choices about your care  Weight Management   Why it is important to manage your weight:  Being overweight increases your risk of health conditions such as heart disease, high blood pressure, type 2 diabetes, and certain types of cancer  It can also increase your risk for osteoarthritis, sleep apnea, and other respiratory problems  Aim for a slow, steady weight loss  Even a small amount of weight loss can lower your risk of health problems  How to lose weight safely:  A safe and healthy way to lose weight is to eat fewer calories and get regular exercise  You can lose up about 1 pound a week by decreasing the number of calories you eat by 500 calories each day  Healthy meal plan for weight management:  A healthy meal plan includes a variety of foods, contains fewer calories, and helps you stay healthy  A healthy meal plan includes the following:  · Eat whole-grain foods more often  A healthy meal plan should contain fiber  Fiber is the part of grains, fruits, and vegetables that is not broken down by your body  Whole-grain foods are healthy and provide extra fiber in your diet  Some examples of whole-grain foods are whole-wheat breads and pastas, oatmeal, brown rice, and bulgur  · Eat a variety of vegetables every day  Include dark, leafy greens such as spinach, kale, mikal greens, and mustard greens  Eat yellow and orange vegetables such as carrots, sweet potatoes, and winter squash  · Eat a variety of fruits every day  Choose fresh or canned fruit (canned in its own juice or light syrup) instead of juice  Fruit juice has very little or no fiber  · Eat low-fat dairy foods  Drink fat-free (skim) milk or 1% milk  Eat fat-free yogurt and low-fat cottage cheese  Try low-fat cheeses such as mozzarella and other reduced-fat cheeses  · Choose meat and other protein foods that are low in fat  Choose beans or other legumes such as split peas or lentils   Choose fish, skinless poultry (chicken or turkey), or lean cuts of red meat (beef or pork)  Before you cook meat or poultry, cut off any visible fat  · Use less fat and oil  Try baking foods instead of frying them  Add less fat, such as margarine, sour cream, regular salad dressing and mayonnaise to foods  Eat fewer high-fat foods  Some examples of high-fat foods include french fries, doughnuts, ice cream, and cakes  · Eat fewer sweets  Limit foods and drinks that are high in sugar  This includes candy, cookies, regular soda, and sweetened drinks  Exercise:  Exercise at least 30 minutes per day on most days of the week  Some examples of exercise include walking, biking, dancing, and swimming  You can also fit in more physical activity by taking the stairs instead of the elevator or parking farther away from stores  Ask your healthcare provider about the best exercise plan for you  © Copyright [employer] 2018 Information is for End User's use only and may not be sold, redistributed or otherwise used for commercial purposes   All illustrations and images included in CareNotes® are the copyrighted property of [employer]  or [address] [place] [address] [employer]

## 2023-01-03 NOTE — PROGRESS NOTES
Assessment and Plan:     Problem List Items Addressed This Visit        Digestive    Ulcerative colitis without complications ([facility] [address] )     Noting no current GI symptoms on Sulfasalazine, urged again to f/u with GI, call with red flag GI symptoms            Cardiovascular and Mediastinum    Hypertension     BP at goal, con't current meds, recheck in 6  mos            Genitourinary    Chronic renal disease, stage IV [facility]) - Primary     Lab Results   Component Value Date    EGFR 25 12/30/2022    EGFR 26 09/23/2022    EGFR 29 06/29/2022    CREATININE 2 21 (H) 12/30/2022    CREATININE 2 18 (H) 09/23/2022    CREATININE 1 99 (H) 06/29/2022   GFR gradually decreasing, following with Nephro, importance of BP/BS control as well as avoiding NSAIDs and dehydration reviewed, on an ARB,  con't labs and f/u as per Nephro            Other    Severe obesity (BMI 35 0-39  9) with comorbidity ([facility] )     The patient is asked to make an attempt to improve diet and exercise patterns            Macular degeneration     Having Eylea injections OS, con't tx and f/u as per specialists        Other Visit Diagnoses     Chronic cough        Likely d/t postnasal drip, will check CXR d/t duration of symptoms, encouraged to start OTC Zyrtec or Claritin, if no better t/c H2 blocker with his CKD, will follow    Relevant Orders    XR chest pa & lateral    Medicare annual wellness visit, subsequent            BMI Counseling: Body mass index is 38 51 kg/m²  The BMI is above normal  Nutrition recommendations include decreasing portion sizes, encouraging healthy choices of fruits and vegetables, consuming healthier snacks, moderation in carbohydrate intake, increasing intake of lean protein, reducing intake of saturated and trans fat and reducing intake of cholesterol  Exercise recommendations include exercising 3-5 times per week  No pharmacotherapy was ordered  Rationale for BMI follow-up plan is due to patient being overweight or obese       Depression Screening and Follow-up Plan: Patient was screened for depression during today's encounter  They screened negative with a PHQ-2 score of 0  Preventive health issues were discussed with patient, and age appropriate screening tests were ordered as noted in patient's After Visit Summary  Personalized health advice and appropriate referrals for health education or preventive services given if needed, as noted in patient's After Visit Summary  History of Present Illness:     Patient presents for a Medicare Wellness Visit    HPI Pt here for follow up appt and AWV    Pt saw Nephro (Dr Andino January) in Sept for f/u CKD stage 4 - OV note reviewed  It was recommended her increase hydration and repeat BW in 3 mos  He is on furosemide 40 mg 1/2 tab q day  BP at goal today and meds were reviewed and no changes have occurred  He denies missing doses of meds or SE with the meds  He does not check his BP outside the office regularly but when he does he states it is around 120-130's/80's  He notes no frequent HA's/dizziness/double vision/CP  He con't to follow with optho for his macular degeneration  He is getting Eylea injections in his L eye regularly  He notes no eye pain/double vision/blurry vision  Pt con't to take his sulfasalazine as directed for his UC  He notes no current GI symptoms - he denies abd pain/diarrhea/blood in stool/wgt loss  Wgt actually up 5 lbs from June 2022  BMI reviewed  He feels his diet is balanced but does no formal exercise  Pt has had a cough with some phlegm for 6 mos  He was asked about SOB and he states "that's a tough question, Im not in very good shape"  He notes no F/C/hemoptysis/unintentional wgt loss/night sweats  He notes "a little" when asked about post nasal drip and congestion         Patient Care Team:  Serena Hazel DO as PCP - General  Ana Jc DO (Nephrology)  Jose Last OD (Optometry)  Kaylan Patterson DO (Colon and Rectal Surgery)     Review of Systems:     Review of Systems   Constitutional: Negative for chills, fever and unexpected weight change  HENT: Positive for congestion, hearing loss, postnasal drip and rhinorrhea  Negative for trouble swallowing  Eyes: Negative for pain and visual disturbance  Respiratory: Positive for cough and shortness of breath  Negative for wheezing  Cardiovascular: Negative for chest pain, palpitations and leg swelling  Gastrointestinal: Negative for abdominal pain, blood in stool, constipation, diarrhea, nausea and vomiting  Genitourinary: Positive for frequency  Negative for difficulty urinating and dysuria  Musculoskeletal: Positive for arthralgias  Negative for myalgias  Skin: Negative for rash and wound  Neurological: Negative for dizziness and headaches  Hematological: Does not bruise/bleed easily  Psychiatric/Behavioral: Negative for dysphoric mood and sleep disturbance  Problem List:     Patient Active Problem List   Diagnosis   • Ulcerative colitis without complications ([facility] [address] )   • Snoring   • Severe obesity (BMI 35 0-39  9) with comorbidity ([facility] )   • Nocturia   • Macular degeneration   • Impaired fasting glucose   • Hypertension   • Gout   • Benign neoplasm of large intestine   • Low HDL (under 40)   • Chronic renal disease, stage IV (HCC)   • Hypertriglyceridemia   • Proteinuria      Past Medical and Surgical History:     Past Medical History:   Diagnosis Date   • Ankle edema     last assessed [id number]   • Depression     last assessed [id number]   • Hypertension    • Kidney stone    • Nephrolithiasis    • Nocturia     last assessed [id number]   • Posterior tibial tendinitis of right lower extremity     last assessed [id number]     Past Surgical History:   Procedure Laterality Date   • ANAL FISSURECTOMY     • COLONOSCOPY  04/22/2009    every 2 years   • COLONOSCOPY  03/04/2013    2 yrs d/t h/o polyp   • COLONOSCOPY  02/15/2016    colo 2/15/16-repeat 2 yrs   • SHOULDER SURGERY Family History:     Family History   Problem Relation Age of Onset   • Clotting disorder Mother    • Nephrolithiasis Father    • Diabetes Sister    • Heart disease Daughter    • Fibromyalgia Daughter       Social History:     Social History     Socioeconomic History   • Marital status: /Civil Union     Spouse name: None   • Number of children: None   • Years of education: None   • Highest education level: None   Occupational History   • Occupation: retired   Tobacco Use   • Smoking status: Never   • Smokeless tobacco: Never   Vaping Use   • Vaping Use: Never used   Substance and Sexual Activity   • Alcohol use: Not Currently     Comment: occassionally   • Drug use: No   • Sexual activity: None   Other Topics Concern   • None   Social History Narrative   • None     Social Determinants of Health     Financial Resource Strain: Low Risk    • Difficulty of Paying Living Expenses: Not hard at all   Food Insecurity: Not on file   Transportation Needs: No Transportation Needs   • Lack of Transportation (Medical): No   • Lack of Transportation (Non-Medical): No   Physical Activity: Not on file   Stress: Not on file   Social Connections: Not on file   Intimate Partner Violence: Not on file   Housing Stability: Not on file      Medications and Allergies:     Current Outpatient Medications   Medication Sig Dispense Refill   • allopurinol (ZYLOPRIM) 100 mg tablet TAKE 1 TABLET BY MOUTH  DAILY 90 tablet 2   • cholecalciferol (VITAMIN D3) 1,000 units tablet Take 2 tablets by mouth daily      • folic acid (FOLVITE) 1 mg tablet Take 1 mg by mouth daily       • furosemide (LASIX) 40 mg tablet TAKE ONE-HALF TABLET BY  MOUTH DAILY 45 tablet 2   • losartan (COZAAR) 100 MG tablet TAKE 1 TABLET BY MOUTH  DAILY 90 tablet 1   • Multiple Vitamins-Minerals (PRESERVISION AREDS) CAPS Take 1 capsule by mouth daily     • ofloxacin (OCUFLOX) 0 3 % ophthalmic solution Install 1 drop in the L eye 4 times daily for 5 days after eye injection as directed  3   • other medication, see sig, Medication/product name: Alverto  Strength:   Sig (include dose, route, frequency): daily     • sulfaSALAzine (AZULFIDINE) 500 mg tablet TAKE 1 TABLET IN THE MORNING, 2 TABLETS AT NOON AND 1 TABLET IN THE EVENING 360 tablet 1   • tamsulosin (FLOMAX) 0 4 mg TAKE 1 CAPSULE BY MOUTH  DAILY 90 capsule 2     No current facility-administered medications for this visit  No Known Allergies   Immunizations:     Immunization History   Administered Date(s) Administered   • COVID-19 PFIZER VACCINE 0 3 ML IM 03/09/2021, 03/30/2021   • INFLUENZA 09/10/2012, 10/04/2013, 09/22/2014, 10/09/2015, 09/15/2016, 09/25/2017, 09/20/2021   • Influenza Split High Dose Preservative Free IM 09/10/2012, 10/04/2013, 09/22/2014, 10/09/2015, 09/15/2016   • Influenza, high dose seasonal 0 7 mL 10/02/2018, 09/30/2019, 09/23/2020, 11/07/2022   • Pneumococcal Conjugate 13-Valent 12/17/2015   • Pneumococcal Polysaccharide PPV23 05/14/2014      Health Maintenance: There are no preventive care reminders to display for this patient  Topic Date Due   • Hepatitis B Vaccine (1 of 3 - 3-dose series) Never done   • COVID-19 Vaccine (3 - Booster for Pfizer series) 05/25/2021      Medicare Screening Tests and Risk Assessments:     Magy Anderson is here for his Subsequent Wellness visit  Last Medicare Wellness visit information reviewed, patient interviewed and updates made to the record today  Health Risk Assessment:   Patient rates overall health as excellent  Patient feels that their physical health rating is same  Patient is satisfied with their life  Eyesight was rated as same  Hearing was rated as same  Patient feels that their emotional and mental health rating is same  Patients states they are never, rarely angry  Patient states they are sometimes unusually tired/fatigued  Pain experienced in the last 7 days has been none  Patient states that he has experienced no weight loss or gain in last 6 months  Depression Screening:   PHQ-2 Score: 0      Fall Risk Screening: In the past year, patient has experienced: no history of falling in past year      Home Safety:  Patient does not have trouble with stairs inside or outside of their home  Patient has working smoke alarms and has working carbon monoxide detector  Home safety hazards include: none  Nutrition:   Current diet is Regular  Medications:   Patient is currently taking over-the-counter supplements  OTC medications include: see medication list  Patient is able to manage medications  Activities of Daily Living (ADLs)/Instrumental Activities of Daily Living (IADLs):   Walk and transfer into and out of bed and chair?: Yes  Dress and groom yourself?: Yes    Bathe or shower yourself?: Yes    Feed yourself?  Yes  Do your laundry/housekeeping?: Yes  Manage your money, pay your bills and track your expenses?: Yes  Make your own meals?: Yes    Do your own shopping?: Yes    Previous Hospitalizations:   Any hospitalizations or ED visits within the last 12 months?: No      Advance Care Planning:   Living will: No    Durable POA for healthcare: No    Advanced directive: No    Patient declined ACP directive: Yes      Cognitive Screening:   Provider or family/friend/caregiver concerned regarding cognition?: No    PREVENTIVE SCREENINGS      Cardiovascular Screening:    General: History Lipid Disorder, Risks and Benefits Discussed and Screening Current      Diabetes Screening:     General: Screening Current and Risks and Benefits Discussed      Colorectal Cancer Screening:     General: Screening Not Indicated and Risks and Benefits Discussed      Prostate Cancer Screening:    General: Screening Not Indicated and Risks and Benefits Discussed      Osteoporosis Screening:    General: Risks and Benefits Discussed and Screening Not Indicated      Abdominal Aortic Aneurysm (AAA) Screening:        General: Risks and Benefits Discussed and Screening Not Indicated Lung Cancer Screening:     General: Screening Not Indicated and Risks and Benefits Discussed      Hepatitis C Screening:    General: Risks and Benefits Discussed and Screening Not Indicated    Screening, Brief Intervention, and Referral to Treatment (SBIRT)    Screening  Typical number of drinks in a day: 0  Typical number of drinks in a week: 0  Interpretation: Low risk drinking behavior  Single Item Drug Screening:  How often have you used an illegal drug (including marijuana) or a prescription medication for non-medical reasons in the past year? never    Single Item Drug Screen Score: 0  Interpretation: Negative screen for possible drug use disorder    Other Counseling Topics:   Car/seat belt/driving safety and regular weightbearing exercise  Vision Screening    Right eye Left eye Both eyes   Without correction      With correction 20/100 20/200 20/70        Physical Exam:     /78   Pulse 90   Temp 98 1 °F (36 7 °C) (Tympanic)   Ht 5' 5" (1 651 m)   Wt 105 kg (231 lb 6 4 oz)   BMI 38 51 kg/m²     Physical Exam  Vitals and nursing note reviewed  Constitutional:       General: He is not in acute distress  Appearance: He is well-developed  He is obese  He is not ill-appearing  HENT:      Head: Normocephalic and atraumatic  Right Ear: Tympanic membrane and external ear normal       Left Ear: Tympanic membrane and external ear normal       Ears:      Comments: [place]  Eyes:      General:         Right eye: No discharge  Left eye: No discharge  Conjunctiva/sclera: Conjunctivae normal    Neck:      Trachea: No tracheal deviation  Cardiovascular:      Rate and Rhythm: Normal rate and regular rhythm  Heart sounds: Normal heart sounds  No murmur heard  Pulmonary:      Effort: Pulmonary effort is normal  No respiratory distress  Breath sounds: Normal breath sounds  No wheezing, rhonchi or rales  Abdominal:      General: There is no distension        Palpations: Abdomen is soft  Tenderness: There is no abdominal tenderness  There is no guarding or rebound  Musculoskeletal:         General: No deformity or signs of injury  Cervical back: Neck supple  Lymphadenopathy:      Cervical: No cervical adenopathy  Skin:     General: Skin is warm and dry  Coloration: Skin is not pale  Findings: No rash  Neurological:      General: No focal deficit present  Mental Status: He is alert  Motor: No abnormal muscle tone  Gait: Gait normal    Psychiatric:         Behavior: Behavior normal          Thought Content:  Thought content normal          Judgment: Judgment normal           Yash Cornelius DO

## 2023-01-03 NOTE — ASSESSMENT & PLAN NOTE
Lab Results   Component Value Date    EGFR 25 12/30/2022    EGFR 26 09/23/2022    EGFR 29 06/29/2022    CREATININE 2 21 (H) 12/30/2022    CREATININE 2 18 (H) 09/23/2022    CREATININE 1 99 (H) 06/29/2022   GFR gradually decreasing, following with Nephro, importance of BP/BS control as well as avoiding NSAIDs and dehydration reviewed, on an ARB,  con't labs and f/u as per Nephro

## 2023-01-03 NOTE — ASSESSMENT & PLAN NOTE
Noting no current GI symptoms on Sulfasalazine, urged again to f/u with GI, call with red flag GI symptoms

## 2023-01-25 ENCOUNTER — OFFICE VISIT (OUTPATIENT)
Dept: NEPHROLOGY | Facility: HOSPITAL | Age: 88
End: 2023-01-25

## 2023-01-25 VITALS
HEIGHT: 65 IN | BODY MASS INDEX: 38.49 KG/M2 | WEIGHT: 231 LBS | DIASTOLIC BLOOD PRESSURE: 80 MMHG | SYSTOLIC BLOOD PRESSURE: 126 MMHG

## 2023-01-25 DIAGNOSIS — I10 PRIMARY HYPERTENSION: Primary | ICD-10-CM

## 2023-01-25 DIAGNOSIS — R80.9 PROTEINURIA, UNSPECIFIED TYPE: ICD-10-CM

## 2023-01-25 DIAGNOSIS — N18.4 CHRONIC RENAL DISEASE, STAGE IV (HCC): ICD-10-CM

## 2023-01-25 DIAGNOSIS — E66.01 SEVERE OBESITY (BMI 35.0-39.9) WITH COMORBIDITY (HCC): ICD-10-CM

## 2023-01-25 NOTE — PATIENT INSTRUCTIONS
We are seeing you for follow-up on your kidneys  Your kidney function remains fairly stable  Please try to increase your water intake  Stay away from Motrin products  It is safe to take Tylenol for pain  Please continue to take all of your medications as prescribed  Your blood pressure remains very well controlled  Please continue to follow a low-salt diet  Will repeat lab tests prior to your next appointment in 4 months  You may call us in the meantime or message us on [employer] if you have any additional questions

## 2023-01-25 NOTE — PROGRESS NOTES
OFFICE FOLLOW UP - Nephrology   Jun Briggs 80 y o  male MRN: [id number]       ASSESSMENT and PLAN:  Diagnoses and all orders for this visit:    Primary hypertension    Chronic renal disease, stage IV ([facility] [address] )  -     Basic metabolic panel; Future  -     CBC; Future  -     Phosphorus; Future  -     PTH, intact; Future  -     Magnesium; Future  -     Uric acid; Future  -     Vitamin D 25 hydroxy; Future    Proteinuria, unspecified type    Severe obesity (BMI 35 0-39  9) with comorbidity ([facility] )        CKD stage IV: Etiology of chronic disease 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, CRS, and age related nephron loss as well as diuretic use  Baseline creatinine 1 6-1 9  Follows with Dr Otilia Barber    -most recent labs showed creatinine of 2 2   -UA: 3+ protein   -renal US: Normal echogenicity and contour  Bilateral renal cyst   Negative for hydro  -Urine protein to creatinine ratio 1 38   -recommend avoiding nephrotoxins including ibuprofen, motrin , and advil    -encourage oral hydration    -completed kidney smart class  Unsure if he would want dialysis at this time    -check labs  prior to next appointment  Nephrolithiasis: continue to hydrate  Avoid salt in diet  Last stone 15 + years ago  Drinks cranberry juice daily  Proteinuria: currently on losartan  Recommend fish oil  Most recent urine protein to creatinine ratio 1 38  Gout: on allopurinol  Last uric acid level: 6 9  Denies any recent gout flares  Hypertension: Blood pressure currently 126/80 mmHg    -Current medications: Losartan 100 mg daily, tamsulosin 0 4 mg with dinner, Lasix 20 mg daily  -medication changes: None  -recommend low salt diet  Receives low sodium meals on wheels  -please check blood pressure daily and keep record  Ulcerative colitis: on sulfasalazine  Other: obesity  Patient will follow up in 4 months  With Dr Velasco  HPI: Jun Briggs is a 80 y o  male who is here for routine follow-up    He feels fatigued most of the time  He presents today with his wife  He denies any recent hospitalizations or changes to his overall health  He reports sleeping a lot throughout the day  He denies chest discomfort or shortness of breath  He denies any weight gain or weight loss  He denies nausea, vomiting, diarrhea, or issues with urination  He denies swelling in his legs  He is taking all of his medications as prescribed  He denies any NSAID use  ROS:   A complete review of systems was done  Pertinent positives and negatives as noted in the HPI, otherwise the review of systems is negative  Allergies: Patient has no known allergies  Medications:   Current Outpatient Medications:   •  allopurinol (ZYLOPRIM) 100 mg tablet, TAKE 1 TABLET BY MOUTH  DAILY, Disp: 90 tablet, Rfl: 2  •  cholecalciferol (VITAMIN D3) 1,000 units tablet, Take 2 tablets by mouth daily , Disp: , Rfl:   •  folic acid (FOLVITE) 1 mg tablet, Take 1 mg by mouth daily  , Disp: , Rfl:   •  furosemide (LASIX) 40 mg tablet, TAKE ONE-HALF TABLET BY  MOUTH DAILY, Disp: 45 tablet, Rfl: 2  •  losartan (COZAAR) 100 MG tablet, TAKE 1 TABLET BY MOUTH  DAILY, Disp: 90 tablet, Rfl: 1  •  Multiple Vitamins-Minerals (PRESERVISION AREDS) CAPS, Take 1 capsule by mouth daily, Disp: , Rfl:   •  ofloxacin (OCUFLOX) 0 3 % ophthalmic solution, Install 1 drop in the L eye 4 times daily for 5 days after eye injection as directed, Disp: , Rfl: 3  •  other medication, see sig,, Medication/product name: Alverto Strength:  Sig (include dose, route, frequency): daily, Disp: , Rfl:   •  sulfaSALAzine (AZULFIDINE) 500 mg tablet, TAKE 1 TABLET IN THE MORNING, 2 TABLETS AT NOON AND 1 TABLET IN THE EVENING, Disp: 360 tablet, Rfl: 1  •  tamsulosin (FLOMAX) 0 4 mg, TAKE 1 CAPSULE BY MOUTH  DAILY, Disp: 90 capsule, Rfl: 2    Past Medical History:   Diagnosis Date   • Ankle edema     last assessed [id number]   • Depression     last assessed [id number]   • Hypertension    • Kidney stone    • Nephrolithiasis    • Nocturia     last assessed [id number]   • Posterior tibial tendinitis of right lower extremity     last assessed [id number]     Past Surgical History:   Procedure Laterality Date   • ANAL FISSURECTOMY     • COLONOSCOPY  04/22/2009    every 2 years   • COLONOSCOPY  03/04/2013    2 yrs d/t h/o polyp   • COLONOSCOPY  02/15/2016    colo 2/15/16-repeat 2 yrs   • SHOULDER SURGERY       Family History   Problem Relation Age of Onset   • Clotting disorder Mother    • Nephrolithiasis Father    • Diabetes Sister    • Heart disease Daughter    • Fibromyalgia Daughter       reports that he has never smoked  He has never used smokeless tobacco  He reports that he does not currently use alcohol  He reports that he does not use drugs  Physical Exam:   Vitals:    01/25/23 1502   BP: 126/80   BP Location: Left arm   Patient Position: Sitting   Cuff Size: Large   Weight: 105 kg (231 lb)   Height: 5' 5" (1 651 m)     Body mass index is 38 44 kg/m²      General: no acute distress   Eyes: conjunctivae pink, anicteric sclerae  ENT: mucous membranes moist  Neck: supple, no JVD  Chest: clear to auscultation bilaterally with no wheezes, rale or rhochi  CVS: regular rate and rhythm   Abdomen: soft, non-tender, non-distended, obese  Extremities: Trace lower extremity edema   Skin: no rash  Neuro: awake and alert, hard of hearing      Lab Results:  Results for orders placed or performed in visit on 01/52/45   Basic metabolic panel   Result Value Ref Range    Sodium 143 135 - 147 mmol/L    Potassium 5 1 3 5 - 5 3 mmol/L    Chloride 112 (H) 96 - 108 mmol/L    CO2 23 21 - 32 mmol/L    ANION GAP 8 4 - 13 mmol/L    BUN 46 (H) 5 - 25 mg/dL    Creatinine 2 21 (H) 0 60 - 1 30 mg/dL    Glucose, Fasting 97 65 - 99 mg/dL    Calcium 9 1 8 3 - 10 1 mg/dL    eGFR 25 ml/min/1 73sq m   Protein / creatinine ratio, urine   Result Value Ref Range    Creatinine, Ur 168 0 mg/dL    Protein Urine Random 232 mg/dL Prot/Creat Ratio, Ur 1 38 (H) 0 00 - 0 10   Urinalysis with microscopic   Result Value Ref Range    Color, UA Light Yellow     Clarity, UA Clear     Specific Gravity, UA 1 017 1 003 - 1 030    pH, UA 6 0 4 5, 5 0, 5 5, 6 0, 6 5, 7 0, 7 5, 8 0    Leukocytes, UA Negative Negative    Nitrite, UA Negative Negative    Protein,  (3+) (A) Negative mg/dl    Glucose, UA Negative Negative mg/dl    Ketones, UA Negative Negative mg/dl    Urobilinogen, UA <2 0 <2 0 mg/dl mg/dl    Bilirubin, UA Negative Negative    Occult Blood, UA Negative Negative    RBC, UA 1-2 None Seen, 1-2 /hpf    WBC, UA 1-2 None Seen, 1-2 /hpf    Epithelial Cells None Seen None Seen, Occasional /hpf    Bacteria, UA None Seen None Seen, Occasional /hpf    MUCUS THREADS Occasional (A) None Seen             Invalid input(s): ALBUMIN      Portions of the record may have been created with voice recognition software  Occasional wrong word or "sound a like" substitutions may have occurred due to the inherent limitations of voice recognition software  Read the chart carefully and recognize, using context, where substitutions have occurred  If you have any questions, please contact the dictating provider

## 2023-02-22 ENCOUNTER — HOSPITAL ENCOUNTER (EMERGENCY)
Facility: HOSPITAL | Age: 88
Discharge: HOME | End: 2023-02-22
Attending: EMERGENCY MEDICINE

## 2023-02-22 ENCOUNTER — APPOINTMENT (EMERGENCY)
Dept: CT IMAGING | Facility: HOSPITAL | Age: 88
End: 2023-02-22

## 2023-02-22 ENCOUNTER — APPOINTMENT (EMERGENCY)
Dept: RADIOLOGY | Facility: HOSPITAL | Age: 88
End: 2023-02-22

## 2023-02-22 VITALS
HEART RATE: 77 BPM | RESPIRATION RATE: 18 BRPM | DIASTOLIC BLOOD PRESSURE: 66 MMHG | BODY MASS INDEX: 38.52 KG/M2 | OXYGEN SATURATION: 92 % | TEMPERATURE: 98.1 F | WEIGHT: 231.48 LBS | SYSTOLIC BLOOD PRESSURE: 145 MMHG

## 2023-02-22 DIAGNOSIS — S01.81XA FOREHEAD LACERATION: ICD-10-CM

## 2023-02-22 DIAGNOSIS — S83.91XA RIGHT KNEE SPRAIN: ICD-10-CM

## 2023-02-22 DIAGNOSIS — S09.90XA HEAD INJURY: Primary | ICD-10-CM

## 2023-02-22 DIAGNOSIS — S63.91XA HAND SPRAIN, RIGHT, INITIAL ENCOUNTER: ICD-10-CM

## 2023-02-22 LAB
ABO GROUP BLD: NORMAL
ANION GAP SERPL CALCULATED.3IONS-SCNC: 8 MMOL/L (ref 4–13)
BASOPHILS # BLD AUTO: 0.09 THOUSANDS/ÂΜL (ref 0–0.1)
BASOPHILS NFR BLD AUTO: 1 % (ref 0–1)
BLD GP AB SCN SERPL QL: NEGATIVE
BUN SERPL-MCNC: 46 MG/DL (ref 5–25)
CALCIUM SERPL-MCNC: 8 MG/DL (ref 8.4–10.2)
CHLORIDE SERPL-SCNC: 108 MMOL/L (ref 96–108)
CO2 SERPL-SCNC: 23 MMOL/L (ref 21–32)
CREAT SERPL-MCNC: 2.44 MG/DL (ref 0.6–1.3)
EOSINOPHIL # BLD AUTO: 0.14 THOUSAND/ÂΜL (ref 0–0.61)
EOSINOPHIL NFR BLD AUTO: 1 % (ref 0–6)
ERYTHROCYTE [DISTWIDTH] IN BLOOD BY AUTOMATED COUNT: 13.1 % (ref 11.6–15.1)
GFR SERPL CREATININE-BSD FRML MDRD: 22 ML/MIN/1.73SQ M
GLUCOSE SERPL-MCNC: 130 MG/DL (ref 65–140)
HCT VFR BLD AUTO: 37.3 % (ref 36.5–49.3)
HGB BLD-MCNC: 11.6 G/DL (ref 12–17)
IMM GRANULOCYTES # BLD AUTO: 0.08 THOUSAND/UL (ref 0–0.2)
IMM GRANULOCYTES NFR BLD AUTO: 1 % (ref 0–2)
LYMPHOCYTES # BLD AUTO: 1.45 THOUSANDS/ÂΜL (ref 0.6–4.47)
LYMPHOCYTES NFR BLD AUTO: 11 % (ref 14–44)
MCH RBC QN AUTO: 28.6 PG (ref 26.8–34.3)
MCHC RBC AUTO-ENTMCNC: 31.1 G/DL (ref 31.4–37.4)
MCV RBC AUTO: 92 FL (ref 82–98)
MONOCYTES # BLD AUTO: 0.94 THOUSAND/ÂΜL (ref 0.17–1.22)
MONOCYTES NFR BLD AUTO: 7 % (ref 4–12)
NEUTROPHILS # BLD AUTO: 10.18 THOUSANDS/ÂΜL (ref 1.85–7.62)
NEUTS SEG NFR BLD AUTO: 79 % (ref 43–75)
NRBC BLD AUTO-RTO: 0 /100 WBCS
PLATELET # BLD AUTO: 216 THOUSANDS/UL (ref 149–390)
PMV BLD AUTO: 10.4 FL (ref 8.9–12.7)
POTASSIUM SERPL-SCNC: 4.8 MMOL/L (ref 3.5–5.3)
RBC # BLD AUTO: 4.05 MILLION/UL (ref 3.88–5.62)
RH BLD: POSITIVE
SODIUM SERPL-SCNC: 139 MMOL/L (ref 135–147)
SPECIMEN EXPIRATION DATE: NORMAL
WBC # BLD AUTO: 12.88 THOUSAND/UL (ref 4.31–10.16)

## 2023-02-22 RX ORDER — GINSENG 100 MG
1 CAPSULE ORAL ONCE
Status: COMPLETED | OUTPATIENT
Start: 2023-02-22 | End: 2023-02-22

## 2023-02-22 RX ORDER — ACETAMINOPHEN 325 MG/1
650 TABLET ORAL ONCE
Status: COMPLETED | OUTPATIENT
Start: 2023-02-22 | End: 2023-02-22

## 2023-02-22 RX ORDER — LIDOCAINE HYDROCHLORIDE 10 MG/ML
10 INJECTION, SOLUTION EPIDURAL; INFILTRATION; INTRACAUDAL; PERINEURAL ONCE
Status: COMPLETED | OUTPATIENT
Start: 2023-02-22 | End: 2023-02-22

## 2023-02-22 RX ADMIN — LIDOCAINE HYDROCHLORIDE 10 ML: 10 INJECTION, SOLUTION EPIDURAL; INFILTRATION; INTRACAUDAL; PERINEURAL at 17:26

## 2023-02-22 RX ADMIN — ACETAMINOPHEN 650 MG: 325 TABLET, FILM COATED ORAL at 18:17

## 2023-02-22 RX ADMIN — TETANUS TOXOID, REDUCED DIPHTHERIA TOXOID AND ACELLULAR PERTUSSIS VACCINE, ADSORBED 0.5 ML: 5; 2.5; 8; 8; 2.5 SUSPENSION INTRAMUSCULAR at 17:26

## 2023-02-22 RX ADMIN — BACITRACIN 1 SMALL APPLICATION: 500 OINTMENT TOPICAL at 17:26

## 2023-02-22 NOTE — ED PROVIDER NOTES
Emergency Department Trauma Note  Chucho Chris 80 y o  male MRN: [id number]  Unit/Bed#: ED TR13/TR13B Encounter: [id number]      Trauma Alert: Trauma Acuity: Trauma Evaluation  Model of Arrival: Mode of Arrival: BLS via Trauma Squad Name and Number:   Trauma Team: Current Providers  Attending Provider: Leo Yeh DO  Registered Nurse: Hemanth Clark RN  Consultants:     None      History of Present Illness     Chief Complaint:   Chief Complaint   Patient presents with   • Fall     To ED with C/o laceration to right forehead, right hand pain and right knee pain after tripping and falling on the sidewalk 2 hours ago  Denies any LOC, neck or back pain     HPI:  Chucho Chris is a 80 y o  male who presents with forehead laceration after fall down 1 step trauma evaluation was called  Mechanism:Details of Incident: Tripped and fell approx 2 hours ago while walking on the sidewalk , Denies any LOC, neck or back pain Injury Date: 02/22/23 Injury Time: 1500 Injury Occurence Location - [address] [place] [address] Way: sidewalk    80-year-old male presents via ambulance after he missed a step and fell down has a right forehead laceration trauma evaluation called  Also complains of right hand and right knee pain no loss of consciousness no chest pain or shortness of breath  Currently awake alert oriented x3 with a [place] Coma Scale of 15 no focal neurologic deficit  Last tetanus is unknown      History provided by:  Patient and EMS personnel  Medical Problem  Location:  Forehead  Quality:  Laceration  Severity:  Moderate  Onset quality:  Sudden  Duration:  2 hours  Timing:  Constant  Progression:  Unchanged  Chronicity:  New  Context:  Trip and fall down 1 step  Worsened by: Movement increases pain in the right hand and right knee    Review of Systems   Musculoskeletal: Negative for back pain  Forehead laceration right hand and right knee pain   Skin: Positive for wound     All other systems reviewed and are negative  Historical Information     Immunizations:   Immunization History   Administered Date(s) Administered   • COVID-19 PFIZER VACCINE 0 3 ML IM 03/09/2021, 03/30/2021   • INFLUENZA 09/10/2012, 10/04/2013, 09/22/2014, 10/09/2015, 09/15/2016, 09/25/2017, 09/20/2021   • Influenza Split High Dose Preservative Free IM 09/10/2012, 10/04/2013, 09/22/2014, 10/09/2015, 09/15/2016   • Influenza, high dose seasonal 0 7 mL 10/02/2018, 09/30/2019, 09/23/2020, 11/07/2022   • Pneumococcal Conjugate 13-Valent 12/17/2015   • Pneumococcal Polysaccharide PPV23 05/14/2014   • Tdap 02/22/2023       Past Medical History:   Diagnosis Date   • Ankle edema     last assessed [id number]   • Depression     last assessed [id number]   • Hypertension    • Kidney stone    • Nephrolithiasis    • Nocturia     last assessed 31Mar2016   • Posterior tibial tendinitis of right lower extremity     last assessed [id number]       Family History   Problem Relation Age of Onset   • Clotting disorder Mother    • Nephrolithiasis Father    • Diabetes Sister    • Heart disease Daughter    • Fibromyalgia Daughter      Past Surgical History:   Procedure Laterality Date   • ANAL FISSURECTOMY     • COLONOSCOPY  04/22/2009    every 2 years   • COLONOSCOPY  03/04/2013    2 yrs d/t h/o polyp   • COLONOSCOPY  02/15/2016    colo 2/15/16-repeat 2 yrs   • SHOULDER SURGERY       Social History     Tobacco Use   • Smoking status: Never   • Smokeless tobacco: Never   Vaping Use   • Vaping Use: Never used   Substance Use Topics   • Alcohol use: Not Currently     Comment: occassionally   • Drug use: No     E-Cigarette/Vaping   • E-Cigarette Use Never User      E-Cigarette/Vaping Substances   • Nicotine No    • THC No    • CBD No    • Flavoring No    • Other No    • Unknown No        Family History: non-contributory    Meds/Allergies   Prior to Admission Medications   Prescriptions Last Dose Informant Patient Reported? Taking?    Multiple Vitamins-Minerals (PRESERVISION AREDS) CAPS   Yes No   Sig: Take 1 capsule by mouth daily   allopurinol (ZYLOPRIM) 100 mg tablet   No No   Sig: TAKE 1 TABLET BY MOUTH  DAILY   cholecalciferol (VITAMIN D3) 1,000 units tablet   Yes No   Sig: Take 2 tablets by mouth daily    folic acid (FOLVITE) 1 mg tablet   Yes No   Sig: Take 1 mg by mouth daily  furosemide (LASIX) 40 mg tablet   No No   Sig: TAKE ONE-HALF TABLET BY  MOUTH DAILY   losartan (COZAAR) 100 MG tablet   No No   Sig: TAKE 1 TABLET BY MOUTH  DAILY   ofloxacin (OCUFLOX) 0 3 % ophthalmic solution   Yes No   Sig: Install 1 drop in the L eye 4 times daily for 5 days after eye injection as directed   other medication, see sig,   Yes No   Sig: Medication/product name: Alverto  Strength:   Sig (include dose, route, frequency): daily   sulfaSALAzine (AZULFIDINE) 500 mg tablet   No No   Sig: TAKE 1 TABLET IN THE MORNING, 2 TABLETS AT NOON AND 1 TABLET IN THE EVENING   tamsulosin (FLOMAX) 0 4 mg   No No   Sig: TAKE 1 CAPSULE BY MOUTH  DAILY      Facility-Administered Medications: None       No Known Allergies    PHYSICAL EXAM    PE limited by: Nothing    Objective   Vitals:   First set: Temperature: 98 1 °F (36 7 °C) (02/22/23 1632)  Pulse: 69 (02/22/23 1630)  Respirations: 18 (02/22/23 1630)  Blood Pressure: 133/74 (02/22/23 1630)  SpO2: 96 % (02/22/23 1630)    Primary Survey:   (A) Airway: Patent  (B) Breathing: Clear bilateral  (C) Circulation: Pulses:   normal  (D) Disabliity:  GCS Total:  15  (E) Expose:  Completed    Secondary Survey: (Click on Physical Exam tab above)  Physical Exam  Vitals and nursing note reviewed  Constitutional:       General: He is not in acute distress  Appearance: He is not ill-appearing, toxic-appearing or diaphoretic     HENT:      Head:      Comments: Right forehead laceration     Right Ear: Tympanic membrane, ear canal and external ear normal       Left Ear: Tympanic membrane, ear canal and external ear normal    Eyes:      General:         Right eye: No discharge  Extraocular Movements: Extraocular movements intact  Pupils: Pupils are equal, round, and reactive to light  Comments: Pupils 3 mm bilateral   Cardiovascular:      Rate and Rhythm: Regular rhythm  Pulses: Normal pulses  Heart sounds: No murmur heard  No friction rub  No gallop  Pulmonary:      Effort: Pulmonary effort is normal  No respiratory distress  Breath sounds: No stridor  No wheezing, rhonchi or rales  Abdominal:      Palpations: Abdomen is soft  Tenderness: There is no abdominal tenderness  There is no guarding or rebound  Musculoskeletal:         General: Tenderness and signs of injury present  No deformity  Cervical back: No tenderness  Right lower leg: No edema  Left lower leg: No edema  Comments: Right hand and right knee tenderness   Skin:     Findings: No erythema or rash  Comments: 4 cm right forehead laceration   Neurological:      General: No focal deficit present  Mental Status: He is alert and oriented to person, place, and time  Cranial Nerves: No cranial nerve deficit  Sensory: No sensory deficit  Coordination: Coordination normal    Psychiatric:         Mood and Affect: Mood normal          Behavior: Behavior normal          Cervical spine cleared by clinical criteria?  No (imaging required)      Invasive Devices     Peripheral Intravenous Line  Duration           Peripheral IV 02/22/23 Right Antecubital <1 day                Lab Results:   Results Reviewed     Procedure Component Value Units Date/Time    Basic metabolic panel [[id number]]  (Abnormal) Collected: 02/22/23 1723    Lab Status: Final result Specimen: Blood from Arm, Right Updated: 02/22/23 1751     Sodium 139 mmol/L      Potassium 4 8 mmol/L      Chloride 108 mmol/L      CO2 23 mmol/L      ANION GAP 8 mmol/L      BUN 46 mg/dL      Creatinine 2 44 mg/dL      Glucose 130 mg/dL      Calcium 8 0 mg/dL      eGFR 22 ml/min/1 73sq m Narrative:      National Kidney Disease Foundation guidelines for Chronic Kidney Disease (CKD):   •  Stage 1 with normal or high GFR (GFR > 90 mL/min/1 73 square meters)  •  Stage 2 Mild CKD (GFR = 60-89 mL/min/1 73 square meters)  •  Stage 3A Moderate CKD (GFR = 45-59 mL/min/1 73 square meters)  •  Stage 3B Moderate CKD (GFR = 30-44 mL/min/1 73 square meters)  •  Stage 4 Severe CKD (GFR = 15-29 mL/min/1 73 square meters)  •  Stage 5 End Stage CKD (GFR <15 mL/min/1 73 square meters)  Note: GFR calculation is accurate only with a steady state creatinine    CBC and differential [[id number]]  (Abnormal) Collected: 02/22/23 1723    Lab Status: Final result Specimen: Blood from Arm, Right Updated: 02/22/23 1729     WBC 12 88 Thousand/uL      RBC 4 05 Million/uL      Hemoglobin 11 6 g/dL      Hematocrit 37 3 %      MCV 92 fL      MCH 28 6 pg      MCHC 31 1 g/dL      RDW 13 1 %      MPV 10 4 fL      Platelets 437 Thousands/uL      nRBC 0 /100 WBCs      Neutrophils Relative 79 %      Immat GRANS % 1 %      Lymphocytes Relative 11 %      Monocytes Relative 7 %      Eosinophils Relative 1 %      Basophils Relative 1 %      Neutrophils Absolute 10 18 Thousands/µL      Immature Grans Absolute 0 08 Thousand/uL      Lymphocytes Absolute 1 45 Thousands/µL      Monocytes Absolute 0 94 Thousand/µL      Eosinophils Absolute 0 14 Thousand/µL      Basophils Absolute 0 09 Thousands/µL                  Imaging Studies:   Direct to CT:  Yes  XR Trauma chest portable   ED Interpretation by Amparo Buck DO (02/22 1738)   No focal infiltrate or pneumothorax      XR Trauma pelvis ap only 1 or 2 vw   ED Interpretation by Amparo Buck DO (02/22 1737)   No acute fracture or dislocation      XR hand 3+ views RIGHT   ED Interpretation by Amparo Buck DO (02/22 1737)   No acute fracture or dislocation      XR knee 4+ views Right injury   ED Interpretation by Amparo Buck DO (02/22 1737)   No acute fracture or dislocation TRAUMA - CT head wo contrast   Final Result by Karey Multani MD (02/22 1717)         1  No acute intracranial hemorrhage or mass effect  2   Right frontal scalp laceration and hematoma,  No acute calvarial fracture  Workstation performed: [id number]         TRAUMA - CT spine cervical wo contrast   Final Result by Karey Multani MD (02/22 1720)      No acute cervical spine fracture or traumatic malalignment  Workstation performed: [id number]               Procedures  ECG 12 Lead Documentation Only    Date/Time: 2/22/2023 4:47 PM  Performed by: Maryam Mcneil DO  Authorized by: Maryam Mcneil DO     ECG reviewed by me, the ED Provider: yes    Patient location:  ED  Rate:     ECG rate:  66  Rhythm:     Rhythm: sinus rhythm    Conduction:     Conduction: abnormal      Abnormal conduction: 1st degree    T waves:     T waves: normal      Laceration repair    Date/Time: 2/22/2023 6:19 PM  Performed by: Maryam Mcneil DO  Authorized by: Maryam Mcneil DO   Body area: head/neck  Location details: forehead  Laceration length: 4 cm  Anesthesia: local infiltration    Anesthesia:  Local Anesthetic: lidocaine 1% without epinephrine  Anesthetic total: 6 mL      Procedure Details:  Irrigation solution: saline  Debridement: moderate  Skin closure: 5-0 nylon  Number of sutures: 9  Technique: running  Dressing: antibiotic ointment and 4x4 sterile gauze  Patient tolerance: patient tolerated the procedure well with no immediate complications               ED Course  ED Course as of 02/22/23 1823 Wed Feb 22, 2023 1738 No signs of infection at this time no fever tachycardia or tachypnea  Mild leukocytosis felt secondary to the trauma           Medical Decision Making  Fall down 1 step trauma evaluation called rule out acute intracranial injury    Amount and/or Complexity of Data Reviewed  Labs: ordered  Radiology: ordered  Risk  OTC drugs    Prescription drug management  Disposition  [facility] Transfer: No  Final diagnoses:   Head injury   Forehead laceration   Hand sprain, right, initial encounter   Right knee sprain     Time reflects when diagnosis was documented in both MDM as applicable and the Disposition within this note     Time User Action Codes Description Comment    2/22/2023  6:20 PM Marta Tejada [L91 60PP] Head injury     2/22/2023  6:20 PM Dalia Pester Add Randy Higgins Forehead laceration     2/22/2023  6:21 PM [address] Hand sprain, right, initial encounter     2/22/2023  6:22 PM Dalia Guerrero Add [Y04 [id number]] Right knee sprain       ED Disposition     ED Disposition   Discharge    Condition   Stable    Date/Time   Wed Feb 22, 2023  6:20 PM    Comment   [place] discharge to home/self care  Follow-up Information     Follow up With Specialties Details Why Contact Info Additional Dominique Rodriguez 1696, DO Internal Medicine, Family Medicine In 1 week For suture removal [facility]  [id number] [place] [address] Emergency Department Emergency Medicine  As needed, If symptoms worsen [address] [place],[address]D [address]  [address] [facility], [address] [place] [address], [address] [place], [place] [address]Doug [address]        Patient's Medications   Discharge Prescriptions    No medications on file     No discharge procedures on file      PDMP Review     None          ED Provider  Electronically Signed by         Lynn Michael DO  02/22/23 [id number]

## 2023-02-23 LAB
ATRIAL RATE: 66 BPM
P AXIS: 23 DEGREES
PR INTERVAL: 214 MS
QRS AXIS: 35 DEGREES
QRSD INTERVAL: 88 MS
QT INTERVAL: 462 MS
QTC INTERVAL: 484 MS
T WAVE AXIS: 49 DEGREES
VENTRICULAR RATE: 66 BPM

## 2023-03-02 ENCOUNTER — OFFICE VISIT (OUTPATIENT)
Dept: FAMILY MEDICINE CLINIC | Facility: HOSPITAL | Age: 88
End: 2023-03-02

## 2023-03-02 VITALS
TEMPERATURE: 98.7 F | WEIGHT: 231.6 LBS | SYSTOLIC BLOOD PRESSURE: 146 MMHG | HEART RATE: 75 BPM | BODY MASS INDEX: 38.59 KG/M2 | DIASTOLIC BLOOD PRESSURE: 64 MMHG | HEIGHT: 65 IN
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
## 2023-03-02 DIAGNOSIS — S01.01XD LACERATION OF SCALP WITHOUT FOREIGN BODY, SUBSEQUENT ENCOUNTER: ICD-10-CM

## 2023-03-02 NOTE — PROGRESS NOTES
Name: Fina Syed      : 1934      MRN: [id number]  Encounter Provider: Pipe Tran DO  Encounter Date: 3/2/2023   Encounter department: [facility][note 3] [employer]      1  Fall, subsequent encounter  Comments:  pt states fall was purely mechanical and denies preceding Cp/dizziness, denies gait issues - call if occurs and PT would be recommended - pt agreeable to do so    2  Laceration of scalp without foreign body, subsequent encounter  Comments:  sutures removed w/o significant difficulty, keep area clean and dry and call with any sign of infection           Subjective      HPI Pt here for ED follow up and suture removal    Pt was at [facility] ED on 23 - ED visit reviewed  Pt was "in a hurry and I missed a step"  He was outside the [employer] and missed a step on an incline and landed on on B/L knees/hands and fell forward to the R and his R forehead  He had head pain and a lot of bleeding  He went to the ED for eval   Vitals in ED were stable  Exam notable for a R forehead lac and R hand and R knee tenderness  Work up in ED notable for Bun/Cr 46/2 44, rest of BMP was nml  CBC with WBC 12 88 and H/H 11 6/37 2, rest of CBC was wnl  Xray of chest/pelvis/R hand and R knee all without acute fracture  CT head showed R frontal lac and hematoma  CT cervical spine showed no fractures  ECG showed 1st degree AV block  Pt had lac of forehead sutured with 9 sutures  He was discharged home and told to f/u with PCP for suture removal in 1 wk  He has been doing well since discharge from ED  He had a HA for about 4 days but that has resolved  His R hand and knee were a bit sore  He has a scab on his R knee  He had eye doc appt yesterday and no acute issues noted  He has no eye injections at that appt  He notes no dizziness/recurrent falls  Review of Systems   Constitutional: Negative for chills and fever  Eyes: Negative for pain and visual disturbance  Respiratory: Negative for cough and shortness of breath  Cardiovascular: Negative for chest pain and palpitations  Gastrointestinal: Negative for abdominal pain, diarrhea, nausea and vomiting  Genitourinary: Negative for difficulty urinating and dysuria  Skin: Positive for wound  Negative for rash  Neurological: Negative for dizziness and headaches  Hematological: Does not bruise/bleed easily  Psychiatric/Behavioral: Negative for confusion  Current Outpatient Medications on File Prior to Visit   Medication Sig   • allopurinol (ZYLOPRIM) 100 mg tablet TAKE 1 TABLET BY MOUTH  DAILY   • cholecalciferol (VITAMIN D3) 1,000 units tablet Take 2 tablets by mouth daily    • folic acid (FOLVITE) 1 mg tablet Take 1 mg by mouth daily  • furosemide (LASIX) 40 mg tablet TAKE ONE-HALF TABLET BY  MOUTH DAILY   • losartan (COZAAR) 100 MG tablet TAKE 1 TABLET BY MOUTH  DAILY   • Multiple Vitamins-Minerals (PRESERVISION AREDS) CAPS Take 1 capsule by mouth daily   • ofloxacin (OCUFLOX) 0 3 % ophthalmic solution Install 1 drop in the L eye 4 times daily for 5 days after eye injection as directed   • other medication, see sig, Medication/product name: Alverto  Strength:   Sig (include dose, route, frequency): daily   • sulfaSALAzine (AZULFIDINE) 500 mg tablet TAKE 1 TABLET IN THE MORNING, 2 TABLETS AT NOON AND 1 TABLET IN THE EVENING   • tamsulosin (FLOMAX) 0 4 mg TAKE 1 CAPSULE BY MOUTH  DAILY       Objective     /64   Pulse 75   Temp 98 7 °F (37 1 °C)   Ht 5' 5" (1 651 m)   Wt 105 kg (231 lb 9 6 oz)   BMI 38 54 kg/m²     Physical Exam  Vitals and nursing note reviewed  Constitutional:       General: He is not in acute distress  Appearance: He is well-developed  He is obese  He is not ill-appearing  HENT:      Head: Normocephalic and atraumatic  Eyes:      General:         Right eye: No discharge  Left eye: No discharge        Conjunctiva/sclera: Conjunctivae normal    Neck: Trachea: No tracheal deviation  Cardiovascular:      Rate and Rhythm: Normal rate and regular rhythm  Heart sounds: No murmur heard  Pulmonary:      Effort: Pulmonary effort is normal  No respiratory distress  Breath sounds: Normal breath sounds  No wheezing, rhonchi or rales  Musculoskeletal:         General: No deformity or signs of injury  Cervical back: Normal range of motion and neck supple  Skin:     General: Skin is warm and dry  Coloration: Skin is not pale  Findings: No rash  Comments: R forehead lac well approximated with mild scabbing, no surrounding erythema/drainage present   Neurological:      General: No focal deficit present  Mental Status: He is alert  Mental status is at baseline  Motor: No abnormal muscle tone  Gait: Gait normal    Psychiatric:         Mood and Affect: Mood normal          Behavior: Behavior normal          Thought Content: Thought content normal          Judgment: Judgment normal       Suture removal    Date/Time: 3/2/2023 12:16 PM  Performed by: Anni Ace DO  Authorized by: Anni Ace DO   Universal Protocol:  Consent: Verbal consent obtained  Written consent not obtained  Risks and benefits: risks, benefits and alternatives were discussed  Consent given by: patient  Patient understanding: patient states understanding of the procedure being performed  Patient identity confirmed: verbally with patient        Patient location:  Bedside  Location:     Laterality:  Right    Location:  [address] [facility] location:  Forehead  Procedure details: Tools used:  Suture removal kit    Wound appearance:  No sign(s) of infection, good wound healing, clean and moist    Number of sutures removed:  9  Post-procedure details:     Post-removal:  No dressing applied    Patient tolerance of procedure:   Tolerated well, no immediate complications        Anni Ace DO

## 2023-05-17 ENCOUNTER — TELEPHONE (OUTPATIENT)
Dept: NEPHROLOGY | Facility: HOSPITAL | Age: 88
End: 2023-05-17

## 2023-05-17 NOTE — TELEPHONE ENCOUNTER
Called and spoke with [employer] to complete their bloodwork prior to their appointment on 5/23 with Giovanny Barnes at the [facility]

## 2023-05-19 ENCOUNTER — APPOINTMENT (OUTPATIENT)
Dept: LAB | Facility: HOSPITAL | Age: 88
End: 2023-05-19

## 2023-05-19 DIAGNOSIS — N18.4 CHRONIC RENAL DISEASE, STAGE IV (HCC): ICD-10-CM

## 2023-05-19 LAB
ANION GAP SERPL CALCULATED.3IONS-SCNC: 1 MMOL/L (ref 4–13)
BUN SERPL-MCNC: 40 MG/DL (ref 5–25)
CALCIUM SERPL-MCNC: 8.4 MG/DL (ref 8.3–10.1)
CHLORIDE SERPL-SCNC: 111 MMOL/L (ref 96–108)
CO2 SERPL-SCNC: 25 MMOL/L (ref 21–32)
CREAT SERPL-MCNC: 2.3 MG/DL (ref 0.6–1.3)
ERYTHROCYTE [DISTWIDTH] IN BLOOD BY AUTOMATED COUNT: 12.9 % (ref 11.6–15.1)
GFR SERPL CREATININE-BSD FRML MDRD: 24 ML/MIN/1.73SQ M
GLUCOSE SERPL-MCNC: 100 MG/DL (ref 65–140)
HCT VFR BLD AUTO: 39.5 % (ref 36.5–49.3)
HGB BLD-MCNC: 12.8 G/DL (ref 12–17)
MAGNESIUM SERPL-MCNC: 2 MG/DL (ref 1.6–2.6)
MCH RBC QN AUTO: 29.5 PG (ref 26.8–34.3)
MCHC RBC AUTO-ENTMCNC: 32.4 G/DL (ref 31.4–37.4)
MCV RBC AUTO: 91 FL (ref 82–98)
PHOSPHATE SERPL-MCNC: 3 MG/DL (ref 2.3–4.1)
PLATELET # BLD AUTO: 233 THOUSANDS/UL (ref 149–390)
PMV BLD AUTO: 10.7 FL (ref 8.9–12.7)
POTASSIUM SERPL-SCNC: 4.5 MMOL/L (ref 3.5–5.3)
RBC # BLD AUTO: 4.34 MILLION/UL (ref 3.88–5.62)
SODIUM SERPL-SCNC: 137 MMOL/L (ref 135–147)
URATE SERPL-MCNC: 8 MG/DL (ref 3.5–8.5)
WBC # BLD AUTO: 7.04 THOUSAND/UL (ref 4.31–10.16)

## 2023-05-20 LAB
25(OH)D3 SERPL-MCNC: 27.4 NG/ML (ref 30–100)
PTH-INTACT SERPL-MCNC: 188.5 PG/ML (ref 12–88)

## 2023-05-23 ENCOUNTER — OFFICE VISIT (OUTPATIENT)
Dept: NEPHROLOGY | Facility: HOSPITAL | Age: 88
End: 2023-05-23

## 2023-05-23 VITALS
DIASTOLIC BLOOD PRESSURE: 85 MMHG | HEART RATE: 86 BPM | SYSTOLIC BLOOD PRESSURE: 145 MMHG | BODY MASS INDEX: 37.45 KG/M2 | HEIGHT: 65 IN | WEIGHT: 224.8 LBS | OXYGEN SATURATION: 94 %

## 2023-05-23 DIAGNOSIS — N20.0 NEPHROLITHIASIS: ICD-10-CM

## 2023-05-23 DIAGNOSIS — R80.9 PROTEINURIA, UNSPECIFIED TYPE: ICD-10-CM

## 2023-05-23 DIAGNOSIS — I10 PRIMARY HYPERTENSION: ICD-10-CM

## 2023-05-23 DIAGNOSIS — N18.4 STAGE 4 CHRONIC KIDNEY DISEASE (HCC): Primary | ICD-10-CM

## 2023-05-23 DIAGNOSIS — M10.9 GOUT, UNSPECIFIED CAUSE, UNSPECIFIED CHRONICITY, UNSPECIFIED SITE: ICD-10-CM

## 2023-05-23 NOTE — PATIENT INSTRUCTIONS
Follow up with Dr Nish Chance in 4 months  Please call the office with any questions or concerns      AVOID NSAIDS INCLUDING MOTRIN, IBUPROFEN, ADVIL, ALEVE, NAPROXEN    NO CHANGES TO ANY MEDICATIONS    REPEAT LAB WORK in 3 months and we will see you again in the office in 4-5 months    CONTINUE TO TAKE VIT D3 2000units DAILY FOR LOW VITAMIN D LEVELS

## 2023-05-23 NOTE — PROGRESS NOTES
Assessment and Plan:    Diagnoses and all orders for this visit:    Stage 4 chronic kidney disease ([facility] [address] )  -     Renal function panel; Future  -     PTH, intact; Future  -     Urinalysis with microscopic; Future  -     Albumin / creatinine urine ratio; Future    Primary hypertension    Proteinuria, unspecified type  -     Albumin / creatinine urine ratio;  Future    Gout, unspecified cause, unspecified chronicity, unspecified site    Nephrolithiasis          CKD IV  -Baseline creatinine: 1 9-2 4  -Most recent creatinine: 2 30 from 5/19  -Etiology: Age associated nephron loss, CRS, hypertensive nephrosclerosis  -Managing Nephrologist: Dr Amber Rosas  -Avoid nephrotoxins, avoid NSAIDS (including naproxen, advil, motrin, toradol)  -Check UA and urine protein creatinine ratio with next appointment  -Renal imaging from 2020 with bilateral kidneys, negative hydronephrosis, normal allergenicity and contour bilaterally  -Completed kidney smart class, unsure if he would pursue dialysis, consider ACP referral at next appointment    Proteinuria  -Prior urine protein creatinine ratio 1 3 g from 12/2022  -Repeat with next appointment, continue losartan    Blood Pressure  -Office visit BP: 154/96 then 145/85 on repeat  -Home BP monitoring:  -Current antihypertensive medications: Lasix 40 mg daily, losartan 100 mg daily, Flomax  -BP acceptable for age and CKD IV, would avoid aggressive management given age, continue current regimen for now    Nephrolithiasis  -Per patient has been stone free for 15+ years, attributed due to tea drinking which he no longer intakes  -Drinks cranberry juice daily, recommended low-salt diet continue hydration    Vitamin D deficiency/secondary hyperparathyroidism  -Recent vitamin D 25-hydroxy 27 4  -PTH elevated at 188, likely due to vitamin D deficiency and advanced renal disease  -Reports he often forgets to take his Vit D3 2000u daily, encouraged to take this daily as he has the above findings, recheck PTH with next appt  -Phosphorus is 3 0    UC  -On sulfasalazine    Gout  -On allopurinol  -Denies any recent flares    BPH  -On flomax    PATIENT INSTRUCTIONS  Follow up with Dr Cliff Canas in 4 months with repeat lab work in 3 months including blood and urine studies  Please call the office with any questions or concerns  AVOID NSAIDS INCLUDING MOTRIN, IBUPROFEN, ADVIL, ALEVE, NAPROXEN      Reason for Visit: No chief complaint on file  HPI: Yuan Lay is a 80 y o  male who is here for follow up of CKD 4 and hypertension, last seen in office in January 2023 by [facility], since that time he reports  He denies any dysuria/hematuria/nausea/vomiting/diarrhea/chest pain/lower extremity edema  He reports his appetite is stable he does not feel he has lost any weight  He reports he general is well  ROS:   Review of Systems   Constitutional: Negative  HENT: Negative  Respiratory: Negative  Cardiovascular: Negative  Gastrointestinal: Negative  Genitourinary: Negative  A complete 10 point review of systems was performed and found to be negative unless otherwise noted above or in the HPI  Allergies:   Patient has no known allergies  Medications:     Current Outpatient Medications:   •  allopurinol (ZYLOPRIM) 100 mg tablet, TAKE 1 TABLET BY MOUTH  DAILY, Disp: 90 tablet, Rfl: 2  •  cholecalciferol (VITAMIN D3) 1,000 units tablet, Take 2 tablets by mouth daily , Disp: , Rfl:   •  folic acid (FOLVITE) 1 mg tablet, Take 1 mg by mouth daily  , Disp: , Rfl:   •  furosemide (LASIX) 40 mg tablet, TAKE ONE-HALF TABLET BY  MOUTH DAILY, Disp: 45 tablet, Rfl: 2  •  losartan (COZAAR) 100 MG tablet, TAKE 1 TABLET BY MOUTH  DAILY, Disp: 90 tablet, Rfl: 1  •  Multiple Vitamins-Minerals (PRESERVISION AREDS) CAPS, Take 1 capsule by mouth daily, Disp: , Rfl:   •  ofloxacin (OCUFLOX) 0 3 % ophthalmic solution, Install 1 drop in the L eye 4 times daily for 5 days after eye injection as directed, Disp: , Rfl: "3  •  other medication, see sig,, Medication/product name: Alverto Strength:  Sig (include dose, route, frequency): daily, Disp: , Rfl:   •  sulfaSALAzine (AZULFIDINE) 500 mg tablet, TAKE 1 TABLET IN THE MORNING, 2 TABLETS AT NOON AND 1 TABLET IN THE EVENING, Disp: 360 tablet, Rfl: 1  •  tamsulosin (FLOMAX) 0 4 mg, TAKE 1 CAPSULE BY MOUTH  DAILY, Disp: 90 capsule, Rfl: 2    Past Medical History:   Diagnosis Date   • Ankle edema     last assessed [id number]   • Depression     last assessed [id number]   • Hypertension    • Kidney stone    • Nephrolithiasis    • Nocturia     last assessed [id number]   • Posterior tibial tendinitis of right lower extremity     last assessed [id number]     Past Surgical History:   Procedure Laterality Date   • ANAL FISSURECTOMY     • COLONOSCOPY  04/22/2009    every 2 years   • COLONOSCOPY  03/04/2013    2 yrs d/t h/o polyp   • COLONOSCOPY  02/15/2016    colo 2/15/16-repeat 2 yrs   • SHOULDER SURGERY       Family History   Problem Relation Age of Onset   • Clotting disorder Mother    • Nephrolithiasis Father    • Diabetes Sister    • Heart disease Daughter    • Fibromyalgia Daughter       reports that he has never smoked  He has never used smokeless tobacco  He reports that he does not currently use alcohol  He reports that he does not use drugs  Physical Exam:   Vitals:    05/23/23 1314 05/23/23 1333   BP: 154/96 145/85   BP Location: Left arm Left arm   Patient Position: Sitting    Cuff Size: Large    Pulse: 86    SpO2: 94%    Weight: 102 kg (224 lb 12 8 oz)    Height: 5' 5\" (1 651 m)      Body mass index is 37 41 kg/m²  Physical Exam  Vitals and nursing note reviewed  Constitutional:       General: He is not in acute distress  Appearance: Normal appearance  He is not toxic-appearing or diaphoretic  HENT:      Head: Normocephalic and atraumatic        Nose: Nose normal       Mouth/Throat:      Mouth: Mucous membranes are moist    Eyes:      General: No scleral " icterus  Cardiovascular:      Rate and Rhythm: Normal rate and regular rhythm  Pulses: Normal pulses  Pulmonary:      Effort: Pulmonary effort is normal  No respiratory distress  Breath sounds: Normal breath sounds  No stridor  No wheezing or rales  Abdominal:      General: Abdomen is flat  Musculoskeletal:      Cervical back: Neck supple  Right lower leg: No edema  Left lower leg: No edema  Skin:     General: Skin is warm and dry  Capillary Refill: Capillary refill takes less than 2 seconds  Neurological:      General: No focal deficit present  Mental Status: He is alert and oriented to person, place, and time  Procedure:  No results found for this or any previous visit  Lab Results   Component Value Date    GLUCOSE 99 05/28/2016    CALCIUM 8 4 05/19/2023     05/28/2016    K 4 5 05/19/2023    CO2 25 05/19/2023     (H) 05/19/2023    BUN 40 (H) 05/19/2023    CREATININE 2 30 (H) 05/19/2023       Results from last 7 days   Lab Units 05/19/23  1239   WBC Thousand/uL 7 04   HEMOGLOBIN g/dL 12 8   HEMATOCRIT % 39 5   PLATELETS Thousands/uL 233   POTASSIUM mmol/L 4 5   CHLORIDE mmol/L 111*   CO2 mmol/L 25   BUN mg/dL 40*   CREATININE mg/dL 2 30*   CALCIUM mg/dL 8 4   MAGNESIUM mg/dL 2 0   PHOSPHORUS mg/dL 3 0       I have personally reviewed the blood work as stated above and in my note  I have personally reviewed renal ultrasound imaging studies  I have personally reviewed prior nephrology note

## 2023-06-30 DIAGNOSIS — I10 ESSENTIAL HYPERTENSION: ICD-10-CM

## 2023-06-30 DIAGNOSIS — M10.9 GOUT, UNSPECIFIED CAUSE, UNSPECIFIED CHRONICITY, UNSPECIFIED SITE: ICD-10-CM

## 2023-06-30 DIAGNOSIS — N40.0 BENIGN PROSTATIC HYPERPLASIA, UNSPECIFIED WHETHER LOWER URINARY TRACT SYMPTOMS PRESENT: ICD-10-CM

## 2023-06-30 RX ORDER — LOSARTAN POTASSIUM 100 MG/1
TABLET ORAL
Qty: 90 TABLET | Refills: 1 | Status: ON HOLD | OUTPATIENT
Start: 2023-06-30

## 2023-06-30 RX ORDER — ALLOPURINOL 100 MG/1
TABLET ORAL
Qty: 90 TABLET | Refills: 2 | Status: ON HOLD | OUTPATIENT
Start: 2023-06-30

## 2023-06-30 RX ORDER — TAMSULOSIN HYDROCHLORIDE 0.4 MG/1
CAPSULE ORAL
Qty: 90 CAPSULE | Refills: 2 | Status: ON HOLD | OUTPATIENT
Start: 2023-06-30

## 2023-07-03 ENCOUNTER — APPOINTMENT (EMERGENCY)
Dept: CT IMAGING | Facility: HOSPITAL | Age: 88
End: 2023-07-03
Payer: COMMERCIAL

## 2023-07-03 ENCOUNTER — HOSPITAL ENCOUNTER (EMERGENCY)
Facility: HOSPITAL | Age: 88
End: 2023-07-03
Attending: EMERGENCY MEDICINE
Payer: COMMERCIAL

## 2023-07-03 ENCOUNTER — APPOINTMENT (EMERGENCY)
Dept: RADIOLOGY | Facility: HOSPITAL | Age: 88
End: 2023-07-03
Payer: COMMERCIAL

## 2023-07-03 VITALS
SYSTOLIC BLOOD PRESSURE: 150 MMHG | HEART RATE: 86 BPM | BODY MASS INDEX: 38.89 KG/M2 | OXYGEN SATURATION: 98 % | WEIGHT: 233.69 LBS | RESPIRATION RATE: 18 BRPM | TEMPERATURE: 97.9 F | DIASTOLIC BLOOD PRESSURE: 76 MMHG

## 2023-07-03 DIAGNOSIS — S14.129A CENTRAL CORD SYND AT UNSP LEVEL OF CERV SPINAL CORD, INIT (HCC): Primary | ICD-10-CM

## 2023-07-03 DIAGNOSIS — R29.898 UPPER EXTREMITY WEAKNESS: ICD-10-CM
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
## 2023-07-03 LAB
ABO GROUP BLD: NORMAL
ALBUMIN SERPL BCP-MCNC: 3.4 G/DL (ref 3.5–5)
ALP SERPL-CCNC: 65 U/L (ref 34–104)
ALT SERPL W P-5'-P-CCNC: 11 U/L (ref 7–52)
ANION GAP SERPL CALCULATED.3IONS-SCNC: 6 MMOL/L
AST SERPL W P-5'-P-CCNC: 15 U/L (ref 13–39)
ATRIAL RATE: 85 BPM
BASOPHILS # BLD AUTO: 0.05 THOUSANDS/ÂΜL (ref 0–0.1)
BASOPHILS NFR BLD AUTO: 1 % (ref 0–1)
BILIRUB SERPL-MCNC: 0.4 MG/DL (ref 0.2–1)
BLD GP AB SCN SERPL QL: NEGATIVE
BUN SERPL-MCNC: 33 MG/DL (ref 5–25)
CALCIUM ALBUM COR SERPL-MCNC: 8.8 MG/DL (ref 8.3–10.1)
CALCIUM SERPL-MCNC: 8.3 MG/DL (ref 8.4–10.2)
CARDIAC TROPONIN I PNL SERPL HS: 20 NG/L
CHLORIDE SERPL-SCNC: 109 MMOL/L (ref 96–108)
CK SERPL-CCNC: 89 U/L (ref 39–308)
CO2 SERPL-SCNC: 25 MMOL/L (ref 21–32)
CREAT SERPL-MCNC: 1.87 MG/DL (ref 0.6–1.3)
EOSINOPHIL # BLD AUTO: 0.35 THOUSAND/ÂΜL (ref 0–0.61)
EOSINOPHIL NFR BLD AUTO: 5 % (ref 0–6)
ERYTHROCYTE [DISTWIDTH] IN BLOOD BY AUTOMATED COUNT: 13.3 % (ref 11.6–15.1)
ETHANOL SERPL-MCNC: <10 MG/DL
GFR SERPL CREATININE-BSD FRML MDRD: 31 ML/MIN/1.73SQ M
GLUCOSE SERPL-MCNC: 104 MG/DL (ref 65–140)
HCT VFR BLD AUTO: 34.7 % (ref 36.5–49.3)
HGB BLD-MCNC: 10.7 G/DL (ref 12–17)
IMM GRANULOCYTES # BLD AUTO: 0.02 THOUSAND/UL (ref 0–0.2)
IMM GRANULOCYTES NFR BLD AUTO: 0 % (ref 0–2)
INR PPP: 0.87 (ref 0.84–1.19)
LYMPHOCYTES # BLD AUTO: 1.55 THOUSANDS/ÂΜL (ref 0.6–4.47)
LYMPHOCYTES NFR BLD AUTO: 23 % (ref 14–44)
MCH RBC QN AUTO: 28.6 PG (ref 26.8–34.3)
MCHC RBC AUTO-ENTMCNC: 30.8 G/DL (ref 31.4–37.4)
MCV RBC AUTO: 93 FL (ref 82–98)
MONOCYTES # BLD AUTO: 0.68 THOUSAND/ÂΜL (ref 0.17–1.22)
MONOCYTES NFR BLD AUTO: 10 % (ref 4–12)
NEUTROPHILS # BLD AUTO: 4.17 THOUSANDS/ÂΜL (ref 1.85–7.62)
NEUTS SEG NFR BLD AUTO: 61 % (ref 43–75)
NRBC BLD AUTO-RTO: 0 /100 WBCS
P AXIS: 63 DEGREES
PLATELET # BLD AUTO: 193 THOUSANDS/UL (ref 149–390)
PMV BLD AUTO: 9.6 FL (ref 8.9–12.7)
POTASSIUM SERPL-SCNC: 4.4 MMOL/L (ref 3.5–5.3)
PR INTERVAL: 254 MS
PROT SERPL-MCNC: 6.8 G/DL (ref 6.4–8.4)
PROTHROMBIN TIME: 12.5 SECONDS (ref 11.6–14.5)
QRS AXIS: 35 DEGREES
QRSD INTERVAL: 84 MS
QT INTERVAL: 418 MS
QTC INTERVAL: 497 MS
RBC # BLD AUTO: 3.74 MILLION/UL (ref 3.88–5.62)
RH BLD: POSITIVE
SODIUM SERPL-SCNC: 140 MMOL/L (ref 135–147)
SPECIMEN EXPIRATION DATE: NORMAL
T WAVE AXIS: 39 DEGREES
VENTRICULAR RATE: 85 BPM
WBC # BLD AUTO: 6.82 THOUSAND/UL (ref 4.31–10.16)

## 2023-07-03 PROCEDURE — 86901 BLOOD TYPING SEROLOGIC RH(D): CPT | Performed by: PHYSICIAN ASSISTANT

## 2023-07-03 PROCEDURE — 72125 CT NECK SPINE W/O DYE: CPT

## 2023-07-03 PROCEDURE — 72170 X-RAY EXAM OF PELVIS: CPT

## 2023-07-03 PROCEDURE — 70450 CT HEAD/BRAIN W/O DYE: CPT

## 2023-07-03 PROCEDURE — 71260 CT THORAX DX C+: CPT

## 2023-07-03 PROCEDURE — 82077 ASSAY SPEC XCP UR&BREATH IA: CPT | Performed by: PHYSICIAN ASSISTANT

## 2023-07-03 PROCEDURE — 36415 COLL VENOUS BLD VENIPUNCTURE: CPT | Performed by: PHYSICIAN ASSISTANT

## 2023-07-03 PROCEDURE — 82550 ASSAY OF CK (CPK): CPT | Performed by: PHYSICIAN ASSISTANT

## 2023-07-03 PROCEDURE — 99285 EMERGENCY DEPT VISIT HI MDM: CPT

## 2023-07-03 PROCEDURE — 85025 COMPLETE CBC W/AUTO DIFF WBC: CPT | Performed by: PHYSICIAN ASSISTANT

## 2023-07-03 PROCEDURE — 85610 PROTHROMBIN TIME: CPT | Performed by: PHYSICIAN ASSISTANT

## 2023-07-03 PROCEDURE — 84484 ASSAY OF TROPONIN QUANT: CPT | Performed by: PHYSICIAN ASSISTANT

## 2023-07-03 PROCEDURE — 93005 ELECTROCARDIOGRAM TRACING: CPT

## 2023-07-03 PROCEDURE — 74177 CT ABD & PELVIS W/CONTRAST: CPT

## 2023-07-03 PROCEDURE — 93010 ELECTROCARDIOGRAM REPORT: CPT | Performed by: INTERNAL MEDICINE

## 2023-07-03 PROCEDURE — 86900 BLOOD TYPING SEROLOGIC ABO: CPT | Performed by: PHYSICIAN ASSISTANT

## 2023-07-03 PROCEDURE — 71045 X-RAY EXAM CHEST 1 VIEW: CPT

## 2023-07-03 PROCEDURE — 86850 RBC ANTIBODY SCREEN: CPT | Performed by: PHYSICIAN ASSISTANT

## 2023-07-03 PROCEDURE — 80053 COMPREHEN METABOLIC PANEL: CPT | Performed by: PHYSICIAN ASSISTANT

## 2023-07-03 RX ADMIN — IOHEXOL 80 ML: 350 INJECTION, SOLUTION INTRAVENOUS at 13:42

## 2023-07-03 NOTE — ASSESSMENT & PLAN NOTE
- Pt reports fall, posterior head strike, no loss of consciousness, denies use of antiplatelet or anticoagulation medications  - Status post fall with the below noted injuries. - Fall precautions. - Geriatric Medicine consultation for evaluation, medication review and recommendations.  - PT and OT evaluation and treatment as indicated. - Case Management consultation for disposition planning.

## 2023-07-03 NOTE — ED PROVIDER NOTES
Emergency Department Trauma Note  Jostin Bingham 80 y.o. male MRN: [id number]  Unit/Bed#: ED 07/ED 07 Encounter: [id number]      Trauma Alert: Trauma Acuity: Trauma Evaluation  Model of Arrival: Mode of Arrival: ALS via    Trauma Team: Current Providers  Attending Provider: Jill Elizabeth DO  Registered Nurse: Alvaro Whalen, RN  Physician Assistant: Samia Duron PA-C  Registered Nurse: Yrn Yepez RN  Consultants:     None      History of Present Illness     Chief Complaint:   Chief Complaint   Patient presents with   • Trauma     HPI:  Jostin Bingham is a 80 y.o. male who presents with fall at home just prior to arrival.  Mechanism:Details of Incident: unknown, was found at bottom of two steps, after wife heard thump, unknown downtime Injury Date: 07/03/23    Patient was walking up to stairs and fell backwards landing on his back and hitting the backside of his head. Patient having bilateral upper extremity weakness since that time as well as lower back pain. Limited range of motion of bilateral upper extremities with decreased  strength wife of patient states that the patient. Was lying on his back for some time and their dog came and notified her that something was wrong. She subsequently went to attend to her  and found him laying flat on his back with limited ability to move his arms. HPI  Review of Systems   Constitutional: Negative for chills and fever. HENT: Negative for ear pain and sore throat. Eyes: Negative for pain and visual disturbance. Respiratory: Negative for cough and shortness of breath. Cardiovascular: Negative for chest pain and palpitations. Gastrointestinal: Negative for abdominal pain and vomiting. Genitourinary: Negative for dysuria and hematuria. Musculoskeletal: Negative for arthralgias and back pain. Skin: Negative for color change and rash. Neurological: Positive for weakness (Bilateral upper extremity weakness).  Negative for seizures and syncope. All other systems reviewed and are negative. Historical Information     Immunizations:   Immunization History   Administered Date(s) Administered   • COVID-19 PFIZER VACCINE 0.3 ML IM 03/09/2021, 03/30/2021   • INFLUENZA 09/10/2012, 10/04/2013, 09/22/2014, 10/09/2015, 09/15/2016, 09/25/2017, 09/20/2021   • Influenza Split High Dose Preservative Free IM 09/10/2012, 10/04/2013, 09/22/2014, 10/09/2015, 09/15/2016   • Influenza, high dose seasonal 0.7 mL 10/02/2018, 09/30/2019, 09/23/2020, 11/07/2022   • Pneumococcal Conjugate 13-Valent 12/17/2015   • Pneumococcal Polysaccharide PPV23 05/14/2014   • Tdap 02/22/2023       Past Medical History:   Diagnosis Date   • Ankle edema     last assessed [id number]   • Depression     last assessed [id number]   • Hypertension    • Kidney stone    • Nephrolithiasis    • Nocturia     last assessed 31Mar2016   • Posterior tibial tendinitis of right lower extremity     last assessed [id number]       Family History   Problem Relation Age of Onset   • Clotting disorder Mother    • Nephrolithiasis Father    • Diabetes Sister    • Heart disease Daughter    • Fibromyalgia Daughter      Past Surgical History:   Procedure Laterality Date   • ANAL FISSURECTOMY     • COLONOSCOPY  04/22/2009    every 2 years   • COLONOSCOPY  03/04/2013    2 yrs d/t h/o polyp   • COLONOSCOPY  02/15/2016    colo 2/15/16-repeat 2 yrs   • SHOULDER SURGERY       Social History     Tobacco Use   • Smoking status: Never   • Smokeless tobacco: Never   Vaping Use   • Vaping Use: Never used   Substance Use Topics   • Alcohol use: Not Currently     Comment: occassionally   • Drug use: No     E-Cigarette/Vaping   • E-Cigarette Use Never User      E-Cigarette/Vaping Substances   • Nicotine No    • THC No    • CBD No    • Flavoring No    • Other No    • Unknown No        Family History: non-contributory    Meds/Allergies   Prior to Admission Medications   Prescriptions Last Dose Informant Patient Reported? Taking? Multiple Vitamins-Minerals (PRESERVISION AREDS) CAPS   Yes No   Sig: Take 1 capsule by mouth daily   allopurinol (ZYLOPRIM) 100 mg tablet   No No   Sig: TAKE 1 TABLET BY MOUTH  DAILY   cholecalciferol (VITAMIN D3) 1,000 units tablet   Yes No   Sig: Take 2 tablets by mouth daily    folic acid (FOLVITE) 1 mg tablet   Yes No   Sig: Take 1 mg by mouth daily. furosemide (LASIX) 40 mg tablet   No No   Sig: TAKE ONE-HALF TABLET BY  MOUTH DAILY   losartan (COZAAR) 100 MG tablet   No No   Sig: TAKE 1 TABLET BY MOUTH DAILY   ofloxacin (OCUFLOX) 0.3 % ophthalmic solution   Yes No   Sig: Install 1 drop in the L eye 4 times daily for 5 days after eye injection as directed   other medication, see sig,   Yes No   Sig: Medication/product name: Vishillary  Strength:   Sig (include dose, route, frequency): daily   sulfaSALAzine (AZULFIDINE) 500 mg tablet   No No   Sig: TAKE 1 TABLET IN THE MORNING, 2 TABLETS AT NOON AND 1 TABLET IN THE EVENING   tamsulosin (FLOMAX) 0.4 mg   No No   Sig: TAKE 1 CAPSULE BY MOUTH  DAILY      Facility-Administered Medications: None       No Known Allergies    PHYSICAL EXAM      Objective   Vitals:   First set: Temperature: 97.9 °F (36.6 °C) (07/03/23 1300)  Pulse: 84 (07/03/23 1300)  Respirations: 18 (07/03/23 1300)  Blood Pressure: 144/83 (07/03/23 1300)  SpO2: (!) 84 % (07/03/23 1300)    Primary Survey:   (A) Airway:  Intact phonation with no stridor  (B) Breathing:  Nonlabored, symmetric, equal and full air entry, adequate oxygen saturation  (C) Circulation: Pulses:   Carotid, radial, femoral, pedal : all 4/4  (D) Disabliity:  GCS Total:  15 alert and orient x 3 , upper extremities with decreased strength versus resistance on forward flexion as well as no significant  strength present  (E) Expose:  Completed. Secondary Survey: (Click on Physical Exam tab above)  Physical Exam  Vitals and nursing note reviewed. Constitutional:       General: He is not in acute distress.      Appearance: He is well-developed. Comments: Patient significantly hard of hearing at baseline   HENT:      Head: Normocephalic and atraumatic. Eyes:      General: No visual field deficit. Conjunctiva/sclera: Conjunctivae normal.   Cardiovascular:      Rate and Rhythm: Normal rate and regular rhythm. Heart sounds: No murmur heard. Pulmonary:      Effort: Pulmonary effort is normal. No respiratory distress. Breath sounds: Normal breath sounds. Abdominal:      Palpations: Abdomen is soft. Tenderness: There is no abdominal tenderness. Musculoskeletal:         General: No swelling. Cervical back: Neck supple. Skin:     General: Skin is warm and dry. Capillary Refill: Capillary refill takes less than 2 seconds. Neurological:      Mental Status: He is alert and oriented to person, place, and time. GCS: GCS eye subscore is 4. GCS verbal subscore is 5. GCS motor subscore is 6. Cranial Nerves: No cranial nerve deficit, dysarthria or facial asymmetry. Sensory: Sensation is intact. No sensory deficit. Motor: Weakness (0/5  strength in B/L hands / 2/5 UE strength on BUE flexion) present. No tremor, atrophy, abnormal muscle tone, seizure activity or pronator drift. Coordination: Romberg sign negative. Coordination normal. Finger-Nose-Finger Test and Heel to [facility] Test normal.      Deep Tendon Reflexes:      Reflex Scores:       Bicep reflexes are 2+ on the right side and 2+ on the left side. Patellar reflexes are 2+ on the right side and 2+ on the left side. Psychiatric:         Mood and Affect: Mood normal.         Cervical spine cleared by clinical criteria?  No (imaging required)      Invasive Devices     Peripheral Intravenous Line  Duration           Peripheral IV 07/03/23 Left;Ventral (anterior) Hand <1 day    Peripheral IV 07/03/23 Right;Ventral (anterior) Forearm <1 day                Lab Results:   Results Reviewed     Procedure Component Value Units Date/Time    HS Troponin I 4hr [[id number]]     Lab Status: No result Specimen: Blood     HS Troponin 0hr (reflex protocol) [[id number]]  (Normal) Collected: 07/03/23 1308    Lab Status: Final result Specimen: Blood from Arm, Right Updated: 07/03/23 1342     hs TnI 0hr 20 ng/L     HS Troponin I 2hr [[id number]]     Lab Status: No result Specimen: Blood     Comprehensive metabolic panel [[id number]]  (Abnormal) Collected: 07/03/23 1308    Lab Status: Final result Specimen: Blood from Arm, Right Updated: 07/03/23 1336     Sodium 140 mmol/L      Potassium 4.4 mmol/L      Chloride 109 mmol/L      CO2 25 mmol/L      ANION GAP 6 mmol/L      BUN 33 mg/dL      Creatinine 1.87 mg/dL      Glucose 104 mg/dL      Calcium 8.3 mg/dL      Corrected Calcium 8.8 mg/dL      AST 15 U/L      ALT 11 U/L      Alkaline Phosphatase 65 U/L      Total Protein 6.8 g/dL      Albumin 3.4 g/dL      Total Bilirubin 0.40 mg/dL      eGFR 31 ml/min/1.73sq m     Narrative:      [place] guidelines for Chronic Kidney Disease (CKD):   •  Stage 1 with normal or high GFR (GFR > 90 mL/min/1.73 square meters)  •  Stage 2 Mild CKD (GFR = 60-89 mL/min/1.73 square meters)  •  Stage 3A Moderate CKD (GFR = 45-59 mL/min/1.73 square meters)  •  Stage 3B Moderate CKD (GFR = 30-44 mL/min/1.73 square meters)  •  Stage 4 Severe CKD (GFR = 15-29 mL/min/1.73 square meters)  •  Stage 5 End Stage CKD (GFR <15 mL/min/1.73 square meters)  Note: GFR calculation is accurate only with a steady state creatinine    CK [[id number]]  (Normal) Collected: 07/03/23 1308    Lab Status: Final result Specimen: Blood from Arm, Right Updated: 07/03/23 1336     Total CK 89 U/L     Ethanol [[id number]]  (Normal) Collected: 07/03/23 1308    Lab Status: Final result Specimen: Blood from Arm, Right Updated: 07/03/23 1336     Ethanol Lvl <10 mg/dL     Protime-INR [[id number]]  (Normal) Collected: 07/03/23 1308    Lab Status: Final result Specimen: Blood from Arm, Right Updated: 07/03/23 1330     Protime 12.5 seconds      INR 0.87    CBC and differential [[id number]]  (Abnormal) Collected: 07/03/23 1308    Lab Status: Final result Specimen: Blood from Arm, Right Updated: 07/03/23 1318     WBC 6.82 Thousand/uL      RBC 3.74 Million/uL      Hemoglobin 10.7 g/dL      Hematocrit 34.7 %      MCV 93 fL      MCH 28.6 pg      MCHC 30.8 g/dL      RDW 13.3 %      MPV 9.6 fL      Platelets 573 Thousands/uL      nRBC 0 /100 WBCs      Neutrophils Relative 61 %      Immat GRANS % 0 %      Lymphocytes Relative 23 %      Monocytes Relative 10 %      Eosinophils Relative 5 %      Basophils Relative 1 %      Neutrophils Absolute 4.17 Thousands/µL      Immature Grans Absolute 0.02 Thousand/uL      Lymphocytes Absolute 1.55 Thousands/µL      Monocytes Absolute 0.68 Thousand/µL      Eosinophils Absolute 0.35 Thousand/µL      Basophils Absolute 0.05 Thousands/µL                  Imaging Studies:   Direct to CT: No  TRAUMA - CT head wo contrast   Final Result by Cris Rai MD (07/03 1352)      No acute intracranial abnormality. Workstation performed: [id number]         TRAUMA - CT spine cervical wo contrast   Final Result by Cris Rai MD (07/03 1416)      No cervical spine fracture or traumatic malalignment. Workstation performed: [id number]         TRAUMA - CT chest abdomen pelvis w contrast   Final Result by Cris Rai MD (07/03 1358)      No acute traumatic visceral injury in the chest, abdomen or pelvis. No acute fracture. Workstation performed: [id number]         CT recon only thoracolumbar (No Charge)   Final Result by Cris Rai MD (07/03 1406)      No fracture or traumatic subluxation. Workstation performed: [id number]         XR chest 1 view portable   Final Result by Erin Shone, MD (07/03 1349)      Unchanged cardiomegaly. Cannot exclude mild pulmonary vascular congestion. Workstation performed: [id number]         XR pelvis ap only 1 or 2 vw   Final Result by Ancelmo Mcnally MD (07/03 1351)      No acute osseous abnormality. Workstation performed: [id number]               Procedures  ECG 12 Lead Documentation Only    Date/Time: 7/3/2023 1:02 PM    Performed by: Jeanne Lim PA-C  Authorized by: Jeanne Lim PA-C    ECG reviewed by me, the ED Provider: yes    Patient location:  ED  Previous ECG:     Previous ECG:  Compared to current    Comparison ECG info:  Compared with prior 22 February 2023 with no significant change    Comparison to cardiac monitor: Yes    Interpretation:     Interpretation: normal    Rate:     ECG rate:  85    ECG rate assessment: normal    Rhythm:     Rhythm: sinus rhythm    Ectopy:     Ectopy: none    QRS:     QRS axis:  Normal  Conduction:     Conduction: abnormal      Abnormal conduction: 1st degree    ST segments:     ST segments:  Normal  T waves:     T waves: normal    Comments:      Self interpreted by me.    CriticalCare Time    Date/Time: 7/3/2023 1:01 PM    Performed by: Jeanne Lim PA-C  Authorized by: Jeanne Lim PA-C    Critical care provider statement:     Critical care time (minutes):  61    Critical care start time:  7/3/2023 1:01 PM    Critical care end time:  7/3/2023 3:51 PM    Critical care was necessary to treat or prevent imminent or life-threatening deterioration of the following conditions:  Trauma    Critical care was time spent personally by me on the following activities:  Blood draw for specimens, obtaining history from patient or surrogate, ordering and performing treatments and interventions, development of treatment plan with patient or surrogate, ordering and review of laboratory studies, discussions with consultants, re-evaluation of patient's condition and examination of patient  Comments:      Transfer to higher level of care to trauma service per discussion w/ trauma surgery. ED Course  ED Course as of 07/03/23 1542   Mon Jul 03, 2023   1358 T-spine and head thus far preliminary in normal limits   1407 CT chest abdomen pelvis without any acute abnormality with thoracic spine osteophytes   1412 Pending trauma transfer / pending CT scans of C spine and T spine   1419 Case discussed w/ Dr. Angelica Mederos for transfer for probable central cord syndrome. Plan for transfer priority 2  to B. Bladder scan 388. Plan to attempt to void. Orders placed for transfer. Medical Decision Making  Plan for transfer based on patient duration with upper extremity weakness out of proportion to CT examination finding with concern for central cord syndrome was. Case discussed with trauma attending Dr. Angelica Mederos with agreement for transfer for admission to trauma service. Case discussed with ED attending Dr. Vishal Hazel as well as Trauma attending. Discussed via PACs for transfer to trauma @ B. Remained in C collar throughout ED course and through transfer. 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 Radiology: ordered. Risk  Prescription drug management.                   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, init ([address])   Fall (on) (from) other stairs and steps, initial encounter   Upper extremity weakness     Time reflects when diagnosis was documented in both MDM as applicable and the Disposition within this note     Time User Action Codes Description Comment    7/3/2023  3:16 PM Buirving Mcbride Add [address] cord synd at unsp level of cerv spinal cord, init ([address])     7/3/2023  3:16 PM Torres, [address] Fall (on) (from) other stairs and steps, initial encounter     7/3/2023  3:17 PM Mateo Terrell Add [R29.898] Upper extremity weakness       ED Disposition     ED Disposition   Transfer to Another Facility-In Network    Condition   --    Date/Time   Mon Jul 3, 2023  2:12 PM    Comment   Luciano Rodriguez should be transferred out to [facility].            MD Aidee Angel Most Recent Value   Patient Condition The patient has been stabilized such that within reasonable medical probability, no material deterioration of the patient condition or the condition of the unborn child(juan josé) is likely to result from the transfer, An emergency transfer is being made prior to stabilization due to the need for definitive care and the benefit of transfer outweighs the risk   Reason for Transfer Level of Care needed not available at this facility   Benefits of Transfer Specialized equipment and/or services available at the receiving facility (Include comment)________________________   Risks of Transfer Potential for delay in receiving treatment, Loss of IV, Potential deterioration of medical condition   Accepting Physician Dr. Gilda Red, Manju Major    (Name & Tel number) [facility]   Sending MD Magda Fuentes   Provider Certification General risk, such as traffic hazards, adverse weather conditions, rough terrain or turbulence, possible failure of equipment (including vehicle or aircraft), or consequences of actions of persons outside the control of the transport personnel, Unanticipated needs of medical equipment and personnel during transport, The possibility of a transport vehicle being unavailable      RN Documentation    [address] Manju Red    (Name & Tel number) [facility]      Follow-up Information    None       Discharge Medication List as of 7/3/2023  3:29 PM      CONTINUE these medications which have NOT CHANGED    Details   allopurinol (ZYLOPRIM) 100 mg tablet TAKE 1 TABLET BY MOUTH  DAILY, Normal      cholecalciferol (VITAMIN D3) 1,000 units tablet Take 2 tablets by mouth daily , Starting Mon 8/20/2012, Historical Med      folic acid (FOLVITE) 1 mg tablet Take 1 mg by mouth daily. , Until Discontinued, Historical Med      furosemide (LASIX) 40 mg tablet TAKE ONE-HALF TABLET BY  MOUTH DAILY, Normal      losartan (COZAAR) 100 MG tablet TAKE 1 TABLET BY MOUTH DAILY, Normal      Multiple Vitamins-Minerals (PRESERVISION AREDS) CAPS Take 1 capsule by mouth daily, Starting Thu 9/15/2016, Historical Med      ofloxacin (OCUFLOX) 0.3 % ophthalmic solution Install 1 drop in the L eye 4 times daily for 5 days after eye injection as directed, Starting Thu 6/27/2019, Historical Med      other medication, see sig, Medication/product name: Alverto  Strength:   Sig (include dose, route, frequency): daily, Historical Med      sulfaSALAzine (AZULFIDINE) 500 mg tablet TAKE 1 TABLET IN THE MORNING, 2 TABLETS AT NOON AND 1 TABLET IN THE EVENING, Normal      tamsulosin (FLOMAX) 0.4 mg TAKE 1 CAPSULE BY MOUTH  DAILY, Normal           No discharge procedures on file.     PDMP Review     None          ED Provider  Electronically Signed by         Lorena Villarreal PA-C  07/03/23 2331

## 2023-07-03 NOTE — ED NOTES
Going to [facility]  [phone]   Dr. Gwendolyn Villar [address] Rodriguez RN  07/03/23 1427

## 2023-07-03 NOTE — EMTALA/ACUTE CARE TRANSFER
[facility] EMERGENCY DEPARTMENT  [address] [place] [address]  Dept: [phone]      EMTALA TRANSFER CONSENT    NAME Chacha Block                                         1934                              MRN [id number]    I have been informed of my rights regarding examination, treatment, and transfer   by Dr. Kranthi Flores DO    Benefits:      Risks:        Consent for Transfer:  I acknowledge that my medical condition has been evaluated and explained to me by the emergency department physician or other qualified medical person and/or my attending physician, who has recommended that I be transferred to the service of  Accepting Physician: Dr. Tammi White at [address] [facility] [address] Name, [facility] : [address] [place] [address] [place]. The above potential benefits of such transfer, the potential risks associated with such transfer, and the probable risks of not being transferred have been explained to me, and I fully understand them. The doctor has explained that, in my case, the benefits of transfer outweigh the risks. I agree to be transferred. I authorize the performance of emergency medical procedures and treatments upon me in both transit and upon arrival at the receiving facility. Additionally, I authorize the release of any and all medical records to the receiving facility and request they be transported with me, if possible. I understand that the safest mode of transportation during a medical emergency is an ambulance and that the Hospital advocates the use of this mode of transport. Risks of traveling to the receiving facility by car, including absence of medical control, life sustaining equipment, such as oxygen, and medical personnel has been explained to me and I fully understand them. (CHAR CORRECT BOX BELOW)  [  ]  I consent to the stated transfer and to be transported by ambulance/helicopter.   [  ]  I consent to the stated transfer, but refuse transportation by ambulance and accept full responsibility for my transportation by car. I understand the risks of non-ambulance transfers and I exonerate the Hospital and its staff from any deterioration in my condition that results from this refusal.    X___________________________________________    DATE  23  TIME________  Signature of patient or legally responsible individual signing on patient behalf           RELATIONSHIP TO PATIENT_________________________          Provider Certification    NAME Payton Go                                         1934                              MRN [id number]    A medical screening exam was performed on the above named patient. 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 Diagnoses of Fall (on) (from) other stairs and steps, initial encounter and Upper extremity weakness were also pertinent to this visit. Patient Condition:      Reason for Transfer:      Transfer Requirements: Facility     · Space available and qualified personnel available for treatment as acknowledged by    · Agreed to accept transfer and to provide appropriate medical treatment as acknowledged by          · Appropriate medical records of the examination and treatment of the patient are provided at the time of transfer   [address] _______  · Transfer will be performed by qualified personnel from    and appropriate transfer equipment as required, including the use of necessary and appropriate life support measures.     Provider Certification: I have examined the patient and explained the following risks and benefits of being transferred/refusing transfer to the patient/family:         Based on these reasonable risks and benefits to the patient and/or the unborn child(juan josé), and based upon the information available at the time of the patient’s examination, I certify that the medical benefits reasonably to be expected from the provision of appropriate medical treatments at another medical facility outweigh the increasing risks, if any, to the individual’s medical condition, and in the case of labor to the unborn child, from effecting the transfer.     X____________________________________________ DATE 07/03/23        TIME_______      ORIGINAL - SEND TO MEDICAL RECORDS   COPY - SEND WITH PATIENT DURING TRANSFER

## 2023-07-03 NOTE — ED NOTES
Pt transported to MRI on monitor with Shanae Marcum RN.       Huber Adair RN  07/03/23 Vinay Stanford

## 2023-07-03 NOTE — ASSESSMENT & PLAN NOTE
- Transferred to [facility] to rule out central cord disorder s/p fall down stairs  - Bilateral shoulder pain and weakness in bilateral upper extremities (2/5  strength bilaterally, cannot raise arms off the bed) and complaining of neck pain  - Pt reports weakness in bilateral LE as well, but strength is 4/5 bilateral LE  - CT Head, C-spine, CAP negative for acute traumatic injuries  - STAT MRI Cervical spine pending  - Admit SD2 and NPO pending MRI results  - Maintain cervical collar at all times  - Neurosurgery consult placed  - Pain control  - DVT ppx  - PT and OT when indicated

## 2023-07-03 NOTE — H&P
[address] [facility]  H&P  Name: Bautista Javed 80 y.o. male I MRN: [id number]  Unit/Bed#: ED 23 I Date of Admission: 7/3/2023   Date of Service: 7/3/2023 I Hospital Day: 0      Assessment/Plan   Fall  Assessment & Plan  - Pt reports fall, posterior head strike, no loss of consciousness, denies use of antiplatelet or anticoagulation medications  - Status post fall with the below noted injuries. - Fall precautions. - Geriatric Medicine consultation for evaluation, medication review and recommendations.  - PT and OT evaluation and treatment as indicated. - Case Management consultation for disposition planning. Stage 4 chronic kidney disease [facility])  Assessment & Plan  Lab Results   Component Value Date    EGFR 31 07/03/2023    EGFR 24 05/19/2023    EGFR 22 02/22/2023    CREATININE 1.87 (H) 07/03/2023    CREATININE 2.30 (H) 05/19/2023    CREATININE 2.44 (H) 02/22/2023     - Continue home lasix  - Continue to monitor    * Weakness of both upper extremities  Assessment & Plan  - Transferred to \[facility]\"" to rule out central cord disorder s/p fall down stairs  - Bilateral shoulder pain and weakness in bilateral upper extremities (2/5  strength bilaterally, cannot raise arms off the bed) and complaining of neck pain  - Pt reports weakness in bilateral LE as well, but strength is 4/5 bilateral LE  - CT Head, C-spine, CAP negative for acute traumatic injuries  - STAT MRI Cervical spine pending  - Admit SD2 and NPO pending MRI results  - Maintain cervical collar at all times  - Neurosurgery consult placed  - Pain control  - DVT ppx  - PT and OT when indicated            Trauma Alert: Other transfer from [address] [place] [address]: Ambulance    Trauma Team: Attending Maria A Alvarez and [address]  Consultants:     Neurosurgery: routine consult; Epic consult order placed; History of Present Illness     Chief Complaint: Fall  Mechanism:Fall     HPI:    Bautista Javed is a 80 y.o. male who presents after a fall. Patient is a poor historian, however wife is at bedside and reports that she heard a thud and found him laying on his back near the stairs. Patient reports a fall, no loss of consciousness, no use of antiplatelet or anticoagulation medications. He reports he hit the back of his head on the ground. Patient complains of neck pain and shoulder pain and weakness in his upper and lower extremities. She reports that his upper extremities are more weak than his lower extremities, and he also is experiencing numbness in his hands. He reports he was unable to get up off the floor due to his weakness. Review of Systems   HENT: Negative for facial swelling. Eyes: Negative for photophobia. Respiratory: Negative for shortness of breath. Cardiovascular: Negative for chest pain. Gastrointestinal: Negative for abdominal pain and nausea. Musculoskeletal: Positive for arthralgias, back pain, myalgias, neck pain and neck stiffness. Skin: Negative for wound. Neurological: Positive for weakness and numbness. Negative for dizziness, syncope, light-headedness and headaches. Psychiatric/Behavioral: Negative for confusion. All other systems reviewed and are negative. 12-point, complete review of systems was reviewed and negative except as stated above.      Historical Information     Past Medical History:   Diagnosis Date   • Ankle edema     last assessed [id number]   • Depression     last assessed [id number]   • Hypertension    • Kidney stone    • Nephrolithiasis    • Nocturia     last assessed [id number]   • Posterior tibial tendinitis of right lower extremity     last assessed [id number]     Past Surgical History:   Procedure Laterality Date   • ANAL FISSURECTOMY     • COLONOSCOPY  04/22/2009    every 2 years   • COLONOSCOPY  03/04/2013    2 yrs d/t h/o polyp   • COLONOSCOPY  02/15/2016    colo 2/15/16-repeat 2 yrs   • SHOULDER SURGERY          Social History     Tobacco Use   • Smoking status: Never   • Smokeless tobacco: Never   Vaping Use   • Vaping Use: Never used   Substance Use Topics   • Alcohol use: Not Currently     Comment: occassionally   • Drug use: No     Immunization History   Administered Date(s) Administered   • COVID-19 PFIZER VACCINE 0.3 ML IM 03/09/2021, 03/30/2021   • INFLUENZA 09/10/2012, 10/04/2013, 09/22/2014, 10/09/2015, 09/15/2016, 09/25/2017, 09/20/2021   • Influenza Split High Dose Preservative Free IM 09/10/2012, 10/04/2013, 09/22/2014, 10/09/2015, 09/15/2016   • Influenza, high dose seasonal 0.7 mL 10/02/2018, 09/30/2019, 09/23/2020, 11/07/2022   • Pneumococcal Conjugate 13-Valent 12/17/2015   • Pneumococcal Polysaccharide PPV23 05/14/2014   • Tdap 02/22/2023     Last Tetanus: 2023  Family History: Non-contributory    1. Before the illness or injury that brought you to the Emergency, did you need someone to help you on a regular basis? 1=Yes   2. Since the illness or injury that brought you to the Emergency, have you needed more help than usual to take care of yourself? 1=Yes   3. Have you been hospitalized for one or more nights during the past 6 months (excluding a stay in the Emergency Department)? 1=Yes   4. In general, do you see well? 1=No   5. In general, do you have serious problems with your memory? 1=Yes   6. Do you take more than three different medications everyday?  1=Yes   TOTAL   6     Did you order a geriatric consult if the score was 2 or greater?: yes     Meds/Allergies   all current active meds have been reviewed   No Known Allergies    Objective   Initial Vitals:   Temperature: 98.2 °F (36.8 °C) (07/03/23 1610)  Pulse: 87 (07/03/23 1610)  Respirations: 18 (07/03/23 1610)  Blood Pressure: 149/72 (07/03/23 1610)    Primary Survey:   Airway:        Status: patent;        Pre-hospital Interventions: none        Hospital Interventions: none  Breathing:        Pre-hospital Interventions: none       Effort: normal       Right breath sounds: normal       Left breath sounds: normal  Circulation:        Rhythm: regular       Rate: regular   Right Pulses Left Pulses    R radial: 2+    R pedal: 2+     L radial: 2+    L pedal: 2+       Disability:        GCS: Eye: 4; Verbal: 5 Motor: 6 Total: 15       Right Pupil: 3 mm;  round;  reactive         Left Pupil:  3 mm;  round;  reactive      R Motor Strength L Motor Strength    R : 2/5  R dorsiflex: 4/5  R plantarflex: 4/5 L : 2/5  L dorsiflex: 4/5  L plantarflex: 4/5        Sensory:  No sensory deficit  Exposure:       Completed: Yes      Secondary Survey:  Physical Exam  Vitals reviewed. Constitutional:       General: He is not in acute distress. Appearance: Normal appearance. HENT:      Head: Normocephalic and atraumatic. Right Ear: External ear normal.      Left Ear: External ear normal.      Nose: Nose normal.      Mouth/Throat:      Mouth: Mucous membranes are dry. Pharynx: Oropharynx is clear. Eyes:      Extraocular Movements: Extraocular movements intact. Conjunctiva/sclera: Conjunctivae normal.      Pupils: Pupils are equal, round, and reactive to light. Neck:      Comments: Midline tenderness of the cervical spine  Cardiovascular:      Rate and Rhythm: Normal rate and regular rhythm. Pulses: Normal pulses. Heart sounds: Normal heart sounds. Pulmonary:      Effort: Pulmonary effort is normal. No respiratory distress. Breath sounds: Normal breath sounds. Chest:      Chest wall: No tenderness. Abdominal:      General: Abdomen is flat. Bowel sounds are normal. There is no distension. Palpations: Abdomen is soft. There is no mass. Tenderness: There is no abdominal tenderness. There is no right CVA tenderness, left CVA tenderness, guarding or rebound. Hernia: No hernia is present. Musculoskeletal:         General: No swelling, deformity or signs of injury. Cervical back: Tenderness present.       Comments: Active ROM bilateral UE limited due to weakness; passive ROM intact without pain or deformities  B/L LE able to actively range them with strength 4/5   Skin:     General: Skin is warm and dry. Capillary Refill: Capillary refill takes less than 2 seconds. Findings: No bruising or lesion. Neurological:      General: No focal deficit present. Mental Status: He is alert and oriented to person, place, and time. Mental status is at baseline. Sensory: No sensory deficit. Motor: Weakness present. Comments: Pt reports numbness in bilateral hands   Psychiatric:         Mood and Affect: Mood normal.         Behavior: Behavior normal.         Invasive Devices     Peripheral Intravenous Line  Duration           Peripheral IV 07/03/23 Left;Ventral (anterior) Hand <1 day    Peripheral IV 07/03/23 Right;Ventral (anterior) Forearm <1 day              Lab Results: Results: I have personally reviewed all pertinent laboratory/tests results    Imaging Results: I have personally reviewed pertinent reports. Chest Xray(s): negative for acute findings   FAST exam(s): negative for acute findings   CT Scan(s): negative for acute findings   Additional Xray(s): N/A     Other Studies:   MRI inpatient order    (Results Pending)         Code Status: Level 1 - Full Code  Advance Directive and Living Will:      Power of :    POLST:    I have spent 30 minutes with Patient and family today in which greater than 50% of this time was spent in counseling/coordination of care regarding Diagnostic results, Prognosis, Risks and benefits of tx options, Instructions for management, Patient and family education, Importance of tx compliance, Risk factor reductions, Impressions, Counseling / Coordination of care, Documenting in the medical record, Reviewing / ordering tests, medicine, procedures  , Obtaining or reviewing history   and Communicating with other healthcare professionals .

## 2023-07-03 NOTE — ASSESSMENT & PLAN NOTE
Lab Results   Component Value Date    EGFR 31 07/03/2023    EGFR 24 05/19/2023    EGFR 22 02/22/2023    CREATININE 1.87 (H) 07/03/2023    CREATININE 2.30 (H) 05/19/2023    CREATININE 2.44 (H) 02/22/2023     - Continue home lasix  - Continue to monitor

## 2023-07-04 ENCOUNTER — ANESTHESIA (INPATIENT)
Dept: PERIOP | Facility: HOSPITAL | Age: 88
DRG: 028 | End: 2023-07-04
Payer: COMMERCIAL

## 2023-07-04 ENCOUNTER — ANESTHESIA EVENT (INPATIENT)
Dept: PERIOP | Facility: HOSPITAL | Age: 88
DRG: 028 | End: 2023-07-04
Payer: COMMERCIAL

## 2023-07-04 RX ORDER — LIDOCAINE HYDROCHLORIDE 10 MG/ML
INJECTION, SOLUTION EPIDURAL; INFILTRATION; INTRACAUDAL; PERINEURAL AS NEEDED
Status: DISCONTINUED | OUTPATIENT
Start: 2023-07-04 | End: 2023-07-04

## 2023-07-04 RX ORDER — HYDROMORPHONE HCL/PF 1 MG/ML
SYRINGE (ML) INJECTION AS NEEDED
Status: DISCONTINUED | OUTPATIENT
Start: 2023-07-04 | End: 2023-07-04

## 2023-07-04 RX ORDER — PROPOFOL 10 MG/ML
INJECTION, EMULSION INTRAVENOUS AS NEEDED
Status: DISCONTINUED | OUTPATIENT
Start: 2023-07-04 | End: 2023-07-04

## 2023-07-04 RX ORDER — EPHEDRINE SULFATE 50 MG/ML
INJECTION INTRAVENOUS AS NEEDED
Status: DISCONTINUED | OUTPATIENT
Start: 2023-07-04 | End: 2023-07-04

## 2023-07-04 RX ORDER — GLYCOPYRROLATE 0.2 MG/ML
INJECTION INTRAMUSCULAR; INTRAVENOUS AS NEEDED
Status: DISCONTINUED | OUTPATIENT
Start: 2023-07-04 | End: 2023-07-04

## 2023-07-04 RX ORDER — KETAMINE HCL IN NACL, ISO-OSM 100MG/10ML
SYRINGE (ML) INJECTION AS NEEDED
Status: DISCONTINUED | OUTPATIENT
Start: 2023-07-04 | End: 2023-07-04

## 2023-07-04 RX ORDER — SODIUM CHLORIDE 9 MG/ML
INJECTION, SOLUTION INTRAVENOUS CONTINUOUS PRN
Status: DISCONTINUED | OUTPATIENT
Start: 2023-07-04 | End: 2023-07-04

## 2023-07-04 RX ORDER — CEFAZOLIN SODIUM 1 G/3ML
INJECTION, POWDER, FOR SOLUTION INTRAMUSCULAR; INTRAVENOUS AS NEEDED
Status: DISCONTINUED | OUTPATIENT
Start: 2023-07-04 | End: 2023-07-04

## 2023-07-04 RX ORDER — FENTANYL CITRATE 50 UG/ML
INJECTION, SOLUTION INTRAMUSCULAR; INTRAVENOUS AS NEEDED
Status: DISCONTINUED | OUTPATIENT
Start: 2023-07-04 | End: 2023-07-04

## 2023-07-04 RX ORDER — DEXAMETHASONE SODIUM PHOSPHATE 10 MG/ML
INJECTION, SOLUTION INTRAMUSCULAR; INTRAVENOUS AS NEEDED
Status: DISCONTINUED | OUTPATIENT
Start: 2023-07-04 | End: 2023-07-04

## 2023-07-04 RX ORDER — MIDAZOLAM HYDROCHLORIDE 2 MG/2ML
INJECTION, SOLUTION INTRAMUSCULAR; INTRAVENOUS AS NEEDED
Status: DISCONTINUED | OUTPATIENT
Start: 2023-07-04 | End: 2023-07-04

## 2023-07-04 RX ORDER — SUCCINYLCHOLINE/SOD CL,ISO/PF 100 MG/5ML
SYRINGE (ML) INTRAVENOUS AS NEEDED
Status: DISCONTINUED | OUTPATIENT
Start: 2023-07-04 | End: 2023-07-04

## 2023-07-04 RX ORDER — PROPOFOL 10 MG/ML
INJECTION, EMULSION INTRAVENOUS CONTINUOUS PRN
Status: DISCONTINUED | OUTPATIENT
Start: 2023-07-04 | End: 2023-07-04

## 2023-07-04 RX ORDER — ROCURONIUM BROMIDE 10 MG/ML
INJECTION, SOLUTION INTRAVENOUS AS NEEDED
Status: DISCONTINUED | OUTPATIENT
Start: 2023-07-04 | End: 2023-07-04

## 2023-07-04 RX ADMIN — SUGAMMADEX 100 MG: 100 INJECTION, SOLUTION INTRAVENOUS at 12:33

## 2023-07-04 RX ADMIN — PROPOFOL 200 MG: 10 INJECTION, EMULSION INTRAVENOUS at 14:15

## 2023-07-04 RX ADMIN — LIDOCAINE HYDROCHLORIDE 50 MG: 10 INJECTION, SOLUTION EPIDURAL; INFILTRATION; INTRACAUDAL; PERINEURAL at 10:53

## 2023-07-04 RX ADMIN — SODIUM CHLORIDE, SODIUM GLUCONATE, SODIUM ACETATE, POTASSIUM CHLORIDE, MAGNESIUM CHLORIDE, SODIUM PHOSPHATE, DIBASIC, AND POTASSIUM PHOSPHATE: .53; .5; .37; .037; .03; .012; .00082 INJECTION, SOLUTION INTRAVENOUS at 14:30

## 2023-07-04 RX ADMIN — MIDAZOLAM 2 MG: 1 INJECTION INTRAMUSCULAR; INTRAVENOUS at 14:18

## 2023-07-04 RX ADMIN — ROCURONIUM BROMIDE 30 MG: 10 INJECTION, SOLUTION INTRAVENOUS at 12:05

## 2023-07-04 RX ADMIN — PROPOFOL 100 MCG/KG/MIN: 10 INJECTION, EMULSION INTRAVENOUS at 11:15

## 2023-07-04 RX ADMIN — CEFAZOLIN 2000 MG: 1 INJECTION, POWDER, FOR SOLUTION INTRAMUSCULAR; INTRAVENOUS at 11:35

## 2023-07-04 RX ADMIN — Medication 25 MG: at 13:11

## 2023-07-04 RX ADMIN — PHENYLEPHRINE HYDROCHLORIDE 50 MCG/MIN: 10 INJECTION INTRAVENOUS at 11:15

## 2023-07-04 RX ADMIN — GLYCOPYRROLATE 0.1 MG: 0.2 INJECTION, SOLUTION INTRAMUSCULAR; INTRAVENOUS at 10:51

## 2023-07-04 RX ADMIN — EPHEDRINE SULFATE 10 MG: 50 INJECTION INTRAVENOUS at 11:18

## 2023-07-04 RX ADMIN — FENTANYL CITRATE 50 MCG: 50 INJECTION, SOLUTION INTRAMUSCULAR; INTRAVENOUS at 10:53

## 2023-07-04 RX ADMIN — Medication 25 MG: at 10:53

## 2023-07-04 RX ADMIN — REMIFENTANIL HYDROCHLORIDE 0.2 MCG/KG/MIN: 1 INJECTION, POWDER, LYOPHILIZED, FOR SOLUTION INTRAVENOUS at 11:15

## 2023-07-04 RX ADMIN — HYDROMORPHONE HYDROCHLORIDE 0.2 MG: 1 INJECTION, SOLUTION INTRAMUSCULAR; INTRAVENOUS; SUBCUTANEOUS at 13:30

## 2023-07-04 RX ADMIN — PROPOFOL 80 MCG/KG/MIN: 10 INJECTION, EMULSION INTRAVENOUS at 12:32

## 2023-07-04 RX ADMIN — EPHEDRINE SULFATE 10 MG: 50 INJECTION INTRAVENOUS at 11:10

## 2023-07-04 RX ADMIN — SUGAMMADEX 100 MG: 100 INJECTION, SOLUTION INTRAVENOUS at 14:03

## 2023-07-04 RX ADMIN — PROPOFOL 80 MCG/KG/MIN: 10 INJECTION, EMULSION INTRAVENOUS at 11:54

## 2023-07-04 RX ADMIN — SODIUM CHLORIDE: 0.9 INJECTION, SOLUTION INTRAVENOUS at 11:03

## 2023-07-04 RX ADMIN — Medication 100 MG: at 10:53

## 2023-07-04 RX ADMIN — PROPOFOL 150 MG: 10 INJECTION, EMULSION INTRAVENOUS at 10:53

## 2023-07-04 RX ADMIN — DEXAMETHASONE SODIUM PHOSPHATE 10 MG: 10 INJECTION, SOLUTION INTRAMUSCULAR; INTRAVENOUS at 10:53

## 2023-07-04 NOTE — CONSULTS
[address] [facility]  Consult  Name: Carol Herring 80 y.o. male I MRN: [id number]  Unit/Bed#: OR POOL I Date of Admission: 7/3/2023   Date of Service: 7/4/2023 I Hospital Day: 1    Inpatient consult to Neurosurgery  Consult performed by: Moon Meyer PA-C  Consult ordered by: Shobha Stoll PA-C        Assessment/Plan   * Weakness of both upper extremities  Assessment & Plan  Cervical spinal cord injury @ C3-4  - s/p unwitnessed fall down stairs with posterior head strike on 7/3   - on arrival with complaints of neck pain and fabien UE weakness and numbness     Imaging:   · 7/3 MRI c-spine: 1. Traumatic disc injury at C4-C5 where there is anterior disc injury (mild separation of the superior C4-C5 disc from the inferior endplate of the C4 vertebral body) with prevertebral fluid from C2-C5. Cord edema at C4-C5 likely traumatic as this is the site of injury, rather than spondylitic myelopathy but needs to be correlated clinically  · 7/3 CT c-spine: No cervical spine fracture or traumatic malalignment    Plan:   · Continue to closely monitor neuro exam   · Frequent neuro checks per primary team   · Maintain strict MAP goal > 85   · Plan for emergent neurosurgical intervention for decompression and fixation fusion   · Case and imaging reviewed with Dr. Gladis Coker   · Long discussion held this am with pt and daughter, decision made to proceed with surgical intervention   · Pre-op orders placed and OR made aware  · Continue strict cervical spine precautions   · Aspen c-collar to be worn at all times   · Neda collar may be worn for showering   · DVT ppx; SCDs, hold chem dvt ppx for nsgy intervention   · Pain control per primary team   · Medical management per primary team   · PT/OT   · Social work following for assistance with dispo once medically cleared     Neurosurgery will continue to follow. Anticipate ICU level of care post-op.  Please reach out with any further questions or concerns. History of Present Illness   HPI: Key Salinas is a 80y.o. year old male with PMH significant for HTN, depression, gout, colitis, and nephrolithiasis on no AC/AP medications who presented to the [facility] yesterday afternoon as a trauma transfer after an unwittnessed fall down the stairs with head strike. Pt reported significant neck pain and fabien UE weakness and numbness extending to fabien elbows. L side reportedly weaker than the R side. CT c-spine did not reveal any acute traumatic fractures or malalignment. Pt underwent STAT MRI c-spine which revealed an acute cord signal change at C4-C5 concerning for acute cervical spinal cord injury. Patient was admitted to the trauma service with neurosurgery consult. Review of Systems   Constitutional: Negative for chills and fever. Eyes: Negative for photophobia and visual disturbance. Respiratory: Negative for cough and shortness of breath. Cardiovascular: Negative for chest pain. Gastrointestinal: Negative for abdominal pain. Musculoskeletal: Positive for gait problem, myalgias and neck pain. Negative for arthralgias, back pain, joint swelling and neck stiffness. Neurological: Positive for weakness and numbness. Negative for dizziness, tremors, seizures, syncope, facial asymmetry, speech difficulty, light-headedness and headaches. Psychiatric/Behavioral: Negative for agitation, behavioral problems and confusion. The patient is not nervous/anxious.       Historical Information   Past Medical History:   Diagnosis Date   • Ankle edema     last assessed [id number]   • Depression     last assessed [id number]   • Hypertension    • Kidney stone    • Nephrolithiasis    • Nocturia     last assessed [id number]   • Posterior tibial tendinitis of right lower extremity     last assessed [id number]     Past Surgical History:   Procedure Laterality Date   • ANAL FISSURECTOMY     • COLONOSCOPY  04/22/2009    every 2 years   • COLONOSCOPY  03/04/2013    2 yrs d/t h/o polyp   • COLONOSCOPY  02/15/2016    colo 2/15/16-repeat 2 yrs   • SHOULDER SURGERY       Social History     Substance and Sexual Activity   Alcohol Use Not Currently    Comment: occassionally     Social History     Substance and Sexual Activity   Drug Use No     Social History     Tobacco Use   Smoking Status Never   Smokeless Tobacco Never     Family History   Problem Relation Age of Onset   • Clotting disorder Mother    • Nephrolithiasis Father    • Diabetes Sister    • Heart disease Daughter    • Fibromyalgia Daughter      Meds/Allergies   all current active meds have been reviewed  No Known Allergies    Objective   I/O       07/02 0701 07/03 0700 07/03 0701 07/04 0700 07/04 0701 07/05 0700    P. O.   0    I.V. (mL/kg)   800 (7.7)    Total Intake(mL/kg)   800 (7.7)    Urine (mL/kg/hr)  125 750 (6.4)    Total Output  125 750    Net  -125 +50                 Physical Exam  Constitutional:       General: He is not in acute distress. Appearance: Normal appearance. He is obese. He is not ill-appearing or toxic-appearing. Comments: Pleasant, elderly male sitting up comfortably in bed. No acute distress   HENT:      Head: Normocephalic and atraumatic. Right Ear: External ear normal.      Left Ear: External ear normal.      Nose: Nose normal. No congestion or rhinorrhea. Mouth/Throat:      Mouth: Mucous membranes are moist.   Eyes:      General: No scleral icterus. Right eye: No discharge. Left eye: No discharge. Extraocular Movements: Extraocular movements intact. Conjunctiva/sclera: Conjunctivae normal.      Pupils: Pupils are equal, round, and reactive to light. Neck:      Comments: Aspen c-collar in place  Cardiovascular:      Rate and Rhythm: Normal rate. Pulmonary:      Effort: Pulmonary effort is normal. No respiratory distress. Comments: No respiratory distress on nasal cannula  Abdominal:      General: Abdomen is flat. There is no distension. Tenderness: There is no abdominal tenderness. Musculoskeletal:         General: Tenderness present. No deformity or signs of injury. Normal range of motion. Right lower leg: No edema. Left lower leg: No edema. Comments: Posterior C-spine tenderness appreciated   Skin:     General: Skin is warm and dry. Capillary Refill: Capillary refill takes less than 2 seconds. Neurological:      Mental Status: He is alert and oriented to person, place, and time. Cranial Nerves: No cranial nerve deficit. Sensory: Sensory deficit present. Motor: Weakness present. Coordination: Coordination normal.      Comments: GCS 15   A&Ox3   Appropriately answering questions and following commands   No dysarthria or aphasia   No appreciated CN deficits   fabien UE weakness noted   - RUE: delt 3/5, bicep/tricep 4/5, wrist 3/5,  3/5   - LUE: delt 3/5, bicep/tricep 3/5, wrist 2/5,  2/5   - RLE: intact   - LLE: 4/5 throughout   Decreased sensation to fabien UE extending from hands to fabien elbows   fabien UE drift, L >R      Psychiatric:         Mood and Affect: Mood normal.         Behavior: Behavior normal.         Thought Content: Thought content normal.         Judgment: Judgment normal.       Neurologic Exam     Mental Status   Oriented to person, place, and time. Cranial Nerves     CN III, IV, VI   Pupils are equal, round, and reactive to light. Vitals:Blood pressure 125/81, pulse 89, temperature 97.7 °F (36.5 °C), resp. rate 18, height 5' 6" (1.676 m), weight 104 kg (229 lb 11.5 oz), SpO2 96 %. ,Body mass index is 37.08 kg/m².      Lab Results:   Results from last 7 days   Lab Units 07/04/23  0935 07/04/23  0544 07/03/23  1308   WBC Thousand/uL 10.95* 9.74 6.82   HEMOGLOBIN g/dL 10.9* 10.8* 10.7*   HEMATOCRIT % 34.8* 35.3* 34.7*   PLATELETS Thousands/uL 222 217 193   NEUTROS PCT %  --  84* 61   MONOS PCT %  --  8 10   EOS PCT %  --  0 5     Results from last 7 days   Lab Units 07/04/23  0935 07/04/23  0544 07/03/23  1308   POTASSIUM mmol/L 5.0 4.9 4.4   CHLORIDE mmol/L 115* 114* 109*   CO2 mmol/L 26 24 25   BUN mg/dL 34* 34* 33*   CREATININE mg/dL 2.16* 2.13* 1.87*   CALCIUM mg/dL 8.6 8.4 8.3*   ALK PHOS U/L 78  --  65   ALT U/L 21  --  11   AST U/L 31  --  15             Results from last 7 days   Lab Units 07/04/23  0935 07/03/23  1308   INR  1.03 0.87   PTT seconds 60*  --      No results found for: "TROPONINT"  ABG:No results found for: "PHART", "KUN0OLL", "PO2ART", "ZIR1IJZ", "W1ANIRNI", "BEART", "SOURCE"    Imaging Studies: I have personally reviewed pertinent reports. MRI cervical spine wo contrast    Result Date: 7/3/2023  Narrative: MRI CERVICAL SPINE WITHOUT CONTRAST INDICATION: Weakness of both upper extremities. COMPARISON:  None. TECHNIQUE:  Multiplanar, multisequence imaging of the cervical spine was performed. . IMAGE QUALITY:  Diagnostic FINDINGS: ALIGNMENT: Straightening of the cervical lordosis. Mild kyphotic deformity at C4-C5 where there is suspected traumatic injury to the disc at C4-C5 (separation of the superior portion of the C4-C5 disc from the inferior endplate of the C4 vertebral body). MARROW SIGNAL:  Normal marrow signal is identified within the visualized bony structures. No discrete marrow lesion. CERVICAL AND VISUALIZED THORACIC CORD: Spinal cord signal abnormality from C4-C5 could be either degenerative spondylitic myelopathy versus traumatic. Correlate clinically. PREVERTEBRAL AND PARASPINAL SOFT TISSUES: Prevertebral soft tissue edema from C2-C5 likely traumatic. VISUALIZED POSTERIOR FOSSA:  The visualized posterior fossa demonstrates no abnormal signal. CERVICAL DISC SPACES: C2-C3: Disc osteophyte complex without significant C3-C4: Disc osteophyte complex causing mild spinal canal stenosis. Uncovertebral hypertrophy and facet arthrosis causing moderate to severe bilateral neuroforaminal narrowing.  C4-C5: Fluid signal abnormality along the anterior aspect of the C4-C5 disc likely. Disc osteophyte complex and ligamentum flavum hypertrophy causing mild spinal canal stenosis. Facet arthrosis and uncovertebral hypertrophy causing moderate to severe bilateral neuroforaminal narrowing. C5-C6: Disc osteophyte complex causing mild spinal canal stenosis. Facet arthrosis and uncovertebral hypertrophy causing moderate to severe bilateral neuroforaminal narrowing C6-C7: Disc osteophyte complex causing anterior impression on the thecal sac. Uncovertebral hypertrophy and facet arthrosis causing moderate to severe right and moderate left neuroforaminal narrowing. C7-T1: Disc osteophyte complex causing impression on the thecal sac. UPPER THORACIC DISC SPACES:  Normal. OTHER FINDINGS:  None. Impression: 1. Traumatic disc injury at C4-C5 where there is anterior disc injury (mild separation of the superior C4-C5 disc from the inferior endplate of the C4 vertebral body) with prevertebral fluid from C2-C5. 2. Cord edema at C4-C5 likely traumatic as this is the site of injury, rather than spondylitic myelopathy but needs to be correlated clinically. I personally discussed this study with Dr. Anton Goodpasture on 7/3/2023 10:13 PM. Workstation performed: [id number]     TRAUMA - CT spine cervical wo contrast    Result Date: 7/3/2023  Narrative: CT CERVICAL SPINE - WITHOUT CONTRAST INDICATION:   TRAUMA. Fall. Upper extremity weakness. Back pain COMPARISON: February 22, 2023 TECHNIQUE:  CT examination of the cervical spine was performed without intravenous contrast.  Contiguous axial images were obtained. Multiplanar 2D reformatted images were created from the source data. Radiation dose length product (DLP) for this visit:  0367 7650680 mGy-cm . This examination, like all CT scans performed in the [facility], was performed utilizing techniques to minimize radiation dose exposure, including the use of iterative reconstruction and automated exposure control.  There was unavoidable motion artifact on first attempt and the examination was therefore repeated, with better results. IMAGE QUALITY:  Diagnostic. FINDINGS: ALIGNMENT:  Normal alignment of the cervical spine. No subluxation. VERTEBRAE:  No fracture. DEGENERATIVE CHANGES:  Moderate multilevel cervical degenerative changes are noted. Degenerative changes quite similar from February 22, 2023 and there is no osseous critical central canal stenosis. PREVERTEBRAL AND PARASPINAL SOFT TISSUES: Unremarkable THORACIC INLET:  Normal.     Impression: No cervical spine fracture or traumatic malalignment. Workstation performed: [id number]     CT recon only thoracolumbar (No Charge)    Result Date: 7/3/2023  Narrative: CT THORACIC AND LUMBAR SPINE INDICATION:   trauma. Fall. COMPARISON: None. TECHNIQUE: Axial CT examination of the thoracic and lumbar spine was obtained utilizing reconstructed images from CT of the chest abdomen and pelvis performed the same day. Images were reformatted in the sagittal and coronal planes. This examination, like all CT scans performed in the [facility], was performed utilizing techniques to minimize radiation dose exposure, including the use of iterative reconstruction and automated exposure control. FINDINGS: ALIGNMENT: Normal alignment. No spondylolisthesis. No scoliosis. VERTEBRAE:  No fracture. No acute osseous abnormality. DEGENERATIVE CHANGES: Age appropriate degenerative changes. Fusion of osteophytes throughout the thoracic spine. No critical central canal stenosis in the thoracic or lumbar spine. PREVERTEBRAL AND PARASPINAL SOFT TISSUES: No prevertebral or paraspinal hematoma or soft tissue mass. Please see separate report of chest, abdomen, and pelvis CT, performed concurrently and dictated under separate accession number. Impression: No fracture or traumatic subluxation.  Workstation performed: [id number]     TRAUMA - CT chest abdomen pelvis w contrast    Result Date: 7/3/2023  Narrative: CT CHEST, ABDOMEN AND PELVIS WITH IV CONTRAST INDICATION:   TRAUMA. Fall. Head injury. Back pain. Upper extremity weakness. COMPARISON:  None. TECHNIQUE: CT examination of the chest, abdomen and pelvis was performed. Multiplanar 2D reformatted images were created from the source data. This examination, like all CT scans performed in the [facility], was performed utilizing techniques to minimize radiation dose exposure, including the use of iterative reconstruction and automated exposure control. Radiation dose length product (DLP) for this visit:  1733.55 mGy-cm IV Contrast:  80 mL of iohexol (OMNIPAQUE) Enteric Contrast: Enteric contrast was administered. FINDINGS: CHEST LUNGS: Low lung volumes. Atelectasis especially in the dependent right costophrenic angle. No pulmonary contusion or pulmonary laceration. PLEURA:  Unremarkable. HEART/GREAT VESSELS: Cardiomegaly. Coronary artery calcification. No thoracic aortic aneurysm. MEDIASTINUM AND SOFIA: No mediastinal hematoma. No mediastinal or hilar lymphadenopathy. Small sliding-type hiatal hernia. CHEST WALL AND LOWER NECK:  Unremarkable. ABDOMEN LIVER/BILIARY TREE:  Unremarkable. GALLBLADDER: There are gallstone(s) within the gallbladder, without pericholecystic inflammatory changes. SPLEEN:  Unremarkable. PANCREAS:  Unremarkable. ADRENAL GLANDS:  Unremarkable. KIDNEYS/URETERS: Bilateral renal cysts, most simple, varying in size measuring up to 5.6 cm in the interpolar right kidney. A lobulated cyst exophytic at the lower pole of the right kidney with thin septations measures up to 4.3 cm. No solid renal mass. No traumatic renal injury. No urinary tract calculus. No hydronephrosis. STOMACH AND BOWEL: Extensive colonic diverticulosis with no evidence for acute diverticulitis. Duodenal diverticulum. No bowel wall thickening or bowel obstruction. APPENDIX:  No findings to suggest appendicitis. ABDOMINOPELVIC CAVITY:  No ascites. No pneumoperitoneum.   No lymphadenopathy. VESSELS:  Unremarkable for patient's age. PELVIS REPRODUCTIVE ORGANS: Prostatomegaly. URINARY BLADDER:  Unremarkable. ABDOMINAL WALL/INGUINAL REGIONS:  Unremarkable. OSSEOUS STRUCTURES: Marginal osteophytes throughout the thoracic and upper lumbar spine. No acute fracture. No destructive osseous lesion. Impression: No acute traumatic visceral injury in the chest, abdomen or pelvis. No acute fracture. Workstation performed: [id number]     TRAUMA - CT head wo contrast    Result Date: 7/3/2023  Narrative: CT BRAIN - WITHOUT CONTRAST INDICATION:   TRAUMA. Fall. Lower back pain. Upper extremity weakness. COMPARISON: February 22, 2023 TECHNIQUE:  CT examination of the brain was performed. Multiplanar 2D reformatted images were created from the source data. Radiation dose length product (DLP) for this visit:  1003 mGy-cm . This examination, like all CT scans performed in the [facility], was performed utilizing techniques to minimize radiation dose exposure, including the use of iterative reconstruction and automated exposure control. IMAGE QUALITY:  Diagnostic. FINDINGS: PARENCHYMA: Decreased attenuation is noted in periventricular and subcortical white matter demonstrating an appearance that is statistically most likely to represent moderate microangiopathic change. No CT signs of acute infarction. No intracranial mass, mass effect or midline shift. No acute parenchymal hemorrhage. VENTRICLES AND EXTRA-AXIAL SPACES:  Normal for the patient's age. VISUALIZED ORBITS: Normal visualized orbits. PARANASAL SINUSES: Partial opacification of left maxillary sinus with some central desiccated secretions, similar from February 22, 2023. Mild mucosal thickening in ethmoid air cells. Sinuses and mastoid air cells are otherwise clear. CALVARIUM AND EXTRACRANIAL SOFT TISSUES: No calvarial fracture. Right parietal scalp contusion. Impression: No acute intracranial abnormality.  Workstation performed: [id number]     XR pelvis ap only 1 or 2 vw    Result Date: 7/3/2023  Narrative: PELVIS INDICATION:   trauma. COMPARISON: 2/22/2023 VIEWS:  XR PELVIS AP ONLY 1 OR 2 VW FINDINGS: No acute fracture or hip dislocation. Mild bilateral hip osteoarthritis. No lytic or blastic osseous lesion. Soft tissues are unremarkable. The visualized lumbar spine is unremarkable. Impression: No acute osseous abnormality. Workstation performed: [id number]     XR chest 1 view portable    Result Date: 7/3/2023  Narrative: CHEST INDICATION:   trauma. COMPARISON: 2/22/2023 EXAM PERFORMED/VIEWS:  XR CHEST PORTABLE FINDINGS: Heart shadow is enlarged but unchanged from prior exam. There is no focal consolidation. Cannot exclude mild pulmonary vascular congestion. No pneumothorax or pleural effusion. Osseous structures appear within normal limits for patient age. Impression: Unchanged cardiomegaly. Cannot exclude mild pulmonary vascular congestion. Workstation performed: [id number]     EKG, Pathology, and Other Studies: I have personally reviewed pertinent reports. VTE Prophylaxis: Sequential compression device (Venodyne) , hold for OR today     Code Status: Level 1 - Full Code  Advance Directive and Living Will:      Power of :    POLST:      Counseling / Coordination of Care  I spent 30 minutes with the patient.

## 2023-07-04 NOTE — UTILIZATION REVIEW
Initial Clinical Review    Admission: Date/Time/Statement:   Admission Orders (From admission, onward)     Ordered        07/03/23 1716  Inpatient Admission  Once                      Orders Placed This Encounter   Procedures   • Inpatient Admission     Standing Status:   Standing     Number of Occurrences:   1     Order Specific Question:   Level of Care     Answer:   Level 2 Stepdown / HOT [14]     Order Specific Question:   Estimated length of stay     Answer:   More than 2 Midnights     Order Specific Question:   Certification     Answer:   I certify that inpatient services are medically necessary for this patient for a duration of greater than two midnights. See H&P and MD Progress Notes for additional information about the patient's course of treatment. ED Arrival Information     Expected   7/3/2023     Arrival   7/3/2023 16:00    Acuity   Emergent            Means of arrival   Ambulance    Escorted by   AYO Matute)    Service   Trauma    Admission type   Emergency            Arrival complaint   Fall, R/o Central Cord syndrome           Chief Complaint   Patient presents with   • Fall     Pt is an UB trauma tx; pt had unwitnessed trip/fall, unknown LOC, unknown downtime       Initial Presentation: 80 y.o. male who presented initially to [facility] ED then transferred to [facility], admitted Inpatient status dt rule out central cord disorder. Presented following a fall down stairs, reporting struck back of head on ground, c/o neck and shoulder pain, weakness BL upper and BL lower extremities and numbness in hands. Unable to get off floor dt weakness. PMHx:  Depression, HTN. CT Head, C-spine, CAP negative for acute traumatic injuries. MRI Cspine pending. Transferred to [facility] for neurosurgery consult. Plan:  stepdown level 2 unit, keep NPO, NS consult, neuro checks, pain control, PT OT eval when able. Geriatrics and CM consult. Continue home lasix and monitor CKD stage 4. Date: 7/4   Day 2: Per Neurosurgery:  Patient with acute cervical spinal cord injury with bilateral upper extremity weakness and numbness. Patient and daughter were in agreement to pursue surgical intervention at this time. Preop orders placed. Plan for OR this morning. Patient will require ICU level of care postoperatively. Continue above tx plan including PT OT eval when able, CM following. 7/4 OP Note:     Procedure:  Procedure(s):  Posterior C4-5 laminectomy, C3-6 fixation/fusion, additional levels as needed  Anesthesia: General  Estimated Blood Loss: Minimal  Specimens:                No specimens collected during this procedure. Drains:   Closed/Suction Drain Right;Posterior Neck Bulb (Active)       Urethral Catheter Double-lumen 16 Fr. (Active)   Findings:  High grade stenosis C4-5. Complications:  none    Date: 7/5    Day 3: Has surpassed a 2nd midnight with active treatments and services, which include intubation and sedation, hourly neuro checks, pain control, ICU level of care.     ED Triage Vitals   Temperature Pulse Respirations Blood Pressure SpO2   07/03/23 1610 07/03/23 1610 07/03/23 1610 07/03/23 1610 07/03/23 1610   98.2 °F (36.8 °C) 87 18 149/72 93 %      Temp Source Heart Rate Source Patient Position - Orthostatic VS BP Location FiO2 (%)   07/03/23 1610 07/03/23 1610 07/03/23 1610 -- 07/04/23 2012   Oral Monitor Lying  40      Pain Score       07/03/23 1821       Med Not Given for Pain - for MAR use only          Wt Readings from Last 1 Encounters:   07/03/23 104 kg (229 lb 11.5 oz)     Additional Vital Signs:   Date/Time Temp Pulse Resp BP MAP (mmHg) Arterial Line BP MAP SpO2 FiO2 (%) Calculated FIO2 (%) - Nasal Cannula Nasal Cannula O2 Flow Rate (L/min) O2 Device Patient Position - Orthostatic VS   07/05/23 0700 97.2 °F (36.2 °C) Abnormal  66 10 Abnormal  -- -- 152/60 96 mmHg 99 % -- -- -- -- --   07/05/23 0600 97.5 °F (36.4 °C) 68 19 -- -- 144/58 92 mmHg 99 % -- -- -- -- -- 07/05/23 0500 97.5 °F (36.4 °C) 72 25 Abnormal  -- -- 152/60 98 mmHg 99 % -- -- -- -- --   07/05/23 0400 97.2 °F (36.2 °C) Abnormal  64 20 -- -- 132/66 96 mmHg 99 % -- -- -- -- --   07/05/23 0334 -- -- -- -- -- -- -- 99 % -- -- -- -- --   07/05/23 0300 97.5 °F (36.4 °C) 66 19 -- -- 106/96 102 mmHg 99 % -- -- -- -- --   07/05/23 0200 97.5 °F (36.4 °C) 68 19 -- -- 136/54 86 mmHg 99 % -- -- -- -- --   07/05/23 0100 97.5 °F (36.4 °C) 70 20 -- -- 132/52 84 mmHg 99 % -- -- -- -- --   07/05/23 0000 97.5 °F (36.4 °C) 74 21 -- -- 136/54 88 mmHg 99 % -- -- -- -- --   07/04/23 2300 97.9 °F (36.6 °C) 84 23 Abnormal  -- -- 138/58 92 mmHg 99 % -- -- -- -- --   07/04/23 2200 97.9 °F (36.6 °C) 80 24 Abnormal  -- -- 140/58 92 mmHg 98 % -- -- -- -- --   07/04/23 2100 98.2 °F (36.8 °C) 78 18 -- -- 132/54 86 mmHg 97 % 40 -- -- Ventilator --   07/04/23 2012 -- -- -- -- -- -- -- -- 40 -- -- Ventilator --   07/04/23 2000 97.9 °F (36.6 °C) 82 25 Abnormal  -- -- 130/58 88 mmHg 98 % -- -- -- -- --   07/04/23 1900 97.2 °F (36.2 °C) Abnormal  88 21 -- -- 124/48 78 mmHg 96 % -- -- -- -- --   07/04/23 1814 96.4 °F (35.8 °C) Abnormal  92 23 Abnormal  -- -- 138/56 88 mmHg 96 % -- -- -- -- --   07/04/23 1800 95.7 °F (35.4 °C) Abnormal  92 22 -- -- 140/56 90 mmHg 96 % -- -- -- -- --   07/04/23 1745 95.4 °F (35.2 °C) Abnormal  88 20 -- -- 136/54 86 mmHg 96 % -- -- -- -- --   07/04/23 1730 95 °F (35 °C) Abnormal  86 18 -- -- 138/56 88 mmHg 96 % -- -- -- -- --   07/04/23 1715 94.6 °F (34.8 °C) Abnormal  82 17 -- -- 142/58 92 mmHg 96 % -- -- -- -- --   07/04/23 1710 94.6 °F (34.8 °C) Abnormal  82 14 -- -- 142/58 92 mmHg 96 % -- -- -- -- --   07/04/23 1700 94.3 °F (34.6 °C) Abnormal  82 13 -- -- 142/58 92 mmHg 96 % -- -- -- -- --   07/04/23 1653 93.9 °F (34.4 °C) Abnormal  84 13 -- -- 132/54 84 mmHg 96 % -- -- -- -- --   07/04/23 1637 93.9 °F (34.4 °C) Abnormal  86 16 -- -- 130/52 82 mmHg 95 % -- -- -- -- --   07/04/23 1615 93.6 °F (34.2 °C) Abnormal  82 13 -- -- 134/54 86 mmHg 96 % -- -- -- -- --   07/04/23 1601 93.2 °F (34 °C) Abnormal  82 18 -- -- 126/52 82 mmHg 97 % -- -- -- -- --   07/04/23 1600 -- 84 -- -- -- -- -- -- -- -- -- -- --   07/04/23 1545 93.2 °F (34 °C) Abnormal  82 12 -- -- 134/56 88 mmHg 97 % -- -- -- -- --   07/04/23 1500 93.6 °F (34.2 °C) Abnormal  88 14 -- -- 90/40 58 mmHg Abnormal  96 % -- -- -- -- --   07/04/23 1440 94.5 °F (34.7 °C) Abnormal  88 16 142/62 -- -- -- 97 % -- -- -- Ventilator --   07/04/23 13:37:56 --  -- -- -- -- -- -- -- -- -- -- -- --   Temp: charted on incorrect patient at 07/04/23 1337   07/04/23 0914 97.7 °F (36.5 °C) 89 18 125/81 96 -- -- 96 % -- -- -- -- --   07/04/23 0800 -- -- -- -- -- -- -- -- -- 28 2 L/min Nasal cannula --   07/04/23 0400 -- -- -- -- -- -- -- -- -- 28 2 L/min Nasal cannula --   07/04/23 02:05:09 97.4 °F (36.3 °C) Abnormal  82 16 100/67 78 -- -- 98 % -- -- -- -- --   07/03/23 22:05:37 97 °F (36.1 °C) Abnormal  79 17 152/92 112 -- -- 97 % -- 28 2 L/min Nasal cannula --   07/03/23 20:21:36 97.1 °F (36.2 °C) Abnormal  81 18 160/92 115 -- -- 92 % -- -- -- -- --   07/03/23 1710 -- 82 18 154/70 -- -- -- 93 % -- -- -- Nasal cannula Lying   07/03/23 1610 98.2 °F (36.8 °C) 87 18 149/72 -- -- -- 93 % -- -- -- Nasal cannula  Lying   O2 Device: 2L at 07/03/23 1610     Pertinent Labs/Diagnostic Test Results:   XR spine cervical 2 or 3 vw injury   Final Result by CEDRIC Montalvo MD (07/04 1500)      Fluoroscopic guidance provided for surgical procedure. Please refer to the separate procedure notes for additional details. Localization procedure was performed, with the OR notified of the level at approximately 12:37 p.m. on 7/4/2023 via telephone conversation with the OR x-ray technologist .      Additional images were obtained after level verification and are present for evaluation.       Workstation performed: [id number]         MRI cervical spine wo contrast   Final Result by Vaibhav Black MD (07/03 2214)         1. Traumatic disc injury at C4-C5 where there is anterior disc injury (mild separation of the superior C4-C5 disc from the inferior endplate of the C4 vertebral body) with prevertebral fluid from C2-C5. 2. Cord edema at C4-C5 likely traumatic as this is the site of injury, rather than spondylitic myelopathy but needs to be correlated clinically.          I personally discussed this study with Dr. Ashish Ashton on 7/3/2023 10:13 PM.                  Workstation performed: [id number]         XR cervical spine 2 or 3 views    (Results Pending)   X-ray chest 1 view    (Results Pending)   XR chest portable ICU    (Results Pending)         Results from last 7 days   Lab Units 07/05/23 0447 07/04/23  0935 07/04/23  0544 07/03/23  1308   WBC Thousand/uL 17.56* 10.95* 9.74 6.82   HEMOGLOBIN g/dL 10.0* 10.9* 10.8* 10.7*   HEMATOCRIT % 31.8* 34.8* 35.3* 34.7*   PLATELETS Thousands/uL 249 222 217 193   NEUTROS ABS Thousands/µL  --   --  7.99* 4.17         Results from last 7 days   Lab Units 07/05/23  0447 07/04/23  1922 07/04/23  1724 07/04/23  0935 07/04/23  0544   SODIUM mmol/L 140 142 141 141 141   POTASSIUM mmol/L 5.0 5.0 4.5 5.0 4.9   CHLORIDE mmol/L 114* 116* 112* 115* 114*   CO2 mmol/L 24 24 23 26 24   ANION GAP mmol/L 2 2 6 0 3   BUN mg/dL 38* 37* 36* 34* 34*   CREATININE mg/dL 2.32* 2.29* 2.29* 2.16* 2.13*   EGFR ml/min/1.73sq m 24 24 24 26 26   CALCIUM mg/dL 8.0* 7.7* 7.7* 8.6 8.4   CALCIUM, IONIZED mmol/L 0.97*  --   --   --   --    MAGNESIUM mg/dL 2.1  --   --   --   --    PHOSPHORUS mg/dL 3.7  --   --   --   --      Results from last 7 days   Lab Units 07/04/23  0935 07/03/23  1308   AST U/L 31 15   ALT U/L 21 11   ALK PHOS U/L 78 65   TOTAL PROTEIN g/dL 7.2 6.8   ALBUMIN g/dL 3.0* 3.4*   TOTAL BILIRUBIN mg/dL 0.50 0.40         Results from last 7 days   Lab Units 07/05/23  0447 07/04/23  1922 07/04/23  1724 07/04/23  0935 07/04/23  0544 07/03/23  1308   GLUCOSE RANDOM mg/dL 130 165* 161* 91 101 104     Results from last 7 days   Lab Units 07/03/23  1308   CK TOTAL U/L 89     Results from last 7 days   Lab Units 07/03/23  1308   HS TNI 0HR ng/L 20         Results from last 7 days   Lab Units 07/04/23  0935 07/03/23  1308   PROTIME seconds 13.8 12.5   INR  1.03 0.87   PTT seconds 60*  --      Results from last 7 days   Lab Units 07/03/23  1308   ETHANOL LVL mg/dL <10     ED Treatment:   Medication Administration from 07/03/2023 1432 to 07/03/2023 2015       Date/Time Order Dose Route Action     07/03/2023 1821 EDT fentanyl citrate (PF) 100 MCG/2ML 25 mcg 25 mcg Intravenous Given        Past Medical History:   Diagnosis Date   • Ankle edema     last assessed [id number]   • Depression     last assessed [id number]   • Hypertension    • Kidney stone    • Nephrolithiasis    • Nocturia     last assessed [id number]   • Posterior tibial tendinitis of right lower extremity     last assessed [id number]     Present on Admission:  • Stage 4 chronic kidney disease ([address])  • Weakness of both upper extremities      Admitting Diagnosis: Neck pain [M54.2]  Fall, initial encounter [W19. XXXA]  Weakness of both upper extremities [R29.898]  Unspecified multiple injuries, initial encounter [T07. XXXA]  Age/Sex: 80 y.o. male  Admission Orders:  Scheduled Medications:  acetaminophen, 975 mg, Oral, Q8H UDAY  allopurinol, 100 mg, Oral, Daily  chlorhexidine, 15 mL, Mouth/Throat, Q12H UDAY  heparin (porcine), 5,000 Units, Subcutaneous, Q8H UDAY  lidocaine, 1 patch, Topical, Daily  melatonin, 6 mg, Oral, HS  omeprazole (PRILOSEC) suspension 2 mg/mL, 20 mg, Oral, Daily  senna-docusate sodium, 1 tablet, Oral, BID  tamsulosin, 0.4 mg, Oral, Daily      Continuous IV Infusions:  dexmedetomidine, 0.1-0.7 mcg/kg/hr, Intravenous, Titrated  phenylephine,  mcg/min, Intravenous, Titrated      PRN Meds:  fentanyl citrate (PF), 50 mcg, Intravenous, Q1H PRN  methocarbamol, 500 mg, Oral, Q6H PRN  ondansetron, 4 mg, Intravenous, Q6H PRN    scd      IP CONSULT TO NEUROSURGERY  IP CONSULT TO GERONTOLOGY  IP CONSULT TO CASE MANAGEMENT  IP CONSULT TO SURGICAL CRITICAL CARE  IP CONSULT TO PHYSICAL MEDICINE REHAB    Network Utilization Review Department  ATTENTION: Please call with any questions or concerns to [phone] and carefully listen to the prompts so that you are directed to the right person. All voicemails are confidential.  Donovan Wilkerson all requests for admission clinical reviews, approved or denied determinations and any other requests to dedicated fax number below belonging to the campus where the patient is receiving treatment.  List of dedicated fax numbers for the Facilities:  [place] DENIALS (Administrative/Medical Necessity) [phone] [address] [facility] (Maternity/NICU/Pediatrics) [phone]   [address] [place] [address] [phone]   [facility] [address] [facility] [phone]   [address] [place] [address] [phone]   [address] [address] [facility] [phone]

## 2023-07-04 NOTE — QUICK NOTE
Made aware this morning that patient with acute cervical spinal cord injury with bilateral upper extremity weakness and numbness. Had extensive conversation at the bedside with patient and daughter via telephone as well as Dr. Keila Junior in regards to management options including both conservative and aggressive with surgical measures. Explained the nature of a spinal cord injury and the typical recovery both with surgery and without surgery. Explained the risk and benefits of surgical intervention at the bedside. Patient and daughter were in agreement to pursue surgical intervention at this time. Preop orders placed. OR made aware. Plan for OR this morning with Dr. Keila Junior. Patient will require ICU level of care postoperatively. See formal consult note to follow.

## 2023-07-04 NOTE — OP NOTE
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 Estimated Blood Loss: Minimal    Specimens:                No specimens collected during this procedure. Drains:   Closed/Suction Drain Right;Posterior Neck Bulb (Active)       Urethral Catheter Double-lumen 16 Fr. (Active)       Findings:  High grade stenosis C4-5. Complications:  none    OR note:      Details of Procedure    The patient was brought to OR and successfully induced with general endotracheal anesthesia. A radial arterial line was placed. While I held in line immobilization, a Naye Selam was used to fiberoptically intubate the patient. The patient was placed in Hirsch pins, and then log rolled onto chest rolls on the OR table, and the Hirsch connected to the OR table. A/P and lateral fluoroscopy was used to confirm good alignment, lordosis, etc.     A midline posterior incision was marked. Hair was minimally clipped. The posterior cervical area was prepped and draped in the standard fashion. A timeout was performed. The patient received antibiotics per protocol, 10mg of Decadron, and MAPs were kept > 85 at all times.      The skin was incised with a skin scalpel and dissection carried down through the midline raphe, and a subperiosteal dissection carried out over the intended posterior cervical spine. A fluorograph was taken to confirm the levels exposed prior to any bone/ligamentous removal/disruption. The ball bur on the [employer] drill was to create entry points to the posterior cortex of the lateral masses of C3-6 bilaterally. The lateral masses were then prepared by drilling in the typical up and out trajectory using a 14 mm depth guard. These were sounded and found to be competent. They were tapped as above. 14 mm long by 3.5 mm diameter lateral mass screws were placed at these levels without difficulty. Rods were cut and bent to fit, but not yet secured. The interspinous ligament between C3-4 and C5-6 was divided. The [employer] drill using a matchstick bit was used to create bilateral laminar troughs C4 & C5. A sharp towel clamp was used to grasp the C5 spinous process, gently elevate, and the residual ligament, etc. Divided with a 2 mm kerrison to remove the C4 & C5 lamina in lobster tail style. Monitoring was stable post decompression. Epidural hemostasis was achieved. The wound was copiously irrigated with antibiotic saline. The exposed remaining lateral mass as well as the facets were drilled out with the [place] drill. The previously cut and bent rods were secured with set screws, and final tightened. This served to reduce and stabilize the rupture of the ALL at C4-5. Hardware was checked with AP and lateral fluorographs. Morcelized autograft from the laminectomy was mixed with [place] paste and applied over the decorticated bone and into the decorticated facet joints to create an arthrodesis. A 7 flat Jose Martin-Short drain was placed epidural and tunneled out through separate stab incision. The deep musculature and then the fascia was closed with interrupted 0 Vicryl Plus sutures. The deep dermis was closed with inverted interrupted 2-0 Vicryl Plus sutures. The skin was closed staples.   Drain secured with drain stitch. Incision was blocked with 0.5% Marcaine with epinephrine. All instrument counts, sponge counts, and needle counts were correct prior to closure the skin. Incision was dressed with Acticoat, 4x4s, and Tegaderms. The drapes were withdrawn. The Hirsch head higgins detached from the OR table, and the patient rotated supine onto his hospital bed. Hirsch pins were removed, and there was some bleeding from the anterior left site, which was stapled. The patient was awoken from general endotracheal anesthesia, still intubated, and taken to the ICU directly in cardiovascularly stable condition.      Nellie Sanchez MD     Date: 7/4/2023  Time: 1:42 PM

## 2023-07-04 NOTE — OCCUPATIONAL THERAPY NOTE
OT CANCEL NOTE    OT orders received. Chart reviewed. Pt is pending OR this AM w/ Nsx for "Posterior C4-5 laminectomy, C3-6 fixation/fusion, additional levels as needed." Will hold initial OT evaluation. Will continue to follow pt on caseload and see pt when medically stable and as clinically appropriate, post-op. 07/04/23 0954   OT Last Visit   OT Visit Date 07/04/23   Note Type   Note type Evaluation; Cancelled Session   Cancel Reasons Patient to operating room         Abhilash Guzman MS, OTR/L

## 2023-07-04 NOTE — CONSULTS
[address] [place] [address]  Consult: Critical Care  Name: Dami Montelongo 80 y.o. male I MRN: [id number]  Unit/Bed#: ICU 08 I Date of Admission: 7/3/2023   Date of Service: 7/4/2023 I Hospital Day: 1      Consults  Assessment/Plan     Assessment and Plan  Principal Problem:    Weakness of both upper extremities  Active Problems:    Stage 4 chronic kidney disease ([address])    Fall  Resolved Problems:    * No resolved hospital problems. *      Neuro:   · Central cord syndrome  ? POD #0 s/p posterior C4-5 laminectomy, C3-6 fixation/fusion  ? Maintain MAP > 85 to optimize spinal cord perfusion   ? Q1h neuromotor checks  ? Monitor RONY drain output amount and character  ? No C-collar necessary at this time   · Acute metabolic encephalopathy  ? Related continuous sedation, anesthesia, and advanced age   ? Q1h neuro checks neuro checks     ? Sleep/wake cycle regulation as able   § Melatonin 6mg qHS  ? CAM-ICU BID  · Sedation/analgesia   ? Continuous infusions:   § Propofol 50 mcg/kg/min   § Transition to precedex as needed to prevent hypotension   § Titrate to goal RASS 0 to -1  ? Scheduled medication:  § Tylenol 975mg q8h   § Lidoderm patch  ? PRN medications:  § Fentanyl 50mcg q1h PRN  § If extubated, can transition to alternate regimen:  § Oxycodone 2.5 - 5mg q4h PRN  § Robaxin 500mg q6h PRN  § Dilaudid 0.2mg q4h PRN       CV:   • Hypertension  o Maintain SBP < 180  o Maintain MAP > 85 in the setting of C-spine surgery   o 1L IVF now   o Phenylephrine infusion as needed   o Continue arterial line monitoring   o Holding home antihypertensives:  - Losartan 100mg daily   - Lasix 20mg daily     Pulm:  • Acute respiratory failure  o Left intubated post-operatively   o Mechanical ventilation day # 1 (7/4/23)   o AC/VC: 12/450/50%/6   - Maintain VT 6-8ml/kg IBW  - Maintain SpO2 >90%  o Check CXR now  o Check ABG in 30 min  o Attempt to wean to extubate as able   o  Suction as needed and closely monitor secretions. Maintain HOB >30 degrees. Q4h oral care with chlorhexidine BID  o Monitor peak and plateau airway pressures. Maintain Ppeak < 45cm H2O, Pplat < 30cm H2O   • MELIA   o CPAP qHS once extubated      GI:   • Ulcerative colitis   o Holding home sulfasalazine TID (500mg, 1000mg, 500mg)   • Stress ulcer prophylaxis  o Omeprazole 20mg daily   • Bowel regimen  o Senna/colace BID  o Last BM: PTA    :   • CKD stage 4  o Patient follows with Dr. Christophe Palomino as outpatient and has known hypertensive nephrosclerosis and advanced age   o Baseline creatinine: 1.90 - 2.40  o Admission creatinine: 1.87 (7/3/23)   o Last creatinine: 2.16 (7/4/23)   o Strict q2h I/O monitoring  o Maintain Burkett catheter  o Continue to follow renal function tests  • BPH  o Continue home tamsulosin 0.4mg qHS     F/E/N:   • Fluids  o Maintenance fluids: Isolyte 75ml/hr   o Diuresis plan: Holding home diuretic for now to prevent hypotension.  Can diurese as indicated   • Electrolytes   o Replete electrolytes with as needed to maintain K >4.0, Mag >2.0, Phos >3.0, ical > 1.10  • Nutrition  o NPO     Heme/Onc:   • Normocytic anemia  o Likely secondary to CKD4  o Baseline hemoglobin: 12.8 - 11.0  o Admission hemoglobin: 10.7 (7/3/23)   o Last hemoglobin: 10.9 (7/4/23)   o Transfuse for hemoglobin <7.0  o CBC in AM  • VTE prophylaxis:  Okay to start lovenox tomorrow per neurosurgery SCD's to BLE    Endo/Rheum:   • No acute issues   o Last hemoglobin A1c 5.1% on 7/4/23  o Add insulin regimen as needed to maintain goal -180    ID:   • Leukocytosis   o Suspect leukemoid stress reaction   o Patient did receive dexamethasone intra-operatively, will anticipate further rise   o Continue to monitor fever and WBC curve off antibiotics       MSK/Skin:   • BUE hemiplegia   o Reposition q2h, eliminate pressure points while in bed  o Close skin surveillance   o PT/OT   o PMR consultation   • Gout  o Allopurinol 100mg daily       Disposition: Critical care     History of Present Illness     HPI: Luciano Rodriguez is a 80 y.o. with CKD4, ulcerative colitis, hypertension, BPH, and gout fell down stairs and was found by his wife on 7/3/23. He denied LOC, but did stain a head strike. Upon presentation to the ED, he complained of neck pain and global weakness with his upper extremities notably more weak than his lowers with associated decrease in bilateral hand sensation. Initial CT imaging was negative for acute injury, but MRI C-spine cord edema. He presents today intubated following a posterior C4-5 laminectomy, C3-6 fixation/fusion. History obtained from chart review. Review of Systems   Unable to perform ROS: Intubated     Historical Information   Past Medical History:  No date:  Ankle edema      Comment:  last assessed [id number]  No date: Depression      Comment:  last assessed [id number]  No date: Hypertension  No date: Kidney stone  No date: Nephrolithiasis  No date: Nocturia      Comment:  last assessed [id number]  No date: Posterior tibial tendinitis of right lower extremity      Comment:  last assessed [id number] Past Surgical History:  No date: ANAL FISSURECTOMY  04/22/2009: COLONOSCOPY      Comment:  every 2 years  03/04/2013: COLONOSCOPY      Comment:  2 yrs d/t h/o polyp  02/15/2016: COLONOSCOPY      Comment:  colo 2/15/16-repeat 2 yrs  No date: SHOULDER SURGERY   Current Outpatient Medications   Medication Instructions   • allopurinol (ZYLOPRIM) 100 mg tablet TAKE 1 TABLET BY MOUTH  DAILY   • cholecalciferol (VITAMIN D3) 1,000 units tablet 2 tablets, Oral, Daily   • folic acid (FOLVITE) 1 mg, Oral, Daily   • furosemide (LASIX) 40 mg tablet TAKE ONE-HALF TABLET BY  MOUTH DAILY   • losartan (COZAAR) 100 MG tablet TAKE 1 TABLET BY MOUTH DAILY   • Multiple Vitamins-Minerals (PRESERVISION AREDS) CAPS 1 capsule, Oral, Daily   • ofloxacin (OCUFLOX) 0.3 % ophthalmic solution Install 1 drop in the L eye 4 times daily for 5 days after eye injection as directed   • other medication, see sig, Medication/product name: Alverto<BR>Strength: <BR>Sig (include dose, route, frequency): daily    • sulfaSALAzine (AZULFIDINE) 500 mg tablet TAKE 1 TABLET IN THE MORNING, 2 TABLETS AT NOON AND 1 TABLET IN THE EVENING   • tamsulosin (FLOMAX) 0.4 mg TAKE 1 CAPSULE BY MOUTH  DAILY    No Known Allergies   Social History     Tobacco Use   • Smoking status: Never   • Smokeless tobacco: Never   Vaping Use   • Vaping Use: Never used   Substance Use Topics   • Alcohol use: Not Currently     Comment: occassionally   • Drug use: No    Family History   Problem Relation Age of Onset   • Clotting disorder Mother    • Nephrolithiasis Father    • Diabetes Sister    • Heart disease Daughter    • Fibromyalgia Daughter           Objective                            Vitals I/O      Most Recent Min/Max in 24hrs   Temp (!) 94.5 °F (34.7 °C) Temp  Min: 94.5 °F (34.7 °C)  Max: 98.2 °F (36.8 °C)   Pulse 88 Pulse  Min: 49  Max: 89   Resp 16 Resp  Min: 16  Max: 18   /62 BP  Min: 100/67  Max: 160/92   O2 Sat 97 % SpO2  Min: 92 %  Max: 98 %      Intake/Output Summary (Last 24 hours) at 7/4/2023 1520  Last data filed at 7/4/2023 1450  Gross per 24 hour   Intake 2200 ml   Output 1145 ml   Net 1055 ml         Diet NPO; Sips with meds     Invasive Monitoring Physical exam   Arterial Line  [place] /58  Arterial Line BP  Min: 90/40  Max: 142/58   MAP 92 mmHg  Arterial Line MAP (mmHg)  Min: 58 mmHg  Max: 92 mmHg    Physical Exam  Vitals reviewed. Constitutional:       General: He is not in acute distress. Appearance: Normal appearance. HENT:      Head: Normocephalic and atraumatic. Eyes:      Conjunctiva/sclera: Conjunctivae normal.      Pupils: Pupils are equal, round, and reactive to light. Neck:      Comments: Posterior drain with serosang output   Cardiovascular:      Rate and Rhythm: Normal rate and regular rhythm. Pulses:           Radial pulses are 2+ on the right side and 2+ on the left side.         Dorsalis pedis pulses are 2+ on the right side and 2+ on the left side. Heart sounds: Normal heart sounds. Pulmonary:      Effort: Pulmonary effort is normal.   Abdominal:      General: Abdomen is protuberant. There is no distension. Palpations: Abdomen is soft. Tenderness: There is no abdominal tenderness. Genitourinary:     Comments: Burkett: clear, yellow urine   Musculoskeletal:      Right lower leg: No edema. Left lower leg: No edema. Skin:     General: Skin is warm and dry. Capillary Refill: Capillary refill takes less than 2 seconds. Neurological:      Mental Status: He is lethargic. GCS: GCS eye subscore is 3. GCS verbal subscore is 1. GCS motor subscore is 4. Motor: Weakness present. Comments: Unable to completely assess strength due to sedation and post anesthesia               Diagnostic Studies      EKG: NSR   Imaging: No new imaging  I have personally reviewed pertinent reports.    and I have personally reviewed pertinent films in PACS     Medications:  Scheduled PRN   acetaminophen, 975 mg, Q8H [facility]  allopurinol, 100 mg, Daily  cefazolin, 1,000 mg, Q8H  chlorhexidine, 15 mL, Q12H [facility]  [START ON 7/5/2023] heparin (porcine), 5,000 Units, Q8H [facility]  lidocaine, 1 patch, Daily  senna-docusate sodium, 1 tablet, BID  sodium chloride, 1,000 mL, Once  tamsulosin, 0.4 mg, Daily      fentanyl citrate (PF), 50 mcg, Q1H PRN  methocarbamol, 500 mg, Q6H PRN  ondansetron, 4 mg, Q6H PRN       Continuous    dexmedetomidine, 0.1-0.7 mcg/kg/hr  multi-electrolyte, 75 mL/hr, Last Rate: 75 mL/hr (07/04/23 1430)  phenylephine,  mcg/min, Last Rate: 50 mcg/min (07/04/23 1504)  propofol, 5-50 mcg/kg/min         Labs:    CBC    Recent Labs     07/04/23  0544 07/04/23  0935   WBC 9.74 10.95*   HGB 10.8* 10.9*   HCT 35.3* 34.8*    222     BMP    Recent Labs     07/04/23  0544 07/04/23  0935   SODIUM 141 141   K 4.9 5.0   * 115*   CO2 24 26   AGAP 3 0   BUN 34* 34*   CREATININE 2.13* 2.16*   CALCIUM 8.4 8.6       Coags    Recent Labs     07/03/23  1308 07/04/23  0935   INR 0.87 1.03   PTT  --  60*        Additional Electrolytes  No recent results       Blood Gas    No recent results  No recent results LFTs  Recent Labs     07/03/23  1308 07/04/23  0935   ALT 11 21   AST 15 31   ALKPHOS 65 78   ALB 3.4* 3.0*   TBILI 0.40 0.50       Infectious  No recent results  Glucose  Recent Labs     07/03/23  1308 07/04/23  0544 07/04/23  0935   GLUC  [facility]

## 2023-07-04 NOTE — PHYSICAL THERAPY NOTE
Physical Therapy Cancellation Note    Patient's Name: Jamir Sanz       07/04/23 1201   PT Last Visit   PT Visit Date 07/04/23   Note Type   Note type Cancelled Session   Cancel Reasons Patient to operating room   Additional Comments Will follow for PT eval post-op.        Pérez Knapp, PT, DPT

## 2023-07-04 NOTE — CASE MANAGEMENT
Case Management Assessment & Discharge Planning Note    Patient name Rolando Bee  Location [address]/[facility] 858-55 MRN [id number]  : 1934 Date 2023       Current Admission Date: 7/3/2023  Current Admission Diagnosis:Weakness of both upper extremities   Patient Active Problem List    Diagnosis Date Noted   • Weakness of both upper extremities 2023   • Fall 2023   • Proteinuria 2022   • Stage 4 chronic kidney disease ([address]) 2022   • Hypertriglyceridemia 2022   • Low HDL (under 40) 2020   • Nephrolithiasis 2018   • Severe obesity (BMI 35.0-39. 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 [address]  Phone: [phone] Fax: [phone] [place] [address]  [address] [place] [address]  Phone: [phone] Fax: [phone] [facility] ([facility] ) - [place], [address] [place] [address] Road  [address] [place]  Phone: [phone] Fax: [phone]    Primary Care Provider: Giancarlo Conley DO    Primary Insurance: Alecia Wen [facility]  Secondary Insurance:     ASSESSMENT:  [facility], [address] [place] [address] Road Representative - Daughter   Primary Phone: [phone] (Mobile)               Patient Information  Admitted from[de-identified] Home  Mental Status: Alert  During Assessment patient was accompanied by: Not accompanied during assessment  Assessment information provided by[de-identified] Spouse  Primary Caregiver: Self  Support Systems: Spouse/significant other, [address] of Residence: [address] [facility] do you live in?: [address] entry access options.  Select all that apply.: Stairs  Number of steps to enter home.: 6  Type of Current Residence: 2 story home  Upon entering residence, is there a bedroom on the main floor (no further steps)?: No (Patient sleeps on the sofa- bedroom is upstairs)  A bedroom is located on the following floor levels of residence (select all that apply):: 2nd Floor  Upon entering residence, is there a bathroom on the main floor (no further steps)?: No  Indicate which floors of current residence have a bathroom (select all the apply):: 2nd Floor  Number of steps to 2nd floor from main floor: One Flight  In the last 12 months, was there a time when you were not able to pay the mortgage or rent on time?: No  In the last 12 months, how many places have you lived?: 1  In the last 12 months, was there a time when you did not have a steady place to sleep or slept in a shelter (including now)?: No  Living Arrangements: Lives w/ Spouse/significant other  Is patient a ?: Yes  Is patient active with [facility] [employer])?: No  Is patient service connected?: No    Activities of Daily Living Prior to Admission  Functional Status: Independent  Completes ADLs independently?: Yes  Ambulates independently?: Yes  Does patient currently own DME?: Yes  What DME does the patient currently own?: [employer], [employer], Bedside Com[employer]  Does patient have a history of Outpatient Therapy (PT/OT)?: No  Does the patient have a history of Short-Term Rehab?: No  Does patient have a history of HHC?: No  Does patient currently have [facility]?: No    Patient Information Continued  Income Source: Pension/half-way  Does patient have prescription coverage?: Yes  Within the past 12 months, you worried that your food would run out before you got the money to buy more.: Never true  Within the past 12 months, the food you bought just didn't last and you didn't have money to get more.: Never true  Food insecurity resource given?: N/A  Does patient receive dialysis treatments?: No  Does patient have a history of substance abuse?: No  Does patient have a history of Mental Health Diagnosis?: No    Means of Transportation  Means of Transport to Appts[de-identified] Drives Self  In the past 12 months, has lack of transportation kept you from medical appointments or from getting medications?: No  In the past 12 months, has lack of transportation kept you from meetings, work, or from getting things needed for daily living?: No  Was application for public transport provided?: N/A        DISCHARGE DETAILS:    Discharge planning discussed with[de-identified] Spouse  Freedom of Choice: Yes  Comments - Freedom of Choice: Discussed FOC  CM contacted family/caregiver?: Yes     CM reviewed d/c planning process including the following: identifying help at home, patient preference for d/c planning needs, Discharge Lounge, [facility] Meds to Bed program, availability of treatment team to discuss questions or concerns patient and/or family may have regarding understanding medications and recognizing signs and symptoms once discharged. CM also encouraged patient to follow up with all recommended appointments after discharge. Patient advised of importance for patient and family to participate in managing patient’s medical well being. Information obtained from patient's spouse, lives with spouse in [address], [address], patient sleeps on sofa on first level of home, full bathroom and bedroom located on 2nd floor. IADLS, wife states that patient was doing things on his own, just taking him longer to complete. Patient drives, is vaccinated for covid. Retired , enjoys using his IPAD.   No additional falls in the past 3 months

## 2023-07-04 NOTE — ANESTHESIA POSTPROCEDURE EVALUATION
Post-Op Assessment Note    CV Status:  Stable  Pain scale: Intubated and Sedated. Pain management: adequate     Post-procedure mental status: Intubated and Sedated. Hydration Status:  Euvolemic and stable   PONV Controlled:  Controlled   Airway Patency:  Patent  Airway: intubated      Post Op Vitals Reviewed: Yes      Staff: Anesthesiologist, CRNA   Comments: Pt brought directly to ICU. Report given to overseeing ICU RNAlvino ACEVES. Pt intubated and sedated. There were no known notable events for this encounter.     /62 (07/04/23 1440)    Temp (!) 94.5 °F (34.7 °C) (07/04/23 1440)    Pulse 88 (07/04/23 1440)   Resp 16 (07/04/23 1440)    SpO2 97 % (07/04/23 1440)

## 2023-07-04 NOTE — PROGRESS NOTES
[address] [facility]  Progress Note  Name: Aravind Croft  MRN: [id number]  [facility] Date of Admission: 7/3/2023   Date of Service: 7/4/2023 I Hospital Day: 1    Assessment/Plan   Fall  Assessment & Plan  - Pt reports fall, posterior head strike, no loss of consciousness, denies use of antiplatelet or anticoagulation medications  - Status post fall with the below noted injuries. - Fall precautions. - Geriatric Medicine consultation for evaluation, medication review and recommendations.  - PT and OT evaluation and treatment as indicated. - Case Management consultation for disposition planning. Stage 4 chronic kidney disease [facility])  Assessment & Plan  Lab Results   Component Value Date    EGFR 31 07/03/2023    EGFR 24 05/19/2023    EGFR 22 02/22/2023    CREATININE 1.87 (H) 07/03/2023    CREATININE 2.30 (H) 05/19/2023    CREATININE 2.44 (H) 02/22/2023     - Continue home lasix  - Continue to monitor    * Weakness of both upper extremities  Assessment & Plan  - Transferred to [facility] to rule out central cord disorder s/p fall down stairs  - Bilateral shoulder pain and weakness in bilateral upper extremities (2/5  strength bilaterally, cannot raise arms off the bed) and complaining of neck pain  - Pt reports weakness in bilateral LE as well, but strength is 4/5 bilateral LE  - CT Head, C-spine, CAP negative for acute traumatic injuries  - Maintain cervical collar at all times  - MRI with area of cord edema consistent with central cord syndrome  - Neurosurgery consult placed  - Pain control  - DVT ppx  - PT and OT when indicated             Bowel Regimen: senokot  VTE Prophylaxis:Enoxaparin (Lovenox)     Disposition: continue med surg    Subjective   Chief Complaint: weakness    Subjective: 81 y/o M presenting after a fall. Patient continues to experience weakness of his upper arms. Pt frustrated with having to wear collar, reiterated importance.       Objective   Vitals: Temp:  [97 °F (36.1 °C)-98.2 °F (36.8 °C)] 97.4 °F (36.3 °C)  HR:  [79-89] 82  Resp:  [16-18] 16  BP: (100-165)/(67-92) 100/67    I/O       07/02 0701  07/03 0700 07/03 0701  07/04 0700    Urine (mL/kg/hr)  50    Total Output  50    Net  -50                 Physical Exam:     General: NAD, non-toxic  HEENT: NC/AT, PERRL  Neck: C collar in place  CV: RRR, no m/r/g  Pulm: effort WNL, CTAB, no wheezes/rales/rhonchi  GI: soft, NT/ND, no rebound/guarding  MSK: no peripheral edema  Neuro: CN II-XII grossly intact, RUE proximal strength 5/5, distal including triceps 2/5; LUE proximal strength 5/5, distal including triceps 2/5. B/L LE strength 5/5  Skin: no rashes/wounds        Invasive Devices     Peripheral Intravenous Line  Duration           Peripheral IV 07/03/23 Left;Ventral (anterior) Hand <1 day    Peripheral IV 07/03/23 Right;Ventral (anterior) Forearm <1 day                      Lab Results:   Results: I have personally reviewed all pertinent laboratory/tests results, BMP/CMP:   Lab Results   Component Value Date    SODIUM 141 07/04/2023    K 4.9 07/04/2023     (H) 07/04/2023    CO2 24 07/04/2023    BUN 34 (H) 07/04/2023    CREATININE 2.13 (H) 07/04/2023    CALCIUM 8.4 07/04/2023    AST 15 07/03/2023    ALT 11 07/03/2023    ALKPHOS 65 07/03/2023    EGFR 26 07/04/2023    and CBC:   Lab Results   Component Value Date    WBC 9.74 07/04/2023    HGB 10.8 (L) 07/04/2023    HCT 35.3 (L) 07/04/2023    MCV 93 07/04/2023     07/04/2023    RBC 3.80 (L) 07/04/2023    MCH 28.4 07/04/2023    MCHC 30.6 (L) 07/04/2023    RDW 13.6 07/04/2023    MPV 9.8 07/04/2023    NRBC 0 07/04/2023     Imaging: I have personally reviewed pertinent reports.      Other Studies: n/a

## 2023-07-04 NOTE — ANESTHESIA PROCEDURE NOTES
Arterial Line Insertion    Performed by: Rupa Osei CRNA  Authorized by: Leander Verdugo DO  Consent: Verbal consent obtained. Written consent obtained. Consent given by: patient  Patient understanding: patient states understanding of the procedure being performed  Patient consent: the patient's understanding of the procedure matches consent given  Procedure consent: procedure consent matches procedure scheduled  Relevant documents: relevant documents present and verified  Test results: test results available and properly labeled  Patient identity confirmed: verbally with patient and arm band  Preparation: Patient was prepped and draped in the usual sterile fashion. Indications: hemodynamic monitoring  Orientation:  Left  Location: radial artery  Sedation:  Patient sedated: GETA.     Procedure Details:  Needle gauge: 20  Seldinger technique: Seldinger technique used  Number of attempts: 1    Post-procedure:  Post-procedure: dressing applied  Waveform: good waveform and waveform confirmed  Post-procedure CNS: normal and unchanged  Patient tolerance: Patient tolerated the procedure well with no immediate complications  Comments: Placed by Anesthesiologist Leobardo Vo

## 2023-07-04 NOTE — ASSESSMENT & PLAN NOTE
- Transferred to B to rule out central cord disorder s/p fall down stairs  - Bilateral shoulder pain and weakness in bilateral upper extremities (2/5  strength bilaterally, cannot raise arms off the bed) and complaining of neck pain  - Pt reports weakness in bilateral LE as well, but strength is 4/5 bilateral LE  - CT Head, C-spine, CAP negative for acute traumatic injuries  - Maintain cervical collar at all times  - MRI with area of cord edema consistent with central cord syndrome  - Neurosurgery consult placed  - Pain control  - DVT ppx  - PT and OT when indicated

## 2023-07-04 NOTE — PLAN OF CARE
Problem: SAFETY ADULT  Goal: Maintain or return to baseline ADL function  Description: INTERVENTIONS:  -  Assess patient's ability to carry out ADLs; assess patient's baseline for ADL function and identify physical deficits which impact ability to perform ADLs (bathing, care of mouth/teeth, toileting, grooming, dressing, etc.)  - Assess/evaluate cause of self-care deficits   - Assess range of motion  - Assess patient's mobility; develop plan if impaired  - Assess patient's need for assistive devices and provide as appropriate  - Encourage maximum independence but intervene and supervise when necessary  - Involve family in performance of ADLs  - Assess for home care needs following discharge   - Consider OT consult to assist with ADL evaluation and planning for discharge  - Provide patient education as appropriate  Outcome: Progressing  Goal: Maintains/Returns to pre admission functional level  Description: INTERVENTIONS:  - Perform BMAT or MOVE assessment daily.   - Set and communicate daily mobility goal to care team and patient/family/caregiver. - Collaborate with rehabilitation services on mobility goals if consulted  - Perform Range of Motion  times a day. - Reposition patient every  hours.   - Dangle patient  times a day  - Stand patient  times a day  - Ambulate patient  times a day  - Out of bed to chair  times a day   - Out of bed for meals  times a day  - Out of bed for toileting  - Record patient progress and toleration of activity level   Outcome: Progressing  Goal: Patient will remain free of falls  Description: INTERVENTIONS:  - Educate patient/family on patient safety including physical limitations  - Instruct patient to call for assistance with activity   - Consult OT/PT to assist with strengthening/mobility   - Keep Call bell within reach  - Keep bed low and locked with side rails adjusted as appropriate  - Keep care items and personal belongings within reach  - Initiate and maintain comfort rounds  - Make Fall Risk Sign visible to staff  - Offer Toileting every  Hours, in advance of need  - Initiate/Maintain alarm  - Obtain necessary fall risk management equipment:  - Apply yellow socks and bracelet for high fall risk patients  - Consider moving patient to room near nurses station  Outcome: Progressing     Problem: PAIN - ADULT  Goal: Verbalizes/displays adequate comfort level or baseline comfort level  Description: Interventions:  - Encourage patient to monitor pain and request assistance  - Assess pain using appropriate pain scale  - Administer analgesics based on type and severity of pain and evaluate response  - Implement non-pharmacological measures as appropriate and evaluate response  - Consider cultural and social influences on pain and pain management  - Notify physician/advanced practitioner if interventions unsuccessful or patient reports new pain  Outcome: Progressing     Problem: Knowledge Deficit  Goal: Patient/family/caregiver demonstrates understanding of disease process, treatment plan, medications, and discharge instructions  Description: Complete learning assessment and assess knowledge base.   Interventions:  - Provide teaching at level of understanding  - Provide teaching via preferred learning methods  Outcome: Progressing     Problem: NEUROSENSORY - ADULT  Goal: Achieves stable or improved neurological status  Description: INTERVENTIONS  - Monitor and report changes in neurological status  - Monitor vital signs such as temperature, blood pressure, glucose, and any other labs ordered   - Initiate measures to prevent increased intracranial pressure  - Monitor for seizure activity and implement precautions if appropriate      Outcome: Progressing  Goal: Remains free of injury related to seizures activity  Description: INTERVENTIONS  - Maintain airway, patient safety  and administer oxygen as ordered  - Monitor patient for seizure activity, document and report duration and description of seizure to physician/advanced practitioner  - If seizure occurs,  ensure patient safety during seizure  - Reorient patient post seizure  - Seizure pads on all 4 side rails  - Instruct patient/family to notify RN of any seizure activity including if an aura is experienced  - Instruct patient/family to call for assistance with activity based on nursing assessment  - Administer anti-seizure medications if ordered    Outcome: Progressing  Goal: Achieves maximal functionality and self care  Description: INTERVENTIONS  - Monitor swallowing and airway patency with patient fatigue and changes in neurological status  - Encourage and assist patient to increase activity and self care.    - Encourage visually impaired, hearing impaired and aphasic patients to use assistive/communication devices  Outcome: Progressing

## 2023-07-04 NOTE — RESPIRATORY THERAPY NOTE
RT Ventilator Management Note  Bautista Javed 80 y.o. male MRN: [id number]  Unit/Bed#: ICU 08 Encounter: [id number]      Daily Screen         7/4/2023  6702             Patient safety screen outcome[de-identified] Failed    Not Ready for Weaning due to[de-identified] Underline problem not resolved              Physical Exam:   Assessment Type: Assess only  General Appearance: Sedated  Respiratory Pattern: Assisted  Chest Assessment: Chest expansion symmetrical  Bilateral Breath Sounds: Clear, Diminished  O2 Device: Vent      Resp Comments: (P) Pt brought up from OR after reintubation following procedure. Pt placed on vent without issues, pt vent settings started at RR 12  FIO2 40% 6 PEEP. Pt appears to be tolerating these settings well, will continue to montor pt per protocol.

## 2023-07-04 NOTE — ANESTHESIA PREPROCEDURE EVALUATION
Procedure:  Posterior C4-5 laminectomy, C3-6 fixation/fusion, additional levels as needed (Spine Cervical)    Relevant Problems   CARDIO   (+) Hypertriglyceridemia   (+) Primary hypertension      /RENAL   (+) Nephrolithiasis   (+) Stage 4 chronic kidney disease (HCC)      MUSCULOSKELETAL   (+) Gout        Physical Exam    Airway    Mallampati score: III  TM Distance: <3 FB  Neck ROM: limited     Dental       Cardiovascular      Pulmonary      Other Findings  C-collar in place. Difficult to assess airway with C-collar in place. Anesthesia Plan  ASA Score- 3 Emergent    Anesthesia Type- general with ASA Monitors. Additional Monitors: arterial line. Airway Plan: ETT. Plan Factors-Exercise tolerance (METS): >4 METS. Chart reviewed. EKG reviewed. Existing labs reviewed. Patient summary reviewed. Patient is not a current smoker. Induction- intravenous. Postoperative Plan- Plan for postoperative opioid use. Informed Consent- Anesthetic plan and risks discussed with patient. I personally reviewed this patient with the CRNA. Discussed and agreed on the Anesthesia Plan with the CRNA. Yovany Mccall

## 2023-07-04 NOTE — ASSESSMENT & PLAN NOTE
Cervical spinal cord injury @ C3-4  - s/p unwitnessed fall down stairs with posterior head strike on 7/3   - on arrival with complaints of neck pain and fabien UE weakness and numbness     Imaging:   · 7/3 MRI c-spine: 1. Traumatic disc injury at C4-C5 where there is anterior disc injury (mild separation of the superior C4-C5 disc from the inferior endplate of the C4 vertebral body) with prevertebral fluid from C2-C5. Cord edema at C4-C5 likely traumatic as this is the site of injury, rather than spondylitic myelopathy but needs to be correlated clinically  · 7/3 CT c-spine: No cervical spine fracture or traumatic malalignment    Plan:   · Continue to closely monitor neuro exam   · Frequent neuro checks per primary team   · Maintain strict MAP goal > 85   · Plan for emergent neurosurgical intervention for decompression and fixation fusion   · Case and imaging reviewed with Dr. Sacha Fisher   · Long discussion held this am with pt and daughter, decision made to proceed with surgical intervention   · Pre-op orders placed and OR made aware  · Continue strict cervical spine precautions   · Aspen c-collar to be worn at all times   · Neda collar may be worn for showering   · DVT ppx; SCDs, hold chem dvt ppx for nsgy intervention   · Pain control per primary team   · Medical management per primary team   · PT/OT   · Social work following for assistance with dispo once medically cleared     Neurosurgery will continue to follow. Anticipate ICU level of care post-op. Please reach out with any further questions or concerns.

## 2023-07-05 NOTE — PROGRESS NOTES
[address] [facility]  Progress Note  Name: Aris Ponce  MRN: [id number]  Unit/Bed#: ICU 08 I Date of Admission: 7/3/2023   Date of Service: 7/5/2023 I Hospital Day: 2    Assessment/Plan   * Weakness of both upper extremities  Assessment & Plan  POD 1 Posterior C4-5 laminectomy, C3-6 fixation/fusion for central cord syndrome ([facility], 7/4/23)  · Cervical spinal cord injury @ C3-4  · s/p unwitnessed fall down stairs with posterior head strike on 7/3   · on arrival with complaints of neck pain and fabien UE weakness and numbness     Imaging:   · MRI cervical spine w/o, 7/3/23: Traumatic disc injury at C4-C5 where there is anterior disc injury (mild separation of the superior C4-C5 disc from the inferior endplate of the C4 vertebral body) with prevertebral fluid from C2-C5. Cord edema at C4-C5 likely traumatic as this is the site of injury, rather than spondylitic myelopathy but needs to be correlated clinically  · CT cervical spine w/o, 7/3/23: No cervical spine fracture or traumatic malalignment    Plan:   · Continue to closely monitor neuro exam   · Maintain strict MAP goal > 85 for 5 days (today day 1/5)  · Currently off pressors and automapping  · RONY drain to FS, continue to monitor  · 125cc bloody drainage since surgery  · No collar indicated post op  · Pain control per primary team   · Medical management per primary team   · Wean to extubate when able  · PT/OT/PMR evaluations  · Social work following for assistance with dispo once medically cleared   · DVT ppx: SCDs, [facility]     Neurosurgery will continue to follow. Please call with questions or concerns.     Fall  Assessment & Plan  See plan as above    Stage 4 chronic kidney disease [facility])  Assessment & Plan  Lab Results   Component Value Date    EGFR 24 07/05/2023    EGFR 24 07/04/2023    EGFR 24 07/04/2023    CREATININE 2.32 (H) 07/05/2023    CREATININE 2.29 (H) 07/04/2023    CREATININE 2.29 (H) 07/04/2023     Medical management per primary team                   Subjective/Objective     Subjective: Patient with no acute events overnight. He continues to remain intubated although he is alert and following commands. Plans for extubation this morning. RONY drain to full suction with bloody drainage. Incision CDI. He has auto mapping. His neuro exam is slightly improved from preop. Burkett catheter remains in place. Objective: Patient intubated, awake and following commands. No acute distress. Invasive Devices     Peripheral Intravenous Line  Duration           Peripheral IV 07/03/23 Right;Ventral (anterior) Forearm 1 day    Peripheral IV 07/04/23 Right Hand <1 day          Arterial Line  Duration           Arterial Line 07/04/23 Left Radial <1 day          Drain  Duration           Closed/Suction Drain Right;Posterior Neck Bulb <1 day    Urethral Catheter Double-lumen 16 Fr. <1 day          Airway  Duration           ETT  Hi-Lo; Cuffed; Inflated;Oral 8 mm <1 day                Vitals: Blood pressure 142/62, pulse 66, temperature 97.5 °F (36.4 °C), resp. rate (!) 7, height 5' 5" (1.651 m), weight 104 kg (229 lb 11.5 oz), SpO2 99 %. ,Body mass index is 38.23 kg/m². Hemodynamic Monitoring: MAP: Arterial Line MAP (mmHg): 94 mmHg    General appearance: alert, appears stated age, cooperative and no distress  Head: Normocephalic, without obvious abnormality, atraumatic  Eyes: conjugate gaze, tracks appropriately  Neck: posterior incision CDI, dressing without strike through. RONY drain to FS with bloody drainage in bulb. Lungs: intubated, off sedation  Heart: regular heart rate  Neurologic:   Mental status: Alert, FC  Cranial nerves: grossly intact (Cranial nerves II-XII)  Sensory: intact to crude touch in all extremities  Motor:   BLE: 5/5  LUE: 4+/5 delt, 2/5 tri, 4-/5 bi, 0/5 /IO  RUE: 4-/5 delt, 3/5 tri, 4+/5 bi, 4-/5 /IO  Reflexes: unable to appreciate reflexes bilateral BR, patella.  No hoffmans or clonus    Lab Results: I have personally reviewed pertinent results. Results from last 7 days   Lab Units 07/05/23 0447 07/04/23  0935 07/04/23  0544 07/03/23  1308   WBC Thousand/uL 17.56* 10.95* 9.74 6.82   HEMOGLOBIN g/dL 10.0* 10.9* 10.8* 10.7*   HEMATOCRIT % 31.8* 34.8* 35.3* 34.7*   PLATELETS Thousands/uL 249 222 217 193   NEUTROS PCT %  --   --  84* 61   MONOS PCT %  --   --  8 10   EOS PCT %  --   --  0 5     Results from last 7 days   Lab Units 07/05/23 0447 07/04/23  1922 07/04/23  1724 07/04/23  0935 07/04/23  0544 07/03/23  1308   POTASSIUM mmol/L 5.0 5.0 4.5 5.0   < > 4.4   CHLORIDE mmol/L 114* 116* 112* 115*   < > 109*   CO2 mmol/L 24 24 23 26   < > 25   BUN mg/dL 38* 37* 36* 34*   < > 33*   CREATININE mg/dL 2.32* 2.29* 2.29* 2.16*   < > 1.87*   CALCIUM mg/dL 8.0* 7.7* 7.7* 8.6   < > 8.3*   ALK PHOS U/L  --   --   --  78  --  65   ALT U/L  --   --   --  21  --  11   AST U/L  --   --   --  31  --  15    < > = values in this interval not displayed. Results from last 7 days   Lab Units 07/05/23 0447   MAGNESIUM mg/dL 2.1     Results from last 7 days   Lab Units 07/05/23 0447   PHOSPHORUS mg/dL 3.7     Results from last 7 days   Lab Units 07/04/23  0935 07/03/23  1308   INR  1.03 0.87   PTT seconds 60*  --      No results found for: "TROPONINT"  ABG:  Lab Results   Component Value Date    PHART 7.367 07/04/2023    RRB9DKQ 39.4 07/04/2023    PO2ART 102.0 07/04/2023    XOA8OIS 22.1 07/04/2023    BEART -2.9 07/04/2023    SOURCE Line, Arterial 07/04/2023       Imaging Studies: I have personally reviewed pertinent reports. and I have personally reviewed pertinent films in PACS     XR chest portable ICU    Result Date: 7/5/2023  Narrative: CHEST INDICATION:   suspected worsened pulmonary edema, effusions. COMPARISON: CXR 7/4/2023 and chest CT 7/3/2023. EXAM PERFORMED/VIEWS:  XR CHEST PORTABLE ICU. FINDINGS: ET tube 4 cm above the ibeth. NG tube in stomach.  Cardiomediastinal silhouette normal. Persistent opacity in the left base. No effusion or pneumothorax. Persistent elevation of the right hemidiaphragm. Upper abdomen normal. Bones normal for age. Impression: NG tube in stomach. Persistent left base opacity, atelectasis and/or pneumonia. Workstation performed: [id number]     X-ray chest 1 view    Result Date: 7/5/2023  Narrative: CHEST INDICATION:   Endotracheal tube positioning. COMPARISON: CXR and chest CT 7/3/2023. EXAM PERFORMED/VIEWS:  XR CHEST 1 VIEW. FINDINGS: ET tube 4 cm above the ibeth. Cardiomediastinal silhouette normal. Low lung volumes. Moderate opacity in the left base. No effusion or pneumothorax. Upper abdomen normal. Bones normal for age. Impression: Low lung volumes with moderate opacity in the left base, likely atelectasis. Pneumonia not excluded in the appropriate clinical setting. Workstation performed: [id number]     XR spine cervical 2 or 3 vw injury    Result Date: 7/4/2023  Narrative: C-ARM -cervical spine INDICATION: Central cervical cord injury, without spinal bony injury, C1-4, initial encounter ([address]) [U64.054C]. Procedure guidance. COMPARISON:  None TECHNIQUE: FLUOROSCOPY TIME:   26 seconds 4 FLUOROSCOPIC IMAGES FINDINGS: Fluoroscopic guidance provided for surgical procedure. Osseous and soft tissue detail limited by technique. Impression: Fluoroscopic guidance provided for surgical procedure. Please refer to the separate procedure notes for additional details. Localization procedure was performed, with the OR notified of the level at approximately 12:37 p.m. on 7/4/2023 via telephone conversation with the OR x-ray technologist . Additional images were obtained after level verification and are present for evaluation. Workstation performed: [id number]     MRI cervical spine wo contrast    Result Date: 7/3/2023  Narrative: MRI CERVICAL SPINE WITHOUT CONTRAST INDICATION: Weakness of both upper extremities. COMPARISON:  None.  TECHNIQUE:  Multiplanar, multisequence imaging of the cervical spine was performed. . IMAGE QUALITY:  Diagnostic FINDINGS: ALIGNMENT: Straightening of the cervical lordosis. Mild kyphotic deformity at C4-C5 where there is suspected traumatic injury to the disc at C4-C5 (separation of the superior portion of the C4-C5 disc from the inferior endplate of the C4 vertebral body). MARROW SIGNAL:  Normal marrow signal is identified within the visualized bony structures. No discrete marrow lesion. CERVICAL AND VISUALIZED THORACIC CORD: Spinal cord signal abnormality from C4-C5 could be either degenerative spondylitic myelopathy versus traumatic. Correlate clinically. PREVERTEBRAL AND PARASPINAL SOFT TISSUES: Prevertebral soft tissue edema from C2-C5 likely traumatic. VISUALIZED POSTERIOR FOSSA:  The visualized posterior fossa demonstrates no abnormal signal. CERVICAL DISC SPACES: C2-C3: Disc osteophyte complex without significant C3-C4: Disc osteophyte complex causing mild spinal canal stenosis. Uncovertebral hypertrophy and facet arthrosis causing moderate to severe bilateral neuroforaminal narrowing. C4-C5: Fluid signal abnormality along the anterior aspect of the C4-C5 disc likely. Disc osteophyte complex and ligamentum flavum hypertrophy causing mild spinal canal stenosis. Facet arthrosis and uncovertebral hypertrophy causing moderate to severe bilateral neuroforaminal narrowing. C5-C6: Disc osteophyte complex causing mild spinal canal stenosis. Facet arthrosis and uncovertebral hypertrophy causing moderate to severe bilateral neuroforaminal narrowing C6-C7: Disc osteophyte complex causing anterior impression on the thecal sac. Uncovertebral hypertrophy and facet arthrosis causing moderate to severe right and moderate left neuroforaminal narrowing. C7-T1: Disc osteophyte complex causing impression on the thecal sac. UPPER THORACIC DISC SPACES:  Normal. OTHER FINDINGS:  None. Impression: 1.  Traumatic disc injury at C4-C5 where there is anterior disc injury (mild separation of the superior C4-C5 disc from the inferior endplate of the C4 vertebral body) with prevertebral fluid from C2-C5. 2. Cord edema at C4-C5 likely traumatic as this is the site of injury, rather than spondylitic myelopathy but needs to be correlated clinically. I personally discussed this study with Dr. Donal Matson on 7/3/2023 10:13 PM. Workstation performed: [id number]     TRAUMA - CT spine cervical wo contrast    Result Date: 7/3/2023  Narrative: CT CERVICAL SPINE - WITHOUT CONTRAST INDICATION:   TRAUMA. Fall. Upper extremity weakness. Back pain COMPARISON: February 22, 2023 TECHNIQUE:  CT examination of the cervical spine was performed without intravenous contrast.  Contiguous axial images were obtained. Multiplanar 2D reformatted images were created from the source data. Radiation dose length product (DLP) for this visit:  0367 2759033 mGy-cm . This examination, like all CT scans performed in the [facility], was performed utilizing techniques to minimize radiation dose exposure, including the use of iterative reconstruction and automated exposure control. There was unavoidable motion artifact on first attempt and the examination was therefore repeated, with better results. IMAGE QUALITY:  Diagnostic. FINDINGS: ALIGNMENT:  Normal alignment of the cervical spine. No subluxation. VERTEBRAE:  No fracture. DEGENERATIVE CHANGES:  Moderate multilevel cervical degenerative changes are noted. Degenerative changes quite similar from February 22, 2023 and there is no osseous critical central canal stenosis. PREVERTEBRAL AND PARASPINAL SOFT TISSUES: Unremarkable THORACIC INLET:  Normal.     Impression: No cervical spine fracture or traumatic malalignment. Workstation performed: [id number]     CT recon only thoracolumbar (No Charge)    Result Date: 7/3/2023  Narrative: CT THORACIC AND LUMBAR SPINE INDICATION:   trauma. Fall. COMPARISON: None.  TECHNIQUE: Axial CT examination of the thoracic and lumbar spine was obtained utilizing reconstructed images from CT of the chest abdomen and pelvis performed the same day. Images were reformatted in the sagittal and coronal planes. This examination, like all CT scans performed in the [facility], was performed utilizing techniques to minimize radiation dose exposure, including the use of iterative reconstruction and automated exposure control. FINDINGS: ALIGNMENT: Normal alignment. No spondylolisthesis. No scoliosis. VERTEBRAE:  No fracture. No acute osseous abnormality. DEGENERATIVE CHANGES: Age appropriate degenerative changes. Fusion of osteophytes throughout the thoracic spine. No critical central canal stenosis in the thoracic or lumbar spine. PREVERTEBRAL AND PARASPINAL SOFT TISSUES: No prevertebral or paraspinal hematoma or soft tissue mass. Please see separate report of chest, abdomen, and pelvis CT, performed concurrently and dictated under separate accession number. Impression: No fracture or traumatic subluxation. Workstation performed: [id number]     TRAUMA - CT chest abdomen pelvis w contrast    Result Date: 7/3/2023  Narrative: CT CHEST, ABDOMEN AND PELVIS WITH IV CONTRAST INDICATION:   TRAUMA. Fall. Head injury. Back pain. Upper extremity weakness. COMPARISON:  None. TECHNIQUE: CT examination of the chest, abdomen and pelvis was performed. Multiplanar 2D reformatted images were created from the source data. This examination, like all CT scans performed in the [facility], was performed utilizing techniques to minimize radiation dose exposure, including the use of iterative reconstruction and automated exposure control. Radiation dose length product (DLP) for this visit:  1733.55 mGy-cm IV Contrast:  80 mL of iohexol (OMNIPAQUE) Enteric Contrast: Enteric contrast was administered. FINDINGS: CHEST LUNGS: Low lung volumes. Atelectasis especially in the dependent right costophrenic angle.  No pulmonary contusion or pulmonary laceration. PLEURA:  Unremarkable. HEART/GREAT VESSELS: Cardiomegaly. Coronary artery calcification. No thoracic aortic aneurysm. MEDIASTINUM AND SOFIA: No mediastinal hematoma. No mediastinal or hilar lymphadenopathy. Small sliding-type hiatal hernia. CHEST WALL AND LOWER NECK:  Unremarkable. ABDOMEN LIVER/BILIARY TREE:  Unremarkable. GALLBLADDER: There are gallstone(s) within the gallbladder, without pericholecystic inflammatory changes. SPLEEN:  Unremarkable. PANCREAS:  Unremarkable. ADRENAL GLANDS:  Unremarkable. KIDNEYS/URETERS: Bilateral renal cysts, most simple, varying in size measuring up to 5.6 cm in the interpolar right kidney. A lobulated cyst exophytic at the lower pole of the right kidney with thin septations measures up to 4.3 cm. No solid renal mass. No traumatic renal injury. No urinary tract calculus. No hydronephrosis. STOMACH AND BOWEL: Extensive colonic diverticulosis with no evidence for acute diverticulitis. Duodenal diverticulum. No bowel wall thickening or bowel obstruction. APPENDIX:  No findings to suggest appendicitis. ABDOMINOPELVIC CAVITY:  No ascites. No pneumoperitoneum. No lymphadenopathy. VESSELS:  Unremarkable for patient's age. PELVIS REPRODUCTIVE ORGANS: Prostatomegaly. URINARY BLADDER:  Unremarkable. ABDOMINAL WALL/INGUINAL REGIONS:  Unremarkable. OSSEOUS STRUCTURES: Marginal osteophytes throughout the thoracic and upper lumbar spine. No acute fracture. No destructive osseous lesion. Impression: No acute traumatic visceral injury in the chest, abdomen or pelvis. No acute fracture. Workstation performed: [id number]     TRAUMA - CT head wo contrast    Result Date: 7/3/2023  Narrative: CT BRAIN - WITHOUT CONTRAST INDICATION:   TRAUMA. Fall. Lower back pain. Upper extremity weakness. COMPARISON: February 22, 2023 TECHNIQUE:  CT examination of the brain was performed. Multiplanar 2D reformatted images were created from the source data.  Radiation dose length product (DLP) for this visit:  1003 mGy-cm . This examination, like all CT scans performed in the [facility], was performed utilizing techniques to minimize radiation dose exposure, including the use of iterative reconstruction and automated exposure control. IMAGE QUALITY:  Diagnostic. FINDINGS: PARENCHYMA: Decreased attenuation is noted in periventricular and subcortical white matter demonstrating an appearance that is statistically most likely to represent moderate microangiopathic change. No CT signs of acute infarction. No intracranial mass, mass effect or midline shift. No acute parenchymal hemorrhage. VENTRICLES AND EXTRA-AXIAL SPACES:  Normal for the patient's age. VISUALIZED ORBITS: Normal visualized orbits. PARANASAL SINUSES: Partial opacification of left maxillary sinus with some central desiccated secretions, similar from February 22, 2023. Mild mucosal thickening in ethmoid air cells. Sinuses and mastoid air cells are otherwise clear. CALVARIUM AND EXTRACRANIAL SOFT TISSUES: No calvarial fracture. Right parietal scalp contusion. Impression: No acute intracranial abnormality. Workstation performed: [id number]     XR pelvis ap only 1 or 2 vw    Result Date: 7/3/2023  Narrative: PELVIS INDICATION:   trauma. COMPARISON: 2/22/2023 VIEWS:  XR PELVIS AP ONLY 1 OR 2 VW FINDINGS: No acute fracture or hip dislocation. Mild bilateral hip osteoarthritis. No lytic or blastic osseous lesion. Soft tissues are unremarkable. The visualized lumbar spine is unremarkable. Impression: No acute osseous abnormality. Workstation performed: [id number]     XR chest 1 view portable    Result Date: 7/3/2023  Narrative: CHEST INDICATION:   trauma. COMPARISON: 2/22/2023 EXAM PERFORMED/VIEWS:  XR CHEST PORTABLE FINDINGS: Heart shadow is enlarged but unchanged from prior exam. There is no focal consolidation. Cannot exclude mild pulmonary vascular congestion. No pneumothorax or pleural effusion.  Osseous structures appear within normal limits for patient age. Impression: Unchanged cardiomegaly. Cannot exclude mild pulmonary vascular congestion. Workstation performed: [id number]     EKG, Pathology, and Other Studies: I have personally reviewed pertinent reports.       VTE Pharmacologic Prophylaxis: Heparin    VTE Mechanical Prophylaxis: sequential compression device

## 2023-07-05 NOTE — CONSULTS
Consultation - Geriatrics   Adalgisa Monahan 80 y.o. male MRN: [id number]  Unit/Bed#: ICU 08 Encounter: [id number]      Assessment/Plan    Ambulatory dysfunction  At risk for falls secondary to age, hx of fall, gait instability, polypharmacy, visual impairment  Prior fall February 2023,  Right forehead lac, treated in ED   Fall precautions  Recommend check orthostatic bp once extubated, oob (side effect of flomax, hypoalbuminemia)  PT/OT  Increased physical exercise, recommend balance and strengthening exercises  Rehab post hospitalization     Acute pain due to trauma  Geriatric pain protocol  Scheduled acetaminophen 975 mg po Q8  Oxycodone 2.5 mg po Q 4 prn moderate pain  Oxycodone 5 mg po Q 4 prn severe pain   Secondary to elevated Cr 2.32 (7/5/23) do not recommend robaxin  Monitor for constipation  Lidoderm patch      Frailty  Clinical Frail Scale: 5- Mildly Frail  Albumin 3.0, recommend protein supplementation once tolerating oral  PT/OT  Continue supportive care     Cognitive screening  No reported prior memory issues  Recommend check TSH and vitamin B12  CT head   Keep physically, mentally and socially active     Delirium precautions  Baseline: alert and oriented x 3  Provide frequent redirection, reorientation, distraction techniques  Avoid deliriogenic medications such as tramadol, benzodiazepines, anticholinergics,  Benadryl  Treat pain, See geriatric pain protocol  Monitor for constipation and urinary retention  Encourage early and frequent moblization, OOB  Encourage Hydration/ Nutrition  Implement sleep hygiene, limit night time interuptions, group activities     Insomnia  Related to hospitalization  First line is behavioral therapy  Avoid sedative hypnotics such as benzodiazepines and benadryl  Encourage staying awake during the day  Encourage daytime activity, morning exercise  Decrease or eliminate day time naps  Avoid caffeine especially during late afternoon and evening hours  Establish a night time routine    Cervical Spinal cord injury C3-C4  S/p posterior C4-C5 lami; C3-C6 fusion 7/4/23  RONY drain  Neurosurgery on consult  Critical care and trauma following      Advanced Care Planning  Full code    Home medication review  Mail order  Losartan 100 mg po daily, last refill 4/24/23 # 90 days  tamsulosin 0.4 mg po daily, last refill 4/24/23 # 90 days  Allopurinol 100 mg po daily, last refill 4/24/23 # 90 days  Furosemide 20 mg po daily, last refill 4/24/23 # 90 days  Sulfasalazine 500 mg po Q am, 1000 mg po Q noon, 500 mg po Q evening               History of Present Illness   Physician Requesting Consult: Jonathan Adams MD  Reason for Consult / Principal Problem: ISAR score 6  Hx and PE limited by: NA  HPI: Luciano Rodriguez is a 80y.o. year old male who presents with pain to his neck and shoulders after falling. His wife reports hearing a thud and finding him lying on his back near the stairs. He reports he hit his head. He was unable to get up due to feeling weakness in her upper and lower extremities. He has depression, HTN, nocturia. Prior to arrival pt lives at home with his wife. He drives. He is independent with IADLs and ADLs. He has prior fall. He wears glasses. He is hard of hearing.      Inpatient consult to Gerontology  Consult performed by: DAMION Cummins  Consult ordered by: Sonya Durbin PA-C          Review of Systems   Unable to perform ROS: Intubated       Historical Information   Past Medical History:   Diagnosis Date   • Ankle edema     last assessed [id number]   • Depression     last assessed [id number]   • Hypertension    • Kidney stone    • Nephrolithiasis    • Nocturia     last assessed [id number]   • Posterior tibial tendinitis of right lower extremity     last assessed [id number]     Past Surgical History:   Procedure Laterality Date   • ANAL FISSURECTOMY     • COLONOSCOPY  04/22/2009    every 2 years   • COLONOSCOPY  03/04/2013    2 yrs d/t h/o polyp   • COLONOSCOPY 02/15/2016    colo 2/15/16-repeat 2 yrs   • SHOULDER SURGERY       Social History   Social History     Substance and Sexual Activity   Alcohol Use Not Currently    Comment: occassionally     Social History     Substance and Sexual Activity   Drug Use No     Social History     Tobacco Use   Smoking Status Never   Smokeless Tobacco Never         Family History:   Family History   Problem Relation Age of Onset   • Clotting disorder Mother    • Nephrolithiasis Father    • Diabetes Sister    • Heart disease Daughter    • Fibromyalgia Daughter        Meds/Allergies   Current meds:   Current Facility-Administered Medications   Medication Dose Route Frequency   • acetaminophen (TYLENOL) tablet 975 mg  975 mg Oral Q8H [address]   • allopurinol (ZYLOPRIM) tablet 100 mg  100 mg Oral Daily   • chlorhexidine (PERIDEX) 0.12 % oral rinse 15 mL  15 mL Mouth/Throat Q12H [address]   • dexmedeTOMIDine (Precedex) 400 mcg in sodium chloride 0.9% 100 mL  0.1-0.7 mcg/kg/hr Intravenous Titrated   • fentanyl citrate (PF) 100 MCG/2ML 50 mcg  50 mcg Intravenous Q1H PRN   • heparin (porcine) subcutaneous injection 5,000 Units  5,000 Units Subcutaneous Q8H [address]   • lidocaine (LIDODERM) 5 % patch 1 patch  1 patch Topical Daily   • melatonin tablet 6 mg  6 mg Oral HS   • methocarbamol (ROBAXIN) tablet 500 mg  500 mg Oral Q6H PRN   • omeprazole (PRILOSEC) suspension 2 mg/mL  20 mg Oral Daily   • ondansetron (ZOFRAN) injection 4 mg  4 mg Intravenous Q6H PRN   • phenylephrine (LEAH-SYNEPHRINE) 50 mg (STANDARD CONCENTRATION) in sodium chloride 0.9% 250 mL   mcg/min Intravenous Titrated   • senna-docusate sodium (SENOKOT S) 8.6-50 mg per tablet 1 tablet  1 tablet Oral BID   • tamsulosin (FLOMAX) capsule 0.4 mg  0.4 mg Oral Daily        No Known Allergies    Objective   Vitals: Blood pressure 146/65, pulse 70, temperature 97.5 °F (36.4 °C), resp. rate 22, height 5' 5" (1.651 m), weight 104 kg (229 lb 11.5 oz), SpO2 98 %. ,Body mass index is 38.23 kg/m². Physical Exam  Vitals reviewed. HENT:      Head: Normocephalic. Nose: No congestion. Mouth/Throat:      Mouth: Mucous membranes are moist.   Eyes:      General:         Right eye: No discharge. Left eye: No discharge. Cardiovascular:      Rate and Rhythm: Normal rate and regular rhythm. Pulses: Normal pulses. Pulmonary:      Effort: Pulmonary effort is normal.   Abdominal:      General: Bowel sounds are normal.      Palpations: Abdomen is soft. Musculoskeletal:         General: Normal range of motion. Cervical back: Normal range of motion. Skin:     General: Skin is warm. Neurological:      General: No focal deficit present. Mental Status: He is alert. Psychiatric:         Mood and Affect: Mood normal.         Lab Results:   Results from last 7 days   Lab Units 07/05/23  0447   WBC Thousand/uL 17.56*   HEMOGLOBIN g/dL 10.0*   HEMATOCRIT % 31.8*   PLATELETS Thousands/uL 249        Results from last 7 days   Lab Units 07/05/23  0447 07/04/23  1724 07/04/23  0935   POTASSIUM mmol/L 5.0   < > 5.0   CHLORIDE mmol/L 114*   < > 115*   CO2 mmol/L 24   < > 26   BUN mg/dL 38*   < > 34*   CREATININE mg/dL 2.32*   < > 2.16*   CALCIUM mg/dL 8.0*   < > 8.6   ALK PHOS U/L  --   --  78   ALT U/L  --   --  21   AST U/L  --   --  31    < > = values in this interval not displayed. Imaging Studies: I have personally reviewed pertinent reports. EKG, Pathology, and Other Studies: I have personally reviewed pertinent reports.     VTE Prophylaxis: Sequential compression device (Venodyne)     Code Status: Level 1 - Full Code

## 2023-07-05 NOTE — PLAN OF CARE
Problem: Knowledge Deficit  Goal: Patient/family/caregiver demonstrates understanding of disease process, treatment plan, medications, and discharge instructions  Description: Complete learning assessment and assess knowledge base.   Interventions:  - Provide teaching at level of understanding  - Provide teaching via preferred learning methods  Outcome: Progressing     Problem: NEUROSENSORY - ADULT  Goal: Achieves stable or improved neurological status  Description: INTERVENTIONS  - Monitor and report changes in neurological status  - Monitor vital signs such as temperature, blood pressure, glucose, and any other labs ordered   - Initiate measures to prevent increased intracranial pressure  - Monitor for seizure activity and implement precautions if appropriate      Outcome: Progressing     Problem: SAFETY,RESTRAINT: NV/NON-SELF DESTRUCTIVE BEHAVIOR  Goal: Remains free of harm/injury (restraint for non violent/non self-detsructive behavior)  Description: INTERVENTIONS:  - Instruct patient/family regarding restraint use   - Assess and monitor physiologic and psychological status   - Provide interventions and comfort measures to meet assessed patient needs   - Identify and implement measures to help patient regain control  - Assess readiness for release of restraint   Outcome: Progressing

## 2023-07-05 NOTE — ASSESSMENT & PLAN NOTE
Lab Results   Component Value Date    EGFR 24 07/05/2023    EGFR 24 07/04/2023    EGFR 24 07/04/2023    CREATININE 2.32 (H) 07/05/2023    CREATININE 2.29 (H) 07/04/2023    CREATININE 2.29 (H) 07/04/2023     Medical management per primary team

## 2023-07-05 NOTE — RESPIRATORY THERAPY NOTE
RT Ventilator Management Note  Adalgisa Monahan 80 y.o. male MRN: [id number]  Unit/Bed#: ICU 08 Encounter: [id number]      Daily Screen         7/4/2023  1720 7/5/2023  0737          Patient safety screen outcome[de-identified] Passed Passed (P)                 Physical Exam:   Assessment Type: (P) Assess only  General Appearance: (P) Awake  Respiratory Pattern: (P) Spontaneous, Assisted  Chest Assessment: (P) Chest expansion symmetrical  Bilateral Breath Sounds: (P) Diminished, Clear  Cough: None  O2 Device: G5      Resp Comments: (P) Pt received still on SPONT mode, dropped pts pressure support from 10 to 8.  No other vent changes made at this time, will continue to monitor pt per protocol

## 2023-07-05 NOTE — OCCUPATIONAL THERAPY NOTE
OT CANCEL NOTE    OT orders received. Chart reviewed. Pt is currently intubated- weaning to extubated-not appropriate to engage in skilled OT services at this time. Will hold initial OT evaluation. Will continue to follow pt on caseload and see pt when medically stable and as clinically appropriate. 07/05/23 1027   OT Last Visit   OT Visit Date 07/05/23   Note Type   Note type Evaluation; Cancelled Session   Cancel Reasons Intubated/sedated             Alber Lundy MS, OTR/L

## 2023-07-05 NOTE — PROGRESS NOTES
[facility]  Progress Note: Critical Care  Name: Bautista Javed 80 y.o. male I MRN: [id number]  Unit/Bed#: ICU 08 I Date of Admission: 7/3/2023   Date of Service: 7/5/2023 I Hospital Day: 2    Assessment/Plan     Neuro:   · Central cord syndrome  ? POD #1 s/p posterior C4-5 laminectomy, C3-6 fixation/fusion  ? Maintain MAP > 85 to optimize spinal cord perfusion   ? Q1h neuromotor checks  ? Monitor RONY drain output amount and character  ? No C-collar necessary at this time   · Acute metabolic encephalopathy  ? Improved since overnight   ? Related continuous sedation, anesthesia, and advanced age   ? Q1h neuro checks neuro checks     ? Sleep/wake cycle regulation as able   § Melatonin 6mg qHS  ? CAM-ICU BID  · Sedation/analgesia   ? Continuous infusions:   § Precedex on hold  § Goal RASS 0 to -1  ? Scheduled medication:  § Tylenol 975mg q8h   § Lidoderm patch  ? PRN medications:  § Fentanyl 50mcg q1h PRN  § If extubated, can transition to alternate regimen:  § Oxycodone 2.5 - 5mg q4h PRN  § Robaxin 500mg q6h PRN  § Dilaudid 0.2mg q4h PRN       CV:   • Hypertension  o Maintain SBP < 180  o Maintain MAP > 85 in the setting of C-spine surgery   o Phenylephrine 25 mcg/min   o Continue arterial line monitoring   o Holding home antihypertensives:  - Losartan 100mg daily   - Lasix 20mg daily, although will likely need to diurese today to facilitate extubation     Pulm:  • Acute respiratory failure  o Left intubated post-operatively with ongoing poor inspiratory effort   o Mechanical ventilation day # 2 (7/4/23)   o AC/VC: 12/450/50%/6   - Maintain VT 6-8ml/kg IBW  - Maintain SpO2 >90%  o Attempt to wean to extubate as able   o CXR as indicated   - CXR from ICU presentation with low lung volumes and some pulmonary edema  o Consider diuresis today to facilitate extubation   o Suction as needed and closely monitor secretions. Maintain HOB >30 degrees.  Q4h oral care with chlorhexidine BID  o Monitor peak and plateau airway pressures. Maintain Ppeak < 45cm H2O, Pplat < 30cm H2O   • MELIA   o CPAP qHS once extubated      GI:   • Ulcerative colitis   o Holding home sulfasalazine TID (500mg, 1000mg, 500mg)   • Stress ulcer prophylaxis  o Omeprazole 20mg daily   • Bowel regimen  o Senna/colace BID  o Last BM: PTA    :   • CKD stage 4  o Patient follows with Dr. Erma Roy as outpatient and has known hypertensive nephrosclerosis and advanced age   o Baseline creatinine: 1.90 - 2.40  o Admission creatinine: 1.87 (7/3/23)   o Last creatinine: 2.32 (7/4/23)   o Diurese today   o Strict q2h I/O monitoring  o Maintain Burkett catheter  o Continue to follow renal function tests  • BPH  o Continue home tamsulosin 0.4mg qHS     F/E/N:   • Fluids  o Maintenance fluids: Isolyte 75ml/hr.  Discontinue now   o Diuresis plan: 40mg IV lasix today to assist extubation    • Electrolytes   o Replete electrolytes with as needed to maintain K >4.0, Mag >2.0, Phos >3.0, ical > 1.10  - Calcium repleted   • Nutrition  o NPO   o Will need enteral access if unable to extubate to initiate feedings     Heme/Onc:   • Normocytic anemia  o Likely secondary to CKD4  o Baseline hemoglobin: 12.8 - 11.0  o Admission hemoglobin: 10.7 (7/3/23)   o Last hemoglobin: 10.0 (7/5/23)   o Transfuse for hemoglobin <7.0  o CBC in AM  • VTE prophylaxis:  SQH TID given low creatinine clearance, SCD's to BLE    Endo/Rheum:   • No acute issues   o Last hemoglobin A1c 5.1% on 7/4/23  o Add insulin regimen as needed to maintain goal -180    ID:   • Leukocytosis   o Suspect stress reaction as well as leukemoid response to dexamethasone given intraoperatively   o Continue to monitor fever and WBC curve off antibiotics       MSK/Skin:   • BUE hemiplegia   o Reposition q2h, eliminate pressure points while in bed  o Close skin surveillance   o PT/OT   o PMR consultation   • Gout  o Allopurinol 100mg daily     Disposition: Critical care    ICU Core Measures Vented Patient  VAP Bundle  VAP bundle ordered     A: Assess, Prevent, and Manage Pain · Has pain been assessed? Yes  · Need for changes to pain regimen? No   B: Both Spontaneous Awakening Trials (SATs) and Spontaneous Breathing Trials (SBTs) · Plan to perform spontaneous awakening trial today? Yes   · Plan to perform spontaneous breathing trial today? Yes   · Obvious barriers to extubation? No   C: Choice of Sedation · RASS Goal: 0 Alert and Calm or N/A patient not on sedation  · Need for changes to sedation or analgesia regimen? No   D: Delirium · CAM-ICU: Negative   E: Early Mobility  · Plan for early mobility? Yes   F: Family Engagement · Plan for family engagement today? Yes       Review of Invasive Devices: Kwadwo Plan: Continue for accurate I/O monitoring for 48 hours    Leana Plan: Keep arterial line for hemodynamic monitoring    Prophylaxis:  VTE VTE covered by:  heparin (porcine), Subcutaneous, 5,000 Units at 07/05/23 5095       Stress Ulcer  not ordered          Subjective   HPI/24hr events:   80 y.o. with CKD4, ulcerative colitis, hypertension, BPH, and gout fell down stairs and was found by his wife on 7/3/23. He denied LOC, but did stain a head strike. Upon presentation to the ED, he complained of neck pain and global weakness with his upper extremities notably more weak than his lowers with associated decrease in bilateral hand sensation. Initial CT imaging was negative for acute injury, but MRI C-spine cord edema. He presents today intubated following a posterior C4-5 laminectomy, C3-6 fixation/fusion. Overnight: Given total of 2L IVF for low UOP. Ongoing low tidal volumes on PSV. Patient more awake and alert, complaining of pain which he confirms is his throat. Denies neck pain or headache. Denies nausea. Review of Systems   HENT: Positive for sore throat. Respiratory: Negative for shortness of breath. Gastrointestinal: Negative for nausea.    Musculoskeletal: Negative for back pain and neck pain. Neurological: Negative for headaches. Objective                            Vitals I/O      Most Recent Min/Max in 24hrs   Temp 97.5 °F (36.4 °C) Temp  Min: 93.2 °F (34 °C)  Max: 98.2 °F (36.8 °C)   Pulse 68 Pulse  Min: 64  Max: 92   Resp 19 Resp  Min: 12  Max: 25   /62 BP  Min: 125/81  Max: 142/62   O2 Sat 99 % SpO2  Min: 95 %  Max: 99 %      Intake/Output Summary (Last 24 hours) at 7/5/2023 0622  Last data filed at 7/5/2023 0400  Gross per 24 hour   Intake 5309.86 ml   Output 1840 ml   Net 3469.86 ml         Diet NPO; Sips with meds     Invasive Monitoring Physical exam   Arterial Line  Leana /58  Arterial Line BP  Min: 90/40  Max: 152/60   MAP 92 mmHg  Arterial Line MAP (mmHg)  Min: 58 mmHg  Max: 102 mmHg    Physical Exam  Vitals reviewed. Constitutional:       General: He is awake. He is not in acute distress. Appearance: Normal appearance. Interventions: He is intubated and restrained. HENT:      Head: Normocephalic and atraumatic. Mouth/Throat:      Mouth: Mucous membranes are moist.   Eyes:      Conjunctiva/sclera: Conjunctivae normal.      Pupils: Pupils are equal, round, and reactive to light. Neck:      Comments: RONY with serosang drainage   Cardiovascular:      Rate and Rhythm: Normal rate and regular rhythm. No extrasystoles are present. Pulses:           Radial pulses are 2+ on the right side and 2+ on the left side. Dorsalis pedis pulses are 2+ on the right side and 2+ on the left side. Heart sounds: Normal heart sounds. Pulmonary:      Effort: Tachypnea present. He is intubated. Breath sounds: Examination of the right-lower field reveals decreased breath sounds. Examination of the left-lower field reveals decreased breath sounds. Decreased breath sounds present. No wheezing or rhonchi. Abdominal:      General: Abdomen is protuberant. There is no distension. Palpations: Abdomen is soft. Tenderness:  There is no abdominal tenderness. Genitourinary:     Comments: Burkett: clear, corine urine   Musculoskeletal:      Cervical back: Muscular tenderness present. Right lower leg: No edema. Left lower leg: No edema. Skin:     General: Skin is warm and dry. Capillary Refill: Capillary refill takes less than 2 seconds. Neurological:      Mental Status: He is alert. GCS: GCS eye subscore is 4. GCS verbal subscore is 1. GCS motor subscore is 6. Motor: Weakness present. Comments: BLE strength 5/5 dorsiflexion, plantarflexion, hip flexion   BUE hand  2/5,  LUE bicep 3/5, tricep 2/5  RUE bicep, tricep 2/5  Normal shoulder shrug    Psychiatric:         Behavior: Behavior is cooperative. Diagnostic Studies      EKG: NSR   Imaging: No new imaging I have personally reviewed pertinent reports.    and I have personally reviewed pertinent films in PACS     Medications:  Scheduled PRN   acetaminophen, 975 mg, Q8H [address]  allopurinol, 100 mg, Daily  calcium gluconate, 2 g, Once  chlorhexidine, 15 mL, Q12H UDAY  heparin (porcine), 5,000 Units, Q8H UDAY  lidocaine, 1 patch, Daily  melatonin, 6 mg, HS  senna-docusate sodium, 1 tablet, BID  tamsulosin, 0.4 mg, Daily      fentanyl citrate (PF), 50 mcg, Q1H PRN  methocarbamol, 500 mg, Q6H PRN  ondansetron, 4 mg, Q6H PRN       Continuous    dexmedetomidine, 0.1-0.7 mcg/kg/hr  multi-electrolyte, 75 mL/hr, Last Rate: 75 mL/hr (07/04/23 1430)  phenylephine,  mcg/min, Last Rate: 25 mcg/min (07/05/23 0352)         Labs:    CBC    Recent Labs     07/04/23  0935 07/05/23  0447   WBC 10.95* 17.56*   HGB 10.9* 10.0*   HCT 34.8* 31.8*    249     BMP    Recent Labs     07/04/23  1922 07/05/23  0447   SODIUM 142 140   K 5.0 5.0   * 114*   CO2 24 24   AGAP 2 2   BUN 37* 38*   CREATININE 2.29* 2.32*   CALCIUM 7.7* 8.0*       Coags    Recent Labs     07/03/23  1308 07/04/23  0935   INR 0.87 1.03   PTT  --  60*        Additional Electrolytes  Recent Labs     07/05/23  0447   MG 2.1   PHOS 3.7   CAIONIZED 0.97*          Blood Gas    Recent Labs     07/04/23  1930   PHART 7.367   [id number] 39.4   PO2ART 102.0   [id number] 22.1   BEART -2.9   SOURCE Line, Arterial     Recent Labs     07/04/23  1930   SOURCE Line, Arterial    LFTs  Recent Labs     07/03/23  1308 07/04/23  0935   ALT 11 21   AST 15 31   ALKPHOS 65 78   ALB 3.4* 3.0*   TBILI 0.40 0.50       Infectious  No recent results  Glucose  Recent Labs     07/04/23  0935 07/04/23  1724 07/04/23  1922 07/05/23  0447   GLUC 91 161* 165* [facility]

## 2023-07-05 NOTE — PHYSICAL THERAPY NOTE
Physical Therapy Cancellation Note       07/05/23 0910   PT Last Visit   PT Visit Date 07/05/23   Note Type   Note type Cancelled Session   Cancel Reasons Medical status  (vented- weaning)     ORDERS FOR PT EVALUATION RECEIVED AND CHART REVIEW COMPLETED. PT NOT APPROPRIATE FOR PARTICIPATION IN PT AT THIS TIME 2* MEDICAL STATUS. WILL CONTINUE TO FOLLOW AND COMPLETE PT EVALUATION AS MEDICAL STATUS DICTATES.    Obey Holley, PT

## 2023-07-05 NOTE — RESPIRATORY THERAPY NOTE
07/05/23 0334   Respiratory Assessment   Resp Comments Pressure support to [id number] overnight for diminished Vt. No adverse events to report on spontaneous mode.    Vent Information   Vent type Wharton G5   Wharton C3/G5 Vent Mode SPONT   $ Vent Daily Charge-Subsequent Yes   $ Pulse Oximetry Spot Check Charge Completed   SpO2 99 %   SPONT Settings   FIO2 (%) 40 %   PEEP (cmH2O) 6 cmH2O   Pressure Support (cmH2O) 10 cmH20   Flow Trigger (LPM) 5 LPM   P-ramp (ms) 50 ms   ETS  (%) 25 %   Humidification Heater   Heater Temp 95 °F (35 °C)   SPONT Actuals   Resp Rate (BPM) 23 BPM   VT (mL) 370 mL   MV (Obs) 9.7   MAP (cmH2O) 9.7 cmH2O   Peak Pressure (cmH2O) 19 cmH2O   I:E Ratio (Obs) 1:2.4   RSBI (f/vt) 62 f/Vt   SPONT Alarms   High Peak Pressure (cmH20) 40 cmH2O   High Resp Rate (BPM) 35 BPM   High MV (L/min) 20 L/min   Low MV (L/min) 3 L/min   High Spont VTE (mL) 1000 mL   Low Spont VTE (mL) 250 mL   Apnea Time (S) 30 S   SPONT Apnea Settings   Resp Rate (BPM) 15 BPM   FIO2 (%) 40 %   %TI (%)   (1.3 sec\)   Apnea Time (S) 20 S   Maintenance   Water bag changed No   Circuit changed No

## 2023-07-05 NOTE — ASSESSMENT & PLAN NOTE
POD 1 Posterior C4-5 laminectomy, C3-6 fixation/fusion for central cord syndrome ([facility], 7/4/23)  · Cervical spinal cord injury @ C3-4  · s/p unwitnessed fall down stairs with posterior head strike on 7/3   · on arrival with complaints of neck pain and fabien UE weakness and numbness     Imaging:   · MRI cervical spine w/o, 7/3/23: Traumatic disc injury at C4-C5 where there is anterior disc injury (mild separation of the superior C4-C5 disc from the inferior endplate of the C4 vertebral body) with prevertebral fluid from C2-C5. Cord edema at C4-C5 likely traumatic as this is the site of injury, rather than spondylitic myelopathy but needs to be correlated clinically  · CT cervical spine w/o, 7/3/23: No cervical spine fracture or traumatic malalignment    Plan:   · Continue to closely monitor neuro exam   · Maintain strict MAP goal > 85 for 5 days (today day 1/5)  · Currently off pressors and automapping  · RONY drain to FS, continue to monitor  · 125cc bloody drainage since surgery  · No collar indicated post op  · Pain control per primary team   · Medical management per primary team   · Wean to extubate when able  · PT/OT/PMR evaluations  · Social work following for assistance with dispo once medically cleared   · DVT ppx: SCDs, [facility]     Neurosurgery will continue to follow. Please call with questions or concerns.

## 2023-07-06 PROBLEM — S14.129A CENTRAL CORD SYNDROME (HCC): Status: ACTIVE | Noted: 2023-07-06

## 2023-07-06 PROBLEM — S14.103A: Status: ACTIVE | Noted: 2023-07-06

## 2023-07-06 NOTE — PLAN OF CARE
Problem: MOBILITY - ADULT  Goal: Maintain or return to baseline ADL function  Description: INTERVENTIONS:  -  Assess patient's ability to carry out ADLs; assess patient's baseline for ADL function and identify physical deficits which impact ability to perform ADLs (bathing, care of mouth/teeth, toileting, grooming, dressing, etc.)  - Assess/evaluate cause of self-care deficits   - Assess range of motion  - Assess patient's mobility; develop plan if impaired  - Assess patient's need for assistive devices and provide as appropriate  - Encourage maximum independence but intervene and supervise when necessary  - Involve family in performance of ADLs  - Assess for home care needs following discharge   - Consider OT consult to assist with ADL evaluation and planning for discharge  - Provide patient education as appropriate  Outcome: Progressing  Goal: Maintains/Returns to pre admission functional level  Description: INTERVENTIONS:  - Perform BMAT or MOVE assessment daily.   - Set and communicate daily mobility goal to care team and patient/family/caregiver.    - Collaborate with rehabilitation services on mobility goals if consulted  - Out of bed for toileting  - Record patient progress and toleration of activity level   Outcome: Progressing     Problem: PAIN - ADULT  Goal: Verbalizes/displays adequate comfort level or baseline comfort level  Description: Interventions:  - Encourage patient to monitor pain and request assistance  - Assess pain using appropriate pain scale  - Administer analgesics based on type and severity of pain and evaluate response  - Implement non-pharmacological measures as appropriate and evaluate response  - Consider cultural and social influences on pain and pain management  - Notify physician/advanced practitioner if interventions unsuccessful or patient reports new pain  Outcome: Progressing     Problem: INFECTION - ADULT  Goal: Absence or prevention of progression during hospitalization  Description: INTERVENTIONS:  - Assess and monitor for signs and symptoms of infection  - Monitor lab/diagnostic results  - Monitor all insertion sites, i.e. indwelling lines, tubes, and drains  - Monitor endotracheal if appropriate and nasal secretions for changes in amount and color  - [place] appropriate cooling/warming therapies per order  - Administer medications as ordered  - Instruct and encourage patient and family to use good hand hygiene technique  - Identify and instruct in appropriate isolation precautions for identified infection/condition  Outcome: Progressing  Goal: Absence of fever/infection during neutropenic period  Description: INTERVENTIONS:  - Monitor WBC    Outcome: Progressing     Problem: SAFETY ADULT  Goal: Maintain or return to baseline ADL function  Description: INTERVENTIONS:  -  Assess patient's ability to carry out ADLs; assess patient's baseline for ADL function and identify physical deficits which impact ability to perform ADLs (bathing, care of mouth/teeth, toileting, grooming, dressing, etc.)  - Assess/evaluate cause of self-care deficits   - Assess range of motion  - Assess patient's mobility; develop plan if impaired  - Assess patient's need for assistive devices and provide as appropriate  - Encourage maximum independence but intervene and supervise when necessary  - Involve family in performance of ADLs  - Assess for home care needs following discharge   - Consider OT consult to assist with ADL evaluation and planning for discharge  - Provide patient education as appropriate  Outcome: Progressing  Goal: Maintains/Returns to pre admission functional level  Description: INTERVENTIONS:  - Perform BMAT or MOVE assessment daily.   - Set and communicate daily mobility goal to care team and patient/family/caregiver.    - Collaborate with rehabilitation services on mobility goals if consulted  - Out of bed for toileting  - Record patient progress and toleration of activity level   Outcome: Progressing  Goal: Patient will remain free of falls  Description: INTERVENTIONS:  - Educate patient/family on patient safety including physical limitations  - Instruct patient to call for assistance with activity   - Consult OT/PT to assist with strengthening/mobility   - Keep Call bell within reach  - Keep bed low and locked with side rails adjusted as appropriate  - Keep care items and personal belongings within reach  - Initiate and maintain comfort rounds  - Make Fall Risk Sign visible to staff  - Apply yellow socks and bracelet for high fall risk patients  - Consider moving patient to room near nurses station  Outcome: Progressing

## 2023-07-06 NOTE — PROGRESS NOTES
[address] [facility]  Progress Note  Name: Moni Amin  MRN: [id number]  Unit/Bed#: ICU 08 I Date of Admission: 7/3/2023   Date of Service: 7/6/2023 I Hospital Day: 3    Assessment/Plan   * Weakness of both upper extremities  Assessment & Plan  POD 2 Posterior C4-5 laminectomy, C3-6 fixation/fusion for central cord syndrome (HDM, 7/4/23)  · Cervical spinal cord injury @ C3-4  · s/p unwitnessed fall down stairs with posterior head strike on 7/3   · on arrival with complaints of neck pain and fabien UE weakness and numbness     Imaging:   · MRI cervical spine w/o, 7/5/23: Hyperflexion injury with a defect through the anterior longitudinal ligament at the C4-5 level and widening of the anterior disc space. Prevertebral edema extending from C1-2 through T1-2 ,mildly progressed. Interval spinal decompression and posterior fixation including bilateral laminectomies at the C4 and C5 levels and bilateral transpedicular screws at the C3-C6 levels with interconnecting rods. Improved patency of the central canal at the C3-4 and C4-5 levels with resolved cord compression. There is stable hyperintense T2/STIR signal within the dorsal aspect of the cord at the C4 level    Plan:   · Continue to closely monitor neuro exam   · Upright XR pending  · Maintain strict MAP goal > 90 for 5 days (today day 2/5)  · Currently on levo   · RONY drain to FS, continue to monitor  · 140cc SS drainage p24h, will maintain at this time and consider d/c tomorrow  · No collar indicated post op  · Decadron 4q6 for likely right C5 palsy  · Pain control per primary team   · Medical management per primary team   · Wean to extubate when able  · PT/OT/PMR evaluations  · Social work following for assistance with dispo once medically cleared   · DVT ppx: SCDs, [facility]     Neurosurgery will continue to follow. Please call with questions or concerns.     Fall  Assessment & Plan  See plan as above    Stage 4 chronic kidney disease [facility])  Assessment & Plan  Lab Results   Component Value Date    EGFR 22 07/06/2023    EGFR 24 07/05/2023    EGFR 24 07/04/2023    CREATININE 2.41 (H) 07/06/2023    CREATININE 2.32 (H) 07/05/2023    CREATININE 2.29 (H) 07/04/2023     Medical management per primary team             Subjective/Objective     Subjective: Patient with acute right arm weakness last night, MRI brain and cervical spine obtained with no obvious cause. Likely C5 palsy. Otherwise, most muscle groups are improving post op. He remains intubated but is awake, alert, and follows commands. Denies pain. Objective: Patient intubated, not sedated. VSS. 1 drain to FS. Intake/Output                 07/06/23 0701 - 07/07/23 0700     [id number] Total              Intake    Total Intake -- -- --       Output    Urine  250  -- 250    Output (mL) (Urethral Catheter Double-lumen 16 Fr.) 250 -- 250    Total Output 250 -- 250       Net I/O     -250 -- -250          Invasive Devices     Peripheral Intravenous Line  Duration           Peripheral IV 07/03/23 Right;Ventral (anterior) Forearm 2 days    Peripheral IV 07/04/23 Right Hand 1 day          Arterial Line  Duration           Arterial Line 07/04/23 Left Radial 1 day          Drain  Duration           NG/OG/Enteral Tube Orogastric Left mouth 2 days    Urethral Catheter Double-lumen 16 Fr. 2 days    Closed/Suction Drain Right;Posterior Neck Bulb 1 day          Airway  Duration           ETT  Hi-Lo; Cuffed; Inflated;Oral 8 mm 1 day                Vitals: Blood pressure (!) 193/96, pulse (!) 48, temperature 99.1 °F (37.3 °C), temperature source Oral, resp. rate 12, height 5' 5" (1.651 m), weight 103 kg (227 lb 11.8 oz), SpO2 98 %. ,Body mass index is 37.9 kg/m².      Hemodynamic Monitoring: MAP: Arterial Line MAP (mmHg): 84 mmHg    General appearance: alert, appears stated age, cooperative and no distress  Head: Normocephalic, without obvious abnormality, atraumatic  Eyes: conjugate gaze, tracks appropriately  Neck: posterior cervical incision CDI, RONY drain to FS with SS drainage  Lungs: intubated  Heart: regular heart rate  Neurologic:   Mental status: Alert, follows commands. Intubated  Cranial nerves: grossly intact (Cranial nerves II-XII)  Sensory: intact to crude touch x4  Motor:   LUE: 4+ delt, bi, tri. 0/5 /IO  RUE: 1-2 delt, 4+ bi, 4- tri, 4+ /IO  BLE: full strength  Reflexes: unable to elicit reflexes B BR/P, no hoffmans or clonus    Lab Results: I have personally reviewed pertinent results. Results from last 7 days   Lab Units 07/06/23  0502 07/05/23 0447 07/04/23  0935 07/04/23  0544 07/03/23  1308   WBC Thousand/uL 11.19* 17.56* 10.95* 9.74 6.82   HEMOGLOBIN g/dL 9.5* 10.0* 10.9* 10.8* 10.7*   HEMATOCRIT % 29.8* 31.8* 34.8* 35.3* 34.7*   PLATELETS Thousands/uL 190 249 222 217 193   NEUTROS PCT %  --   --   --  84* 61   MONOS PCT %  --   --   --  8 10   EOS PCT %  --   --   --  0 5     Results from last 7 days   Lab Units 07/06/23  0502 07/05/23 0447 07/04/23  1922 07/04/23  1724 07/04/23  0935 07/04/23  0544 07/03/23  1308   POTASSIUM mmol/L 4.8 5.0 5.0   < > 5.0   < > 4.4   CHLORIDE mmol/L 112* 114* 116*   < > 115*   < > 109*   CO2 mmol/L 24 24 24   < > 26   < > 25   BUN mg/dL 49* 38* 37*   < > 34*   < > 33*   CREATININE mg/dL 2.41* 2.32* 2.29*   < > 2.16*   < > 1.87*   CALCIUM mg/dL 8.7 8.0* 7.7*   < > 8.6   < > 8.3*   ALK PHOS U/L  --   --   --   --  78  --  65   ALT U/L  --   --   --   --  21  --  11   AST U/L  --   --   --   --  31  --  15    < > = values in this interval not displayed.      Results from last 7 days   Lab Units 07/05/23  0447   MAGNESIUM mg/dL 2.1     Results from last 7 days   Lab Units 07/05/23  0447   PHOSPHORUS mg/dL 3.7     Results from last 7 days   Lab Units 07/04/23  0935 07/03/23  1308   INR  1.03 0.87   PTT seconds 60*  --      No results found for: "TROPONINT"  ABG:  Lab Results   Component Value Date    PHART 7.367 07/04/2023    BDD5HLX 39.4 07/04/2023    PO2ART 102.0 07/04/2023    XZZ3OOG 22.1 07/04/2023    BEART -2.9 07/04/2023    SOURCE Line, Arterial 07/04/2023       Imaging Studies: I have personally reviewed pertinent reports. and I have personally reviewed pertinent films in PACS     MRI cervical spine wo contrast    Result Date: 7/6/2023  Narrative: MRI CERVICAL SPINE WITHOUT CONTRAST INDICATION: Brain and C-spine, new RUE weakness POD 1 from emergent posterior laminectomy for central cord syndrome. COMPARISON: CT cervical spine 7/3/2023 TECHNIQUE:  Multiplanar, multisequence imaging of the cervical spine was performed. . IMAGE QUALITY:  Diagnostic FINDINGS: There is again evidence of hyperextension injury with a defect through the anterior longitudinal ligament at the C4-5 level and widening of the anterior disc space. Interval spinal decompression and posterior fixation including bilateral laminectomies at  the C4 and C5 levels and bilateral transpedicular screws at the C3-C6 levels with interconnecting rods. MARROW SIGNAL:  Normal marrow signal is identified within the visualized bony structures. No discrete marrow lesion. CERVICAL AND VISUALIZED THORACIC CORD:  There is stable hyperintense T2/STIR signal within the dorsal aspect of the cord at the C4 level PREVERTEBRAL AND PARASPINAL SOFT TISSUES:  There is prevertebral edema extending from the C1-2 level down to the T1-2 level, mildly progressed. VISUALIZED POSTERIOR FOSSA:  The visualized posterior fossa demonstrates no abnormal signal. CERVICAL DISC SPACES: C2-3: Stable broad-based central disc protrusion. Mild central canal narrowing. Mild right neural foraminal stenosis is again noted. C3-4: Stable diffuse disc osteophyte complex. . Improved patency of the central canal status post bilateral laminectomies. Limited evaluation of the neural foramina secondary to fixation hardware artifacts.  C4-5: Stable diffuse disc osteophyte complex with bilateral uncovertebral hypertrophy partially effacing the ventral subarachnoid space. Improved patency of the central canal status post bilateral laminectomies. Limited evaluation of the neural foramina secondary to fixation hardware artifacts. C5-6: Stable diffuse disc osteophyte complex with bilateral uncovertebral hypertrophy with a superimposed left paracentral disc protrusion. Mild to moderate central canal narrowing. Limited evaluation of the neural foramina secondary to fixation hardware artifacts. C6-7: Stable diffuse disc osteophyte complex with bilateral uncovertebral hypertrophy partially effacing the ventral subarachnoid space. Mild central canal narrowing. Moderate to severe right and moderate left neural foraminal stenosis is again noted. C7-T1: Stable diffuse disc osteophyte complex with bilateral uncovertebral hypertrophy partially effacing the ventral subarachnoid space. No central canal narrowing. No neural foraminal stenosis. UPPER THORACIC DISC SPACES:  Normal.     Impression: Hyperflexion injury with a defect through the anterior longitudinal ligament at the C4-5 level and widening of the anterior disc space. Prevertebral edema extending from C1-2 through T1-2 ,mildly progressed. Interval spinal decompression and posterior fixation including bilateral laminectomies at the C4 and C5 levels and bilateral transpedicular screws at the C3-C6 levels with interconnecting rods. Improved patency of the central canal at the C3-4 and C4-5 levels with resolved cord compression. There is stable hyperintense T2/STIR signal within the dorsal aspect of the cord at the C4 level. Findings are consistent with the preliminary report from [facility] which was provided shortly after completion of the exam. Workstation performed: [id number]     MRI brain wo contrast    Result Date: 7/6/2023  Narrative: MRI BRAIN WITHOUT CONTRAST INDICATION: Brain and C-spine, new RUE weakness POD 1 from emergent posterior laminectomy for central cord syndrome. COMPARISON:   CT head 7/3/2023 TECHNIQUE:  Multiplanar, multisequence imaging of the brain was performed. IMAGE QUALITY:  Diagnostic. FINDINGS: BRAIN PARENCHYMA:  There is no discrete mass, mass effect or midline shift. There is no intracranial hemorrhage. There is no evidence of acute infarction and diffusion imaging is unremarkable. Small scattered hyperintensities on T2/FLAIR imaging are noted in the periventricular and subcortical white matter demonstrating an appearance that is statistically most likely to represent mild microangiopathic change. Small calcified lesion is noted along the anterior right frontal convexity, dural calcification versus small meningioma. Punctate focus of susceptibility is noted within the left occipital horn, possible sequela of remote hemorrhage. VENTRICLES:  Normal for the patient's age. SELLA AND PITUITARY GLAND:  Normal. ORBITS: Thinning of the bilateral ocular lenses which can be degenerative or postsurgical. PARANASAL SINUSES: Ethmoidal and left maxillary sinus mucosal thickening. The remainder of the visualized paranasal sinus cavities and mastoid air cells are clear VASCULATURE:  Evaluation of the major intracranial vasculature demonstrates appropriate flow voids. CALVARIUM AND SKULL BASE:  Normal. EXTRACRANIAL SOFT TISSUES: Right parietal scalp soft tissue swelling. Impression: 1. No acute infarction, edema, or mass effect. 2. Mild chronic microangiopathic ischemic changes. 3. Small calcified lesion is noted along the anterior right frontal convexity, dural calcification versus small meningioma. 4. Right parietal scalp soft tissue swelling.  Findings are consistent with the preliminary report from [facility] which was provided shortly after completion of the exam. Workstation performed: [id number]     Echo complete w/ contrast if indicated    Result Date: 7/5/2023  Narrative: •  Left Ventricle: Left ventricular cavity size is normal. Wall thickness is normal. The left ventricular ejection fraction is 50%. Systolic function is mildly reduced. There is mild global hypokinesis. Diastolic function is moderately abnormal, consistent with grade II (pseudonormal) relaxation. Left atrial filling pressure is elevated. •  Right Ventricle: Right ventricular cavity size is mildly dilated. •  Aortic Valve: There is aortic valve sclerosis. The aortic valve velocity is normal. •  Mitral Valve: There is mild annular calcification. There is mild to moderate regurgitation with an eccentrically directed jet. •  Tricuspid Valve: There is mild to moderate regurgitation. The tricuspid valve regurgitation jet is central. The right ventricular systolic pressure is at least mildly elevated. •  Pulmonary Veins: There is systolic blunting in the pulmonary veins. XR chest portable ICU    Result Date: 7/5/2023  Narrative: CHEST INDICATION:   suspected worsened pulmonary edema, effusions. COMPARISON: CXR 7/4/2023 and chest CT 7/3/2023. EXAM PERFORMED/VIEWS:  XR CHEST PORTABLE ICU. FINDINGS: ET tube 4 cm above the ibeth. NG tube in stomach. Cardiomediastinal silhouette normal. Persistent opacity in the left base. No effusion or pneumothorax. Persistent elevation of the right hemidiaphragm. Upper abdomen normal. Bones normal for age. Impression: NG tube in stomach. Persistent left base opacity, atelectasis and/or pneumonia. Workstation performed: [id number]     X-ray chest 1 view    Result Date: 7/5/2023  Narrative: CHEST INDICATION:   Endotracheal tube positioning. COMPARISON: CXR and chest CT 7/3/2023. EXAM PERFORMED/VIEWS:  XR CHEST 1 VIEW. FINDINGS: ET tube 4 cm above the ibeth. Cardiomediastinal silhouette normal. Low lung volumes. Moderate opacity in the left base. No effusion or pneumothorax. Upper abdomen normal. Bones normal for age. Impression: Low lung volumes with moderate opacity in the left base, likely atelectasis. Pneumonia not excluded in the appropriate clinical setting. Workstation performed: [id number]     XR spine cervical 2 or 3 vw injury    Result Date: 7/4/2023  Narrative: C-ARM -cervical spine INDICATION: Central cervical cord injury, without spinal bony injury, C1-4, initial encounter ([address]) [X36.674Z]. Procedure guidance. COMPARISON:  None TECHNIQUE: FLUOROSCOPY TIME:   26 seconds 4 FLUOROSCOPIC IMAGES FINDINGS: Fluoroscopic guidance provided for surgical procedure. Osseous and soft tissue detail limited by technique. Impression: Fluoroscopic guidance provided for surgical procedure. Please refer to the separate procedure notes for additional details. Localization procedure was performed, with the OR notified of the level at approximately 12:37 p.m. on 7/4/2023 via telephone conversation with the OR x-ray technologist . Additional images were obtained after level verification and are present for evaluation. Workstation performed: [id number]     MRI cervical spine wo contrast    Result Date: 7/3/2023  Narrative: MRI CERVICAL SPINE WITHOUT CONTRAST INDICATION: Weakness of both upper extremities. COMPARISON:  None. TECHNIQUE:  Multiplanar, multisequence imaging of the cervical spine was performed. . IMAGE QUALITY:  Diagnostic FINDINGS: ALIGNMENT: Straightening of the cervical lordosis. Mild kyphotic deformity at C4-C5 where there is suspected traumatic injury to the disc at C4-C5 (separation of the superior portion of the C4-C5 disc from the inferior endplate of the C4 vertebral body). MARROW SIGNAL:  Normal marrow signal is identified within the visualized bony structures. No discrete marrow lesion. CERVICAL AND VISUALIZED THORACIC CORD: Spinal cord signal abnormality from C4-C5 could be either degenerative spondylitic myelopathy versus traumatic. Correlate clinically. PREVERTEBRAL AND PARASPINAL SOFT TISSUES: Prevertebral soft tissue edema from C2-C5 likely traumatic.  VISUALIZED POSTERIOR FOSSA:  The visualized posterior fossa demonstrates no abnormal signal. CERVICAL DISC SPACES: C2-C3: Disc osteophyte complex without significant C3-C4: Disc osteophyte complex causing mild spinal canal stenosis. Uncovertebral hypertrophy and facet arthrosis causing moderate to severe bilateral neuroforaminal narrowing. C4-C5: Fluid signal abnormality along the anterior aspect of the C4-C5 disc likely. Disc osteophyte complex and ligamentum flavum hypertrophy causing mild spinal canal stenosis. Facet arthrosis and uncovertebral hypertrophy causing moderate to severe bilateral neuroforaminal narrowing. C5-C6: Disc osteophyte complex causing mild spinal canal stenosis. Facet arthrosis and uncovertebral hypertrophy causing moderate to severe bilateral neuroforaminal narrowing C6-C7: Disc osteophyte complex causing anterior impression on the thecal sac. Uncovertebral hypertrophy and facet arthrosis causing moderate to severe right and moderate left neuroforaminal narrowing. C7-T1: Disc osteophyte complex causing impression on the thecal sac. UPPER THORACIC DISC SPACES:  Normal. OTHER FINDINGS:  None. Impression: 1. Traumatic disc injury at C4-C5 where there is anterior disc injury (mild separation of the superior C4-C5 disc from the inferior endplate of the C4 vertebral body) with prevertebral fluid from C2-C5. 2. Cord edema at C4-C5 likely traumatic as this is the site of injury, rather than spondylitic myelopathy but needs to be correlated clinically. I personally discussed this study with Dr. Enma Nolasco on 7/3/2023 10:13 PM. Workstation performed: [id number]     TRAUMA - CT spine cervical wo contrast    Result Date: 7/3/2023  Narrative: CT CERVICAL SPINE - WITHOUT CONTRAST INDICATION:   TRAUMA. Fall. Upper extremity weakness. Back pain COMPARISON: February 22, 2023 TECHNIQUE:  CT examination of the cervical spine was performed without intravenous contrast.  Contiguous axial images were obtained. Multiplanar 2D reformatted images were created from the source data.  Radiation dose length product (DLP) for this visit:  0367 0515856 mGy-cm . This examination, like all CT scans performed in the [facility], was performed utilizing techniques to minimize radiation dose exposure, including the use of iterative reconstruction and automated exposure control. There was unavoidable motion artifact on first attempt and the examination was therefore repeated, with better results. IMAGE QUALITY:  Diagnostic. FINDINGS: ALIGNMENT:  Normal alignment of the cervical spine. No subluxation. VERTEBRAE:  No fracture. DEGENERATIVE CHANGES:  Moderate multilevel cervical degenerative changes are noted. Degenerative changes quite similar from February 22, 2023 and there is no osseous critical central canal stenosis. PREVERTEBRAL AND PARASPINAL SOFT TISSUES: Unremarkable THORACIC INLET:  Normal.     Impression: No cervical spine fracture or traumatic malalignment. Workstation performed: [id number]     CT recon only thoracolumbar (No Charge)    Result Date: 7/3/2023  Narrative: CT THORACIC AND LUMBAR SPINE INDICATION:   trauma. Fall. COMPARISON: None. TECHNIQUE: Axial CT examination of the thoracic and lumbar spine was obtained utilizing reconstructed images from CT of the chest abdomen and pelvis performed the same day. Images were reformatted in the sagittal and coronal planes. This examination, like all CT scans performed in the [facility], was performed utilizing techniques to minimize radiation dose exposure, including the use of iterative reconstruction and automated exposure control. FINDINGS: ALIGNMENT: Normal alignment. No spondylolisthesis. No scoliosis. VERTEBRAE:  No fracture. No acute osseous abnormality. DEGENERATIVE CHANGES: Age appropriate degenerative changes. Fusion of osteophytes throughout the thoracic spine. No critical central canal stenosis in the thoracic or lumbar spine. PREVERTEBRAL AND PARASPINAL SOFT TISSUES: No prevertebral or paraspinal hematoma or soft tissue mass. Please see separate report of chest, abdomen, and pelvis CT, performed concurrently and dictated under separate accession number. Impression: No fracture or traumatic subluxation. Workstation performed: [id number]     TRAUMA - CT chest abdomen pelvis w contrast    Result Date: 7/3/2023  Narrative: CT CHEST, ABDOMEN AND PELVIS WITH IV CONTRAST INDICATION:   TRAUMA. Fall. Head injury. Back pain. Upper extremity weakness. COMPARISON:  None. TECHNIQUE: CT examination of the chest, abdomen and pelvis was performed. Multiplanar 2D reformatted images were created from the source data. This examination, like all CT scans performed in the [facility], was performed utilizing techniques to minimize radiation dose exposure, including the use of iterative reconstruction and automated exposure control. Radiation dose length product (DLP) for this visit:  1733.55 mGy-cm IV Contrast:  80 mL of iohexol (OMNIPAQUE) Enteric Contrast: Enteric contrast was administered. FINDINGS: CHEST LUNGS: Low lung volumes. Atelectasis especially in the dependent right costophrenic angle. No pulmonary contusion or pulmonary laceration. PLEURA:  Unremarkable. HEART/GREAT VESSELS: Cardiomegaly. Coronary artery calcification. No thoracic aortic aneurysm. MEDIASTINUM AND SOFIA: No mediastinal hematoma. No mediastinal or hilar lymphadenopathy. Small sliding-type hiatal hernia. CHEST WALL AND LOWER NECK:  Unremarkable. ABDOMEN LIVER/BILIARY TREE:  Unremarkable. GALLBLADDER: There are gallstone(s) within the gallbladder, without pericholecystic inflammatory changes. SPLEEN:  Unremarkable. PANCREAS:  Unremarkable. ADRENAL GLANDS:  Unremarkable. KIDNEYS/URETERS: Bilateral renal cysts, most simple, varying in size measuring up to 5.6 cm in the interpolar right kidney. A lobulated cyst exophytic at the lower pole of the right kidney with thin septations measures up to 4.3 cm. No solid renal mass. No traumatic renal injury.  No urinary tract calculus. No hydronephrosis. STOMACH AND BOWEL: Extensive colonic diverticulosis with no evidence for acute diverticulitis. Duodenal diverticulum. No bowel wall thickening or bowel obstruction. APPENDIX:  No findings to suggest appendicitis. ABDOMINOPELVIC CAVITY:  No ascites. No pneumoperitoneum. No lymphadenopathy. VESSELS:  Unremarkable for patient's age. PELVIS REPRODUCTIVE ORGANS: Prostatomegaly. URINARY BLADDER:  Unremarkable. ABDOMINAL WALL/INGUINAL REGIONS:  Unremarkable. OSSEOUS STRUCTURES: Marginal osteophytes throughout the thoracic and upper lumbar spine. No acute fracture. No destructive osseous lesion. Impression: No acute traumatic visceral injury in the chest, abdomen or pelvis. No acute fracture. Workstation performed: [id number]     TRAUMA - CT head wo contrast    Result Date: 7/3/2023  Narrative: CT BRAIN - WITHOUT CONTRAST INDICATION:   TRAUMA. Fall. Lower back pain. Upper extremity weakness. COMPARISON: February 22, 2023 TECHNIQUE:  CT examination of the brain was performed. Multiplanar 2D reformatted images were created from the source data. Radiation dose length product (DLP) for this visit:  1003 mGy-cm . This examination, like all CT scans performed in the [facility], was performed utilizing techniques to minimize radiation dose exposure, including the use of iterative reconstruction and automated exposure control. IMAGE QUALITY:  Diagnostic. FINDINGS: PARENCHYMA: Decreased attenuation is noted in periventricular and subcortical white matter demonstrating an appearance that is statistically most likely to represent moderate microangiopathic change. No CT signs of acute infarction. No intracranial mass, mass effect or midline shift. No acute parenchymal hemorrhage. VENTRICLES AND EXTRA-AXIAL SPACES:  Normal for the patient's age. VISUALIZED ORBITS: Normal visualized orbits.  PARANASAL SINUSES: Partial opacification of left maxillary sinus with some central desiccated secretions, similar from February 22, 2023. Mild mucosal thickening in ethmoid air cells. Sinuses and mastoid air cells are otherwise clear. CALVARIUM AND EXTRACRANIAL SOFT TISSUES: No calvarial fracture. Right parietal scalp contusion. Impression: No acute intracranial abnormality. Workstation performed: [id number]     XR pelvis ap only 1 or 2 vw    Result Date: 7/3/2023  Narrative: PELVIS INDICATION:   trauma. COMPARISON: 2/22/2023 VIEWS:  XR PELVIS AP ONLY 1 OR 2 VW FINDINGS: No acute fracture or hip dislocation. Mild bilateral hip osteoarthritis. No lytic or blastic osseous lesion. Soft tissues are unremarkable. The visualized lumbar spine is unremarkable. Impression: No acute osseous abnormality. Workstation performed: [id number]     XR chest 1 view portable    Result Date: 7/3/2023  Narrative: CHEST INDICATION:   trauma. COMPARISON: 2/22/2023 EXAM PERFORMED/VIEWS:  XR CHEST PORTABLE FINDINGS: Heart shadow is enlarged but unchanged from prior exam. There is no focal consolidation. Cannot exclude mild pulmonary vascular congestion. No pneumothorax or pleural effusion. Osseous structures appear within normal limits for patient age. Impression: Unchanged cardiomegaly. Cannot exclude mild pulmonary vascular congestion. Workstation performed: [id number]     EKG, Pathology, and Other Studies: I have personally reviewed pertinent reports.       VTE Pharmacologic Prophylaxis: Heparin    VTE Mechanical Prophylaxis: sequential compression device

## 2023-07-06 NOTE — PHYSICAL THERAPY NOTE
Physical Therapy Cancellation Note    PT orders received chart review completed. Pt is currently intubated/sedated and not appropriate to participate in skilled PT at this time. PT will follow and eval as medically appropriate.      07/06/23 1314   Note Type   Note type Cancelled Session   Cancel Reasons Intubated/sedated       Eli Arcos PT

## 2023-07-06 NOTE — QUICK NOTE
Noted right upper extremity weakness on serial exam.  Patient unable to lift arm off pillow, minimal  strength although he is able to wiggle his fingers in flexion and extension. 0 out of 5 bicep strength, but retains the ability to extend his arm at the elbow. Precedex has been held for several hours, initially there was concern that there was some component of sedation. Discussed with neurosurgeon, recommend MRI of C-spine and brain, will increase MAP goal to 90 with phenylephrine.

## 2023-07-06 NOTE — PROGRESS NOTES
[address] [place] [address]  Progress Note: Critical Care  Name: Jordan Angel 80 y.o. male I MRN: [id number]  Unit/Bed#: ICU 08 I Date of Admission: 7/3/2023   Date of Service: 7/6/2023 I Hospital Day: 3    Assessment/Plan   Neuro:   · Diagnosis: Central Cord Syndrome  · Plan:  · S/p POD 2 C4-C5 Laminectomy, C3-C6 fixation/fusion for central cord syndrome   · Cervical spinal cord injury C3-C4  · MAP goal>90 day through 7/9  · Continue RONY drain to suction and monitoring output   · C spine precautions- no collar needed  · Imaging reviewed and discussed   · NSGY following- appreciate eval and recs   · Diagnosis: Pain/Agitation/Delirium   · Plan:   · D/c precedex due to bradycardia   · Continue ATC tylenol and lidocane patch   · PRN- fentanyl 50, robaxin   · 2 doses of fentanyl in/12 hrs   · Continue melatonin   · CAM-ICU daily  · Regulate sleep-wake cycle   · Frequent reorientation to avoid ICU delirium       CV:   · Diagnosis:Bradycardia; MAP goal >90 in the setting of central cord syndrome   · Plan:  · Discontinue Dewayne due to reflex bradycardia  · Levo started for MAP goals >90  · EKG reviewed- bradycardia, appears irregular.  Will repeat later today  · Diagnosis: Hx of HTN, Hypertriglyceridemia  · Plan:   · Holding home HTN meds- Losartan       Pulm:  · Diagnosis: Acute hypoxic respiratory failure  · Plan:   · Current vent settings- 12/450/6/50%  · Give 40 IV Lasix today again today given improvement in vent settings  · Daily SAT and SBT - patient with poor TV upon SBTs and with difficult breathing   · Remains intubated post-op    GI:   · Diagnosis: Ulcerative colitis  · Plan:   · Start miralax  · Continue daily senna   · No Bm eyt  · Hx of anal fissurectomy, not on daily steroids     :   · Diagnosis: Stage 4 CKD, BPH   · Plan:   · Give 40 IV Lasix today   · S/p diuresed with 40 IV Lasix yesterday   · Continue pastor given poor anatomy for condom cath   · Holding home flomax   · Monitor I's and O's daily       F/E/N:   · Fluids:/  · Electrolytes: Replete as needed for K>4, Phos>3, and Mag >2  · Nutrition: Jevity 1.2 @ 72, currently at 40      Heme/Onc:   · Diagnosis: Acute on chronic anemia; Folate deficiency   · Plan:   · Baseline Hgb appears to be 11-12  · Folate reordered  · Transfuse for Hgb <7.0  · DVT ppx with subcutaneous heparin 5000 TID   · Daily CBC     Endo:   · Diagnosis: Pre-diabetes  · Plan:   · Not on SSI, glucose   · BG goal 140-180      ID:   · Diagnosis: No acute issues   · Plan:   · Continue to monitor fever curve and WBC trend       MSK/Skin:   · Diagnosis: Fall, Hx of gout   · Plan:   · Continue allopurinol 100mg daily   · PT/OT when appropriate  · q2 hour positioning   · Offloading pressure points      Disposition: Critical care    ICU Core Measures     Vented Patient  VAP Bundle  VAP bundle ordered     A: Assess, Prevent, and Manage Pain · Has pain been assessed? Yes  · Need for changes to pain regimen? No   B: Both Spontaneous Awakening Trials (SATs) and Spontaneous Breathing Trials (SBTs) · Plan to perform spontaneous awakening trial today? Yes   · Plan to perform spontaneous breathing trial today? Yes   · Obvious barriers to extubation? No   C: Choice of Sedation · RASS Goal: 0 Alert and Calm  · Need for changes to sedation or analgesia regimen? No   D: Delirium · CAM-ICU: Negative   E: Early Mobility  · Plan for early mobility? Yes   F: Family Engagement · Plan for family engagement today? Yes       Review of Invasive Devices:     Kwadwo Plan: Continue for accurate I/O monitoring for 48 hours    Leana Plan: Keep arterial line for hemodynamic monitoring    Prophylaxis:  VTE VTE covered by:  heparin (porcine), Subcutaneous, 5,000 Units at 07/06/23 0502       Stress Ulcer  covered byomeprazole (PRILOSEC) suspension 2 mg/mL [[id number]]          Subjective   HPI/24hr events:   - Diuresed yesterday with 40 IV Lasix with 1/2L UOP  - SBT with poor TV   - Overnight, weakness in RUE- unable to lift arm and minimal  strength. NSGY recommended MRI c-spine and brain and MAP goal increase to 90 with Dewayne  - MRI negative- residual edema post-op  - Patient bradycardic despite precedex d/c, Dewayne switched to Levo to meet MAP goals     ROS: Intubated      Objective                            Vitals I/O      Most Recent Min/Max in 24hrs   Temp 99.1 °F (37.3 °C) Temp  Min: 97.2 °F (36.2 °C)  Max: 99.4 °F (37.4 °C)   Pulse (!) 44 Pulse  Min: 44  Max: 90   Resp 12 Resp  Min: 7  Max: 26   BP (!) 193/96 (dewayne placed on hold, MD aware of HTN) BP  Min: 146/65  Max: 193/96   O2 Sat 100 % SpO2  Min: 98 %  Max: 100 %      Intake/Output Summary (Last 24 hours) at 7/6/2023 0658  Last data filed at 7/6/2023 0512  Gross per 24 hour   Intake 456.16 ml   Output 1380 ml   Net -923.84 ml         Diet Enteral/Parenteral; Tube Feeding No Oral Diet; Jevity 1.2 Cricket; Continuous; 65     Invasive Monitoring Physical exam   Arterial Line  Leana /54  Arterial Line BP  Min: 132/48  Max: 178/66   MAP 82 mmHg  Arterial Line MAP (mmHg)  Min: 78 mmHg  Max: 114 mmHg    Physical Exam  Vitals and nursing note reviewed. Constitutional:       General: He is not in acute distress. Appearance: He is well-developed. He is ill-appearing. Interventions: He is intubated. HENT:      Head: Normocephalic and atraumatic. Eyes:      Conjunctiva/sclera: Conjunctivae normal.   Cardiovascular:      Rate and Rhythm: Normal rate and regular rhythm. Heart sounds: No murmur heard. Pulmonary:      Effort: Pulmonary effort is normal. No respiratory distress. He is intubated. Breath sounds: Normal breath sounds. Abdominal:      Palpations: Abdomen is soft. Tenderness: There is no abdominal tenderness. Musculoskeletal:         General: No swelling. Cervical back: Neck supple. Skin:     General: Skin is warm and dry. Capillary Refill: Capillary refill takes less than 2 seconds.    Neurological:      Mental Status: He is alert. Comments: RONY drain in place with sang output   Awake, following commands    3/5 L bicep  0/5 L     4/5 R bicep  4/5 R     B/l LE 5/5 strength    Psychiatric:         Mood and Affect: Mood normal.            Diagnostic Studies      EKG:Bradycardia- appears irregular.  Will obtain repeat EKG after HR improved   Imaging: MRI brain/Cspine - post op C1-T1 edema WNL      Medications:  Scheduled PRN   acetaminophen, 975 mg, Q8H UDAY  allopurinol, 100 mg, Daily  chlorhexidine, 15 mL, Q12H UDAY  heparin (porcine), 5,000 Units, Q8H UDAY  lidocaine, 1 patch, Daily  melatonin, 6 mg, HS  omeprazole (PRILOSEC) suspension 2 mg/mL, 20 mg, Daily  senna-docusate sodium, 1 tablet, BID  tamsulosin, 0.4 mg, Daily      fentanyl citrate (PF), 50 mcg, Q1H PRN  hydrALAZINE, 5 mg, Q4H PRN  methocarbamol, 500 mg, Q6H PRN  ondansetron, 4 mg, Q6H PRN       Continuous    norepinephrine, 1-30 mcg/min, Last Rate: 3 mcg/min (07/06/23 0651)         Labs:    CBC    Recent Labs     07/05/23 0447 07/06/23  0502   WBC 17.56* 11.19*   HGB 10.0* 9.5*   HCT 31.8* 29.8*    190     BMP    Recent Labs     07/05/23  0447 07/06/23  0502   SODIUM 140 140   K 5.0 4.8   * 112*   CO2 24 24   AGAP 2 4   BUN 38* 49*   CREATININE 2.32* 2.41*   CALCIUM 8.0* 8.7       Coags    Recent Labs     07/04/23  0935   INR 1.03   PTT 60*        Additional Electrolytes  Recent Labs     07/05/23  0447   MG 2.1   PHOS 3.7   CAIONIZED 0.97*          Blood Gas    Recent Labs     07/04/23  1930   PHART 7.367   NBS6QNS 39.4   PO2ART 102.0   AWZ1FNR 22.1   BEART -2.9   SOURCE Line, Arterial     Recent Labs     07/04/23 1930   SOURCE Line, Arterial    LFTs  Recent Labs     07/04/23  0935   ALT 21   AST 31   ALKPHOS 78   ALB 3.0*   TBILI 0.50       Infectious  No recent results  Glucose  Recent Labs     07/04/23  1724 07/04/23  1922 07/05/23  0447 07/06/23  0502   GLUC 161* 165* 130 98               Critical Care Time Delivered: Upon my evaluation, this patient had a high probability of imminent or life-threatening deterioration due to Central Cord Syndrome , which required my direct attention, intervention, and personal management. I have personally provided 15 minutes of critical care time, exclusive of procedures, teaching, family meetings, and any prior time recorded by providers other than myself.      Natalia Mcdonald PA-C

## 2023-07-06 NOTE — RESPIRATORY THERAPY NOTE
07/06/23 0250   Respiratory Assessment   Resp Comments Patient transported to and from [facility] via transport vent without incident. No adverse events overnight.    Vent Information   Vent type Wharton G5   Wharton C3/G5 Vent Mode (S)CMV   $ Vent Daily Charge-Subsequent Yes   $ Pulse Oximetry Spot Check Charge Completed   (S)CMV Settings   Resp Rate (BPM) 12 BPM   VT (mL) 450 mL   FIO2 (%) 40 %   PEEP (cmH2O) 6 cmH2O   Insp Time (%) 1 %   Flow Trigger (LPM) 5   Humidification Heater   Heater Temperature (Set) 95 °F (35 °C)   (S)CMV Actuals   Resp Rate (BPM) 12 BPM   VT (mL) 453   MV 5.4   MAP (cmH2O) 9.8 cmH2O   Peak Pressure (cmH2O) 24 cmH2O   (S)CMV Alarms   High Peak Pressure (cmH2O) 40   Low Pressure (cmH2O) 8 cm H2O   High Resp Rate (BPM) 35 BPM   Low Resp Rate (BPM) 8 BPM   High MV (L/min) 20 L/min   Low MV (L/min) 4 L/min   Apnea Time (s) 20 S   Maintenance   Water bag changed Yes   Circuit changed No

## 2023-07-06 NOTE — CASE MANAGEMENT
Case Management Discharge Planning Note    Patient name Salvador Beckman  Location ICU 08/ICU 08 MRN [id number]  : 1934 Date 2023       Current Admission Date: 7/3/2023  Current Admission Diagnosis:Central cord syndrome [facility])   Patient Active Problem List    Diagnosis Date Noted   • C3 spinal cord injury ([address]) 2023   • Central cord syndrome ([address]) 2023   • Weakness of both upper extremities 2023   • Fall 2023   • Proteinuria 2022   • Stage 4 chronic kidney disease ([address]) 2022   • Hypertriglyceridemia 2022   • Low HDL (under 40) 2020   • Nephrolithiasis 2018   • Severe obesity (BMI 35.0-39. 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      LOS (days): 3  Geometric Mean LOS (GMLOS) (days):   Days to GMLOS:     OBJECTIVE:  Risk of Unplanned Readmission Score: 20.82         Current admission status: Inpatient   Preferred Pharmacy:   [facility] ([facility] [address] [facility] [address]  Phone: [phone] Fax: [phone]    [facility] [address] [place], [place] - [address]  [address] [facility] [address]  Phone: [phone] Fax: [phone] [facility] ([facility] ) - [address]  [address] [facility]  Phone: [phone] Fax: [phone]    Primary Care Provider: Kathi Dsouza DO    Primary Insurance: [facility]  Secondary Insurance:     DISCHARGE DETAILS:    Pt remains intubated and sedated.    CM will continue to follow for d/c needs and recs

## 2023-07-06 NOTE — ACP (ADVANCE CARE PLANNING)
Advanced Care Planning Note     Salvador Beckman 80 y.o. male MRN: [id number]    Unit/Bed#: ICU 08 Encounter: [id number]    Salvador Beckman is an 79 yo male male that presented today secondary to having an acute condition requiring critical care provider evaluation. The patient has chronic comorbidities, including but not limited to CKD, Advanced age, Obesity and now is inflicted with following acute conditions: Central Cord Syndrome, C3-C4 cervical spine injury due to fall. Due to the severity of the patient's acute condition and/or the extent of chronic conditions, there is need for advanced care planning at this time. Please see my previous documentation in regards to the patient's current condition, assessment, and treatment plan. During this discussion with the patient and/or family members, it was established that all stake holders were agreeable and understood the rationale for advanced care planning to occur at this time. The following individuals were present & participated in the face to face conversation I had about the patient's advanced directives & advanced care planning:Spouse- Julieta Marrero and granddaughter Braxton Ro. Also on the phone included adult children Jarrett and Sidra Ortega. Patient has one other daughter, Mark Roger, who was not on the phone during the conversation. Patient was fully awake, alert, for this conversation while intubated and had been off of sedation >6hrs. Dr. Ascencio Seen and I had a thorough conversation bedside today providing a clinical update on patient's ICU stay including difficult ventilation, CKD4, and MSK weakness. We discussed the suspected difficulties of extubating the patient despite repeated optimization with daily SBT, vent weaning, and IV diuresis. We discussed the high likelihood of reintubation given patient has poor TV (low 300s) on 8/6 and symptomatic (dyspnea/WOB) on spontaneous mode of ventilation.  When asked if patient would like to be re-intubated after extubation, patient shook his head yes. His family is in agreement with each other and patient that he would like to be re-intubated and ultimately his goal is to live longer and make it home. We discussed the suspected need for tracheostomy if re-intubated, family and patient state at this time they would like to pursue a tracheostomy if required, understanding the likely need for LTAC/rehab for trach vent weaning. Currently the plan is to repeat IV diuresis, and extubate to Bipap later today. Repeat GOC discussion pending respiratory status post extubation and patient's further ability to engage in conversation. Patient's two daughters, Efrain Jerome and Freeman Rey, will be coming in later today. While patient does not have a previously listed POA, they request all updates be given first to daughter Cindy Loyd. All questions were answered and they expressed appreciation and understanding for our care. Total time spent, (40) minutes (11:45 to 12:30).     CODE STATUS: Level 1 - Full Code  POA:    POLST:      SIGNATURE: Jose Manuel Gordon PA-C  DATE: July 6, 2023  TIME: 12:34 PM

## 2023-07-06 NOTE — OCCUPATIONAL THERAPY NOTE
Occupational Therapy Cancel Note     07/06/23 1311   OT Last Visit   OT Visit Date 07/06/23   Note Type   Note type Cancelled Session   Cancel Reasons Intubated/sedated           Kian Jung OT

## 2023-07-06 NOTE — ASSESSMENT & PLAN NOTE
Lab Results   Component Value Date    EGFR 22 07/06/2023    EGFR 24 07/05/2023    EGFR 24 07/04/2023    CREATININE 2.41 (H) 07/06/2023    CREATININE 2.32 (H) 07/05/2023    CREATININE 2.29 (H) 07/04/2023     Medical management per primary team

## 2023-07-06 NOTE — ASSESSMENT & PLAN NOTE
POD 2 Posterior C4-5 laminectomy, C3-6 fixation/fusion for central cord syndrome (HDM, 7/4/23)  · Cervical spinal cord injury @ C3-4  · s/p unwitnessed fall down stairs with posterior head strike on 7/3   · on arrival with complaints of neck pain and fabien UE weakness and numbness     Imaging:   · MRI cervical spine w/o, 7/5/23: Hyperflexion injury with a defect through the anterior longitudinal ligament at the C4-5 level and widening of the anterior disc space. Prevertebral edema extending from C1-2 through T1-2 ,mildly progressed. Interval spinal decompression and posterior fixation including bilateral laminectomies at the C4 and C5 levels and bilateral transpedicular screws at the C3-C6 levels with interconnecting rods. Improved patency of the central canal at the C3-4 and C4-5 levels with resolved cord compression. 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 Please call with questions or concerns.

## 2023-07-07 NOTE — PHYSICAL THERAPY NOTE
Physical Therapy Evaluation     Patient's Name: Wilfrid Walsh    Admitting Diagnosis  Neck pain [M54.2]  Fall, initial encounter [W19. XXXA]  Weakness of both upper extremities [R29.898]  Unspecified multiple injuries, initial encounter [T07. XXXA]    Problem List  Patient Active Problem List   Diagnosis    Ulcerative colitis without complications ([address])    Snoring    Severe obesity (BMI 35.0-39. 9) with comorbidity (HCC)    Nocturia    Macular degeneration    Impaired fasting glucose    Primary hypertension    Gout    Benign neoplasm of large intestine    Nephrolithiasis    Low HDL (under 40)    Stage 4 chronic kidney disease (HCC)    Hypertriglyceridemia    Proteinuria    Weakness of both upper extremities    Fall    C3 spinal cord injury ([address])    Central cord syndrome [facility])       Past Medical History  Past Medical History:   Diagnosis Date    Ankle edema     last assessed [id number]    Depression     last assessed [id number]    Hypertension     Kidney stone     Nephrolithiasis     Nocturia     last assessed [id number]    Posterior tibial tendinitis of right lower extremity     last assessed [id number]       Past Surgical History  Past Surgical History:   Procedure Laterality Date    ANAL FISSURECTOMY      CERVICAL FUSION N/A 7/4/2023    Procedure: Posterior C4-5 laminectomy, C3-6 fixation/fusion;  Surgeon: Luciano Blunt MD;  Location: [facility];  Service: Neurosurgery    COLONOSCOPY  04/22/2009    every 2 years    COLONOSCOPY  03/04/2013    2 yrs d/t h/o polyp    COLONOSCOPY  02/15/2016    colo 2/15/16-repeat 2 yrs    SHOULDER SURGERY            07/07/23 0941   PT Last Visit   PT Visit Date 07/07/23   Note Type   Note type Evaluation   Pain Assessment   Pain Assessment Tool 0-10   Pain Score No Pain   Restrictions/Precautions   Weight Bearing Precautions Per Order No   Other Precautions Chair Alarm; Bed Alarm;Multiple lines;Telemetry;O2;Fall Risk   Home Living   Type of [address] [place] [address]  Multi-level;Performs ADLs on one level; Able to live on main level with bedroom/bathroom  (pt unsure of number of NEEMA)   Bathroom Shower/Tub Walk-in shower   Bathroom Toilet Standard   Bathroom Equipment Grab bars in [facility]; Other (Comment)  (rollator)   Prior Function   Level of [place] Independent with ADLs; Independent with functional mobility; Independent with IADLS   Lives With Spouse   Receives Help From Family   IADLs Independent with driving; Independent with meal prep   Cognition   Orientation Level Oriented X4   Memory Decreased recall of biographical information   Following Commands Follows one step commands without difficulty   RLE Assessment   RLE Assessment   (grossly with mobility, at least 3-/5)   LLE Assessment   LLE Assessment   (grossly with mobility, at least 3/5)   Bed Mobility   Supine to Sit 2  Maximal assistance   Additional items Assist x 2;HOB elevated; Increased time required;LE management   Sit to Supine Unable to assess  (pt left sitting in recliner with call bell in reach and all needs met)   Transfers   Sit to Stand 2  Maximal assistance   Additional items Assist x 2; Increased time required   Stand to Sit 2  Maximal assistance   Additional items Assist x 2   Stand pivot 2  Maximal assistance   Additional items Assist x 2   Ambulation/Elevation   Gait pattern Narrow AVLARADO; Forward Flexion;Decreased foot clearance;Shuffling; Ataxia   Gait Assistance 2  Maximal assist   Additional items Assist x 2   Assistive Device   (BL HHA)   Distance 1 step each foot prior towards chair   Balance   Static Sitting Poor +   Dynamic Sitting Poor   Static Standing Poor   Dynamic Standing Poor -   Ambulatory Poor -   Activity Tolerance   Activity Tolerance Patient limited by fatigue   Medical Staff Made Aware Co-eval with OT   Assessment   Prognosis Guarded   Problem List Decreased strength;Decreased endurance; Impaired balance;Decreased mobility; Impaired sensation   Assessment Pt is an 81yo male seen for PT eval following admission to [facility] as transfer from  after presenting there s/p fall down stairs. Pt was transferred to [facility] to rule out central cord syndrome, and is currently s/p C4-5 laminectomy and C3-6 fixation/fusion. PMH is significant for HTN, CKD stage 4, BPH, and gout. Pt lives with his wife in a multi-level home, and was unsure of how many stairs he has to enter home. He reports being independent with all functional mobility, ADLs, and IADLs PTA. Pt reports that he owns a cane and rollator, and that he used them as needed at baseline, though not all the time. Pt reported no pain this session, however reported numbness in BUE and BLE. Upon visual assessment, UEs grosly weaker than LEs. Pt required max A for all mobility, including supine<>sit, sit<>stand, and stand-pivot transfer. During SPT, pt able to minimally clear each foot to aid with steps towards chair before muscle fatigue limited rest of the motion, thus requiring SPT instead of short-distance ambulation. He sat EOB with significant assistance required to maintain upright trunk posture, as he was unable to utilize UEs to aid in supporting himself. The patient's AM-PAC Basic Mobility Inpatient Short Form Raw Score is 7. A Raw score of less than 16 suggests the patient may benefit from discharge to post-acute rehabilitation services. Please also refer to the recommendation of the Physical Therapist for safe discharge planning. Recommend post-acute rehab services upon d/c. Pt would benefit from skilled PT intervention to address the aforementioned impairments to allow for optimal functional mobility, safety, and independence upon discharge. At end of session, pt was left sitting comfortably in recliner with call bell in reach and all current needs met. Goals   Patient Goals to go home   STG Expiration Date 07/21/23   Short Term Goal #1 1.  In 2 weeks, pt will be independent with aspects of bed mobility to aid in optimizing independence and safety upon discharge. 2. In 2 weeks, pt will increase BUE and BLE muscle strength by at least 1/2 grade to aid in improving safety, independence, and overall mechanics with functional mobility, ambulation, and other physical activities. 3. In 2 weeks, pt will demonstrate improved balance in sitting and during stance, as evidenced by decreased postural sway and no losses of balance, to allow for improved independence and decreased risk of falls. 4. In 2 weeks, pt will be able to perform sit<>stand transfer with no greater than min A to allow for decreased caregiver burden and optimal independence upon d/c. 5. In 2 weeks pt will mobilize at least 50ft in manual WC to allow for improved independence and mobility upon d/c. 6. In 2 weeks, pt will be able to ambulate 10ft across flat surface with least restrictive AD and no greated than mod A to allow for decreased caregiver burden and optimal independence upon d/c.   Plan   Treatment/Interventions Functional transfer training;LE strengthening/ROM; Therapeutic exercise; Endurance training;Patient/family training;Equipment eval/education; Bed mobility;Gait training; Compensatory technique education;Continued evaluation   PT Frequency 2-3x/wk   Recommendation   PT Discharge Recommendation Post acute rehabilitation services   AM-PAC Basic Mobility Inpatient   Turning in Flat Bed Without Bedrails 2   Lying on Back to Sitting on Edge of Flat Bed Without Bedrails 1   Moving Bed to Chair 1   Standing Up From Chair Using Arms 1   Walk in Room 1   Climb 3-5 Stairs With Railing 1   Basic Mobility Inpatient Raw Score 7   Highest Level Of Mobility   -[facility] Goal 2: Bed activities/Dependent transfer         Chris Hogan, SPT

## 2023-07-07 NOTE — CASE MANAGEMENT
Case Management Discharge Planning Note    Patient name Vu Leon  Location ICU 08/ICU 08 MRN [id number]  : 1934 Date 2023       Current Admission Date: 7/3/2023  Current Admission Diagnosis:Central cord syndrome [facility])   Patient Active Problem List    Diagnosis Date Noted   • C3 spinal cord injury ([address]) 2023   • Central cord syndrome ([address]) 2023   • Weakness of both upper extremities 2023   • Fall 2023   • Proteinuria 2022   • Stage 4 chronic kidney disease ([address]) 2022   • Hypertriglyceridemia 2022   • Low HDL (under 40) 2020   • Nephrolithiasis 2018   • Severe obesity (BMI 35.0-39. 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      LOS (days): 4  Geometric Mean LOS (GMLOS) (days): 9.80  Days to GMLOS:6.1     OBJECTIVE:  Risk of Unplanned Readmission Score: 20.65         Current admission status: Inpatient   Preferred Pharmacy:   [facility] ([address] [place] [address]  Phone: [phone] Fax: [phone]    [facility] [address] [place], [place] - [address]  [address] [facility] [address]  Phone: [phone] Fax: [phone] [place] [facility] ([facility] ) - [place], [address]  [address] [facility]  Phone: [phone] Fax: [phone]    Primary Care Provider: Latonia Brock DO    Primary Insurance: [facility] REP  Secondary Insurance:     DISCHARGE DETAILS:    Pt extubated. Will await therapy recommendations pertaining to d/c planning.

## 2023-07-07 NOTE — PLAN OF CARE
Problem: PHYSICAL THERAPY ADULT  Goal: Performs mobility at highest level of function for planned discharge setting. See evaluation for individualized goals. Description: Treatment/Interventions: Functional transfer training, LE strengthening/ROM, Therapeutic exercise, Endurance training, Patient/family training, Equipment eval/education, Bed mobility, Gait training, Compensatory technique education, Continued evaluation          See flowsheet documentation for full assessment, interventions and recommendations. 7/7/2023 1705 by Chris Hogan  Note: Prognosis: Guarded  Problem List: Decreased strength, Decreased endurance, Impaired balance, Decreased mobility, Impaired sensation  Assessment: Pt is an 79yo male seen for PT eval following admission to [facility] as transfer from  after presenting there s/p fall down stairs. Pt was transferred to [facility] to rule out central cord syndrome, and is currently s/p C4-5 laminectomy and C3-6 fixation/fusion. PMH is significant for HTN, CKD stage 4, BPH, and gout. Pt lives with his wife in a multi-level home, and was unsure of how many stairs he has to enter home. He reports being independent with all functional mobility, ADLs, and IADLs PTA. Pt reports that he owns a cane and rollator, and that he used them as needed at baseline, though not all the time. Pt reported no pain this session, however reported numbness in BUE and BLE. Upon visual assessment, UEs grosly weaker than LEs. Pt required max A for all mobility, including supine<>sit, sit<>stand, and stand-pivot transfer. During SPT, pt able to minimally clear each foot to aid with steps towards chair before muscle fatigue limited rest of the motion, thus requiring SPT instead of short-distance ambulation. He sat EOB with significant assistance required to maintain upright trunk posture, as he was unable to utilize UEs to aid in supporting himself. The patient's AM-PAC Basic Mobility Inpatient Short Form Raw Score is 7.  A Raw score of less than 16 suggests the patient may benefit from discharge to post-acute rehabilitation services. Please also refer to the recommendation of the Physical Therapist for safe discharge planning. Recommend post-acute rehab services upon d/c. Pt would benefit from skilled PT intervention to address the aforementioned impairments to allow for optimal functional mobility, safety, and independence upon discharge. At end of session, pt was left sitting comfortably in recliner with call bell in reach and all current needs met. PT Discharge Recommendation: Post acute rehabilitation services    See flowsheet documentation for full assessment.

## 2023-07-07 NOTE — PROGRESS NOTES
[address] [place] [address]  Progress Note: Critical Care  Name: Payton Asa 80 y.o. male I MRN: [id number]  Unit/Bed#: ICU 08 I Date of Admission: 7/3/2023   Date of Service: 7/7/2023 I Hospital Day: 4    Assessment/Plan   Neuro:   · Diagnosis: Central Cord Syndrome  · Plan  · S/p POD3 C4-C5 laminectomy, C3-C6 fixation/fusion   · Cervical spinal cord injury c3-c4  · Decadron 2mgq8 started by NSGY on 7/6 for Right C5 palsy for 72 hrs  · Upright XR pending   · Continue RONY drain to suction, monitor output   · C spine precautions  · NSGY following- appreciate eval and recs  · Diagnosis: Pain/Agitation/Delirium   · Plan:   · Continue ATC tylenol and lidocaine patch   · PRN robaxin   · If patient has increased pain, plan to add oxycodone 2.5/5   · D/c fentanyl PRN   · Continue Melatonin 6mg qhs  · CAM-ICU daily  · Regulate sleep-wake cycle   · Frequent reorientation to avoid ICU delirium     CV:   · Diagnosis: Hemodynamic optimization in the setting of acute spinal cord injury   · Plan:   · MAP goals >90 through 7/9 per NSGY   · Patient has been auto-mapping for the last 24 hrs   · Hx of reflex bradycardia with Dewayne, plan to use Levo for MAP pushes if needed   · Diagnosis: Hx of HTN  · Plan:   · Plan to start Cardene if SBP >180 to prevent flash pulmonary edema  · Holding home antihypertensive- Losartan     Pulm:  · Diagnosis: Acute hypoxic respiratory failure   · Plan:   · Wore bipap continuously, transition to NC   · Bipap qhs 12/6   · Give 20 IV Lasix today  · Restart home lasix 40 PO   · Start Mucinex   · Pulmonary hygiene- Flutter valve, OOB to chair   · Spo2 >88%    GI:   · Diagnosis: Ulcerative Colitis   · Hx: Not on steroids, hx of anal fissurectomy   · Plan:   · Start home sulfasalazine 500mg in AM and, 1000 at noon, and 500 at night when tolerating PO   · Continue daily senna and miralax   · Add dulcolax PRN suppository   · D/c omeprazole   · F/u BM- no bowel movement yet     : · Diagnosis: Elevated Cr, Stage 4 CKD, BPH   · Plan:   · Remove pastor cath post diuresis   · Give 20mg IV Lasix today  · Resume home 20mg IV Lasix today Resume home flomax when pastor removed   · Holding home allopurinol given increased Cr  · Baseline 1.9-2.3  · Monitor I's and O's daily     F/E/N:   · Fluids: /   · Electrolytes: Replete as needed for K>4, Phos>3, and Mag >2  · Nutrition: Nursing dysphagia screen- large gulps and coughing, obtaining formal speech    Heme/Onc:   · Diagnosis: Chronic normocytic normochromic anemia; Leukocytosis  · Plan:   · Continue home folate  · Transfuse for Hgb <7  · DVT ppx with heparin 5000 q8 given kidney function   · Daily CBC     Endo:   · Diagnosis: No acute issues   · Plan:   · Not on SSI- monitor glucose with steroid addition   · Most recent A1C <6  · BG goal 140-180    ID:   · Diagnosis: No acute issues   · Plan:   · Continue to monitor fever curve and WBC trend     MSK/Skin:   · Diagnosis: Hx of gout, Mechanical fall   · Plan:   · Resume allopurinol tomorrow pending improved kidney function and lack of IV diruesis  · PT/OT when appropriate  · q2 hour positioning   · Offloading pressure points      Disposition: Critical care     ICU Core Measures     A: Assess, Prevent, and Manage Pain · Has pain been assessed? Yes  · Need for changes to pain regimen? Yes   B: Both SAT/SAT  · N/A   C: Choice of Sedation · RASS Goal: 0 Alert and Calm  · Need for changes to sedation or analgesia regimen? No   D: Delirium · CAM-ICU: Negative   E: Early Mobility  · Plan for early mobility? Yes   F: Family Engagement · Plan for family engagement today? Yes       Review of Invasive Devices: Pastor Plan: Pastor to be removed.  Order has been placed    Leana Plan: Keep arterial line for hemodynamic monitoring    Prophylaxis:  VTE VTE covered by:  heparin (porcine), Subcutaneous, 5,000 Units at 07/07/23 0514       Stress Ulcer  covered byomeprazole (PRILOSEC) suspension 2 mg/mL [[id number]] Subjective   HPI/24hr events:   - Extubated to Bipap, wore continuous Bipap overnight, tolerated without issues   - Neuro exam remains unchanged     Review of Systems   Constitutional: Negative for chills and fever. + hungry  + warm   HENT: Negative for ear pain and sore throat. Eyes: Negative for pain and visual disturbance. Respiratory: Positive for cough. Negative for shortness of breath.         + productive cough    Cardiovascular: Negative for chest pain and palpitations. Gastrointestinal: Negative for abdominal pain and vomiting. Genitourinary: Negative for dysuria and hematuria. Musculoskeletal: Positive for myalgias. Negative for arthralgias and back pain. + Chronic left hip bursa pain, worsened by immobility   Skin: Negative for color change and rash. Neurological: Negative for seizures and syncope. All other systems reviewed and are negative. Objective                            Vitals I/O      Most Recent Min/Max in 24hrs   Temp 98.5 °F (36.9 °C) Temp  Min: 97.6 °F (36.4 °C)  Max: 98.5 °F (36.9 °C)   Pulse 84 Pulse  Min: 48  Max: 92   Resp (!) 27 Resp  Min: 11  Max: 34   BP (!) 193/96 (anam placed on hold, MD aware of HTN) No data recorded   O2 Sat 100 % SpO2  Min: 97 %  Max: 100 %      Intake/Output Summary (Last 24 hours) at 7/7/2023 0647  Last data filed at 7/7/2023 0601  Gross per 24 hour   Intake 0 ml   Output 1700 ml   Net -1700 ml         No diet orders on file     Invasive Monitoring Physical exam   Arterial Line  Hooven /64  Arterial Line BP  Min: 134/52  Max: 180/68   MAP 98 mmHg  Arterial Line MAP (mmHg)  Min: 78 mmHg  Max: 108 mmHg    Physical Exam  Vitals and nursing note reviewed. Constitutional:       General: He is not in acute distress. Appearance: He is well-developed. He is not ill-appearing. HENT:      Head: Normocephalic.       Comments: Staples in L frontal scalp- without purulence or erythema   Eyes:      Conjunctiva/sclera: Conjunctivae normal.   Cardiovascular:      Rate and Rhythm: Normal rate and regular rhythm. Heart sounds: No murmur heard. Pulmonary:      Effort: Pulmonary effort is normal. No respiratory distress. Breath sounds: Normal breath sounds. Comments: Productive cough with thick secretions   Abdominal:      Palpations: Abdomen is soft. Tenderness: There is no abdominal tenderness. Musculoskeletal:         General: No swelling. Cervical back: Neck supple. Skin:     General: Skin is warm and dry. Capillary Refill: Capillary refill takes less than 2 seconds. Neurological:      Mental Status: He is alert and oriented to person, place, and time. GCS: GCS eye subscore is 4. GCS verbal subscore is 5. GCS motor subscore is 6. Cranial Nerves: No cranial nerve deficit, dysarthria or facial asymmetry.       Comments: RUE- deltoid 4/5, bicep- 2/5,  4/5   LUE- deltoid 4/5, bicep 4/5,  0/5,   B/L LE 5/5    Psychiatric:         Mood and Affect: Mood normal.            Diagnostic Studies      EKG: Telemetry reviewed- no acute events   Imaging:No new imaging      Medications:  Scheduled PRN   acetaminophen, 975 mg, Q8H [facility]  chlorhexidine, 15 mL, Q12H [facility]  dexamethasone, 2 mg, U7R [facility]  folic acid, 1 mg, Daily  heparin (porcine), 5,000 Units, Q8H [facility]  lidocaine, 1 patch, Daily  melatonin, 6 mg, HS  omeprazole (PRILOSEC) suspension 2 mg/mL, 20 mg, Daily  polyethylene glycol, 17 g, Daily  senna-docusate sodium, 1 tablet, BID      methocarbamol, 500 mg, Q6H PRN  ondansetron, 4 mg, Q6H PRN       Continuous          Labs:    CBC    Recent Labs     07/06/23  0502 07/07/23  0542   WBC 11.19* 11.40*   HGB 9.5* 10.0*   HCT 29.8* 31.4*    214     BMP    Recent Labs     07/06/23  0502 07/07/23  0542   SODIUM 140 141   K 4.8 4.8   * 109*   CO2 24 27   AGAP 4 5   BUN 49* 61*   CREATININE 2.41* 2.24*   CALCIUM 8.7 8.4       Coags    No recent results     Additional Electrolytes  No recent results       Blood Gas    No recent results  No recent results LFTs  No recent results    Infectious  No recent results  Glucose  Recent Labs     07/06/23  0502 07/07/23  0542   GLUC 98 105               No Critical Care time delivered.      Alea Abarca PA-C

## 2023-07-07 NOTE — PLAN OF CARE
Problem: OCCUPATIONAL THERAPY ADULT  Goal: Performs self-care activities at highest level of function for planned discharge setting. See evaluation for individualized goals. Description: Treatment Interventions: ADL retraining, Functional transfer training, UE strengthening/ROM, Endurance training, Patient/family training, Equipment evaluation/education, Neuromuscular reeducation, Fine motor coordination activities, Compensatory technique education, Activityengagement          See flowsheet documentation for full assessment, interventions and recommendations. Note: Limitation: Decreased ADL status, Decreased UE ROM, Decreased UE strength, Decreased endurance, Decreased sensation, Decreased fine motor control, Decreased self-care trans, Decreased high-level ADLs, Non-func R UE, Non-func L UE  Prognosis: Good, Fair  Assessment: Pt is a 80 y.o. male who was [facility] to [facility] on 7/3/2023 with Central cord syndrome ([address]) s/p fall down stairs. Pt's problem list also includes PMH of HTN, obesity, previous surgery and CKD, macular degeneration, gout, ulcerative colitis. At baseline pt was completing adls and mobility independently - shares homemaking with family. Pt lives with spouse in 2 story home with 6 NEEMA and FFSU PRN. Currently pt requires max to total assist for overall ADLS and max a x 2 for functional mobility/transfers. Pt currently presents with impairments in the following categories -steps to enter environment, limited home support, difficulty performing ADLS, difficulty performing IADLS  and environment activity tolerance, endurance, standing balance/tolerance, sitting balance/tolerance, UE strength, UE ROM, FMC, GMC and sensation .  These impairments, as well as pt's fatigue, pain, abnormal tone, decreased caregiver support, risk for falls and home environment  limit pt's ability to safely engage in all baseline areas of occupation, includingeating, grooming, bathing, dressing, toileting, functional mobility/transfers, community mobility, laundry , house maintenance, meal prep, cleaning, social participation  and leisure activities  From OT standpoint, recommend inpt rehab ([facility]) upon D/C. OT will continue to follow to address the below stated goals.      OT Discharge Recommendation: Post acute rehabilitation services

## 2023-07-07 NOTE — PLAN OF CARE
Problem: MOBILITY - ADULT  Goal: Maintain or return to baseline ADL function  Description: INTERVENTIONS:  -  Assess patient's ability to carry out ADLs; assess patient's baseline for ADL function and identify physical deficits which impact ability to perform ADLs (bathing, care of mouth/teeth, toileting, grooming, dressing, etc.)  - Assess/evaluate cause of self-care deficits   - Assess range of motion  - Assess patient's mobility; develop plan if impaired  - Assess patient's need for assistive devices and provide as appropriate  - Encourage maximum independence but intervene and supervise when necessary  - Involve family in performance of ADLs  - Assess for home care needs following discharge   - Consider OT consult to assist with ADL evaluation and planning for discharge  - Provide patient education as appropriate  Outcome: Progressing  Goal: Maintains/Returns to pre admission functional level  Description: INTERVENTIONS:  - Perform BMAT or MOVE assessment daily.   - Set and communicate daily mobility goal to care team and patient/family/caregiver. - Collaborate with rehabilitation services on mobility goals if consulted  - Perform Range of Motion 3 times a day. - Reposition patient every 2 hours.   -- Out of bed for toileting  - Record patient progress and toleration of activity level   Outcome: Progressing

## 2023-07-07 NOTE — PROGRESS NOTES
RONY with 50 mL output over 24 hours. Drain removed. Patient tolerated well. Suture tied. Nursing notified.

## 2023-07-07 NOTE — ASSESSMENT & PLAN NOTE
POD#3 - s/p posterior C4-5 laminectomy, C3-6 fixation/fusion for central cord syndrome (HDM, 7/4/23)  · Cervical spinal cord injury @ C3-4  · s/p unwitnessed fall down stairs with posterior head strike on 7/3   · on arrival with complaints of neck pain and fabien UE weakness and numbness   · Pt with new R C5 palsy post-op     Imaging:   · 7/6 MRI c-spine: Hyperflexion injury with a defect through the anterior longitudinal ligament at the C4-5 level and widening of the anterior disc space. Prevertebral edema extending from C1-2 through T1-2 ,mildly progressed. Interval spinal decompression and posterior fixation including bilateral laminectomies at the C4 and C5 levels and bilateral transpedicular screws at the C3-C6 levels with interconnecting rods. Improved patency of the central canal at the C3-4 and C4-5 levels with resolved cord compression. There is stable hyperintense T2/STIR signal within the dorsal aspect of the cord at the C4 level  · 7/6 MRI brain: No acute infarction, edema, or mass effect. Mild chronic microangiopathic ischemic changes. Small calcified lesion is noted along the anterior right frontal convexity, dural calcification versus small meningioma. Right parietal scalp soft tissue swelling. Plan:   · Continue to closely monitor neuro exam   · Frequent neuro checks per primary team   · Maintain strict HTN goals, MAP > 90 x 5 days post-op   · Day 3/5 today   · Currently auto-mapping   · Levo since discontinued   · No further neurosurgical intervention indicated at this time   · Case and imaging reviewed this am on rounds.    · 1 RONY drain to FS, d/c today   · 50cc/24hrs, serosanguinous drainage   · Will plan to remove at the bedside today   · Please obtain upright cervical xrays post-op --> order in place    · No collar  · Continue Decadron 2mg q 8hrs for concern for R C5 palsy post-op   · Anticipate this should slowly improve   · DVT ppx: SCDs, SQH   · Pain control per primary team   · Multimodal regimen in place  · Medical management per primary team   · PT/OT/PMR evaluations  · Social work following for assistance with dispo once medically cleared   · Anticipate discharge to a SCI rehab     Neurosurgery will continue to follow. Please call with questions or concerns.

## 2023-07-07 NOTE — CASE MANAGEMENT
Case Management Discharge Planning Note    Patient name Radha Notice  Location ICU 08/ICU 08 MRN [id number]  : 1934 Date 2023       Current Admission Date: 7/3/2023  Current Admission Diagnosis:Central cord syndrome [facility])   Patient Active Problem List    Diagnosis Date Noted   • C3 spinal cord injury ([address]) 2023   • Central cord syndrome ([address]) 2023   • Weakness of both upper extremities 2023   • Fall 2023   • Proteinuria 2022   • Stage 4 chronic kidney disease ([address]) 2022   • Hypertriglyceridemia 2022   • Low HDL (under 40) 2020   • Nephrolithiasis 2018   • Severe obesity (BMI 35.0-39. 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 [address]  Phone: [phone] Fax: [phone] [facility] ([facility] ) [facility] [address] [place] [address]  [address] [facility]  Phone: [phone] Fax: [phone]    Primary Care Provider: Odessa Barros DO    Primary Insurance: [facility]  Secondary Insurance:     DISCHARGE DETAILS:    Pt was seen by OT/PT. Recommendation was for IP rehab with SCI rehab being most preferred.  CM discussed with pt's wife and her preference is for the pt to d/c to [facility] as they live next door, she doesn't drive, and she isn't able to get consistent transport to places.    CM placed and will follow up

## 2023-07-07 NOTE — SPEECH THERAPY NOTE
Speech Language/Pathology  Speech-Language Pathology Bedside Swallow Evaluation        Patient Name: Edis Vasques    SDXPW'B Date: 7/7/2023     Problem List  Principal Problem:    Central cord syndrome [facility])  Active Problems:    Stage 4 chronic kidney disease ([address])    Weakness of both upper extremities    Fall    C3 spinal cord injury [facility])         Past Medical History  Past Medical History:   Diagnosis Date   • Ankle edema     last assessed [id number]   • Depression     last assessed [id number]   • Hypertension    • Kidney stone    • Nephrolithiasis    • Nocturia     last assessed [id number]   • Posterior tibial tendinitis of right lower extremity     last assessed [id number]       Past Surgical History  Past Surgical History:   Procedure Laterality Date   • ANAL FISSURECTOMY     • CERVICAL FUSION N/A 7/4/2023    Procedure: Posterior C4-5 laminectomy, C3-6 fixation/fusion;  Surgeon: Garth Ruelas MD;  [facility];  Service: Neurosurgery   • COLONOSCOPY  04/22/2009    every 2 years   • COLONOSCOPY  03/04/2013    2 yrs d/t h/o polyp   • COLONOSCOPY  02/15/2016    colo 2/15/16-repeat 2 yrs   • SHOULDER SURGERY         Summary    Pt presents with oropharyngeal swallows that appear WNL. There were no overt s/s of aspiration. Risk of aspiration appears low. Pt is appropriate to resume premorbid diet, regular and thin liquids. No further ST intervention is indicated at this time. Eval only. Recommendations:   Diet: regular diet and thin liquids   Meds: whole with liquid   Frequent Oral care: 2x/day  Staff to assist with feeding until pt can use his arms  Aspiration precautions and compensatory swallowing strategies: upright posture  Other Recommendations/ considerations: No further ST indicated at this time. Eval only. Current Medical Status  Copied from admission/physician notes:  Edis Vasques is a 80 y.o. male who presents after a fall.   Patient is a poor historian, however wife is at bedside and reports that she heard a thud and found him laying on his back near the stairs. Patient reports a fall, no loss of consciousness, no use of antiplatelet or anticoagulation medications. He reports he hit the back of his head on the ground. Patient complains of neck pain and shoulder pain and weakness in his upper and lower extremities. She reports that his upper extremities are more weak than his lower extremities, and he also is experiencing numbness in his hands. He reports he was unable to get up off the floor due to his weakness. Neuro:   • Diagnosis: Central Cord Syndrome  ? Plan  - S/p POD3 C4-C5 laminectomy, C3-C6 fixation/fusion   - Cervical spinal cord injury c3-c4  - Decadron 2mgq8 started by NSGY on 7/6 for Right C5 palsy for 72 hrs  - Upright XR pending   - Continue RONY drain to suction, monitor output   - C spine precautions  - NSGY following- appreciate eval and recs    Order received and chart reviewed. Discussed with nurse. Family members present for eval. Pt denies hx of dysphagia. Pt was intubated 7/4-7/6. He was then on bipap, but has been off bipap all morning. Sats were % during eval.       Past medical history:   Please see H&P for details      Special Studies:  Imaging from 7/6/23:  CT spine-  Posterior C4-5 laminectomy, C3-6 fixation/fusion, additional levels as needed (Spine Cervical)     MRI head-  IMPRESSION:     1. No acute infarction, edema, or mass effect. 2. Mild chronic microangiopathic ischemic changes. 3. Small calcified lesion is noted along the anterior right frontal convexity, dural calcification versus small meningioma. 4. Right parietal scalp soft tissue swelling. MRI neck-  Hyperflexion injury with a defect through the anterior longitudinal ligament at the C4-5 level and widening of the anterior disc space. Prevertebral edema extending from C1-2 through T1-2 ,mildly progressed.  Interval spinal decompression and posterior   fixation including bilateral laminectomies at the C4 and C5 levels and bilateral transpedicular screws at the C3-C6 levels with interconnecting rods. Improved patency of the central canal at the C3-4 and C4-5 levels with resolved cord compression. There   is stable hyperintense T2/STIR signal within the dorsal aspect of the cord at the C4 level. Social/Education/Vocational Hx:  Pt lives with family    Swallow Information   Current Risks for Dysphagia & Aspiration: recent surgery and recent intubation  Current Symptoms/Concerns: NPO until seen by ST  Current Diet: NPO   Baseline Diet: regular diet and thin liquids    Baseline Assessment   Behavior/Cognition: alert  Speech/Language Status: able to participate in conversation and able to follow commands  Patient Positioning: upright in chair     Swallow Mechanism Exam   Facial: symmetrical  Labial: WFL  Lingual: WFL  Velum: symmetrical  Mandible: adequate ROM  Dentition: full dentures  Vocal quality:clear/adequate   Volitional Cough: strong/productive   Respiratory: RA, sats at %      Consistencies Assessed and Performance   Consistencies Administered: ice chips, thin liquids, puree, soft solids and hard solids  -water via straw, applesauce, jello, full piece of toast with butter, crackers    Oral Stage: Adequate bolus retrieval and draw from straw. Prompt mastication, bolus formation and transfer. No residue or pocketing. Good lip seal.     Pharyngeal Stage: Hyolaryngeal elevation observed and palpated. Swallows appeared timely. There were no overt s/s of aspiration including with consecutive sips of water.        Esophageal Concerns: none reported      Results Reviewed with: patient, RN and family   Dysphagia Goals: none at this time  Discharge recommendation: no follow up needed

## 2023-07-07 NOTE — OCCUPATIONAL THERAPY NOTE
Occupational Therapy Evaluation     Patient Name: Jostin OLIVARES Date: 7/7/2023  Problem List  Principal Problem:    Central cord syndrome [facility])  Active Problems:    Stage 4 chronic kidney disease ([address])    Weakness of both upper extremities    Fall    C3 spinal cord injury [facility])    Past Medical History  Past Medical History:   Diagnosis Date    Ankle edema     last assessed [id number]    Depression     last assessed [id number]    Hypertension     Kidney stone     Nephrolithiasis     Nocturia     last assessed [id number]    Posterior tibial tendinitis of right lower extremity     last assessed [id number]     Past Surgical History  Past Surgical History:   Procedure Laterality Date    ANAL FISSURECTOMY      CERVICAL FUSION N/A 7/4/2023    Procedure: Posterior C4-5 laminectomy, C3-6 fixation/fusion;  Surgeon: Lucia Sewell MD;  [facility];  Service: Neurosurgery    COLONOSCOPY  04/22/2009    every 2 years    COLONOSCOPY  03/04/2013    2 yrs d/t h/o polyp    COLONOSCOPY  02/15/2016    colo 2/15/16-repeat 2 yrs    SHOULDER SURGERY           07/07/23 0935   OT Last Visit   OT Visit Date 07/07/23   Note Type   Note type Evaluation   Pain Assessment   Pain Assessment Tool 0-10   Pain Score No Pain   Restrictions/Precautions   Weight Bearing Precautions Per Order No   Other Precautions Chair Alarm; Bed Alarm;Multiple lines; Fall Risk   Home Living   Type of [address] [place] [address] Dr Multi-level; Able to live on main level with bedroom/bathroom   Bathroom Shower/Tub Walk-in shower   Bathroom Toilet Standard   Bathroom Equipment Grab bars in [address]   Prior Function   Level of [place] Independent with ADLs; Independent with functional mobility; Needs assistance with IADLS   Lives With Spouse   Receives Help From Family   IADLs Independent with driving; Independent with meal prep; Independent with medication management   Falls in the last 6 months 1 to 4   Vocational Retired   Lifestyle Autonomy I adls and mobility - i iadls - shares homemaking with spouse   Reciprocal Relationships supportive family   Service to Others retired   Intrinsic Gratification mostly sedentary   Subjective   Subjective "My arms and my legs are numb"   ADL   Eating Assistance Unable to assess   Grooming Assistance 1  Total Assistance   UB Bathing Assistance 1  Total Assistance   LB Bathing Assistance 1  Total Assistance   UB Dressing Assistance 1  Total Assistance   LB Dressing Assistance 1  Total Assistance   Toileting Assistance  1  Total Assistance   Bed Mobility   Supine to Sit 2  Maximal assistance   Additional items Assist x 2   Transfers   Sit to Stand 2  Maximal assistance   Additional items Assist x 2   Stand to Sit 2  Maximal assistance   Additional items Assist x 2   Stand pivot 2  Maximal assistance   Additional items Assist x 2   Balance   Static Sitting Fair -   Dynamic Sitting Poor +   Static Standing Poor   Dynamic Standing Poor   Ambulatory Poor -   Activity Tolerance   Activity Tolerance Patient limited by fatigue;Treatment limited secondary to medical complications (Comment)   Medical Staff Made Aware Coeval with PT 2* medical complexity, comorbidities and limited overall tolerance to activity   RUE Assessment   RUE Assessment X  (minimal active movement)   LUE Assessment   LUE Assessment X  (minimal active movement)   Hand Function   Gross Motor Coordination Impaired   Fine Motor Coordination Impaired   Cognition   Arousal/Participation Alert; Cooperative   Attention Attends with cues to redirect   Orientation Level Oriented X4   Memory Decreased recall of precautions   Following Commands Follows one step commands without difficulty   Assessment   Limitation Decreased ADL status; Decreased UE ROM; Decreased UE strength;Decreased endurance;Decreased sensation;Decreased fine motor control;Decreased self-care trans;Decreased high-level ADLs; Non-func R UE;Non-func L UE   Prognosis Good;Fair   Assessment Pt is a 80 y.o. male who was admitted to [address] [place] [address] on 7/3/2023 with Central cord syndrome ([address]) s/p fall down stairs. Pt's problem list also includes PMH of HTN, obesity, previous surgery and CKD, macular degeneration, gout, ulcerative colitis. At baseline pt was completing adls and mobility independently - shares homemaking with family. Pt lives with spouse in 2 story home with 6 NEEMA and [employer] PRN. Currently pt requires max to total assist for overall ADLS and max a x 2 for functional mobility/transfers. Pt currently presents with impairments in the following categories -steps to enter environment, limited home support, difficulty performing ADLS, difficulty performing IADLS  and environment activity tolerance, endurance, standing balance/tolerance, sitting balance/tolerance, UE strength, UE ROM, FMC, GMC and sensation . These impairments, as well as pt's fatigue, pain, abnormal tone, decreased caregiver support, risk for falls and home environment  limit pt's ability to safely engage in all baseline areas of occupation, includingeating, grooming, bathing, dressing, toileting, functional mobility/transfers, community mobility, laundry , house maintenance, meal prep, cleaning, social participation  and leisure activities  From OT standpoint, recommend inpt rehab ([facility]) upon D/C. OT will continue to follow to address the below stated goals. Goals   Patient Goals go home   LTG Time Frame 10-14   Long Term Goal #1 refer to established goals below   Plan   Treatment Interventions ADL retraining;Functional transfer training;UE strengthening/ROM; Endurance training;Patient/family training;Equipment evaluation/education; Neuromuscular reeducation; Fine motor coordination activities; Compensatory technique education; Activityengagement   Goal Expiration Date 07/21/23   OT Frequency 3-5x/wk   Recommendation   OT Discharge Recommendation Post acute rehabilitation services   AM-PAC Daily Activity Inpatient   Lower Body Dressing 1   Bathing 1   Toileting 1   Upper Body Dressing 1   Grooming 1   Eating 1   Daily Activity Raw Score 6   Turning Head Towards Sound 4   Follow Simple Instructions 4   Low Function Daily Activity Raw Score 14   Low Function Daily Activity Standardized Score  24.79   AM-PAC Applied Cognition Inpatient   Following a Speech/Presentation 3   Understanding Ordinary Conversation 4   Taking Medications 4   Remembering Where Things Are Placed or Put Away 3   Remembering List of 4-5 Errands 3   Taking Care of Complicated Tasks 3   Applied Cognition Raw Score 20   Applied Cognition Standardized Score 41.76   End of Consult   Education Provided Yes;Family or social support of family present for education by provider   Patient Position at End of Consult Bedside chair;Bed/Chair alarm activated; All needs within reach   Nurse Communication Nurse aware of consult       The patient's raw score on the AM-PAC Daily Activity Inpatient Short Form is 6. A raw score of less than 19 suggests the patient may benefit from discharge to post-acute rehabilitation services. Please refer to the recommendation of the Occupational Therapist for safe discharge planning.       OCCUPATIONAL THERAPY GOALS:    *Min a feeding/grooming after setup with use of AD PRN  *Mod a adls after setup with use of AD PRN  *Mod a toileting and clothing management  *Mod a bed mobility with fair sitting balance/tolerance on EOB to engage in light grooming/self care tasks and enjoyable activities  *Mod a transfers to/from all surfaces with fair balance/safety  *Demonstrate fair dynamic balance for carryover with safe engagement in adl and iadl tasks   *Increase AROM BUE to [facility] with at least 3+/5 strength for functional use with self care activities  *Increase activity tolerance to 20-25 min for participation in adls and enjoyable activities  *Assess DME needs  *Assist with safe d/c recommendations      [employer]

## 2023-07-07 NOTE — PROGRESS NOTES
[address] [facility]  Progress Note  Name: Iris Meng  MRN: [id number]  Unit/Bed#: ICU 08 I Date of Admission: 7/3/2023   Date of Service: 7/7/2023 I Hospital Day: 4    Assessment/Plan   Weakness of both upper extremities  Assessment & Plan  POD#3 - s/p posterior C4-5 laminectomy, C3-6 fixation/fusion for central cord syndrome (HDM, 7/4/23)  · Cervical spinal cord injury @ C3-4  · s/p unwitnessed fall down stairs with posterior head strike on 7/3   · on arrival with complaints of neck pain and fabien UE weakness and numbness   · Pt with new R C5 palsy post-op     Imaging:   · 7/6 MRI c-spine: Hyperflexion injury with a defect through the anterior longitudinal ligament at the C4-5 level and widening of the anterior disc space. Prevertebral edema extending from C1-2 through T1-2 ,mildly progressed. Interval spinal decompression and posterior fixation including bilateral laminectomies at the C4 and C5 levels and bilateral transpedicular screws at the C3-C6 levels with interconnecting rods. Improved patency of the central canal at the C3-4 and C4-5 levels with resolved cord compression. There is stable hyperintense T2/STIR signal within the dorsal aspect of the cord at the C4 level  · 7/6 MRI brain: No acute infarction, edema, or mass effect. Mild chronic microangiopathic ischemic changes. Small calcified lesion is noted along the anterior right frontal convexity, dural calcification versus small meningioma. Right parietal scalp soft tissue swelling. Plan:   · Continue to closely monitor neuro exam   · Frequent neuro checks per primary team   · Maintain strict HTN goals, MAP > 90 x 5 days post-op   · Day 3/5 today   · Currently auto-mapping   · Levo since discontinued   · No further neurosurgical intervention indicated at this time   · Case and imaging reviewed this am on rounds.    · 1 RONY drain to FS, d/c today   · 50cc/24hrs, serosanguinous drainage   · Will plan to remove at the bedside today   · Please obtain upright cervical xrays post-op --> order in place    · No collar  · Continue Decadron 2mg q 8hrs for concern for R C5 palsy post-op   · Anticipate this should slowly improve   · DVT ppx: SCDs, SQH   · Pain control per primary team   · Multimodal regimen in place  · Medical management per primary team   · PT/OT/PMR evaluations  · Social work following for assistance with dispo once medically cleared   · Anticipate discharge to a SCI rehab     Neurosurgery will continue to follow. Please call with questions or concerns. * Central cord syndrome [facility])  Assessment & Plan  - see plan as documented above        Subjective/Objective   Chief Complaint: "I'm doing good"    Subjective: Patient with continued right deltoid/bicep/tricep weakness. MRI negative and likely edema post operatively and a R C5 palsy. Strength in right intraosseous muscles and left deltoid improving. Reports no pain but sensation of tingling and numbness from his elbow to his fingers as well as in his lower extremities that have remained constant. Objective: Patient extubated this morning. I/O       07/05 0701 07/06 0700 07/06 0701 07/07 0700 07/07 0701 07/08 0700    P. O. 0      I.V. (mL/kg) 346.2 (3.4)      NG/ 0     IV Piggyback       Total Intake(mL/kg) 456.2 (4.4) 0 (0)     Urine (mL/kg/hr) 1240 (0.5) 1650 (0.7)     Emesis/NG output 0      Drains 140 50     Stool 0      Total Output 1380 1700     Net -923.8 -1700            Unmeasured Stool Occurrence 0 x          Invasive Devices     Peripheral Intravenous Line  Duration           Peripheral IV 07/03/23 Right;Ventral (anterior) Forearm 3 days    Peripheral IV 07/04/23 Right Hand 3 days          Arterial Line  Duration           Arterial Line 07/04/23 Left Radial 3 days          Drain  Duration           NG/OG/Enteral Tube Orogastric Left mouth 3 days    Urethral Catheter Double-lumen 16 Fr. 3 days    Closed/Suction Drain Right;Posterior Neck Bulb 2 days Physical Exam:  Vitals: Blood pressure (!) 193/96, pulse 84, temperature 98.5 °F (36.9 °C), temperature source Oral, resp. rate (!) 27, height 5' 5" (1.651 m), weight 103 kg (227 lb 11.8 oz), SpO2 100 %. ,Body mass index is 37.9 kg/m². General appearance: alert, appears stated age, cooperative and no distress  Head: Normocephalic, without obvious abnormality, atraumatic  Eyes: EOMI, PERRL  Neck: supple, symmetrical, trachea midline and NT  Back: no kyphosis present, no tenderness to percussion or palpation  Lungs: non labored breathing  Heart: regular heart rate  Neurologic: GCS: 15  Mental status: Alert, oriented x 3, thought content appropriate  Cranial nerves: grossly intact (Cranial nerves II-XII)  Sensory: decreased sensation from fabien hands to the elbows, some paresthesias to fabien hands and fingers. - otherwise, sensation is equal and intact fabien throughout   Motor: moving LUE including wrist but cannot move left fingers, cannot move RUE but can move at wrist and fingers, can move lower extremities bilaterally. - RUE: Deltoid abd/add 1/5, bicep/tricep 1/5, wrist ext/flex 3/5,  3/5, intraosseous 3/5  - LUE: Deltoid abd/add 4/5, bicep/tricep 3/5, wrist ext/flex 2/5,  1/5, intraosseous 1/5  - RLE: Hip 4/5, knee 5/5, df/pf 5/5  - LLE: Hip 5/5, knee 5/5, df/pf 5/5  Reflexes: unable to illicit   Coordination: finger to nose normal bilaterally, no drift bilaterally    Lab Results:  Results from last 7 days   Lab Units 07/07/23  0542 07/06/23  0502 07/05/23  0447 07/04/23  0935 07/04/23  0544 07/03/23  1308   WBC Thousand/uL 11.40* 11.19* 17.56*   < > 9.74 6.82   HEMOGLOBIN g/dL 10.0* 9.5* 10.0*   < > 10.8* 10.7*   HEMATOCRIT % 31.4* 29.8* 31.8*   < > 35.3* 34.7*   PLATELETS Thousands/uL 214 190 249   < > 217 193   NEUTROS PCT %  --   --   --   --  84* 61   MONOS PCT %  --   --   --   --  8 10   EOS PCT %  --   --   --   --  0 5    < > = values in this interval not displayed.      Results from last 7 days   Lab Units 07/07/23  0542 07/06/23  0502 07/05/23  0447 07/04/23  1724 07/04/23  0935 07/04/23  0544 07/03/23  1308   POTASSIUM mmol/L 4.8 4.8 5.0   < > 5.0   < > 4.4   CHLORIDE mmol/L 109* 112* 114*   < > 115*   < > 109*   CO2 mmol/L 27 24 24   < > 26   < > 25   BUN mg/dL 61* 49* 38*   < > 34*   < > 33*   CREATININE mg/dL 2.24* 2.41* 2.32*   < > 2.16*   < > 1.87*   CALCIUM mg/dL 8.4 8.7 8.0*   < > 8.6   < > 8.3*   ALK PHOS U/L  --   --   --   --  78  --  65   ALT U/L  --   --   --   --  21  --  11   AST U/L  --   --   --   --  31  --  15    < > = values in this interval not displayed. Results from last 7 days   Lab Units 07/05/23  0447   MAGNESIUM mg/dL 2.1     Results from last 7 days   Lab Units 07/05/23  0447   PHOSPHORUS mg/dL 3.7     Results from last 7 days   Lab Units 07/04/23  0935 07/03/23  1308   INR  1.03 0.87   PTT seconds 60*  --      No results found for: "TROPONINT"  ABG:  Lab Results   Component Value Date    PHART 7.367 07/04/2023    TCV9YEU 39.4 07/04/2023    PO2ART 102.0 07/04/2023    WTP7JIQ 22.1 07/04/2023    BEART -2.9 07/04/2023    SOURCE Line, Arterial 07/04/2023       Imaging Studies: I have personally reviewed pertinent reports. and I have personally reviewed pertinent films in PACS    XR chest portable    Result Date: 7/6/2023  Impression: No significant change since prior study. Workstation performed: [id number]     MRI cervical spine wo contrast    Result Date: 7/6/2023  Impression: Hyperflexion injury with a defect through the anterior longitudinal ligament at the C4-5 level and widening of the anterior disc space. Prevertebral edema extending from C1-2 through T1-2 ,mildly progressed. Interval spinal decompression and posterior fixation including bilateral laminectomies at the C4 and C5 levels and bilateral transpedicular screws at the C3-C6 levels with interconnecting rods.  Improved patency of the central canal at the C3-4 and C4-5 levels with resolved cord compression. There is stable hyperintense T2/STIR signal within the dorsal aspect of the cord at the C4 level. Findings are consistent with the preliminary report from [facility] which was provided shortly after completion of the exam. Workstation performed: [id number]     MRI brain wo contrast    Result Date: 7/6/2023  Impression: 1. No acute infarction, edema, or mass effect. 2. Mild chronic microangiopathic ischemic changes. 3. Small calcified lesion is noted along the anterior right frontal convexity, dural calcification versus small meningioma. 4. Right parietal scalp soft tissue swelling. Findings are consistent with the preliminary report from [facility] which was provided shortly after completion of the exam. Workstation performed: [id number]     XR chest portable ICU    Result Date: 7/5/2023  Impression: NG tube in stomach. Persistent left base opacity, atelectasis and/or pneumonia. Workstation performed: [id number]     X-ray chest 1 view    Result Date: 7/5/2023  Impression: Low lung volumes with moderate opacity in the left base, likely atelectasis. Pneumonia not excluded in the appropriate clinical setting. Workstation performed: [id number]     XR spine cervical 2 or 3 vw injury    Result Date: 7/4/2023  Impression: Fluoroscopic guidance provided for surgical procedure. Please refer to the separate procedure notes for additional details. Localization procedure was performed, with the OR notified of the level at approximately 12:37 p.m. on 7/4/2023 via telephone conversation with the OR x-ray technologist . Additional images were obtained after level verification and are present for evaluation. Workstation performed: [id number]     MRI cervical spine wo contrast    Result Date: 7/3/2023  Impression: 1.  Traumatic disc injury at C4-C5 where there is anterior disc injury (mild separation of the superior C4-C5 disc from the inferior endplate of the C4 vertebral body) with prevertebral fluid from C2-C5. 2. Cord edema at C4-C5 likely traumatic as this is the site of injury, rather than spondylitic myelopathy but needs to be correlated clinically. I personally discussed this study with Dr. Carla Shone on 7/3/2023 10:13 PM. Workstation performed: [id number]     TRAUMA - CT spine cervical wo contrast    Result Date: 7/3/2023  Impression: No cervical spine fracture or traumatic malalignment. Workstation performed: [id number]     CT recon only thoracolumbar (No Charge)    Result Date: 7/3/2023  Impression: No fracture or traumatic subluxation. Workstation performed: [id number]     TRAUMA - CT chest abdomen pelvis w contrast    Result Date: 7/3/2023  Impression: No acute traumatic visceral injury in the chest, abdomen or pelvis. No acute fracture. Workstation performed: [id number]     TRAUMA - CT head wo contrast    Result Date: 7/3/2023  Impression: No acute intracranial abnormality. Workstation performed: [id number]     XR pelvis ap only 1 or 2 vw    Result Date: 7/3/2023  Impression: No acute osseous abnormality. Workstation performed: [id number]     XR chest 1 view portable    Result Date: 7/3/2023  Impression: Unchanged cardiomegaly. Cannot exclude mild pulmonary vascular congestion. Workstation performed: [id number]       EKG, Pathology, and Other Studies: I have personally reviewed pertinent reports.       VTE Pharmacologic Prophylaxis: Sequential compression device (Venodyne)  and Heparin    VTE Mechanical Prophylaxis: sequential compression device

## 2023-07-08 NOTE — PROGRESS NOTES
[address]  Progress Note: Critical Care  Name: Margret Gandara 80 y.o. male I MRN: [id number]  Unit/Bed#: ICU 08 I Date of Admission: 7/3/2023   Date of Service: 7/8/2023 I Hospital Day: 5    Assessment/Plan   Neuro:   · Diagnosis: Central cord syndrome POD #4 from C4-C5 laminectomy and C3-C6 fixation/fusion, C5 palsy, acute pain  · Plan: Frequent neurologic checks, continue scheduled Tylenol/lidoderm patches with PRN Robaxin, continue Decadron for 72 hours, awaiting upright cervical xrays, MAP>90 for 5 days postoperatively, appreciate neurosurgery recommendations  CV:   · Diagnosis: History of hypertension  · Plan: Holding home losartan, continue home furosemide  Pulm:  · Diagnosis: Pulmonary insufficiency  · Plan: Wean oxygen for goal SpO2>90%, respiratory protocol  GI:   · Diagnosis: Ulcerative colitis  · Plan: Continue home sulfasalazine, bowel regimen for now  :   · Diagnosis: CKD 4, BPH  · Plan: Creatinine at baseline, close intake and output, resume home Flomax today  F/E/N:   · Plan: Continue diet, replete electrolytes as necessary  Heme/Onc:   · Diagnosis: Anemia  · Plan: Follow cell counts daily, consider iron panel versus empiric supplementation, continue heparin for DVT prophylaxis  Endo:   · Plan: Follow blood sugars as needed  ID:   · Plan: Follow temperature and white count  MSK/Skin:   · Diagnosis: Mechanical fall  · Plan: Out of bed, PT/OT      Disposition: Improving    ICU Core Measures     A: Assess, Prevent, and Manage Pain · Has pain been assessed? Yes  · Need for changes to pain regimen? No   B: Both SAT/SAT  · N/A   C: Choice of Sedation · RASS Goal: N/A patient not on sedation  · Need for changes to sedation or analgesia regimen? NA   D: Delirium · CAM-ICU: Negative   E: Early Mobility  · Plan for early mobility? Yes   F: Family Engagement · Plan for family engagement today?  Yes       Review of Invasive Devices:        Leana Plan: Keep arterial line for hemodynamic monitoring    Prophylaxis:  VTE VTE covered by:  heparin (porcine), Subcutaneous, 5,000 Units at 07/07/23 2218       Stress Ulcer  covered byomeprazole (PRILOSEC) suspension 2 mg/mL [[id number]]          Subjective   HPI/24hr events: Patient extubated yesterday to nasal cannula, declined bipap overnight    Review of Systems   Constitutional: Positive for fatigue. HENT: Negative for trouble swallowing. Respiratory: Negative for shortness of breath. Cardiovascular: Negative for chest pain. Gastrointestinal: Negative for abdominal pain, nausea and vomiting. Genitourinary: Negative for difficulty urinating (patient with retention overnight). Musculoskeletal: Positive for myalgias (paresthesias improving bilateral lower extremities). Negative for arthralgias. Neurological: Positive for weakness. Objective                            Vitals I/O      Most Recent Min/Max in 24hrs   Temp 98.5 °F (36.9 °C) Temp  Min: 97.5 °F (36.4 °C)  Max: 98.5 °F (36.9 °C)   Pulse 72 Pulse  Min: 62  Max: 98   Resp (!) 26 Resp  Min: 18  Max: 36   /78 BP  Min: 115/55  Max: 143/76   O2 Sat 97 % SpO2  Min: 97 %  Max: 100 %      Intake/Output Summary (Last 24 hours) at 7/8/2023 0238  Last data filed at 7/7/2023 1700  Gross per 24 hour   Intake --   Output 810 ml   Net -810 ml         Diet Regular; Regular House     Invasive Monitoring Physical exam   Arterial Line  Leana /54  Arterial Line BP  Min: 126/46  Max: 168/62   MAP 86 mmHg  Arterial Line MAP (mmHg)  Min: 70 mmHg  Max: 100 mmHg    Physical Exam  Constitutional:       General: He is not in acute distress. Appearance: He is not ill-appearing. Interventions: Nasal cannula in place. HENT:      Head: Normocephalic and atraumatic. Mouth/Throat:      Mouth: Mucous membranes are dry. Eyes:      Pupils: Pupils are equal, round, and reactive to light. Cardiovascular:      Rate and Rhythm: Normal rate and regular rhythm. Pulses: Normal pulses. Pulmonary:      Effort: Pulmonary effort is normal. No respiratory distress. Breath sounds: Normal breath sounds. Abdominal:      General: There is no distension. Palpations: Abdomen is soft. Tenderness: There is no abdominal tenderness. Musculoskeletal:         General: No tenderness or signs of injury. Right lower leg: No edema. Left lower leg: No edema. Skin:     General: Skin is warm and dry. Neurological:      Mental Status: He is easily aroused. He is lethargic. GCS: GCS eye subscore is 4. GCS verbal subscore is 5. GCS motor subscore is 6. Sensory: Sensory deficit (diminished sensation to fine touch distal bilateral upper/lower extrmeities) present. Comments: Bilateral lower extremities 4/5  LUE shoulder 4/5, elbow 3/5, wrist 3/5  RUE shoulder 2/5, elbow 2/5, wrist 3/5              Diagnostic Studies      EKG: Sinus rhythm  Imaging:  I have personally reviewed pertinent reports.    and I have personally reviewed pertinent films in PACS     Medications:  Scheduled PRN   acetaminophen, 975 mg, Q8H [address]  chlorhexidine, 15 mL, Q12H UDAY  dexamethasone, 2 mg, F3C UDAY  folic acid, 1 mg, Daily  furosemide, 20 mg, Daily  guaiFENesin, 600 mg, Q12H UDAY  heparin (porcine), 5,000 Units, Q8H [address]  lidocaine, 1 patch, Daily  melatonin, 6 mg, HS  omeprazole (PRILOSEC) suspension 2 mg/mL, 20 mg, Daily  polyethylene glycol, 17 g, Daily  senna-docusate sodium, 1 tablet, BID  sulfaSALAzine, 1,000 mg, Daily  sulfaSALAzine, 500 mg, BID      bisacodyl, 10 mg, Daily PRN  methocarbamol, 500 mg, Q6H PRN  ondansetron, 4 mg, Q6H PRN       Continuous          Labs:    CBC    Recent Labs     07/06/23  0502 07/07/23  0542   WBC 11.19* 11.40*   HGB 9.5* 10.0*   HCT 29.8* 31.4*    214     BMP    Recent Labs     07/06/23  0502 07/07/23  0542   SODIUM 140 141   K 4.8 4.8   * 109*   CO2 24 27   AGAP 4 5   BUN 49* 61*   CREATININE 2.41* 2.24*   CALCIUM 8.7 8.4 Coags    No recent results     Additional Electrolytes  No recent results       Blood Gas    No recent results  No recent results LFTs  No recent results    Infectious  No recent results  Glucose  Recent Labs     07/06/23  0502 07/07/23  0542   GLUC 98 [address], CRNP

## 2023-07-08 NOTE — PLAN OF CARE
Problem: MOBILITY - ADULT  Goal: Maintain or return to baseline ADL function  Description: INTERVENTIONS:  -  Assess patient's ability to carry out ADLs; assess patient's baseline for ADL function and identify physical deficits which impact ability to perform ADLs (bathing, care of mouth/teeth, toileting, grooming, dressing, etc.)  - Assess/evaluate cause of self-care deficits   - Assess range of motion  - Assess patient's mobility; develop plan if impaired  - Assess patient's need for assistive devices and provide as appropriate  - Encourage maximum independence but intervene and supervise when necessary  - Involve family in performance of ADLs  - Assess for home care needs following discharge   - Consider OT consult to assist with ADL evaluation and planning for discharge  - Provide patient education as appropriate  Outcome: Progressing  Goal: Maintains/Returns to pre admission functional level  Description: INTERVENTIONS:  - Perform BMAT or MOVE assessment daily.   - Set and communicate daily mobility goal to care team and patient/family/caregiver. - Collaborate with rehabilitation services on mobility goals if consulted  - Perform Range of Motion  times a day. - Reposition patient every  hours.   - Dangle patient  times a day  - Stand patient  times a day  - Ambulate patient  times a day  - Out of bed to chair  times a day   - Out of bed for meals  times a day  - Out of bed for toileting  - Record patient progress and toleration of activity level   Outcome: Progressing     Problem: PAIN - ADULT  Goal: Verbalizes/displays adequate comfort level or baseline comfort level  Description: Interventions:  - Encourage patient to monitor pain and request assistance  - Assess pain using appropriate pain scale  - Administer analgesics based on type and severity of pain and evaluate response  - Implement non-pharmacological measures as appropriate and evaluate response  - Consider cultural and social influences on pain and pain management  - Notify physician/advanced practitioner if interventions unsuccessful or patient reports new pain  Outcome: Progressing     Problem: SAFETY ADULT  Goal: Maintain or return to baseline ADL function  Description: INTERVENTIONS:  -  Assess patient's ability to carry out ADLs; assess patient's baseline for ADL function and identify physical deficits which impact ability to perform ADLs (bathing, care of mouth/teeth, toileting, grooming, dressing, etc.)  - Assess/evaluate cause of self-care deficits   - Assess range of motion  - Assess patient's mobility; develop plan if impaired  - Assess patient's need for assistive devices and provide as appropriate  - Encourage maximum independence but intervene and supervise when necessary  - Involve family in performance of ADLs  - Assess for home care needs following discharge   - Consider OT consult to assist with ADL evaluation and planning for discharge  - Provide patient education as appropriate  Outcome: Progressing  Goal: Maintains/Returns to pre admission functional level  Description: INTERVENTIONS:  - Perform BMAT or MOVE assessment daily.   - Set and communicate daily mobility goal to care team and patient/family/caregiver. - Collaborate with rehabilitation services on mobility goals if consulted  - Perform Range of Motion  times a day. - Reposition patient every  hours.   - Dangle patient  times a day  - Stand patient  times a day  - Ambulate patient  times a day  - Out of bed to chair  times a day   - Out of bed for meals  times a day  - Out of bed for toileting  - Record patient progress and toleration of activity level   Outcome: Progressing  Goal: Patient will remain free of falls  Description: INTERVENTIONS:  - Educate patient/family on patient safety including physical limitations  - Instruct patient to call for assistance with activity   - Consult OT/PT to assist with strengthening/mobility   - Keep Call bell within reach  - Keep bed low and locked with side rails adjusted as appropriate  - Keep care items and personal belongings within reach  - Initiate and maintain comfort rounds  - Make Fall Risk Sign visible to staff  - Offer Toileting every  Hours, in advance of need  - Initiate/Maintain alarm  - Obtain necessary fall risk management equipment:  - Apply yellow socks and bracelet for high fall risk patients  - Consider moving patient to room near nurses station  Outcome: Progressing     Problem: NEUROSENSORY - ADULT  Goal: Achieves stable or improved neurological status  Description: INTERVENTIONS  - Monitor and report changes in neurological status  - Monitor vital signs such as temperature, blood pressure, glucose, and any other labs ordered   - Initiate measures to prevent increased intracranial pressure  - Monitor for seizure activity and implement precautions if appropriate      Outcome: Progressing  Goal: Remains free of injury related to seizures activity  Description: INTERVENTIONS  - Maintain airway, patient safety  and administer oxygen as ordered  - Monitor patient for seizure activity, document and report duration and description of seizure to physician/advanced practitioner  - If seizure occurs,  ensure patient safety during seizure  - Reorient patient post seizure  - Seizure pads on all 4 side rails  - Instruct patient/family to notify RN of any seizure activity including if an aura is experienced  - Instruct patient/family to call for assistance with activity based on nursing assessment  - Administer anti-seizure medications if ordered    Outcome: Progressing  Goal: Achieves maximal functionality and self care  Description: INTERVENTIONS  - Monitor swallowing and airway patency with patient fatigue and changes in neurological status  - Encourage and assist patient to increase activity and self care.    - Encourage visually impaired, hearing impaired and aphasic patients to use assistive/communication devices  Outcome: Progressing

## 2023-07-08 NOTE — PROGRESS NOTES
Progress Note - Neurosurgery   Jostin Bingham 80 y.o. male MRN: [id number]  Unit/Bed#: ICU 08 Encounter: [id number]    Assessment:  POD#4 - s/p posterior C4-5 laminectomy, C3-6 fixation/fusion for central cord syndrome (HDM, 7/4/23)  • Cervical spinal cord injury @ C3-4  • s/p unwitnessed fall down stairs with posterior head strike on 7/3   • on arrival with complaints of neck pain and fabien UE weakness and numbness   • Patient currently with central cord syndrome - weakness in bilateral proximal muscles with good strength distally     Imaging:   • 7/6 MRI c-spine: Hyperflexion injury with a defect through the anterior longitudinal ligament at the C4-5 level and widening of the anterior disc space. Prevertebral edema extending from C1-2 through T1-2 ,mildly progressed. Interval spinal decompression and posterior fixation including bilateral laminectomies at the C4 and C5 levels and bilateral transpedicular screws at the C3-C6 levels with interconnecting rods. Improved patency of the central canal at the C3-4 and C4-5 levels with resolved cord compression. There is stable hyperintense T2/STIR signal within the dorsal aspect of the cord at the C4 level  • 7/6 MRI brain: No acute infarction, edema, or mass effect. Mild chronic microangiopathic ischemic changes. Small calcified lesion is noted along the anterior right frontal convexity, dural calcification versus small meningioma. Right parietal scalp soft tissue swelling.     Plan:   • Continue to closely monitor neuro exam   ? Frequent neuro checks per primary team   • Maintain strict HTN goals, MAP > 90 x 5 days post-op   ? Day 3/5 today   ? Currently auto-mapping   - Levo since discontinued   • No further neurosurgical intervention indicated at this time   ? Case and imaging reviewed this am on rounds. • Please obtain upright cervical xrays post-op --> order in place    • No collar  • Continue Decadron 2mg q 8hrs for concern for R C5 palsy post-op   ?  Anticipate this should slowly improve   • DVT ppx: SCDs, SQH   • Pain control per primary team   ? Multimodal regimen in place  • Medical management per primary team   • PT/OT/PMR evaluations  • Social work following for assistance with dispo once medically cleared   ? Anticipate discharge to a SCI rehab     VTE Prophylaxis: Sequential compression device Percilla Poser)     Subjective/Objective   Chief Complaint: POD#4 - s/p posterior C4-5 laminectomy, C3-6 fixation/fusion for central cord syndrome     Vitals: Blood pressure 129/62, pulse 82, temperature 98.6 °F (37 °C), temperature source Oral, resp. rate 20, height 5' 5" (1.651 m), weight 103 kg (227 lb 11.8 oz), SpO2 97 %. ,Body mass index is 37.9 kg/m². Physical Exam:   Neurologic Exam:  Awake and alert  Oriented x3  Speech clear and fluent  0/5 in bilateral deltoids  2/5 in left biceps, 1/5 in right biceps  3/5 in bilateral triceps  2/5 to 3/5 in bilateral hand  and IOs  4/5 in bilateral lower extremity muscle groups  Diffuse dysesthesias throughout upper extremities  Diffuse upper extremity swelling      Invasive Devices     Peripheral Intravenous Line  Duration           Peripheral IV 07/03/23 Right;Ventral (anterior) Forearm 4 days    Peripheral IV 07/04/23 Right Hand 4 days    Peripheral IV 07/08/23 Left;Proximal;Ventral (anterior) Forearm <1 day          Arterial Line  Duration           Arterial Line 07/04/23 Left Radial 4 days                      Lab Results:   I have personally reviewed pertinent results.     Lab Results   Component Value Date    WBC 9.62 07/08/2023    HGB 10.0 (L) 07/08/2023    HCT 32.6 (L) 07/08/2023    MCV 94 07/08/2023     07/08/2023    RBC 3.48 (L) 07/08/2023    MCH 28.7 07/08/2023    MCHC 30.7 (L) 07/08/2023    RDW 13.8 07/08/2023    MPV 10.1 07/08/2023    SODIUM 142 07/08/2023     (H) 07/08/2023    CO2 27 07/08/2023    BUN 68 (H) 07/08/2023    CREATININE 2.21 (H) 07/08/2023    CALCIUM 8.6 07/08/2023    EGFR 25 07/08/2023 Imaging Studies: I have personally reviewed pertinent reports.    and I have personally reviewed pertinent films in PACS

## 2023-07-08 NOTE — CASE MANAGEMENT
Case Management Discharge Planning Note    Patient name Jamas Riedel  Location ICU 08/ICU 08 MRN [id number]  : 1934 Date 2023       Current Admission Date: 7/3/2023  Current Admission Diagnosis:Central cord syndrome [facility])   Patient Active Problem List    Diagnosis Date Noted   • C3 spinal cord injury ([address]) 2023   • Central cord syndrome ([address]) 2023   • Weakness of both upper extremities 2023   • Fall 2023   • Proteinuria 2022   • Stage 4 chronic kidney disease ([address]) 2022   • Hypertriglyceridemia 2022   • Low HDL (under 40) 2020   • Nephrolithiasis 2018   • Severe obesity (BMI 35.0-39. 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      LOS (days): 5  Geometric Mean LOS (GMLOS) (days): 9.80  Days to GMLOS:5.1     OBJECTIVE:  Risk of Unplanned Readmission Score: 19.33         Current admission status: Inpatient   Preferred Pharmacy:   [facility] (acquired by [employer] in 2014) [facility] Service ([facility] [address] [facility] [address]  Phone: [phone] Fax: [phone] [place] [id number]  [address] [facility] [address]  Phone: [phone] Fax: [phone] Delivery ([facility] ) - [address]  [address] [facility]  Phone: [phone] Fax: [phone]    Primary Care Provider: Yesika Thao DO    Primary Insurance: [facility]  Secondary Insurance:     DISCHARGE DETAILS:       Additional Comments: Dari Koehler requesting updated PT/OT for review

## 2023-07-09 NOTE — PROGRESS NOTES
[address] [facility]  Progress Note: Critical Care  Name: Jostin Bingham 80 y.o. male I MRN: [id number]  Unit/Bed#: ICU 08 I Date of Admission: 7/3/2023   Date of Service: 7/9/2023 I Hospital Day: 6    Assessment/Plan   Neuro:   · Diagnosis: Central cord syndrome POD #5 from C4-C5 laminectomy and C3-C6 fixation/fusion, C5 palsy, acute pain  · Plan: Frequent neurologic checks, continue scheduled Tylenol/Lidoderm/Neurontin with PRN robaxin, to complete Decadron today, awaiting upright cervical xrays, MAP>90 for 5 days postoperatively, appreciate neurosurgery recommendations  CV:   · Diagnosis: History of hypertension  · Plan: Holding home losartan, continue home furosemide  Pulm:  · Diagnosis: Pulmonary insufficiency  · Plan: Wean oxygen for goal SpO2>94%, respiratory protocol  GI:   · Diagnosis: Ulcerative colitis  · Plan: Continue home sulfasalazine, bowel regimen for now  :   · Diagnosis: CKD 4, BPH, urinary retention  · Plan: Creatinine at baseline, close intake and output, continue home Flomax, urinary retention protocol  F/E/N:   · Plan: Continue regular diet, replete electrolytes as necessary  Heme/Onc:   · Diagnosis: Anemia  · Plan: Follow cell counts daily, consider iron supplementation, continue heparin for DVT prophylaxis  Endo:   · Plan: Follow blood sugars as needed  ID:   · Plan: Follow temperature and white ocunt  MSK/Skin:   · Diagnosis: Mechanical fall  · Plan: Out of bed, PT/OT    Disposition: Improving    ICU Core Measures     A: Assess, Prevent, and Manage Pain · Has pain been assessed? Yes  · Need for changes to pain regimen? No   B: Both SAT/SAT  · N/A   C: Choice of Sedation · RASS Goal: N/A patient not on sedation  · Need for changes to sedation or analgesia regimen? NA   D: Delirium · CAM-ICU: Negative   E: Early Mobility  · Plan for early mobility? Yes   F: Family Engagement · Plan for family engagement today?  Yes       Review of Invasive Devices:        Leana Plan: Discontinue arterial line    Prophylaxis:  VTE VTE covered by:  heparin (porcine), Subcutaneous, 5,000 Units at 07/08/23 2204       Stress Ulcer  not ordered          Subjective   HPI/24hr events: Patient required strait catherization during the day yesterday, awaiting upright cervical xrays    Review of Systems   Constitutional: Negative for fatigue. HENT: Negative for trouble swallowing. Respiratory: Negative for shortness of breath. Cardiovascular: Negative for chest pain. Gastrointestinal: Negative for abdominal pain, nausea and vomiting. Genitourinary: Positive for difficulty urinating and urgency. Musculoskeletal: Negative for arthralgias and myalgias. Neurological: Positive for weakness. Negative for numbness and headaches. Objective                            Vitals I/O      Most Recent Min/Max in 24hrs   Temp 98 °F (36.7 °C) Temp  Min: 97.8 °F (36.6 °C)  Max: 98.6 °F (37 °C)   Pulse 86 Pulse  Min: 60  Max: 94   Resp 22 Resp  Min: 18  Max: 33   /55 BP  Min: 113/55  Max: 145/56   O2 Sat 97 % SpO2  Min: 95 %  Max: 100 %      Intake/Output Summary (Last 24 hours) at 7/9/2023 0058  Last data filed at 7/8/2023 1700  Gross per 24 hour   Intake 100 ml   Output 1150 ml   Net -1050 ml         Diet Regular; Regular House     Invasive Monitoring Physical exam   Arterial Line  [place] /44  Arterial Line BP  Min: 124/46  Max: 154/56   MAP 74 mmHg  Arterial Line MAP (mmHg)  Min: 72 mmHg  Max: 92 mmHg    Physical Exam  Constitutional:       General: He is not in acute distress. Interventions: Nasal cannula in place. HENT:      Head: Normocephalic and atraumatic. Mouth/Throat:      Mouth: Mucous membranes are moist.   Eyes:      Pupils: Pupils are equal, round, and reactive to light. Neck:      Comments: Dressing over posterior cervical incision intact with no obvious drainage  Cardiovascular:      Rate and Rhythm: Normal rate and regular rhythm. Pulses: Normal pulses. Pulmonary:      Effort: Pulmonary effort is normal. No respiratory distress. Breath sounds: Normal breath sounds. Abdominal:      General: There is distension. Palpations: Abdomen is soft. Tenderness: There is no abdominal tenderness. Musculoskeletal:         General: No tenderness or signs of injury. Right lower leg: No edema. Left lower leg: No edema. Skin:     General: Skin is warm and dry. Neurological:      Mental Status: He is alert. Comments: Strength 4/5 bilateral lower extremities  LUE shoulder 3/5, elbow 2/5,  3/5  RUE shoulder 2/5, elbow 2/5,  3/5, more dexterity noted in right hand fingers versus left   Psychiatric:         Behavior: Behavior is cooperative. Diagnostic Studies      EKG: Sinus rhythm  Imaging:  I have personally reviewed pertinent reports.    and I have personally reviewed pertinent films in PACS     Medications:  Scheduled PRN   acetaminophen, 975 mg, Q8H [facility]  chlorhexidine, 15 mL, Q12H [facility]  dexamethasone, 2 mg, T2O UDAY  folic acid, 1 mg, Daily  furosemide, 20 mg, Daily  gabapentin, 100 mg, TID  guaiFENesin, 600 mg, Q12H UDAY  heparin (porcine), 5,000 Units, Q8H [facility]  lidocaine, 1 patch, Daily  melatonin, 6 mg, HS  polyethylene glycol, 17 g, Daily  senna-docusate sodium, 1 tablet, BID  sulfaSALAzine, 1,000 mg, Daily  sulfaSALAzine, 500 mg, BID  tamsulosin, 0.4 mg, Daily      bisacodyl, 10 mg, Daily PRN  methocarbamol, 500 mg, Q6H PRN  ondansetron, 4 mg, Q6H PRN       Continuous          Labs:    CBC    Recent Labs     07/07/23  0542 07/08/23  0615   WBC 11.40* 9.62   HGB 10.0* 10.0*   HCT 31.4* 32.6*    232     BMP    Recent Labs     07/07/23  0542 07/08/23  0615   SODIUM 141 142   K 4.8 5.1   * 111*   CO2 27 27   AGAP 5 4   BUN 61* 68*   CREATININE 2.24* 2.21*   CALCIUM 8.4 8.6       Coags    No recent results     Additional Electrolytes  No recent results       Blood Gas    No recent results  No recent results LFTs  No recent results    Infectious  No recent results  Glucose  Recent Labs     07/07/23  0542 07/08/23  0615   GLUC 105 100 DAMION Quinn

## 2023-07-09 NOTE — ASSESSMENT & PLAN NOTE
POD#5 - s/p posterior C4-5 laminectomy, C3-6 fixation/fusion for central cord syndrome (HDM, 7/4/23)  · Cervical spinal cord injury @ C3-4  · s/p unwitnessed fall down stairs with posterior head strike on 7/3   · on arrival with complaints of neck pain and fabien UE weakness and numbness   · Pt with new R C5 palsy post-op     Imaging:   · 7/9/23 postop cervical x-rays: Final report pending. Good alignment and hardware  · 7/6 MRI c-spine: Hyperflexion injury with a defect through the anterior longitudinal ligament at the C4-5 level and widening of the anterior disc space. Prevertebral edema extending from C1-2 through T1-2 ,mildly progressed. Interval spinal decompression and posterior fixation including bilateral laminectomies at the C4 and C5 levels and bilateral transpedicular screws at the C3-C6 levels with interconnecting rods. Improved patency of the central canal at the C3-4 and C4-5 levels with resolved cord compression. There is stable hyperintense T2/STIR signal within the dorsal aspect of the cord at the C4 level  · 7/6 MRI brain: No acute infarction, edema, or mass effect. Mild chronic microangiopathic ischemic changes. Small calcified lesion is noted along the anterior right frontal convexity, dural calcification versus small meningioma. Right parietal scalp soft tissue swelling. Plan:   · Continue to closely monitor neuro exam   · Frequent neuro checks per primary team   · Exam significant limited at this time secondary to drowsiness.   Unable to complete detailed motor exam.  Discussed with primary team.  · Maintain strict HTN goals, MAP > 90 x 5 days post-op   · Day 5/5 today   · Currently auto-mapping - though per chart review it has been multiple episodes below goal   · No further neurosurgical intervention indicated at this time  · RONY drain removed 7/7/2023  · 50cc/24hrs, serosanguinous drainage   · No collar  · Continue Decadron 2mg q 8hrs for concern for R C5 palsy post-op   · DVT ppx: SCDs, SQH · Pain control per primary team   · Currently on gabapentin 100 mg 3 times daily -query discontinued secondary to drowsiness  · Robaxin as needed with last dose 7/8 0402  · Scheduled Tylenol  · No narcotics ordered at this time  · Medical management per primary team   · PT/OT/PMR evaluations appreciated  · Discussed with granddaughter anticipated need for rehab ideally acute today if patient able to tolerate. · Social work following for assistance with dispo once medically cleared   · Anticipate discharge to a SCI rehab     Neurosurgery will continue to follow. Please call with questions or concerns.

## 2023-07-09 NOTE — PROGRESS NOTES
[address] [facility]  Progress Note  Name: Usha Krishnamurthy  MRN: [id number]  Unit/Bed#: ICU 08 I Date of Admission: 7/3/2023   Date of Service: 7/9/2023 I Hospital Day: 6    Assessment/Plan   Weakness of both upper extremities  Assessment & Plan  POD#5 - s/p posterior C4-5 laminectomy, C3-6 fixation/fusion for central cord syndrome (HDM, 7/4/23)  · Cervical spinal cord injury @ C3-4  · s/p unwitnessed fall down stairs with posterior head strike on 7/3   · on arrival with complaints of neck pain and fabien UE weakness and numbness   · Pt with new R C5 palsy post-op     Imaging:   · 7/9/23 postop cervical x-rays: Final report pending. Good alignment and hardware  · 7/6 MRI c-spine: Hyperflexion injury with a defect through the anterior longitudinal ligament at the C4-5 level and widening of the anterior disc space. Prevertebral edema extending from C1-2 through T1-2 ,mildly progressed. Interval spinal decompression and posterior fixation including bilateral laminectomies at the C4 and C5 levels and bilateral transpedicular screws at the C3-C6 levels with interconnecting rods. Improved patency of the central canal at the C3-4 and C4-5 levels with resolved cord compression. There is stable hyperintense T2/STIR signal within the dorsal aspect of the cord at the C4 level  · 7/6 MRI brain: No acute infarction, edema, or mass effect. Mild chronic microangiopathic ischemic changes. Small calcified lesion is noted along the anterior right frontal convexity, dural calcification versus small meningioma. Right parietal scalp soft tissue swelling. Plan:   · Continue to closely monitor neuro exam   · Frequent neuro checks per primary team   · Exam significant limited at this time secondary to drowsiness.   Unable to complete detailed motor exam.  Discussed with primary team.  · Maintain strict HTN goals, MAP > 90 x 5 days post-op   · Day 5/5 today   · Currently auto-mapping - though per chart review it has been multiple episodes below goal   · No further neurosurgical intervention indicated at this time  · RONY drain removed 7/7/2023  · 50cc/24hrs, serosanguinous drainage   · No collar  · Continue Decadron 2mg q 8hrs for concern for R C5 palsy post-op   · DVT ppx: SCDs, SQH   · Pain control per primary team   · Currently on gabapentin 100 mg 3 times daily -query discontinued secondary to drowsiness  · Robaxin as needed with last dose 7/8 0402  · Scheduled Tylenol  · No narcotics ordered at this time  · Medical management per primary team   · PT/OT/PMR evaluations appreciated  · Discussed with granddaughter anticipated need for rehab ideally acute today if patient able to tolerate. · Social work following for assistance with dispo once medically cleared   · Anticipate discharge to a SCI rehab     Neurosurgery will continue to follow. Please call with questions or concerns. Fall  Assessment & Plan  See plan as above    Stage 4 chronic kidney disease [facility])  Assessment & Plan  Lab Results   Component Value Date    EGFR 23 07/09/2023    EGFR 25 07/08/2023    EGFR 25 07/07/2023    CREATININE 2.37 (H) 07/09/2023    CREATININE 2.21 (H) 07/08/2023    CREATININE 2.24 (H) 07/07/2023     Medical management per primary team    * Central cord syndrome [facility])  Assessment & Plan  - see plan as documented above            Subjective/Objective   Chief Complaint: Postoperative follow-up    Subjective: Patient extremely drowsy and unable to participate for full exam.  Discussed with nurse who states he was slightly drowsy this morning but then was able to awake answer all questions and follow full exam.  He was able to stand and pivot to the chair and went for his x-rays this morning. Discussed with primary team who will follow-up with consideration for checking blood gas as well as discontinuation of gabapentin. Granddaughter at bedside confirms patient was lifting his arms off the chair. Objective: Sleeping in chair. NAD.     I/O 07/07 0701 07/08 0700 07/08 0701 07/09 0700 07/09 0701  07/10 0700    P. O.  100 240    NG/GT       Total Intake(mL/kg)  100 (1) 240 (2.3)    Urine (mL/kg/hr) 1125 (0.5) 975 (0.4)     Drains 35      Stool 0      Total Output 1160 975     Net -1160 -875 +240           Unmeasured Stool Occurrence 1 x            Invasive Devices     Peripheral Intravenous Line  Duration           Peripheral IV 07/08/23 Left;Proximal;Ventral (anterior) Forearm 1 day    Peripheral IV 07/09/23 Right;Ventral (anterior) Forearm <1 day          Arterial Line  Duration           Arterial Line 07/04/23 Left Radial 5 days                Physical Exam:  Vitals: Blood pressure 114/60, pulse 58, temperature 98.1 °F (36.7 °C), temperature source Oral, resp. rate 17, height 5' 5" (1.651 m), weight 103 kg (227 lb 15.3 oz), SpO2 100 %. ,Body mass index is 37.93 kg/m². General appearance: Drowsy, appears stated age, and no distress  Head: Normocephalic, without obvious abnormality  Eyes: Opens eyes briefly to voice  Neck: Incision CDI with staples  Lungs: non labored breathing  Heart: regular heart rate  Neurologic:   Mental status: Drowsy Limited exam  Cranial nerves: Does not feel light participate  Motor and sensory exam limited secondary to participation. Able to provide slight  bilaterally. Withdraws legs. Stable to slightly improved in am per RN and [facility]    Lab Results:  Results from last 7 days   Lab Units 07/09/23  0523 07/08/23  0615 07/07/23  0542 07/04/23  0935 07/04/23  0544 07/03/23  1308   WBC Thousand/uL 8.11 9.62 11.40*   < > 9.74 6.82   HEMOGLOBIN g/dL 9.7* 10.0* 10.0*   < > 10.8* 10.7*   HEMATOCRIT % 32.1* 32.6* 31.4*   < > 35.3* 34.7*   PLATELETS Thousands/uL 251 232 214   < > 217 193   NEUTROS PCT %  --   --   --   --  84* 61   MONOS PCT %  --   --   --   --  8 10   EOS PCT %  --   --   --   --  0 5    < > = values in this interval not displayed.      Results from last 7 days   Lab Units 07/09/23  0523 07/08/23  9391 07/07/23  0542 07/04/23  1724 07/04/23  0935 07/04/23  0544 07/03/23  1308   POTASSIUM mmol/L 5.3 5.1 4.8   < > 5.0   < > 4.4   CHLORIDE mmol/L 113* 111* 109*   < > 115*   < > 109*   CO2 mmol/L 26 27 27   < > 26   < > 25   BUN mg/dL 82* 68* 61*   < > 34*   < > 33*   CREATININE mg/dL 2.37* 2.21* 2.24*   < > 2.16*   < > 1.87*   CALCIUM mg/dL 8.3 8.6 8.4   < > 8.6   < > 8.3*   ALK PHOS U/L  --   --   --   --  78  --  65   ALT U/L  --   --   --   --  21  --  11   AST U/L  --   --   --   --  31  --  15    < > = values in this interval not displayed. Results from last 7 days   Lab Units 07/09/23  0523 07/05/23  0447   MAGNESIUM mg/dL 2.3 2.1     Results from last 7 days   Lab Units 07/09/23  0523 07/05/23  0447   PHOSPHORUS mg/dL 4.9* 3.7     Results from last 7 days   Lab Units 07/04/23  0935 07/03/23  1308   INR  1.03 0.87   PTT seconds 60*  --      No results found for: "TROPONINT"  ABG:  Lab Results   Component Value Date    PHART 7.367 07/04/2023    FKT5PUY 39.4 07/04/2023    PO2ART 102.0 07/04/2023    BYU1QZH 22.1 07/04/2023    BEART -2.9 07/04/2023    SOURCE Line, Arterial 07/04/2023       Imaging Studies: I have personally reviewed pertinent reports. and I have personally reviewed pertinent films in PACS    XR chest portable    Result Date: 7/6/2023  Impression: No significant change since prior study. Workstation performed: [id number]     MRI cervical spine wo contrast    Result Date: 7/6/2023  Impression: Hyperflexion injury with a defect through the anterior longitudinal ligament at the C4-5 level and widening of the anterior disc space. Prevertebral edema extending from C1-2 through T1-2 ,mildly progressed. Interval spinal decompression and posterior fixation including bilateral laminectomies at the C4 and C5 levels and bilateral transpedicular screws at the C3-C6 levels with interconnecting rods. Improved patency of the central canal at the C3-4 and C4-5 levels with resolved cord compression.  There is stable hyperintense T2/STIR signal within the dorsal aspect of the cord at the C4 level. Findings are consistent with the preliminary report from [facility] which was provided shortly after completion of the exam. Workstation performed: [id number]     MRI brain wo contrast    Result Date: 7/6/2023  Impression: 1. No acute infarction, edema, or mass effect. 2. Mild chronic microangiopathic ischemic changes. 3. Small calcified lesion is noted along the anterior right frontal convexity, dural calcification versus small meningioma. 4. Right parietal scalp soft tissue swelling. Findings are consistent with the preliminary report from [facility] which was provided shortly after completion of the exam. Workstation performed: [id number]     XR chest portable ICU    Result Date: 7/5/2023  Impression: NG tube in stomach. Persistent left base opacity, atelectasis and/or pneumonia. Workstation performed: [id number]     X-ray chest 1 view    Result Date: 7/5/2023  Impression: Low lung volumes with moderate opacity in the left base, likely atelectasis. Pneumonia not excluded in the appropriate clinical setting. Workstation performed: [id number]     XR spine cervical 2 or 3 vw injury    Result Date: 7/4/2023  Impression: Fluoroscopic guidance provided for surgical procedure. Please refer to the separate procedure notes for additional details. Localization procedure was performed, with the OR notified of the level at approximately 12:37 p.m. on 7/4/2023 via telephone conversation with the OR x-ray technologist . Additional images were obtained after level verification and are present for evaluation. Workstation performed: [id number]     MRI cervical spine wo contrast    Result Date: 7/3/2023  Impression: 1. Traumatic disc injury at C4-C5 where there is anterior disc injury (mild separation of the superior C4-C5 disc from the inferior endplate of the C4 vertebral body) with prevertebral fluid from C2-C5.  2. Cord edema at C4-C5 likely traumatic as this is the site of injury, rather than spondylitic myelopathy but needs to be correlated clinically. I personally discussed this study with Dr. Ant Campbell on 7/3/2023 10:13 PM. Workstation performed: [id number]     TRAUMA - CT spine cervical wo contrast    Result Date: 7/3/2023  Impression: No cervical spine fracture or traumatic malalignment. Workstation performed: [id number]     CT recon only thoracolumbar (No Charge)    Result Date: 7/3/2023  Impression: No fracture or traumatic subluxation. Workstation performed: [id number]     TRAUMA - CT chest abdomen pelvis w contrast    Result Date: 7/3/2023  Impression: No acute traumatic visceral injury in the chest, abdomen or pelvis. No acute fracture. Workstation performed: [id number]     TRAUMA - CT head wo contrast    Result Date: 7/3/2023  Impression: No acute intracranial abnormality. Workstation performed: [id number]     XR pelvis ap only 1 or 2 vw    Result Date: 7/3/2023  Impression: No acute osseous abnormality. Workstation performed: [id number]     XR chest 1 view portable    Result Date: 7/3/2023  Impression: Unchanged cardiomegaly. Cannot exclude mild pulmonary vascular congestion.  Workstation performed: [id number]     VTE Pharmacologic Prophylaxis: Heparin    VTE Mechanical Prophylaxis: sequential compression device

## 2023-07-09 NOTE — QUICK NOTE
x4 staple removed from L temple. Small amount of bleeding with removal of 2nd staple, hemostasis achieved after 30 seconds of direct pressure, EBL <1cc.  Rest of staples removed without issue, pt tolerated procedure well

## 2023-07-09 NOTE — ASSESSMENT & PLAN NOTE
Lab Results   Component Value Date    EGFR 23 07/09/2023    EGFR 25 07/08/2023    EGFR 25 07/07/2023    CREATININE 2.37 (H) 07/09/2023    CREATININE 2.21 (H) 07/08/2023    CREATININE 2.24 (H) 07/07/2023     Medical management per primary team

## 2023-07-09 NOTE — PLAN OF CARE
Problem: PAIN - ADULT  Goal: Verbalizes/displays adequate comfort level or baseline comfort level  Description: Interventions:  - Encourage patient to monitor pain and request assistance  - Assess pain using appropriate pain scale  - Administer analgesics based on type and severity of pain and evaluate response  - Implement non-pharmacological measures as appropriate and evaluate response  - Consider cultural and social influences on pain and pain management  - Notify physician/advanced practitioner if interventions unsuccessful or patient reports new pain  Outcome: Progressing     Problem: INFECTION - ADULT  Goal: Absence or prevention of progression during hospitalization  Description: INTERVENTIONS:  - Assess and monitor for signs and symptoms of infection  - Monitor lab/diagnostic results  - Monitor all insertion sites, i.e. indwelling lines, tubes, and drains  - Monitor endotracheal if appropriate and nasal secretions for changes in amount and color  - [place] appropriate cooling/warming therapies per order  - Administer medications as ordered  - Instruct and encourage patient and family to use good hand hygiene technique  - Identify and instruct in appropriate isolation precautions for identified infection/condition  Outcome: Progressing  Goal: Absence of fever/infection during neutropenic period  Description: INTERVENTIONS:  - Monitor WBC    Outcome: Progressing     Problem: Knowledge Deficit  Goal: Patient/family/caregiver demonstrates understanding of disease process, treatment plan, medications, and discharge instructions  Description: Complete learning assessment and assess knowledge base.   Interventions:  - Provide teaching at level of understanding  - Provide teaching via preferred learning methods  Outcome: Progressing     Problem: NEUROSENSORY - ADULT  Goal: Achieves stable or improved neurological status  Description: INTERVENTIONS  - Monitor and report changes in neurological status  - Monitor vital signs such as temperature, blood pressure, glucose, and any other labs ordered   - Initiate measures to prevent increased intracranial pressure  - Monitor for seizure activity and implement precautions if appropriate      Outcome: Progressing  Goal: Remains free of injury related to seizures activity  Description: INTERVENTIONS  - Maintain airway, patient safety  and administer oxygen as ordered  - Monitor patient for seizure activity, document and report duration and description of seizure to physician/advanced practitioner  - If seizure occurs,  ensure patient safety during seizure  - Reorient patient post seizure  - Seizure pads on all 4 side rails  - Instruct patient/family to notify RN of any seizure activity including if an aura is experienced  - Instruct patient/family to call for assistance with activity based on nursing assessment  - Administer anti-seizure medications if ordered    Outcome: Progressing  Goal: Achieves maximal functionality and self care  Description: INTERVENTIONS  - Monitor swallowing and airway patency with patient fatigue and changes in neurological status  - Encourage and assist patient to increase activity and self care.    - Encourage visually impaired, hearing impaired and aphasic patients to use assistive/communication devices  Outcome: Progressing     Problem: Prexisting or High Potential for Compromised Skin Integrity  Goal: Skin integrity is maintained or improved  Description: INTERVENTIONS:  - Identify patients at risk for skin breakdown  - Assess and monitor skin integrity  - Assess and monitor nutrition and hydration status  - Monitor labs   - Assess for incontinence   - Turn and reposition patient  - Assist with mobility/ambulation  - Relieve pressure over bony prominences  - Avoid friction and shearing  - Provide appropriate hygiene as needed including keeping skin clean and dry  - Evaluate need for skin moisturizer/barrier cream  - Collaborate with interdisciplinary team   - Patient/family teaching  - Consider wound care consult   Outcome: Progressing

## 2023-07-10 ENCOUNTER — TELEPHONE (OUTPATIENT)
Dept: NEUROSURGERY | Facility: CLINIC | Age: 88
End: 2023-07-10

## 2023-07-10 NOTE — PROGRESS NOTES
[address] [facility]  Progress Note  Name: Vu Oscar  MRN: [id number]  Unit/Bed#: ICU 08 I Date of Admission: 7/3/2023   Date of Service: 7/10/2023 I Hospital Day: 7    Assessment/Plan   Weakness of both upper extremities  Assessment & Plan  POD6  Posterior C4-5 laminectomy, C3-6 fixation/fusion for central cord syndrome (HDM, 7/4/23)  · Cervical spinal cord injury @ C3-4  · s/p unwitnessed fall down stairs with posterior head strike on 7/3   · on arrival with complaints of neck pain and fabien UE weakness and numbness   · Pt with new R C5 palsy post-op     Imaging:   · XR cervical spine, 7/9/23: Final report pending. Per my interpretation, appropriate alignment and hardware    Plan:   · Continue to closely monitor neuro exam   · MAP goals completed  · RONY drain d/c 7/7  · No collar  · S/p decadron 2q8 x72h for right C5 palsy  · Pain control per primary team   · Gabapentin d/c 2/2 drowsiness  · Medical management per primary team   · PT/OT/PMR evaluations appreciated  · Social work following for assistance with dispo once medically cleared   · Anticipate discharge to a SCI rehab   · DVT ppx: SCDs, [facility]    Neurosurgery will sign off at this time. Patient will follow up in 2 weeks for incision check and 6 weeks for POV. Please call with questions or concerns. Fall  Assessment & Plan  See plan as above    * Central cord syndrome [facility])  Assessment & Plan  see plan as documented above                      Subjective/Objective   Chief Complaint: none    Subjective: Patient with NAEO. Exam continues to be stable. He is much more alert and interactive today. Plan for transfer to floor today if respiratory status remains stable. He has no new neurological complaints. C/o subjective numbness in paresthesias in hands but difficult to get answer as to how far proximally the sensory deficits go. Objective: Patient sitting in chair in NAD. VSS. Burkett remains in place.      Intake/Output 07/10/23 0701 - 07/11/23 0700     [id number] Total              Intake    P.O.  240  -- 240    Total Intake 240 -- 240       Output    Urine  100  -- 100    Output (mL) (Urethral Catheter 16 Fr.) 100 -- 100    Total Output 100 -- 100       Net I/O     140 -- 140          Invasive Devices     Peripheral Intravenous Line  Duration           Peripheral IV 07/08/23 Left;Proximal;Ventral (anterior) Forearm 2 days    Peripheral IV 07/09/23 Right;Ventral (anterior) Forearm 1 day          Arterial Line  Duration           Arterial Line 07/04/23 Left Radial 5 days          Drain  Duration           Urethral Catheter 16 Fr. <1 day                Vitals: Blood pressure 113/50, pulse 90, temperature 98.1 °F (36.7 °C), temperature source Oral, resp. rate 20, height 5' 5" (1.651 m), weight 103 kg (227 lb 15.3 oz), SpO2 96 %. ,Body mass index is 37.93 kg/m². General appearance: alert, appears stated age, cooperative and no distress  Head: Normocephalic, without obvious abnormality, atraumatic  Eyes: tracks appropriately, conjugate gaze  Neck: posterior incision CDI, no drainage. Open to air. No drainage from drain site. Lungs: non labored breathing  Heart: regular heart rate  Neurologic:   Mental status: Alert, oriented, thought content appropriate, speech clear and fluent  Cranial nerves: grossly intact (Cranial nerves II-XII)  Sensory: intact to LT throughout    Motor Findings  Strength: Right  Strength: Left    Deltoid  0/5  4+/5    Biceps  3/5  4+/5    Triceps  4-/5  4-/5    Hand Intrinsics  4-/5  2/5    Iliospoas  5/5  5/5    Quadriceps  5/5  5/5    Tibialis anterior  5/5  5/5    Gastroc soleus  5/5  5/5      Reflexes: 0/5 throughout, no hoffmans or clonus    Lab Results: I have personally reviewed pertinent results.       Results from last 7 days   Lab Units 07/09/23  0523 07/08/23  0615 07/07/23  0542 07/04/23  0935 07/04/23  0544 07/03/23  1308   WBC Thousand/uL 8.11 9.62 11.40*   < > 9.74 6.82 HEMOGLOBIN g/dL 9.7* 10.0* 10.0*   < > 10.8* 10.7*   HEMATOCRIT % 32.1* 32.6* 31.4*   < > 35.3* 34.7*   PLATELETS Thousands/uL 251 232 214   < > 217 193   NEUTROS PCT %  --   --   --   --  84* 61   MONOS PCT %  --   --   --   --  8 10   EOS PCT %  --   --   --   --  0 5    < > = values in this interval not displayed. Results from last 7 days   Lab Units 07/10/23  0522 07/09/23  0523 07/08/23  0615 07/04/23  1724 07/04/23  0935 07/04/23  0544 07/03/23  1308   POTASSIUM mmol/L 5.3 5.3 5.1   < > 5.0   < > 4.4   CHLORIDE mmol/L 112* 113* 111*   < > 115*   < > 109*   CO2 mmol/L 28 26 27   < > 26   < > 25   BUN mg/dL 89* 82* 68*   < > 34*   < > 33*   CREATININE mg/dL 2.29* 2.37* 2.21*   < > 2.16*   < > 1.87*   CALCIUM mg/dL 8.1* 8.3 8.6   < > 8.6   < > 8.3*   ALK PHOS U/L  --   --   --   --  78  --  65   ALT U/L  --   --   --   --  21  --  11   AST U/L  --   --   --   --  31  --  15    < > = values in this interval not displayed. Results from last 7 days   Lab Units 07/09/23 0523 07/05/23  0447   MAGNESIUM mg/dL 2.3 2.1     Results from last 7 days   Lab Units 07/09/23 0523 07/05/23  0447   PHOSPHORUS mg/dL 4.9* 3.7     Results from last 7 days   Lab Units 07/04/23  0935 07/03/23  1308   INR  1.03 0.87   PTT seconds 60*  --      No results found for: "TROPONINT"  ABG:  Lab Results   Component Value Date    PHART 7.235 (LL) 07/09/2023    EMY4IQH 59.5 (HH) 07/09/2023    PO2ART 112.6 07/09/2023    VCG3QQS 24.6 07/09/2023    BEART -3.4 07/09/2023    SOURCE Line, Arterial 07/09/2023       Imaging Studies: I have personally reviewed pertinent reports. and I have personally reviewed pertinent films in PACS     XR chest portable    Result Date: 7/6/2023  Narrative: CHEST INDICATION:   follow up hypoxia. COMPARISON: Chest radiograph 7/5/2023. EXAM PERFORMED/VIEWS:  XR CHEST PORTABLE FINDINGS: Endotracheal tube tip terminates 2.5 cm above the ibeth. Enteric tube terminates in the stomach.  Heart shadow is enlarged but unchanged from prior exam. Lung markings are crowded secondary to low lung volumes. Similar left lung base opacity. No pneumothorax or pleural effusion. Osseous structures appear within normal limits for patient age. Impression: No significant change since prior study. Workstation performed: [id number]     MRI cervical spine wo contrast    Result Date: 7/6/2023  Narrative: MRI CERVICAL SPINE WITHOUT CONTRAST INDICATION: Brain and C-spine, new RUE weakness POD 1 from emergent posterior laminectomy for central cord syndrome. COMPARISON: CT cervical spine 7/3/2023 TECHNIQUE:  Multiplanar, multisequence imaging of the cervical spine was performed. . IMAGE QUALITY:  Diagnostic FINDINGS: There is again evidence of hyperextension injury with a defect through the anterior longitudinal ligament at the C4-5 level and widening of the anterior disc space. Interval spinal decompression and posterior fixation including bilateral laminectomies at  the C4 and C5 levels and bilateral transpedicular screws at the C3-C6 levels with interconnecting rods. MARROW SIGNAL:  Normal marrow signal is identified within the visualized bony structures. No discrete marrow lesion. CERVICAL AND VISUALIZED THORACIC CORD:  There is stable hyperintense T2/STIR signal within the dorsal aspect of the cord at the C4 level PREVERTEBRAL AND PARASPINAL SOFT TISSUES:  There is prevertebral edema extending from the C1-2 level down to the T1-2 level, mildly progressed. VISUALIZED POSTERIOR FOSSA:  The visualized posterior fossa demonstrates no abnormal signal. CERVICAL DISC SPACES: C2-3: Stable broad-based central disc protrusion. Mild central canal narrowing. Mild right neural foraminal stenosis is again noted. C3-4: Stable diffuse disc osteophyte complex. . Improved patency of the central canal status post bilateral laminectomies. Limited evaluation of the neural foramina secondary to fixation hardware artifacts.  C4-5: Stable diffuse disc osteophyte complex with bilateral uncovertebral hypertrophy partially effacing the ventral subarachnoid space. Improved patency of the central canal status post bilateral laminectomies. Limited evaluation of the neural foramina secondary to fixation hardware artifacts. C5-6: Stable diffuse disc osteophyte complex with bilateral uncovertebral hypertrophy with a superimposed left paracentral disc protrusion. Mild to moderate central canal narrowing. Limited evaluation of the neural foramina secondary to fixation hardware artifacts. C6-7: Stable diffuse disc osteophyte complex with bilateral uncovertebral hypertrophy partially effacing the ventral subarachnoid space. Mild central canal narrowing. Moderate to severe right and moderate left neural foraminal stenosis is again noted. C7-T1: Stable diffuse disc osteophyte complex with bilateral uncovertebral hypertrophy partially effacing the ventral subarachnoid space. No central canal narrowing. No neural foraminal stenosis. UPPER THORACIC DISC SPACES:  Normal.     Impression: Hyperflexion injury with a defect through the anterior longitudinal ligament at the C4-5 level and widening of the anterior disc space. Prevertebral edema extending from C1-2 through T1-2 ,mildly progressed. Interval spinal decompression and posterior fixation including bilateral laminectomies at the C4 and C5 levels and bilateral transpedicular screws at the C3-C6 levels with interconnecting rods. Improved patency of the central canal at the C3-4 and C4-5 levels with resolved cord compression. There is stable hyperintense T2/STIR signal within the dorsal aspect of the cord at the C4 level.  Findings are consistent with the preliminary report from [facility] which was provided shortly after completion of the exam. Workstation performed: [id number]     MRI brain wo contrast    Result Date: 7/6/2023  Narrative: MRI BRAIN WITHOUT CONTRAST INDICATION: Brain and C-spine, new RUE weakness POD 1 from emergent posterior laminectomy for central cord syndrome. COMPARISON:   CT head 7/3/2023 TECHNIQUE:  Multiplanar, multisequence imaging of the brain was performed. IMAGE QUALITY:  Diagnostic. FINDINGS: BRAIN PARENCHYMA:  There is no discrete mass, mass effect or midline shift. There is no intracranial hemorrhage. There is no evidence of acute infarction and diffusion imaging is unremarkable. Small scattered hyperintensities on T2/FLAIR imaging are noted in the periventricular and subcortical white matter demonstrating an appearance that is statistically most likely to represent mild microangiopathic change. Small calcified lesion is noted along the anterior right frontal convexity, dural calcification versus small meningioma. Punctate focus of susceptibility is noted within the left occipital horn, possible sequela of remote hemorrhage. VENTRICLES:  Normal for the patient's age. SELLA AND PITUITARY GLAND:  Normal. ORBITS: Thinning of the bilateral ocular lenses which can be degenerative or postsurgical. PARANASAL SINUSES: Ethmoidal and left maxillary sinus mucosal thickening. The remainder of the visualized paranasal sinus cavities and mastoid air cells are clear VASCULATURE:  Evaluation of the major intracranial vasculature demonstrates appropriate flow voids. CALVARIUM AND SKULL BASE:  Normal. EXTRACRANIAL SOFT TISSUES: Right parietal scalp soft tissue swelling. Impression: 1. No acute infarction, edema, or mass effect. 2. Mild chronic microangiopathic ischemic changes. 3. Small calcified lesion is noted along the anterior right frontal convexity, dural calcification versus small meningioma. 4. Right parietal scalp soft tissue swelling.  Findings are consistent with the preliminary report from [facility] which was provided shortly after completion of the exam. Workstation performed: [id number]     Echo complete w/ contrast if indicated    Result Date: 7/5/2023  Narrative: •  Left Ventricle: Left ventricular cavity size is normal. Wall thickness is normal. The left ventricular ejection fraction is 50%. Systolic function is mildly reduced. There is mild global hypokinesis. Diastolic function is moderately abnormal, consistent with grade II (pseudonormal) relaxation. Left atrial filling pressure is elevated. •  Right Ventricle: Right ventricular cavity size is mildly dilated. •  Aortic Valve: There is aortic valve sclerosis. The aortic valve velocity is normal. •  Mitral Valve: There is mild annular calcification. There is mild to moderate regurgitation with an eccentrically directed jet. •  Tricuspid Valve: There is mild to moderate regurgitation. The tricuspid valve regurgitation jet is central. The right ventricular systolic pressure is at least mildly elevated. •  Pulmonary Veins: There is systolic blunting in the pulmonary veins. XR chest portable ICU    Result Date: 7/5/2023  Narrative: CHEST INDICATION:   suspected worsened pulmonary edema, effusions. COMPARISON: CXR 7/4/2023 and chest CT 7/3/2023. EXAM PERFORMED/VIEWS:  XR CHEST PORTABLE ICU. FINDINGS: ET tube 4 cm above the ibeth. NG tube in stomach. Cardiomediastinal silhouette normal. Persistent opacity in the left base. No effusion or pneumothorax. Persistent elevation of the right hemidiaphragm. Upper abdomen normal. Bones normal for age. Impression: NG tube in stomach. Persistent left base opacity, atelectasis and/or pneumonia. Workstation performed: [id number]     X-ray chest 1 view    Result Date: 7/5/2023  Narrative: CHEST INDICATION:   Endotracheal tube positioning. COMPARISON: CXR and chest CT 7/3/2023. EXAM PERFORMED/VIEWS:  XR CHEST 1 VIEW. FINDINGS: ET tube 4 cm above the ibeth. Cardiomediastinal silhouette normal. Low lung volumes. Moderate opacity in the left base. No effusion or pneumothorax. Upper abdomen normal. Bones normal for age.      Impression: Low lung volumes with moderate opacity in the left base, likely atelectasis. Pneumonia not excluded in the appropriate clinical setting. Workstation performed: [id number]     XR spine cervical 2 or 3 vw injury    Result Date: 7/4/2023  Narrative: C-ARM -cervical spine INDICATION: Central cervical cord injury, without spinal bony injury, C1-4, initial encounter ([address]) [C82.236N]. Procedure guidance. COMPARISON:  None TECHNIQUE: FLUOROSCOPY TIME:   26 seconds 4 FLUOROSCOPIC IMAGES FINDINGS: Fluoroscopic guidance provided for surgical procedure. Osseous and soft tissue detail limited by technique. Impression: Fluoroscopic guidance provided for surgical procedure. Please refer to the separate procedure notes for additional details. Localization procedure was performed, with the OR notified of the level at approximately 12:37 p.m. on 7/4/2023 via telephone conversation with the OR x-ray technologist . Additional images were obtained after level verification and are present for evaluation. Workstation performed: [id number]     MRI cervical spine wo contrast    Result Date: 7/3/2023  Narrative: MRI CERVICAL SPINE WITHOUT CONTRAST INDICATION: Weakness of both upper extremities. COMPARISON:  None. TECHNIQUE:  Multiplanar, multisequence imaging of the cervical spine was performed. . IMAGE QUALITY:  Diagnostic FINDINGS: ALIGNMENT: Straightening of the cervical lordosis. Mild kyphotic deformity at C4-C5 where there is suspected traumatic injury to the disc at C4-C5 (separation of the superior portion of the C4-C5 disc from the inferior endplate of the C4 vertebral body). MARROW SIGNAL:  Normal marrow signal is identified within the visualized bony structures. No discrete marrow lesion. CERVICAL AND VISUALIZED THORACIC CORD: Spinal cord signal abnormality from C4-C5 could be either degenerative spondylitic myelopathy versus traumatic. Correlate clinically. PREVERTEBRAL AND PARASPINAL SOFT TISSUES: Prevertebral soft tissue edema from C2-C5 likely traumatic.  VISUALIZED POSTERIOR FOSSA: The visualized posterior fossa demonstrates no abnormal signal. CERVICAL DISC SPACES: C2-C3: Disc osteophyte complex without significant C3-C4: Disc osteophyte complex causing mild spinal canal stenosis. Uncovertebral hypertrophy and facet arthrosis causing moderate to severe bilateral neuroforaminal narrowing. C4-C5: Fluid signal abnormality along the anterior aspect of the C4-C5 disc likely. Disc osteophyte complex and ligamentum flavum hypertrophy causing mild spinal canal stenosis. Facet arthrosis and uncovertebral hypertrophy causing moderate to severe bilateral neuroforaminal narrowing. C5-C6: Disc osteophyte complex causing mild spinal canal stenosis. Facet arthrosis and uncovertebral hypertrophy causing moderate to severe bilateral neuroforaminal narrowing C6-C7: Disc osteophyte complex causing anterior impression on the thecal sac. Uncovertebral hypertrophy and facet arthrosis causing moderate to severe right and moderate left neuroforaminal narrowing. C7-T1: Disc osteophyte complex causing impression on the thecal sac. UPPER THORACIC DISC SPACES:  Normal. OTHER FINDINGS:  None. Impression: 1. Traumatic disc injury at C4-C5 where there is anterior disc injury (mild separation of the superior C4-C5 disc from the inferior endplate of the C4 vertebral body) with prevertebral fluid from C2-C5. 2. Cord edema at C4-C5 likely traumatic as this is the site of injury, rather than spondylitic myelopathy but needs to be correlated clinically. I personally discussed this study with Dr. Miriam Nassar on 7/3/2023 10:13 PM. Workstation performed: [id number]     TRAUMA - CT spine cervical wo contrast    Result Date: 7/3/2023  Narrative: CT CERVICAL SPINE - WITHOUT CONTRAST INDICATION:   TRAUMA. Fall. Upper extremity weakness. Back pain COMPARISON: February 22, 2023 TECHNIQUE:  CT examination of the cervical spine was performed without intravenous contrast.  Contiguous axial images were obtained.  Multiplanar 2D reformatted images were created from the source data. Radiation dose length product (DLP) for this visit:  0367 8760432 mGy-cm . This examination, like all CT scans performed in the [facility], was performed utilizing techniques to minimize radiation dose exposure, including the use of iterative reconstruction and automated exposure control. There was unavoidable motion artifact on first attempt and the examination was therefore repeated, with better results. IMAGE QUALITY:  Diagnostic. FINDINGS: ALIGNMENT:  Normal alignment of the cervical spine. No subluxation. VERTEBRAE:  No fracture. DEGENERATIVE CHANGES:  Moderate multilevel cervical degenerative changes are noted. Degenerative changes quite similar from February 22, 2023 and there is no osseous critical central canal stenosis. PREVERTEBRAL AND PARASPINAL SOFT TISSUES: Unremarkable THORACIC INLET:  Normal.     Impression: No cervical spine fracture or traumatic malalignment. Workstation performed: [id number]     CT recon only thoracolumbar (No Charge)    Result Date: 7/3/2023  Narrative: CT THORACIC AND LUMBAR SPINE INDICATION:   trauma. Fall. COMPARISON: None. TECHNIQUE: Axial CT examination of the thoracic and lumbar spine was obtained utilizing reconstructed images from CT of the chest abdomen and pelvis performed the same day. Images were reformatted in the sagittal and coronal planes. This examination, like all CT scans performed in the [facility], was performed utilizing techniques to minimize radiation dose exposure, including the use of iterative reconstruction and automated exposure control. FINDINGS: ALIGNMENT: Normal alignment. No spondylolisthesis. No scoliosis. VERTEBRAE:  No fracture. No acute osseous abnormality. DEGENERATIVE CHANGES: Age appropriate degenerative changes. Fusion of osteophytes throughout the thoracic spine. No critical central canal stenosis in the thoracic or lumbar spine.  PREVERTEBRAL AND PARASPINAL SOFT TISSUES: No prevertebral or paraspinal hematoma or soft tissue mass. Please see separate report of chest, abdomen, and pelvis CT, performed concurrently and dictated under separate accession number. Impression: No fracture or traumatic subluxation. Workstation performed: [id number]     TRAUMA - CT chest abdomen pelvis w contrast    Result Date: 7/3/2023  Narrative: CT CHEST, ABDOMEN AND PELVIS WITH IV CONTRAST INDICATION:   TRAUMA. Fall. Head injury. Back pain. Upper extremity weakness. COMPARISON:  None. TECHNIQUE: CT examination of the chest, abdomen and pelvis was performed. Multiplanar 2D reformatted images were created from the source data. This examination, like all CT scans performed in the [facility], was performed utilizing techniques to minimize radiation dose exposure, including the use of iterative reconstruction and automated exposure control. Radiation dose length product (DLP) for this visit:  1733.55 mGy-cm IV Contrast:  80 mL of iohexol (OMNIPAQUE) Enteric Contrast: Enteric contrast was administered. FINDINGS: CHEST LUNGS: Low lung volumes. Atelectasis especially in the dependent right costophrenic angle. No pulmonary contusion or pulmonary laceration. PLEURA:  Unremarkable. HEART/GREAT VESSELS: Cardiomegaly. Coronary artery calcification. No thoracic aortic aneurysm. MEDIASTINUM AND SOFIA: No mediastinal hematoma. No mediastinal or hilar lymphadenopathy. Small sliding-type hiatal hernia. CHEST WALL AND LOWER NECK:  Unremarkable. ABDOMEN LIVER/BILIARY TREE:  Unremarkable. GALLBLADDER: There are gallstone(s) within the gallbladder, without pericholecystic inflammatory changes. SPLEEN:  Unremarkable. PANCREAS:  Unremarkable. ADRENAL GLANDS:  Unremarkable. KIDNEYS/URETERS: Bilateral renal cysts, most simple, varying in size measuring up to 5.6 cm in the interpolar right kidney.  A lobulated cyst exophytic at the lower pole of the right kidney with thin septations measures up to 4.3 cm. No solid renal mass. No traumatic renal injury. No urinary tract calculus. No hydronephrosis. STOMACH AND BOWEL: Extensive colonic diverticulosis with no evidence for acute diverticulitis. Duodenal diverticulum. No bowel wall thickening or bowel obstruction. APPENDIX:  No findings to suggest appendicitis. ABDOMINOPELVIC CAVITY:  No ascites. No pneumoperitoneum. No lymphadenopathy. VESSELS:  Unremarkable for patient's age. PELVIS REPRODUCTIVE ORGANS: Prostatomegaly. URINARY BLADDER:  Unremarkable. ABDOMINAL WALL/INGUINAL REGIONS:  Unremarkable. OSSEOUS STRUCTURES: Marginal osteophytes throughout the thoracic and upper lumbar spine. No acute fracture. No destructive osseous lesion. Impression: No acute traumatic visceral injury in the chest, abdomen or pelvis. No acute fracture. Workstation performed: [id number]     TRAUMA - CT head wo contrast    Result Date: 7/3/2023  Narrative: CT BRAIN - WITHOUT CONTRAST INDICATION:   TRAUMA. Fall. Lower back pain. Upper extremity weakness. COMPARISON: February 22, 2023 TECHNIQUE:  CT examination of the brain was performed. Multiplanar 2D reformatted images were created from the source data. Radiation dose length product (DLP) for this visit:  1003 mGy-cm . This examination, like all CT scans performed in the [facility], was performed utilizing techniques to minimize radiation dose exposure, including the use of iterative reconstruction and automated exposure control. IMAGE QUALITY:  Diagnostic. FINDINGS: PARENCHYMA: Decreased attenuation is noted in periventricular and subcortical white matter demonstrating an appearance that is statistically most likely to represent moderate microangiopathic change. No CT signs of acute infarction. No intracranial mass, mass effect or midline shift. No acute parenchymal hemorrhage. VENTRICLES AND EXTRA-AXIAL SPACES:  Normal for the patient's age. VISUALIZED ORBITS: Normal visualized orbits. PARANASAL SINUSES: Partial opacification of left maxillary sinus with some central desiccated secretions, similar from February 22, 2023. Mild mucosal thickening in ethmoid air cells. Sinuses and mastoid air cells are otherwise clear. CALVARIUM AND EXTRACRANIAL SOFT TISSUES: No calvarial fracture. Right parietal scalp contusion. Impression: No acute intracranial abnormality. Workstation performed: [id number]     XR pelvis ap only 1 or 2 vw    Result Date: 7/3/2023  Narrative: PELVIS INDICATION:   trauma. COMPARISON: 2/22/2023 VIEWS:  XR PELVIS AP ONLY 1 OR 2 VW FINDINGS: No acute fracture or hip dislocation. Mild bilateral hip osteoarthritis. No lytic or blastic osseous lesion. Soft tissues are unremarkable. The visualized lumbar spine is unremarkable. Impression: No acute osseous abnormality. Workstation performed: [id number]     XR chest 1 view portable    Result Date: 7/3/2023  Narrative: CHEST INDICATION:   trauma. COMPARISON: 2/22/2023 EXAM PERFORMED/VIEWS:  XR CHEST PORTABLE FINDINGS: Heart shadow is enlarged but unchanged from prior exam. There is no focal consolidation. Cannot exclude mild pulmonary vascular congestion. No pneumothorax or pleural effusion. Osseous structures appear within normal limits for patient age. Impression: Unchanged cardiomegaly. Cannot exclude mild pulmonary vascular congestion. Workstation performed: [id number]     EKG, Pathology, and Other Studies: I have personally reviewed pertinent reports.       VTE Pharmacologic Prophylaxis: Heparin    VTE Mechanical Prophylaxis: sequential compression device

## 2023-07-10 NOTE — CONSULTS
PHYSICAL MEDICINE AND REHABILITATION CONSULT NOTE  Radha Notice 80 y.o. male MRN: [id number]  Unit/Bed#: ICU 08 Encounter: [id number]    Requested by:   Jack Buchanan   Reason for Consultation:  Rehabilitation medicine evaluation and assistance with appropriate disposition. Primary insurance listed on facesheet:  [employer]    Assessment and Recommendations:  80year old M with PMH of CKD4, HTN, ulcerative colitis, nephrolithiasis, gout, BPH, Vit D def, secondary hyperparathyroidism, depression who fell at home and found right away by wife having hit back of head without LOC and noted to have neck pain and BUE>BLE weakness and inability to stand who was brought to hospital with initial negative CT imaging at [facility] except R parietal scalp contusion who was transferred to [facility] where MRI C spine showed traumatic disc injury at C4-C5 where there is anterior disc injury (mild separation of the superior C4-C5 disc from the inferior endplate of the C4 vertebral body) with prevertebral fluid from C2-C5. As well as cord edema at C4-C5 likely traumatic as this is the site of injury, rather than spondylitic myelopathy but needs to be correlated clinically per radiology. Patient evaluated by NSx and diagnosed with central cord syndrome SCI underwent posterior C4-5 laminectomy, C3-6 fixation/fusion. Course notable for acute respiratory failure requiring intubation now s/p extubation with some mild respiratory hypoxia on 2L supplemental O2,  acute metabolic encephalopathy improved, pain, post-op R C5 nerve palsy recently on steroids per NSx, urinary retention with indwelling pastor in place, with significant BUE weakness, impaired balance, and decline in ADLs and mobility. Prior Level of Function and Social history:    Lives in house mult-level with spouse.     Independent with ADLs  Independent with ambulation with cane/rollator    Current Level of Function:    Transfers max assist x2  Ambulation 1 step max assist x2  ADLs largely total assist     Impression:  ADL and mobility deficits due to C4-C5 SCI with central cord syndrome c/b post-op R C5 nerve palsy with residual significant proximal>distal RUE weakness, distal>prox LUE; severe gait/standing imbalance, mild hypoxic respiratory failure on 2L NC, neurogenic bladder s/p insertion of indwelling pastor, neck and neuropathic b/l arm pain, and possible mild residual encephalopathy and fatigue. 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 s/p  posterior C4-5 laminectomy, C3-6 fixation/fusion c/b post-op R C5 nerve palsy   - Recommend dedicated SCI rehab as patient appears to be appropriate candidate pending medical/sx clearance by Nsx/primary team and accepting facility   - If dedicated SCI rehab not available patient may be adequately appropriate for non-dedicated SCI [facility]/IRF inpatient rehab program   - Continue PT/OT OOB as long as cleared by NSx;  Bedside strengthening and ROM exercises 2-3x/d  - Recommend aspiration precautions and monitoring oral intake even though patient has been cleared for oral diet  - Monitor for residual cognitive deficits and post-concussive symptoms and he did have head trauma and is at risk and has some residual encephalopathy which is improving   - MAP/BP goal  per neurosurgery completed  - Cleared for hep Sq for VTE prophylaxis   - C collar not required per NSx; WBAT, follow-up imaging per neurosurgery   - Thigh high SHAMAR hose or ACE wraps PRN/abdominal binder PRN when sitting up right and OOB for symptomatic orthostasis  - Optimal mood/agitation/sleep mgmt   - Patient (caregiver if applicable) education/supportive counseling      - Nutrition/FEN - currently on regular diet; still at some aspiration risk and risk of dehydration, risk of impaired nutrition   - hold for lethargy or signs of aspiration   - Resume SLP if swallowing declines  - Assistance with feeding/drinking due to impaired strength and dexterity   - Monitor vitals, exam, lytes, kidney function, and intake   - Recommend Nutrition consult if not already obtained     - Renal/Neurogenic bladder- hx of CKD    - Monitor BP, Cr, fluid status/PE closely; on lasix 20mg qday   - Recommend maintaining pastor catheter with high risk of uncontrolled urinary retention, kidney injury, UTI related to retention, and autonomic dysfunction until more stable and mobile for voiding trial and if needed straight cath program in inpatient rehab setting; on floamx   - Remains at increased risk of UTI, monitor for s/s, treat symptomatic UTIs only - can present atypically such as with muscle spasms   - Monitor renal function closely  - Avoid constipation/impaction    -  If Pastor removed and patient does void, would check random bladder scans Q4H and all PVR's x 3 to ensure complete emptying. If all random bladder scans <450 and all PVRs<150cc, can discontinue. Recommend straight cath for bladder scans>450 or if uncomfortable 300-449.   If significantly elevated scans in spite of straight caths please place indwelling pastor until patient more stable and mobile in inpatient rehab setting    - GI/Neurogenic Bowel - Hx of UC  - Sulfasalazine  - Consider PPI with recent steroids c/ hx of SCI/ICU course   - Last acceptable BM 7/7  - monitor for signs and symptoms of obstruction/ileus > not currently; hold stimulant lax if occurs and obtain KUB  - monitor for impaction with PRN rectal checks/disimpaction  - Duclolax supp daily PRN if no BM in 2 days   - Daily miralax for now  - Colace 100mg BID-TID  - Senna 17.2 mg at HS  - Dulcolax suppository 30 min after breakfast (can provide digital stimulation prior to dulcolax supp)  - Limiting constipating medications if possible  - Ensure adequate hydration   - Hold bowel meds for loose frequent stools     - Pain:  Somatic pain at neck; with neuropathic pain in arms  Consider:  - Low dose Gabapentin for neuropathic pain if wakefulness improves  - APAP 975mg TID   - Oxycodone 2.5 Q4H PRN per other providers   - Oxycodone 5-10mg Q4H PRN   - Methocarbamol 250mg Q6H PRN muscle spasms   - Monitor for oversedation, AMS/delirium, and respiratory depression   - If being administered - hold opiates, muscle relaxants, benzodiazepines, and gabapentin for oversedation, AMS, or RR<12  - Wean narcotics/sedative meds as able/appropriate   - Counseled on and continue to encourage deep breathing/relaxation/behavioral pain management techniques    - Cardiovascular: Can run lower Bps with cervical SCI  - On chronic lasix 20mg qday; losartan on hold  - Risk for orthostatic hypotension when OOB - consider thigh high TEDs v ace wraps and abdominal binder when OOB  - Risk for acute autonomic dysfunction, if this occurs, find and eliminate source if possible (, GI most likely), sit upright, loosen clothing, monitor for constipation, impaction, urinary obstruction, infection, skin breakdown/pressure injury  - High Risk for DVT.  SCDs with heparin SQ currently     - Pulmonary:  saturating well on 2L O2  - Last CXR 7/10 - await final read   - Monitor O2 at rest and activity, supplemental O2 PRN, monitor for increased cough/respiratory distress  - aggressive pulmonary toilet to prevent atelectasis and pneumonia   - encourage incentive spirometry q 1 hour use while awake     - Skin:   - turning q 2 hours in bed; frequent skin checks, EHOB or ROHO cushion in chair/WC, barrier cream or Allevyn foam to buttocks/sacrum and heels; float heels off edge of pillow when in bed; optimal bowel/bladder mgmt, educate patient (caregiver if applicable) how to appropriate offload in chair/bed  - Consider low air loss specialty bed such as p500 when out of ICU    - Psychiatric: risk of difficulty coping, adjustment disorder/reaction; H/o  Depression  - On melatonin   - Supportive counseling  - Recommend optimal sleep/mood management   - Neuropsych c/s in IP rehab setting    CMS IRF Preadmission Screen Including Required Elements   Prior level of functioning: Independent with ADLs and mobility   Current level of functioning: Max assist x2-total assist  Expected level of improvement: TBD - initial goal improving OOB tolerances, improving transfers, standing balance, bed mobility - eventually min assist ADLs; transfers, WC mobility   Expected time frame to achieve improvement: 4-6 weeks  Evaluation of risk for clinical complications:  Moderate to high:  DVT, PE, Neurological changes, Pneumonia, UTI, Pressure injury  Conditions causing the need for rehabilitation:  SCI with weakness, imbalance, incoordination, neurogenic bladder, pain, risk of autonomic dysfunction   Treatments needed: PT/OT/SLT/Rehab Nursing  Expected frequency and duration of treatment in the IRF:3 hours/day, 5 days/week x 24/7 rehabilitation nursing and medical management  Anticipated discharge destination: Possibly SNF v family home   Anticipated post-discharge treatments: PT/OT/SLT  Other relevant information: none  Additional needs (cultural/dietary/equipment/medications/services):  See above and TBD     # Other medical issues:     Per primary service        ==================================================================    Chief Complaint:  Bilateral arm weakness    History of Present Illness:   80year old M with PMH of CKD4, HTN, ulcerative colitis, nephrolithiasis, gout, BPH, Vit D def, secondary hyperparathyroidism, depression who fell at home and found right away by wife having hit back of head without LOC and noted to have neck pain and BUE>BLE weakness and inability to stand who was brought to hospital with initial negative CT imaging at [facility] except R parietal scalp contusion who was transferred to [facility] where MRI C spine showed traumatic disc injury at C4-C5 where there is anterior disc injury (mild separation of the superior C4-C5 disc from the inferior endplate of the C4 vertebral body) with prevertebral fluid from C2-C5. As well as cord edema at C4-C5 likely traumatic as this is the site of injury, rather than spondylitic myelopathy but needs to be correlated clinically per radiology. Patient evaluated by NSx and diagnosed with central cord syndrome SCI underwent posterior C4-5 laminectomy, C3-6 fixation/fusion. Course notable for acute respiratory failure requiring intubation now s/p extubation with some mild respiratory hypoxia on 2L supplemental O2,  acute metabolic encephalopathy improved, pain, post-op R C5 nerve palsy recently on steroids per NSx, urinary retention with indwelling pastor in place. On eval, patient reports considerable bilateral upper extremity weakness worse proximally R and distally on L. Patient reported some difficulty sitting up and standing recently. Patient denies headache, visual changes, nausea, vomiting, abdominal pain, shortness of breath coming creased cough. He does note some acute on chronic neck pain and pain radiating down arms with some burning and tingling in the arms. He denies lightheadedness, visual changes, or other new complaints. Review of Systems:     Complete review of systems obtained. Please see HPI for details with other significant symptoms or history listed here: Otherwise, 14 point review of systems completed and was otherwise unremarkable.     No Known Allergies    Current Facility-Administered Medications:   •  acetaminophen (TYLENOL) tablet 975 mg, 975 mg, Oral, Q8H [facility], DAMION Bose, 975 mg at 07/10/23 9926  •  bisacodyl (DULCOLAX) rectal suppository 10 mg, 10 mg, Rectal, Daily PRN, Octavio Fletcher PA-C  •  folic acid (FOLVITE) tablet 1 mg, 1 mg, Oral, Daily, Oly Mcconnell PA-C, 1 mg at 07/10/23 8506  •  furosemide (LASIX) tablet 20 mg, 20 mg, Oral, Daily, Oly Mcconnell, PA-C, 20 mg at 07/10/23 0806  •  guaiFENesin (MUCINEX) 12 hr tablet 600 mg, 600 mg, Oral, Q12H [address], Oly Mcconnell PA-C, 600 mg at 07/10/23 0806  • heparin (porcine) subcutaneous injection 5,000 Units, 5,000 Units, Subcutaneous, Q8H [address], DAMION Dalton, 5,000 Units at 07/10/23 9754  •  lidocaine (LIDODERM) 5 % patch 1 patch, 1 patch, Topical, Daily, DAMION Cordero, 1 patch at 07/09/23 2043  •  melatonin tablet 6 mg, 6 mg, Oral, HS, DAMION Bose, 6 mg at 07/09/23 2203  •  ondansetron (ZOFRAN) injection 4 mg, 4 mg, Intravenous, Q6H PRN, DAMION Cordero  •  polyethylene glycol (MIRALAX) packet 17 g, 17 g, Oral, Daily, ENEIDA Miller-C, 17 g at 07/10/23 0805  •  senna-docusate sodium (SENOKOT S) 8.6-50 mg per tablet 1 tablet, 1 tablet, Oral, BID, DAMION Cordero, 1 tablet at 07/10/23 3956  •  sulfaSALAzine (AZULFIDINE) tablet 1,000 mg, 1,000 mg, Oral, Daily, Franchesca Cosme PA-C, 1,000 mg at 07/09/23 1230  •  sulfaSALAzine (AZULFIDINE) tablet 500 mg, 500 mg, Oral, BID, JOHNNY FloodC, 500 mg at 07/10/23 0808  •  tamsulosin (FLOMAX) capsule 0.4 mg, 0.4 mg, Oral, Daily, Jazmín BrowereDAMION, 0.4 mg at 07/10/23 8228    Past Medical History:   Diagnosis Date   • Ankle edema     last assessed [id number]   • Depression     last assessed [id number]   • Hypertension    • Kidney stone    • Nephrolithiasis    • Nocturia     last assessed [id number]   • Posterior tibial tendinitis of right lower extremity     last assessed [id number]       Past Surgical History:   Procedure Laterality Date   • ANAL FISSURECTOMY     • CERVICAL FUSION N/A 7/4/2023    Procedure: Posterior C4-5 laminectomy, C3-6 fixation/fusion;  Surgeon: Alina Schuster MD;  Location: [facility];  Service: Neurosurgery   • COLONOSCOPY  04/22/2009    every 2 years   • COLONOSCOPY  03/04/2013    2 yrs d/t h/o polyp   • COLONOSCOPY  02/15/2016    colo 2/15/16-repeat 2 yrs   • SHOULDER SURGERY        Family History   Problem Relation Age of Onset   • Clotting disorder Mother    • Nephrolithiasis Father    • Diabetes Sister    • Heart disease Daughter    • Fibromyalgia Daughter        Social History available currently in EMR:  (See additional SH as outlined above)   Social History     Socioeconomic History   • Marital status: /Civil Union     Spouse name: None   • Number of children: None   • Years of education: None   • Highest education level: None   Occupational History   • Occupation: retired   Tobacco Use   • Smoking status: Never   • Smokeless tobacco: Never   Vaping Use   • Vaping Use: Never used   Substance and Sexual Activity   • Alcohol use: Not Currently     Comment: occassionally   • Drug use: No   • Sexual activity: Not Currently   Other Topics Concern   • None   Social History Narrative   • None     Social Determinants of Health     Financial Resource Strain: Low Risk  (1/3/2023)    Overall Financial Resource Strain (CARDIA)    • Difficulty of Paying Living Expenses: Not hard at all   Food Insecurity: No Food Insecurity (7/4/2023)    Hunger Vital Sign    • Worried About Running Out of Food in the Last Year: Never true    • Ran Out of Food in the Last Year: Never true   Transportation Needs: No Transportation Needs (7/4/2023)    PRAPARE - Transportation    • Lack of Transportation (Medical): No    • Lack of Transportation (Non-Medical):  No   Physical Activity: Not on file   Stress: Not on file   Social Connections: Not on file   Intimate Partner Violence: Not on file   Housing Stability: Low Risk  (7/4/2023)    Housing Stability Vital Sign    • Unable to Pay for Housing in the Last Year: No    • Number of Places Lived in the Last Year: 1    • Unstable Housing in the Last Year: No       Physical Examination:   Temp:  [97.8 °F (36.6 °C)-98.2 °F (36.8 °C)] 98.1 °F (36.7 °C)  HR:  [56-96] 90  Resp:  [16-24] 20  BP: (103-137)/(48-54) 103/48  Arterial Line BP: ()/(36-60) 70/36  General: Awake, alert in NAD  HENT: NCAT, MMM, NC in place  Neck: Supple, no lymphadenopathy  Respiratory: Unlabored breathing, breath sounds equal, Lungs CTA, no wheezes, rales, or rhonchi  Cardiovascular: Regular rate and rhythm, no murmurs, rubs, or gallops  Gastrointestinal: Soft, non-tender, non-distended, normoactive bowel sounds  Genitourinary: No pastor  SkiN/MSK/Extremities:  Distal extremities appropriately warm, normal cap refill distally, no cyanosis or calf edema, no calf tenderness to palpation  Neurologic/Psych:   MENTAL STATUS: awake, tired appearing, oriented to person, place, time, and largely to situation   Affect: Somewhat flattened  CN II-XII: grossly intact   Strength/MMT:    Right  Left  Site  Right  Left  Site    0 4-  S Ab: Shoulder Abductors  5  5  HF: Hip Flexors    2 4  EF: Elbow Flexors  5  5 KF: Knee Flexors    2+  4  EE: Elbow Extensors  5  5  KE: Knee Extensors    3-  2  WE: Wrist Extensors  5  5  DR: Dorsi Flexors    3  2-  FF: Finger Flexors  5  5  PF: Plantar Flexors       5  5  EHL: Extensor Hallucis Longus       Data:  Lab Results   Component Value Date    HGB 9.7 (L) 07/09/2023    HGB 11.5 (L) 05/28/2016    HCT 32.1 (L) 07/09/2023    HCT 36.9 (L) 05/28/2016    WBC 8.11 07/09/2023    WBC 9.2 05/28/2016     05/28/2016    K 5.3 07/10/2023    K 4.9 05/28/2016     (H) 07/10/2023     05/28/2016    GLUCOSE 99 05/28/2016    CREATININE 2.29 (H) 07/10/2023    CREATININE 1.32 (H) 05/28/2016    BUN 89 (H) 07/10/2023    BUN 24 05/28/2016       XR chest portable    Result Date: 7/6/2023  Impression: No significant change since prior study. Workstation performed: [id number]     MRI cervical spine wo contrast    Result Date: 7/6/2023  Impression: Hyperflexion injury with a defect through the anterior longitudinal ligament at the C4-5 level and widening of the anterior disc space. Prevertebral edema extending from C1-2 through T1-2 ,mildly progressed.  Interval spinal decompression and posterior fixation including bilateral laminectomies at the C4 and C5 levels and bilateral transpedicular screws at the C3-C6 levels with interconnecting rods. Improved patency of the central canal at the C3-4 and C4-5 levels with resolved cord compression. There is stable hyperintense T2/STIR signal within the dorsal aspect of the cord at the C4 level. Findings are consistent with the preliminary report from [facility] which was provided shortly after completion of the exam. Workstation performed: [id number]     MRI brain wo contrast    Result Date: 7/6/2023  Impression: 1. No acute infarction, edema, or mass effect. 2. Mild chronic microangiopathic ischemic changes. 3. Small calcified lesion is noted along the anterior right frontal convexity, dural calcification versus small meningioma. 4. Right parietal scalp soft tissue swelling. Findings are consistent with the preliminary report from [facility] which was provided shortly after completion of the exam. Workstation performed: [id number]     XR chest portable ICU    Result Date: 7/5/2023  Impression: NG tube in stomach. Persistent left base opacity, atelectasis and/or pneumonia. Workstation performed: [id number]     X-ray chest 1 view    Result Date: 7/5/2023  Impression: Low lung volumes with moderate opacity in the left base, likely atelectasis. Pneumonia not excluded in the appropriate clinical setting. Workstation performed: [id number]     XR spine cervical 2 or 3 vw injury    Result Date: 7/4/2023  Impression: Fluoroscopic guidance provided for surgical procedure. Please refer to the separate procedure notes for additional details. Localization procedure was performed, with the OR notified of the level at approximately 12:37 p.m. on 7/4/2023 via telephone conversation with the OR x-ray technologist . Additional images were obtained after level verification and are present for evaluation. Workstation performed: [id number]     MRI cervical spine wo contrast    Result Date: 7/3/2023  Impression: 1.  Traumatic disc injury at C4-C5 where there is anterior disc injury (mild separation of the superior C4-C5 disc from the inferior endplate of the C4 vertebral body) with prevertebral fluid from C2-C5. 2. Cord edema at C4-C5 likely traumatic as this is the site of injury, rather than spondylitic myelopathy but needs to be correlated clinically. I personally discussed this study with Dr. Donal Matson on 7/3/2023 10:13 PM. Workstation performed: [id number]     TRAUMA - CT spine cervical wo contrast    Result Date: 7/3/2023  Impression: No cervical spine fracture or traumatic malalignment. Workstation performed: [id number]     CT recon only thoracolumbar (No Charge)    Result Date: 7/3/2023  Impression: No fracture or traumatic subluxation. Workstation performed: [id number]     TRAUMA - CT chest abdomen pelvis w contrast    Result Date: 7/3/2023  Impression: No acute traumatic visceral injury in the chest, abdomen or pelvis. No acute fracture. Workstation performed: [id number]     TRAUMA - CT head wo contrast    Result Date: 7/3/2023  Impression: No acute intracranial abnormality. Workstation performed: [id number]     XR pelvis ap only 1 or 2 vw    Result Date: 7/3/2023  Impression: No acute osseous abnormality. Workstation performed: [id number]     XR chest 1 view portable    Result Date: 7/3/2023  Impression: Unchanged cardiomegaly. Cannot exclude mild pulmonary vascular congestion. Workstation performed: [id number]       Pertinent labs and imaging reviewed. Patient seen on date of service listed. 80 minutes or greater spent for this encounter which included a combination of face-to-face time with patient and non-face-to-face time which in part specifically includes management of SCI. Face-to-face time included extended discussion with patient regarding current condition, medical history, mood, medical/rehabilitation management, and disposition.   Non face-to-face time included coordination of care with patient's co-managing AP and/or physician(s) thru communication and/or review of their recent documentation as well as reviewing vitals, bowel/bladder function, recent labs, diagnostic imaging, and notes from therapy, CM, and nursing. Thank you for allowing the PM&R service to participate in the care of this patient. We will continue to follow Janay Yañez progress with you. Please do not hesitate to call with questions or concerns.     Harsha Dale MD, [address] [facility]  Physical Medicine and Rehabilitation  Brain Injury Medicine

## 2023-07-10 NOTE — PLAN OF CARE
Problem: PAIN - ADULT  Goal: Verbalizes/displays adequate comfort level or baseline comfort level  Description: Interventions:  - Encourage patient to monitor pain and request assistance  - Assess pain using appropriate pain scale  - Administer analgesics based on type and severity of pain and evaluate response  - Implement non-pharmacological measures as appropriate and evaluate response  - Consider cultural and social influences on pain and pain management  - Notify physician/advanced practitioner if interventions unsuccessful or patient reports new pain  Outcome: Progressing     Problem: INFECTION - ADULT  Goal: Absence or prevention of progression during hospitalization  Description: INTERVENTIONS:  - Assess and monitor for signs and symptoms of infection  - Monitor lab/diagnostic results  - Monitor all insertion sites, i.e. indwelling lines, tubes, and drains  - Monitor endotracheal if appropriate and nasal secretions for changes in amount and color  - [place] appropriate cooling/warming therapies per order  - Administer medications as ordered  - Instruct and encourage patient and family to use good hand hygiene technique  - Identify and instruct in appropriate isolation precautions for identified infection/condition  Outcome: Progressing  Goal: Absence of fever/infection during neutropenic period  Description: INTERVENTIONS:  - Monitor WBC    Outcome: Progressing     Problem: Knowledge Deficit  Goal: Patient/family/caregiver demonstrates understanding of disease process, treatment plan, medications, and discharge instructions  Description: Complete learning assessment and assess knowledge base.   Interventions:  - Provide teaching at level of understanding  - Provide teaching via preferred learning methods  Outcome: Progressing     Problem: NEUROSENSORY - ADULT  Goal: Achieves stable or improved neurological status  Description: INTERVENTIONS  - Monitor and report changes in neurological status  - Monitor vital signs such as temperature, blood pressure, glucose, and any other labs ordered   - Initiate measures to prevent increased intracranial pressure  - Monitor for seizure activity and implement precautions if appropriate      Outcome: Progressing  Goal: Remains free of injury related to seizures activity  Description: INTERVENTIONS  - Maintain airway, patient safety  and administer oxygen as ordered  - Monitor patient for seizure activity, document and report duration and description of seizure to physician/advanced practitioner  - If seizure occurs,  ensure patient safety during seizure  - Reorient patient post seizure  - Seizure pads on all 4 side rails  - Instruct patient/family to notify RN of any seizure activity including if an aura is experienced  - Instruct patient/family to call for assistance with activity based on nursing assessment  - Administer anti-seizure medications if ordered    Outcome: Progressing  Goal: Achieves maximal functionality and self care  Description: INTERVENTIONS  - Monitor swallowing and airway patency with patient fatigue and changes in neurological status  - Encourage and assist patient to increase activity and self care. - Encourage visually impaired, hearing impaired and aphasic patients to use assistive/communication devices  Outcome: Progressing     Problem: Nutrition/Hydration-ADULT  Goal: Nutrient/Hydration intake appropriate for improving, restoring or maintaining nutritional needs  Description: Monitor and assess patient's nutrition/hydration status for malnutrition. Collaborate with interdisciplinary team and initiate plan and interventions as ordered. Monitor patient's weight and dietary intake as ordered or per policy. Utilize nutrition screening tool and intervene as necessary. Determine patient's food preferences and provide high-protein, high-caloric foods as appropriate.      INTERVENTIONS:  - Monitor oral intake, urinary output, labs, and treatment plans  - Assess nutrition and hydration status and recommend course of action  - Evaluate amount of meals eaten  - Assist patient with eating if necessary   - Allow adequate time for meals  - Recommend/ encourage appropriate diets, oral nutritional supplements, and vitamin/mineral supplements  - Order, calculate, and assess calorie counts as needed  - Recommend, monitor, and adjust tube feedings and TPN/PPN based on assessed needs  - Assess need for intravenous fluids  - Provide specific nutrition/hydration education as appropriate  - Include patient/family/caregiver in decisions related to nutrition  Outcome: Progressing

## 2023-07-10 NOTE — TELEPHONE ENCOUNTER
07/14/2023-PT [address] 07/13/2023-PT [address] 07/12/2023-PT STILL IN HOSPITAL    07/11/2023-PT STILL IN HOSPITAL    07/10/2023-PT STILL IN HOSPITAL  07/18/2023-2 [facility] POV W/NURSE  08/21/2023-6 [facility] POV W/CERVICAL SPINE XRAY     Princess Beckett PA-C   Please schedule 2 week incision check and 6 week POV with [place], xr cervical prior   Surgery 7/4

## 2023-07-10 NOTE — DISCHARGE INSTR - AVS FIRST PAGE
Discharge Instructions  Posterior cervical decompression and fixation/fusion      Surgical incisional care:  Keep incision clean and dry. Avoid applying creams, lotion or antiseptic to incision area. Check the wound daily. If the incision becomes red, swollen, tender, warm, or has increased drainage please notify physician immediately. Okay to apply a padded dressing over incision while wearing VISTA collar in order to reduce risk of irritation. May shower 3 days after surgery, but do not soak in a tub and no swimming. Use mild antimicrobial soap and water. Pat incision dry after showering. Cervical VISTA collar to be worn at all times except for showering. Change from VISTA (grey) collar to the [place] (peach) collar prior to showering. Incision may be cleaned with water and a mild antimicrobial soap using a clean washcloth. Incision is to be gently patted dry with a clean towel. Once dry, collar should be changed back to a VISTA (grey) collar with clean pads in place. Hand wash collar pads with mild soap and water. They are to be laid flat to dry on a clean towel. Recommend changing every 1-2 days. Please refer to VISTA collar instructions for further details. Activity Restrictions:  No heavy lifting greater than 5 - 10lbs. No strenuous activities. May walk as tolerated. Encourage at least 4 short walks per day. No driving while requiring cervical collar, anticipated six weeks. No significant neck movement. Postoperative medication:  Please take pain medications to relieve incision pain, and muscle relaxants to prevent spasms as directed. Please see after visit summary (AVS) for details. Please take over the counter stool softeners such as colace or senna-s to avoid constipation while on narcotics. Do not take ibuprofen, Naproxen/Aleve or any NSAID until cleared by surgeon. May take Tylenol instead.   If taking Coumadin, Aspirin, or Plavix, you may resume these medications when cleared by Neurosurgery. Follow-up 6 weeks after surgery with a repeat cervical spine upright x-rays to be completed prior to visit. **Please notify MD immediately if you experience a fever of 101. F, have increased neck or arm pain, new numbness and/or weakness in your arms, difficulty swallowing or breathing especially while lying down, numbness or weakness in arms or legs. **          Routine pastor catheter care. F/u with urology if fails voiding trial at rehab once more mobile.

## 2023-07-10 NOTE — PROGRESS NOTES
[address] [facility] (formerly [facility])  Progress Note: Critical Care  Name: Radha Syed 80 y.o. male I MRN: [id number]  Unit/Bed#: ICU 08 I Date of Admission: 7/3/2023   Date of Service: 7/9/2023 I Hospital Day: 6    Assessment/Plan   Neuro:   · Diagnosis: Central cord syndrome s/p 7/4 C4-C5 laminectomy and C3-C6 fixation/fusion, C5 palsy, acute pain  · Plan:  · Scheduled Tylenol/Lidoderm/Neurontin with PRN robaxin  · MAP>90 for 5 days postoperatively ended yesterday    CV:   · Diagnosis: History of hypertension  · Plan: Holding home losartan  · Diagnosis: CHF, LVEF 50% with g2dd, mild-moderate MR  · Plan: Continue home lasix      Pulm:  · Diagnosis: Pulmonary insufficiency  · Hypercarbia to 59 on ABG afternoon of 7/9 (obtained for confusion), improved with BiPap  · Plan:   · Wean oxygen for goal SpO2>94%, respiratory protocol  · BiPAP QHS    GI:   · Diagnosis: Ulcerative colitis  · Plan: Continue home sulfasalazine, bowel regimen for now    :   · Diagnosis: CKD 4, BPH, urinary retention  · Plan: Creatinine at baseline, close intake and output, continue home Flomax, urinary retention protocol    F/E/N:   · Plan: Continue regular diet, replete electrolytes as necessary    Heme/Onc:   · Diagnosis: Anemia  · Plan: Follow cell counts daily, consider iron supplementation, continue heparin for DVT prophylaxis    Endo:   · Plan: Follow blood sugars as needed    ID:   · Plan: Follow temperature and WBC    MSK/Skin:   · Diagnosis: Mechanical fall  · Plan: Out of bed, PT/OT    Disposition: Improving, anticipate downgrade soon    ICU Core Measures     A: Assess, Prevent, and Manage Pain · Has pain been assessed? Yes  · Need for changes to pain regimen? No   B: Both SAT/SAT  · N/A   C: Choice of Sedation · RASS Goal: N/A patient not on sedation  · Need for changes to sedation or analgesia regimen? NA   D: Delirium · CAM-ICU: Negative   E: Early Mobility  · Plan for early mobility?  Yes   F: Family Engagement · Plan for family engagement today? Yes       Review of Invasive Devices:        Leana Plan: Discontinue arterial line    Prophylaxis:  VTE VTE covered by:  heparin (porcine), Subcutaneous, 5,000 Units at 07/09/23 1349       Stress Ulcer  Diet          Subjective   HPI/24hr events: Patient required BiPap yesterday afternoon for hypercarbia, placed back on BiPap electively overnight to prevent recurrence, which he tolerated well    Review of Systems   Constitutional: Negative for fatigue. HENT: Negative for trouble swallowing. Respiratory: Negative for shortness of breath. Cardiovascular: Negative for chest pain. Gastrointestinal: Negative for abdominal pain, nausea and vomiting. Genitourinary: Positive for difficulty urinating and urgency. Musculoskeletal: Negative for arthralgias and myalgias. Neurological: Positive for weakness. Negative for numbness and headaches. Objective                            Vitals I/O      Most Recent Min/Max in 24hrs   Temp 98.2 °F (36.8 °C) Temp  Min: 97.9 °F (36.6 °C)  Max: 98.3 °F (36.8 °C)   Pulse 80 Pulse  Min: 56  Max: 96   Resp 17 Resp  Min: 16  Max: 24   /60 BP  Min: 108/53  Max: 139/74   O2 Sat 98 % SpO2  Min: 91 %  Max: 100 %      Intake/Output Summary (Last 24 hours) at 7/9/2023 2340  Last data filed at 7/9/2023 1800  Gross per 24 hour   Intake 780 ml   Output 1000 ml   Net -220 ml         Diet Regular; Regular House     Invasive Monitoring Physical exam   Arterial Line  Leana /54  Arterial Line BP  Min: 112/40  Max: 178/60   MAP 88 mmHg  Arterial Line MAP (mmHg)  Min: 64 mmHg  Max: 98 mmHg    Physical Exam  Constitutional:       General: He is not in acute distress. Interventions: Nasal cannula in place. HENT:      Head: Normocephalic and atraumatic. Mouth/Throat:      Mouth: Mucous membranes are moist.   Eyes:      Pupils: Pupils are equal, round, and reactive to light.    Cardiovascular:      Rate and Rhythm: Normal rate and regular rhythm. Pulses: Normal pulses. Pulmonary:      Effort: Pulmonary effort is normal. No respiratory distress. Breath sounds: Normal breath sounds. Abdominal:      Palpations: Abdomen is soft. Tenderness: There is no abdominal tenderness. Musculoskeletal:         General: No tenderness or signs of injury. Right lower leg: No edema. Left lower leg: No edema. Skin:     General: Skin is warm and dry. Neurological:      Mental Status: He is alert. Comments: Left upper extremity minimal  strength but able to elevate arm against gravity  Right upper extremity  strength is present but diminished, cannot elevate arm against gravity  Bilateral lower extremity strength decreased but equal   Psychiatric:         Behavior: Behavior is cooperative. Diagnostic Studies      EKG: Sinus rhythm  Imaging:  I have personally reviewed pertinent reports.    and I have personally reviewed pertinent films in PACS     Medications:  Scheduled PRN   acetaminophen, 459 mg, F5Q [address]  folic acid, 1 mg, Daily  furosemide, 20 mg, Daily  guaiFENesin, 600 mg, Q12H UDAY  heparin (porcine), 5,000 Units, Q8H [address]  lidocaine, 1 patch, Daily  melatonin, 6 mg, HS  polyethylene glycol, 17 g, Daily  senna-docusate sodium, 1 tablet, BID  sulfaSALAzine, 1,000 mg, Daily  sulfaSALAzine, 500 mg, BID  tamsulosin, 0.4 mg, Daily      bisacodyl, 10 mg, Daily PRN  methocarbamol, 500 mg, Q6H PRN  ondansetron, 4 mg, Q6H PRN       Continuous          Labs:    CBC    Recent Labs     07/08/23  0615 07/09/23  0523   WBC 9.62 8.11   HGB 10.0* 9.7*   HCT 32.6* 32.1*    251     BMP    Recent Labs     07/08/23  0615 07/09/23  0523   SODIUM 142 142   K 5.1 5.3   * 113*   CO2 27 26   AGAP 4 3   BUN 68* 82*   CREATININE 2.21* 2.37*   CALCIUM 8.6 8.3       Coags    No recent results     Additional Electrolytes  Recent Labs     07/09/23  0523   MG 2.3   PHOS 4.9*          Blood Gas    Recent Labs 07/09/23  1229   PHART 7.235*   DYG8STQ 59.5*   PO2ART 112.6   HDM1[facility]MC 24.6   BEART -3.4   SOURCE Line, Arterial     Recent Labs     07/09/23  1229   SOURCE Line, Arterial    LFTs  No recent results    Infectious  No recent results  Glucose  Recent Labs     07/08/23  0615 07/09/23  0523   GLUC 113 920 Fadumo Motley MD

## 2023-07-10 NOTE — PLAN OF CARE
Problem: MOBILITY - ADULT  Goal: Maintain or return to baseline ADL function  Description: INTERVENTIONS:  -  Assess patient's ability to carry out ADLs; assess patient's baseline for ADL function and identify physical deficits which impact ability to perform ADLs (bathing, care of mouth/teeth, toileting, grooming, dressing, etc.)  - Assess/evaluate cause of self-care deficits   - Assess range of motion  - Assess patient's mobility; develop plan if impaired  - Assess patient's need for assistive devices and provide as appropriate  - Encourage maximum independence but intervene and supervise when necessary  - Involve family in performance of ADLs  - Assess for home care needs following discharge   - Consider OT consult to assist with ADL evaluation and planning for discharge  - Provide patient education as appropriate  Outcome: Progressing  Goal: Maintains/Returns to pre admission functional level  Description: INTERVENTIONS:  - Perform BMAT or MOVE assessment daily.   - Set and communicate daily mobility goal to care team and patient/family/caregiver.    - Collaborate with rehabilitation services on mobility goals if consulted  - Out of bed for toileting  - Record patient progress and toleration of activity level   Outcome: Progressing     Problem: PAIN - ADULT  Goal: Verbalizes/displays adequate comfort level or baseline comfort level  Description: Interventions:  - Encourage patient to monitor pain and request assistance  - Assess pain using appropriate pain scale  - Administer analgesics based on type and severity of pain and evaluate response  - Implement non-pharmacological measures as appropriate and evaluate response  - Consider cultural and social influences on pain and pain management  - Notify physician/advanced practitioner if interventions unsuccessful or patient reports new pain  Outcome: Progressing     Problem: INFECTION - ADULT  Goal: Absence or prevention of progression during hospitalization  Description: INTERVENTIONS:  - Assess and monitor for signs and symptoms of infection  - Monitor lab/diagnostic results  - Monitor all insertion sites, i.e. indwelling lines, tubes, and drains  - Monitor endotracheal if appropriate and nasal secretions for changes in amount and color  - [place] appropriate cooling/warming therapies per order  - Administer medications as ordered  - Instruct and encourage patient and family to use good hand hygiene technique  - Identify and instruct in appropriate isolation precautions for identified infection/condition  Outcome: Progressing  Goal: Absence of fever/infection during neutropenic period  Description: INTERVENTIONS:  - Monitor WBC    Outcome: Progressing     Problem: SAFETY ADULT  Goal: Maintain or return to baseline ADL function  Description: INTERVENTIONS:  -  Assess patient's ability to carry out ADLs; assess patient's baseline for ADL function and identify physical deficits which impact ability to perform ADLs (bathing, care of mouth/teeth, toileting, grooming, dressing, etc.)  - Assess/evaluate cause of self-care deficits   - Assess range of motion  - Assess patient's mobility; develop plan if impaired  - Assess patient's need for assistive devices and provide as appropriate  - Encourage maximum independence but intervene and supervise when necessary  - Involve family in performance of ADLs  - Assess for home care needs following discharge   - Consider OT consult to assist with ADL evaluation and planning for discharge  - Provide patient education as appropriate  Outcome: Progressing  Goal: Maintains/Returns to pre admission functional level  Description: INTERVENTIONS:  - Perform BMAT or MOVE assessment daily.   - Set and communicate daily mobility goal to care team and patient/family/caregiver.    - Collaborate with rehabilitation services on mobility goals if consulted  - Out of bed for toileting  - Record patient progress and toleration of activity level   Outcome: Progressing  Goal: Patient will remain free of falls  Description: INTERVENTIONS:  - Educate patient/family on patient safety including physical limitations  - Instruct patient to call for assistance with activity   - Consult OT/PT to assist with strengthening/mobility   - Keep Call bell within reach  - Keep bed low and locked with side rails adjusted as appropriate  - Keep care items and personal belongings within reach  - Initiate and maintain comfort rounds  - Make Fall Risk Sign visible to staff  - Initiate/Maintain bed/chair alarm  - Apply yellow socks and bracelet for high fall risk patients  - Consider moving patient to room near nurses station  Outcome: Progressing     Problem: DISCHARGE PLANNING  Goal: Discharge to home or other facility with appropriate resources  Description: INTERVENTIONS:  - Identify barriers to discharge w/patient and caregiver  - Arrange for needed discharge resources and transportation as appropriate  - Identify discharge learning needs (meds, wound care, etc.)  - Arrange for interpretive services to assist at discharge as needed  - Refer to Case Management Department for coordinating discharge planning if the patient needs post-hospital services based on physician/advanced practitioner order or complex needs related to functional status, cognitive ability, or social support system  Outcome: Progressing     Problem: Knowledge Deficit  Goal: Patient/family/caregiver demonstrates understanding of disease process, treatment plan, medications, and discharge instructions  Description: Complete learning assessment and assess knowledge base.   Interventions:  - Provide teaching at level of understanding  - Provide teaching via preferred learning methods  Outcome: Progressing     Problem: NEUROSENSORY - ADULT  Goal: Achieves stable or improved neurological status  Description: INTERVENTIONS  - Monitor and report changes in neurological status  - Monitor vital signs such as temperature, blood pressure, glucose, and any other labs ordered   - Initiate measures to prevent increased intracranial pressure  - Monitor for seizure activity and implement precautions if appropriate      Outcome: Progressing  Goal: Remains free of injury related to seizures activity  Description: INTERVENTIONS  - Maintain airway, patient safety  and administer oxygen as ordered  - Monitor patient for seizure activity, document and report duration and description of seizure to physician/advanced practitioner  - If seizure occurs,  ensure patient safety during seizure  - Reorient patient post seizure  - Seizure pads on all 4 side rails  - Instruct patient/family to notify RN of any seizure activity including if an aura is experienced  - Instruct patient/family to call for assistance with activity based on nursing assessment  - Administer anti-seizure medications if ordered    Outcome: Progressing  Goal: Achieves maximal functionality and self care  Description: INTERVENTIONS  - Monitor swallowing and airway patency with patient fatigue and changes in neurological status  - Encourage and assist patient to increase activity and self care. - Encourage visually impaired, hearing impaired and aphasic patients to use assistive/communication devices  Outcome: Progressing     Problem: Nutrition/Hydration-ADULT  Goal: Nutrient/Hydration intake appropriate for improving, restoring or maintaining nutritional needs  Description: Monitor and assess patient's nutrition/hydration status for malnutrition. Collaborate with interdisciplinary team and initiate plan and interventions as ordered. Monitor patient's weight and dietary intake as ordered or per policy. Utilize nutrition screening tool and intervene as necessary. Determine patient's food preferences and provide high-protein, high-caloric foods as appropriate.      INTERVENTIONS:  - Monitor oral intake, urinary output, labs, and treatment plans  - Assess nutrition and hydration status and recommend course of action  - Evaluate amount of meals eaten  - Assist patient with eating if necessary   - Allow adequate time for meals  - Recommend/ encourage appropriate diets, oral nutritional supplements, and vitamin/mineral supplements  - Order, calculate, and assess calorie counts as needed  - Recommend, monitor, and adjust tube feedings and TPN/PPN based on assessed needs  - Assess need for intravenous fluids  - Provide specific nutrition/hydration education as appropriate  - Include patient/family/caregiver in decisions related to nutrition  Outcome: Progressing     Problem: SAFETY,RESTRAINT: NV/NON-SELF DESTRUCTIVE BEHAVIOR  Goal: Remains free of harm/injury (restraint for non violent/non self-detsructive behavior)  Description: INTERVENTIONS:  - Instruct patient/family regarding restraint use   - Assess and monitor physiologic and psychological status   - Provide interventions and comfort measures to meet assessed patient needs   - Identify and implement measures to help patient regain control  - Assess readiness for release of restraint   Outcome: Progressing  Goal: Returns to optimal restraint-free functioning  Description: INTERVENTIONS:  - Assess the patient's behavior and symptoms that indicate continued need for restraint  - Identify and implement measures to help patient regain control  - Assess readiness for release of restraint   Outcome: Progressing     Problem: Prexisting or High Potential for Compromised Skin Integrity  Goal: Skin integrity is maintained or improved  Description: INTERVENTIONS:  - Identify patients at risk for skin breakdown  - Assess and monitor skin integrity  - Assess and monitor nutrition and hydration status  - Monitor labs   - Assess for incontinence   - Turn and reposition patient  - Assist with mobility/ambulation  - Relieve pressure over bony prominences  - Avoid friction and shearing  - Provide appropriate hygiene as needed including keeping skin clean and dry  - Evaluate need for skin moisturizer/barrier cream  - Collaborate with interdisciplinary team   - Patient/family teaching  - Consider wound care consult   Outcome: Progressing

## 2023-07-10 NOTE — ANESTHESIA POSTPROCEDURE EVALUATION
Post-Op Assessment Note             Reason for prolonged intubation > 24 hours:  Airway per Primary Team: Unable to safely extubate in OR, Surgeon awareReason for prolonged intubation > 48 hours:  Airway per Primary Team: Unable to safely extubate in OR, Surgeon aware      There were no known notable events for this encounter.     BP      Temp      Pulse     Resp      SpO2

## 2023-07-10 NOTE — ASSESSMENT & PLAN NOTE
POD6  Posterior C4-5 laminectomy, C3-6 fixation/fusion for central cord syndrome (HDM, 7/4/23)  · Cervical spinal cord injury @ C3-4  · s/p unwitnessed fall down stairs with posterior head strike on 7/3   · on arrival with complaints of neck pain and fabien UE weakness and numbness   · Pt with new R C5 palsy post-op     Imaging:   · XR cervical spine, 7/9/23: Final report pending. Per my interpretation, appropriate alignment and hardware    Plan:   · Continue to closely monitor neuro exam   · MAP goals completed  · RONY drain d/c 7/7  · No collar  · S/p decadron 2q8 x72h for right C5 palsy  · Pain control per primary team   · Gabapentin d/c 2/2 drowsiness  · Medical management per primary team   · PT/OT/PMR evaluations appreciated  · Social work following for assistance with dispo once medically cleared   · Anticipate discharge to a SCI rehab   · [facility]    Neurosurgery will sign off at this time. Patient will follow up in 2 weeks for incision check and 6 weeks for POV. Please call with questions or concerns.

## 2023-07-10 NOTE — CASE MANAGEMENT
Case Management Discharge Planning Note    Patient name Payton Go  Location ICU 08/ICU 08 MRN [id number]  : 1934 Date 7/10/2023       Current Admission Date: 7/3/2023  Current Admission Diagnosis:Central cord syndrome [facility])   Patient Active Problem List    Diagnosis Date Noted   • C3 spinal cord injury ([address]) 2023   • Central cord syndrome ([address]) 2023   • Weakness of both upper extremities 2023   • Fall 2023   • Proteinuria 2022   • Stage 4 chronic kidney disease ([address]) 2022   • Hypertriglyceridemia 2022   • Low HDL (under 40) 2020   • Nephrolithiasis 2018   • Severe obesity (BMI 35.0-39. 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 [address] [place], [place] - [address] [facility]  [address] [facility] [address]  Phone: [phone] Fax: [phone] Delivery ([facility] ) - [address]  638 [facility]  Phone: [phone] Fax: [phone]    Primary Care Provider: Jhonatan Em DO    Primary Insurance: [facility]  Secondary Insurance:     DISCHARGE DETAILS:    As per Sharon Osborn from [facility]: " the pt's insurance was terminated- we can't accept"  CM will verify with hospital financial counselors.

## 2023-07-10 NOTE — CASE MANAGEMENT
Case Management Discharge Planning Note    Patient name Kaleb Cage  Location ICU 08/ICU 08 MRN [id number]  : 1934 Date 7/10/2023       Current Admission Date: 7/3/2023  Current Admission Diagnosis:Central cord syndrome [facility])   Patient Active Problem List    Diagnosis Date Noted   • C3 spinal cord injury ([address]) 2023   • Central cord syndrome ([address]) 2023   • Weakness of both upper extremities 2023   • Fall 2023   • Proteinuria 2022   • Stage 4 chronic kidney disease ([address]) 2022   • Hypertriglyceridemia 2022   • Low HDL (under 40) 2020   • Nephrolithiasis 2018   • Severe obesity (BMI 35.0-39. 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      LOS (days): 7  Geometric Mean LOS (GMLOS) (days): 9.80  Days to GMLOS:3     OBJECTIVE:  Risk of Unplanned Readmission Score: 24.15         Current admission status: Inpatient   Preferred Pharmacy:   [facility] ([address] [place] [address]  Phone: [phone] Fax: [phone]    [facility] [address] [place], [place] - [address]  [address] [facility] [address]  Phone: [phone] Fax: [address] [place] Delivery ([facility] ) - [place], [address] [place] [address] Road  638 [facility]  Phone: [phone] Fax: [phone]    Primary Care Provider: Norma Disla DO    Primary Insurance: [facility]  Secondary Insurance:     DISCHARGE DETAILS:    Pt accepted to [facility]. Noted that pt isn't medically stable.  Will continue to follow

## 2023-07-11 NOTE — PROGRESS NOTES
Progress Note - Trauma ICU Transfer   and [facility]   Raul Carpenter 80 y.o. male [id number]   Unit/Bed#: ICU 08 Encounter: [id number]     Assessment & Plan   Summary of Diagnosed Injuries:   Central cord syndrome with right C5 palsy     PLAN:  - Neurosurgery signed off   - Patient is to follow up with NSGY in 2 weeks for incision check and 6 weeks for POV  - Continue to monitor neurologic exam   - No collar   - Pain regimen in place  - SCDs and SQH for DVT PPx   - PT/OT  - Continue to hold home antihypertensives due to normotension   - Maintain pastor catheter for urinary retention   - BiPAP at night     VTE Prophylaxis:Sequential compression device (Venodyne)  and Heparin     Disposition: downgrade to SD2    Code status:  Level 1 - Full Code    Consultants: IP CONSULT TO NEUROSURGERY  IP CONSULT TO GERONTOLOGY  IP CONSULT TO CASE MANAGEMENT  IP CONSULT TO PHYSICAL MEDICINE REHAB     Subjective   Mechanism of Injury:Fall     HPI/Last 24 hour events:     Raul Carpenter is a 80 y.o. with CKD4, ulcerative colitis, hypertension, BPH, and gout fell down stairs and was found by his wife on 7/3/23. He denied LOC, but did stain a head strike. Upon presentation to the ED, he complained of neck pain and global weakness with his upper extremities notably more weak than his lowers with associated decrease in bilateral hand sensation. Initial CT imaging was negative for acute injury, but MRI C-spine revealed cord edema. Reason for ICU admission: frequent neurologic checks and MAP pushes     Summary of ICU clinical course:     Patient presented to the ICU intubated following a posterior C4-5 laminectomy, C3-6 fixation/fusion on 7/4. Patient was extubated on 7/6. After extubation, patient continued to experience pulmonary insufficiency and hypercarbia requiring intermittent BiPAP with improvement. Patient also has been having urinary retention and pastor catheter was placed 7/9. Patient completed MAP pushes on 7/9.  Home antihypertensive due to normotension. Recent or scheduled procedures:   7/4: posterior C4-5 laminectomy, C3-6 fixation/fusion (Dr. Julito Cordova)     Outstanding/pending diagnostics:   7/9: XR cervical spine finding report pending. Objective   Vitals:   Temp:  [94.4 °F (34.7 °C)-98 °F (36.7 °C)] 97.5 °F (36.4 °C)  HR:  [56-82] 70  Resp:  [14-34] 34  BP: ()/(46-69) 119/53  FiO2 (%):  [30] 30    I/O       07/09 0701  07/10 0700 07/10 0701  07/11 0700 07/11 0701  07/12 0700    P. O. 780 480 140    Total Intake(mL/kg) 780 (7.6) 480 (4.7) 140 (1.4)    Urine (mL/kg/hr) 1050 (0.4) 1021 (0.4) 425 (0.7)    Total Output 1050 1021 425    Net -270 545 -285                  Physical Exam:   GENERAL APPEARANCE: no acute distress   NEURO: alert and oriented x3. BL LE 4/5. RUE 2/5, good . LUE 3/5, weak . HEENT: normocephalic and atraumatic  CV: regular rate and rhythm  LUNGS: no respiratory distress  GI: soft, nontender  SKIN: warm, dry     Invasive Devices     Peripheral Intravenous Line  Duration           Peripheral IV 07/08/23 Left;Proximal;Ventral (anterior) Forearm 3 days    Peripheral IV 07/11/23 Distal;Right;Upper;Ventral (anterior) Arm <1 day          Drain  Duration           Urethral Catheter 16 Fr. 1 day                    Lab Results: Results: I have personally reviewed all pertinent laboratory/tests results    Imaging Results: I have personally reviewed pertinent reports. Chest Xray(s): positive for acute findings: Mild pulmonary vascular congestion with pleural-parenchymal density at the right lung base which appears new. FAST exam(s): negative for acute findings   CT Scan(s): negative for acute findings   Additional Xray(s): pending       Code Status: Level 1 - Full Code       Patient seen and evaluated by Critical Care today and deemed to be appropriate for transfer to Stepdown Level 2. Spoke to  [employer] from Trauma service regarding transfer.  Critical Care can be contacted via [employer] with any questions or concerns.

## 2023-07-11 NOTE — CASE MANAGEMENT
Case Management Discharge Planning Note    Patient name Bautista Javed  Location [address]/[facility] 278[address]53 MRN [id number]  : 1934 Date 2023       Current Admission Date: 7/3/2023  Current Admission Diagnosis:Central cord syndrome [facility])   Patient Active Problem List    Diagnosis Date Noted   • C3 spinal cord injury ([address]) 2023   • Central cord syndrome ([address]) 2023   • Weakness of both upper extremities 2023   • Fall 2023   • Proteinuria 2022   • Stage 4 chronic kidney disease ([address]) 2022   • Hypertriglyceridemia 2022   • Low HDL (under 40) 2020   • Nephrolithiasis 2018   • Severe obesity (BMI 35.0-39. 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      LOS (days): 8  Geometric Mean LOS (GMLOS) (days): 9.80  Days to GMLOS:1.8     OBJECTIVE:  Risk of Unplanned Readmission Score: 20.53         Current admission status: Inpatient   Preferred Pharmacy:   [facility] ([facility]5 [address] 1000 [facility] [place] [address]  Phone: [phone] Fax: [phone]    [facility] [address] [place], [place] - [address] [facility]  [address] [facility] [address]  Phone: [phone] Fax: [phone] Delivery ([facility] ) - [facility], [address] [place] [address] Road  [address] [facility]  Phone: [phone] Fax: [phone]    Primary Care Provider: Towana Pallas, DO    Primary Insurance: [employer] [employer] [facility]  Secondary Insurance:     DISCHARGE DETAILS:    [facility] has the [employer] cards and verified they're active. They can accept.  CM will submit for insurance approval once therapy notes are written

## 2023-07-11 NOTE — PROGRESS NOTES
[address] [facility]  Progress Note: Critical Care  Name: Farzana Weiss 80 y.o. male I MRN: [id number]  Unit/Bed#: ICU 08 I Date of Admission: 7/3/2023   Date of Service: 7/11/2023 I Hospital Day: 8    Assessment/Plan      Neuro:   · Diagnosis: Central cord syndrome s/p 7/4 C4-C5 laminectomy and C3-C6 fixation/fusion, C5 palsy, acute pain  · Plan:  · Scheduled Tylenol/Lidoderm/Neurontin with PRN robaxin  · MAP>90 for 5 days postoperatively ended yesterday    CV:   · Diagnosis: History of hypertension  · Plan: Holding home losartan  · Diagnosis: CHF, LVEF 50% with g2dd, mild-moderate MR  · Plan: Continue home lasix      Pulm:  · Diagnosis: Pulmonary insufficiency  · Hypercarbia to 59 on ABG afternoon of 7/9 (obtained for confusion), improved with BiPap  · Plan:   · Wean oxygen for goal SpO2>94%, respiratory protocol  · BiPAP QHS    GI:   · Diagnosis: Ulcerative colitis  · Plan: Continue home sulfasalazine, bowel regimen for now    :   · Diagnosis: CKD 4, BPH, urinary retention  · Plan: Creatinine at baseline, close intake and output, continue home Flomax, urinary retention protocol    F/E/N:   · Plan: Continue regular diet, replete electrolytes as necessary    Heme/Onc:   · Diagnosis: Anemia  · Plan: Follow cell counts daily, consider iron supplementation, continue heparin for DVT prophylaxis    Endo:   · Plan: Follow blood sugars as needed    ID:   · Plan: Follow temperature and WBC    MSK/Skin:   · Diagnosis: Mechanical fall  · Plan: Out of bed, PT/OT        Disposition: Critical care    ICU Core Measures     A: Assess, Prevent, and Manage Pain · Has pain been assessed? Yes  · Need for changes to pain regimen? No   B: Both SAT/SAT  · N/A   C: Choice of Sedation · RASS Goal: 0 Alert and Calm  · Need for changes to sedation or analgesia regimen? No   D: Delirium · CAM-ICU: Negative   E: Early Mobility  · Plan for early mobility? NA   F: Family Engagement · Plan for family engagement today? NA       Review of Invasive Devices: Kw[place]wo Plan: Continue for accurate I/O monitoring for 48 hours        Prophylaxis:  VTE VTE covered by:  heparin (porcine), Subcutaneous, 5,000 Units at 07/10/23 2153       Stress Ulcer  not ordered            Subjective   HPI/24hr events: No acute events overnight. Pt slept with BIPAP on. Denying any complaints    Review of Systems   Respiratory: Negative for shortness of breath. Cardiovascular: Negative for chest pain. Gastrointestinal: Negative for abdominal pain. Musculoskeletal: Positive for neck pain. Neurological: Positive for weakness. All other systems reviewed and are negative. Objective                            Vitals I/O      Most Recent Min/Max in 24hrs   Temp 98 °F (36.7 °C) Temp  Min: 97.9 °F (36.6 °C)  Max: 98.1 °F (36.7 °C)   Pulse 66 Pulse  Min: 56  Max: 90   Resp (!) 24 Resp  Min: 16  Max: 33   /61 BP  Min: 93/46  Max: 123/61   O2 Sat 98 % SpO2  Min: 96 %  Max: 100 %      Intake/Output Summary (Last 24 hours) at 7/11/2023 0315  Last data filed at 7/11/2023 0200  Gross per 24 hour   Intake 480 ml   Output 1021 ml   Net -541 ml         Diet Regular; Regular House     Invasive Monitoring Physical exam   Arterial Line  [place] BP 70/36  Arterial Line BP  Min: 70/36  Max: 140/54   MAP (!) 52 mmHg  Arterial Line MAP (mmHg)  Min: 52 mmHg  Max: 88 mmHg    Physical Exam  Vitals and nursing note reviewed. Constitutional:       General: He is not in acute distress. Appearance: Normal appearance. He is obese. HENT:      Head: Normocephalic and atraumatic. Mouth/Throat:      Mouth: Mucous membranes are moist.   Eyes:      Extraocular Movements: Extraocular movements intact. Cardiovascular:      Rate and Rhythm: Normal rate and regular rhythm. Pulmonary:      Effort: Pulmonary effort is normal.      Breath sounds: Normal breath sounds. Abdominal:      Palpations: Abdomen is soft. Tenderness: There is no abdominal tenderness. Musculoskeletal:      Cervical back: Normal range of motion. Skin:     General: Skin is warm and dry. Neurological:      Mental Status: He is alert and oriented to person, place, and time. Comments: B/L LE strength 4/5. RUE strength 2/5, good  LUE strength 3/5, worse               Diagnostic Studies      EKG: n/a  Imaging: n/a I have personally reviewed pertinent reports.    and I have personally reviewed pertinent films in PACS     Medications:  Scheduled PRN   acetaminophen, 302 mg, P7H [address]  folic acid, 1 mg, Daily  furosemide, 20 mg, Daily  guaiFENesin, 600 mg, Q12H UDAY  heparin (porcine), 5,000 Units, Q8H [address]  lidocaine, 1 patch, Daily  melatonin, 6 mg, HS  polyethylene glycol, 17 g, Daily  senna-docusate sodium, 1 tablet, BID  sulfaSALAzine, 1,000 mg, Daily  sulfaSALAzine, 500 mg, BID  tamsulosin, 0.4 mg, Daily      bisacodyl, 10 mg, Daily PRN  methocarbamol, 250 mg, Q6H PRN  ondansetron, 4 mg, Q6H PRN  oxyCODONE, 2.5 mg, Q4H PRN       Continuous          Labs:      CBC    Recent Labs     07/09/23  0523   WBC 8.11   HGB 9.7*   HCT 32.1*        BMP    Recent Labs     07/09/23  0523 07/10/23  0522   SODIUM 142 143   K 5.3 5.3   * 112*   CO2 26 28   AGAP 3 3   BUN 82* 89*   CREATININE 2.37* 2.29*   CALCIUM 8.3 8.1*       Coags    No recent results     Additional Electrolytes  Recent Labs     07/09/23  0523   MG 2.3   PHOS 4.9*          Blood Gas    Recent Labs     07/09/23  1229   PHART 7.235*   HMU8HUE 59.5*   PO2ART 112.6   QJO6CQU 24.6   BEART -3.4   SOURCE Line, Arterial     Recent Labs     07/09/23  1229   SOURCE Line, Arterial    LFTs  No recent results    Infectious  No recent results  Glucose  Recent Labs     07/09/23  0523 07/10/23  0522   GLUC 112 92                 Nikhil Singh DO

## 2023-07-11 NOTE — PLAN OF CARE
Problem: MOBILITY - ADULT  Goal: Maintain or return to baseline ADL function  Description: INTERVENTIONS:  -  Assess patient's ability to carry out ADLs; assess patient's baseline for ADL function and identify physical deficits which impact ability to perform ADLs (bathing, care of mouth/teeth, toileting, grooming, dressing, etc.)  - Assess/evaluate cause of self-care deficits   - Assess range of motion  - Assess patient's mobility; develop plan if impaired  - Assess patient's need for assistive devices and provide as appropriate  - Encourage maximum independence but intervene and supervise when necessary  - Involve family in performance of ADLs  - Assess for home care needs following discharge   - Consider OT consult to assist with ADL evaluation and planning for discharge  - Provide patient education as appropriate  Outcome: Progressing  Goal: Maintains/Returns to pre admission functional level  Description: INTERVENTIONS:  - Perform BMAT or MOVE assessment daily.   - Set and communicate daily mobility goal to care team and patient/family/caregiver. - Collaborate with rehabilitation services on mobility goals if consulted  - Perform Range of Motion 3 times a day. - Reposition patient every 2 hours.   - Dangle patient 3 times a day  - Stand patient 2 times a day  - Ambulate patient 3 times a day  - Out of bed to chair 3 times a day   - Out of bed for meals 3 times a day  - Out of bed for toileting  - Record patient progress and toleration of activity level   Outcome: Progressing     Problem: PAIN - ADULT  Goal: Verbalizes/displays adequate comfort level or baseline comfort level  Description: Interventions:  - Encourage patient to monitor pain and request assistance  - Assess pain using appropriate pain scale  - Administer analgesics based on type and severity of pain and evaluate response  - Implement non-pharmacological measures as appropriate and evaluate response  - Consider cultural and social influences on pain and pain management  - Notify physician/advanced practitioner if interventions unsuccessful or patient reports new pain  Outcome: Progressing     Problem: INFECTION - ADULT  Goal: Absence or prevention of progression during hospitalization  Description: INTERVENTIONS:  - Assess and monitor for signs and symptoms of infection  - Monitor lab/diagnostic results  - Monitor all insertion sites, i.e. indwelling lines, tubes, and drains  - Monitor endotracheal if appropriate and nasal secretions for changes in amount and color  - [place] appropriate cooling/warming therapies per order  - Administer medications as ordered  - Instruct and encourage patient and family to use good hand hygiene technique  - Identify and instruct in appropriate isolation precautions for identified infection/condition  Outcome: Progressing  Goal: Absence of fever/infection during neutropenic period  Description: INTERVENTIONS:  - Monitor WBC    Outcome: Progressing     Problem: SAFETY ADULT  Goal: Maintain or return to baseline ADL function  Description: INTERVENTIONS:  -  Assess patient's ability to carry out ADLs; assess patient's baseline for ADL function and identify physical deficits which impact ability to perform ADLs (bathing, care of mouth/teeth, toileting, grooming, dressing, etc.)  - Assess/evaluate cause of self-care deficits   - Assess range of motion  - Assess patient's mobility; develop plan if impaired  - Assess patient's need for assistive devices and provide as appropriate  - Encourage maximum independence but intervene and supervise when necessary  - Involve family in performance of ADLs  - Assess for home care needs following discharge   - Consider OT consult to assist with ADL evaluation and planning for discharge  - Provide patient education as appropriate  Outcome: Progressing  Goal: Maintains/Returns to pre admission functional level  Description: INTERVENTIONS:  - Perform BMAT or MOVE assessment daily.   - Set and communicate daily mobility goal to care team and patient/family/caregiver. - Collaborate with rehabilitation services on mobility goals if consulted  - Perform Range of Motion 3 times a day. - Reposition patient every 2 hours.   - Dangle patient 3 times a day  - Stand patient 3 times a day  - Ambulate patient 3 times a day  - Out of bed to chair 3 times a day   - Out of bed for meals 3 times a day  - Out of bed for toileting  - Record patient progress and toleration of activity level   Outcome: Progressing  Goal: Patient will remain free of falls  Description: INTERVENTIONS:  - Educate patient/family on patient safety including physical limitations  - Instruct patient to call for assistance with activity   - Consult OT/PT to assist with strengthening/mobility   - Keep Call bell within reach  - Keep bed low and locked with side rails adjusted as appropriate  - Keep care items and personal belongings within reach  - Initiate and maintain comfort rounds  - Make Fall Risk Sign visible to staff  - Offer Toileting every 2 Hours, in advance of need  - Initiate/Maintain bed alarm  - Obtain necessary fall risk management equipment  - Apply yellow socks and bracelet for high fall risk patients  - Consider moving patient to room near nurses station  Outcome: Progressing     Problem: DISCHARGE PLANNING  Goal: Discharge to home or other facility with appropriate resources  Description: INTERVENTIONS:  - Identify barriers to discharge w/patient and caregiver  - Arrange for needed discharge resources and transportation as appropriate  - Identify discharge learning needs (meds, wound care, etc.)  - Arrange for interpretive services to assist at discharge as needed  - Refer to Case Management Department for coordinating discharge planning if the patient needs post-hospital services based on physician/advanced practitioner order or complex needs related to functional status, cognitive ability, or social support system  Outcome: Progressing     Problem: Knowledge Deficit  Goal: Patient/family/caregiver demonstrates understanding of disease process, treatment plan, medications, and discharge instructions  Description: Complete learning assessment and assess knowledge base. Interventions:  - Provide teaching at level of understanding  - Provide teaching via preferred learning methods  Outcome: Progressing     Problem: NEUROSENSORY - ADULT  Goal: Achieves stable or improved neurological status  Description: INTERVENTIONS  - Monitor and report changes in neurological status  - Monitor vital signs such as temperature, blood pressure, glucose, and any other labs ordered   - Initiate measures to prevent increased intracranial pressure  - Monitor for seizure activity and implement precautions if appropriate      Outcome: Progressing  Goal: Remains free of injury related to seizures activity  Description: INTERVENTIONS  - Maintain airway, patient safety  and administer oxygen as ordered  - Monitor patient for seizure activity, document and report duration and description of seizure to physician/advanced practitioner  - If seizure occurs,  ensure patient safety during seizure  - Reorient patient post seizure  - Seizure pads on all 4 side rails  - Instruct patient/family to notify RN of any seizure activity including if an aura is experienced  - Instruct patient/family to call for assistance with activity based on nursing assessment  - Administer anti-seizure medications if ordered    Outcome: Progressing  Goal: Achieves maximal functionality and self care  Description: INTERVENTIONS  - Monitor swallowing and airway patency with patient fatigue and changes in neurological status  - Encourage and assist patient to increase activity and self care.    - Encourage visually impaired, hearing impaired and aphasic patients to use assistive/communication devices  Outcome: Progressing     Problem: Nutrition/Hydration-ADULT  Goal: Nutrient/Hydration intake appropriate for improving, restoring or maintaining nutritional needs  Description: Monitor and assess patient's nutrition/hydration status for malnutrition. Collaborate with interdisciplinary team and initiate plan and interventions as ordered. Monitor patient's weight and dietary intake as ordered or per policy. Utilize nutrition screening tool and intervene as necessary. Determine patient's food preferences and provide high-protein, high-caloric foods as appropriate.      INTERVENTIONS:  - Monitor oral intake, urinary output, labs, and treatment plans  - Assess nutrition and hydration status and recommend course of action  - Evaluate amount of meals eaten  - Assist patient with eating if necessary   - Allow adequate time for meals  - Recommend/ encourage appropriate diets, oral nutritional supplements, and vitamin/mineral supplements  - Order, calculate, and assess calorie counts as needed  - Recommend, monitor, and adjust tube feedings and TPN/PPN based on assessed needs  - Assess need for intravenous fluids  - Provide specific nutrition/hydration education as appropriate  - Include patient/family/caregiver in decisions related to nutrition  Outcome: Progressing     Problem: SAFETY,RESTRAINT: NV/NON-SELF DESTRUCTIVE BEHAVIOR  Goal: Remains free of harm/injury (restraint for non violent/non self-detsructive behavior)  Description: INTERVENTIONS:  - Instruct patient/family regarding restraint use   - Assess and monitor physiologic and psychological status   - Provide interventions and comfort measures to meet assessed patient needs   - Identify and implement measures to help patient regain control  - Assess readiness for release of restraint   Outcome: Progressing  Goal: Returns to optimal restraint-free functioning  Description: INTERVENTIONS:  - Assess the patient's behavior and symptoms that indicate continued need for restraint  - Identify and implement measures to help patient regain control  - Assess readiness for release of restraint   Outcome: Progressing     Problem: Prexisting or High Potential for Compromised Skin Integrity  Goal: Skin integrity is maintained or improved  Description: INTERVENTIONS:  - Identify patients at risk for skin breakdown  - Assess and monitor skin integrity  - Assess and monitor nutrition and hydration status  - Monitor labs   - Assess for incontinence   - Turn and reposition patient  - Assist with mobility/ambulation  - Relieve pressure over bony prominences  - Avoid friction and shearing  - Provide appropriate hygiene as needed including keeping skin clean and dry  - Evaluate need for skin moisturizer/barrier cream  - Collaborate with interdisciplinary team   - Patient/family teaching  - Consider wound care consult   Outcome: Progressing

## 2023-07-11 NOTE — CASE MANAGEMENT
Case Management Discharge Planning Note    Patient name Cliff Escobar  Location ICU 08/ICU 08 MRN [id number]  : 1934 Date 2023       Current Admission Date: 7/3/2023  Current Admission Diagnosis:Central cord syndrome [facility])   Patient Active Problem List    Diagnosis Date Noted   • C3 spinal cord injury ([address]) 2023   • Central cord syndrome ([address]) 2023   • Weakness of both upper extremities 2023   • Fall 2023   • Proteinuria 2022   • Stage 4 chronic kidney disease ([address]) 2022   • Hypertriglyceridemia 2022   • Low HDL (under 40) 2020   • Nephrolithiasis 2018   • Severe obesity (BMI 35.0-39. 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 [address] [facility] [address] [address]  Phone: [phone] Fax: [phone]    [facility] [address] [place], [place] - [address] [facility]  [address] [facility] [address]  Phone: [phone] Fax: [phone] Delivery ([facility] ) - [place], [address] [place] [address]  Phone: [phone] Fax: [phone]    Primary Care Provider: Melissa Mckeon DO    Primary Insurance: [facility]  Secondary Insurance:     DISCHARGE DETAILS:    CM verified with hospital financial counselors and insurance verifiers that pt's medical insurance IS active.    CM faxed pt's insurance cards to [facility].

## 2023-07-11 NOTE — PLAN OF CARE
Problem: MOBILITY - ADULT  Goal: Maintain or return to baseline ADL function  Description: INTERVENTIONS:  -  Assess patient's ability to carry out ADLs; assess patient's baseline for ADL function and identify physical deficits which impact ability to perform ADLs (bathing, care of mouth/teeth, toileting, grooming, dressing, etc.)  - Assess/evaluate cause of self-care deficits   - Assess range of motion  - Assess patient's mobility; develop plan if impaired  - Assess patient's need for assistive devices and provide as appropriate  - Encourage maximum independence but intervene and supervise when necessary  - Involve family in performance of ADLs  - Assess for home care needs following discharge   - Consider OT consult to assist with ADL evaluation and planning for discharge  - Provide patient education as appropriate  Outcome: Progressing  Goal: Maintains/Returns to pre admission functional level  Description: INTERVENTIONS:  - Perform BMAT or MOVE assessment daily.   - Set and communicate daily mobility goal to care team and patient/family/caregiver. - Collaborate with rehabilitation services on mobility goals if consulted  - Perform Range of Motion 4 times a day. - Reposition patient every 2 hours.   - Dangle patient 2 times a day  - Stand patient 2 times a day  - A- Out of bed to chair 1 times a day   - - Record patient progress and toleration of activity level   Outcome: Progressing

## 2023-07-11 NOTE — PLAN OF CARE
Problem: MOBILITY - ADULT  Goal: Maintain or return to baseline ADL function  Description: INTERVENTIONS:  -  Assess patient's ability to carry out ADLs; assess patient's baseline for ADL function and identify physical deficits which impact ability to perform ADLs (bathing, care of mouth/teeth, toileting, grooming, dressing, etc.)  - Assess/evaluate cause of self-care deficits   - Assess range of motion  - Assess patient's mobility; develop plan if impaired  - Assess patient's need for assistive devices and provide as appropriate  - Encourage maximum independence but intervene and supervise when necessary  - Involve family in performance of ADLs  - Assess for home care needs following discharge   - Consider OT consult to assist with ADL evaluation and planning for discharge  - Provide patient education as appropriate  Outcome: Progressing  Goal: Maintains/Returns to pre admission functional level  Description: INTERVENTIONS:  - Perform BMAT or MOVE assessment daily.   - Set and communicate daily mobility goal to care team and patient/family/caregiver. - Collaborate with rehabilitation services on mobility goals if consulted  - Perform Range of Motion 3 times a day. - Reposition patient every 2 hours.   - Dangle patient 3 times a day  - Stand patient 3 times a day  - Ambulate patient 3 times a day  - Out of bed to chair 3 times a day   - Out of bed for meals 3 times a day  - Out of bed for toileting  - Record patient progress and toleration of activity level   Outcome: Progressing     Problem: PAIN - ADULT  Goal: Verbalizes/displays adequate comfort level or baseline comfort level  Description: Interventions:  - Encourage patient to monitor pain and request assistance  - Assess pain using appropriate pain scale  - Administer analgesics based on type and severity of pain and evaluate response  - Implement non-pharmacological measures as appropriate and evaluate response  - Consider cultural and social influences on pain and pain management  - Notify physician/advanced practitioner if interventions unsuccessful or patient reports new pain  Outcome: Progressing     Problem: INFECTION - ADULT  Goal: Absence or prevention of progression during hospitalization  Description: INTERVENTIONS:  - Assess and monitor for signs and symptoms of infection  - Monitor lab/diagnostic results  - Monitor all insertion sites, i.e. indwelling lines, tubes, and drains  - Monitor endotracheal if appropriate and nasal secretions for changes in amount and color  - [place] appropriate cooling/warming therapies per order  - Administer medications as ordered  - Instruct and encourage patient and family to use good hand hygiene technique  - Identify and instruct in appropriate isolation precautions for identified infection/condition  Outcome: Progressing  Goal: Absence of fever/infection during neutropenic period  Description: INTERVENTIONS:  - Monitor WBC    Outcome: Progressing     Problem: SAFETY ADULT  Goal: Maintain or return to baseline ADL function  Description: INTERVENTIONS:  -  Assess patient's ability to carry out ADLs; assess patient's baseline for ADL function and identify physical deficits which impact ability to perform ADLs (bathing, care of mouth/teeth, toileting, grooming, dressing, etc.)  - Assess/evaluate cause of self-care deficits   - Assess range of motion  - Assess patient's mobility; develop plan if impaired  - Assess patient's need for assistive devices and provide as appropriate  - Encourage maximum independence but intervene and supervise when necessary  - Involve family in performance of ADLs  - Assess for home care needs following discharge   - Consider OT consult to assist with ADL evaluation and planning for discharge  - Provide patient education as appropriate  Outcome: Progressing  Goal: Maintains/Returns to pre admission functional level  Description: INTERVENTIONS:  - Perform BMAT or MOVE assessment daily.   - Set and communicate daily mobility goal to care team and patient/family/caregiver. - Collaborate with rehabilitation services on mobility goals if consulted  - Perform Range of Motion 3 times a day. - Reposition patient every 2 hours.   - Dangle patient 3 times a day  - Stand patient 3 times a day  - Ambulate patient 3 times a day  - Out of bed to chair 3 times a day   - Out of bed for meals 3 times a day  - Out of bed for toileting  - Record patient progress and toleration of activity level   Outcome: Progressing  Goal: Patient will remain free of falls  Description: INTERVENTIONS:  - Educate patient/family on patient safety including physical limitations  - Instruct patient to call for assistance with activity   - Consult OT/PT to assist with strengthening/mobility   - Keep Call bell within reach  - Keep bed low and locked with side rails adjusted as appropriate  - Keep care items and personal belongings within reach  - Initiate and maintain comfort rounds  - Make Fall Risk Sign visible to staff  - Offer Toileting every 2 Hours, in advance of need  - Initiate/Maintain on alarm  - Obtain necessary fall risk management equipment:   - Apply yellow socks and bracelet for high fall risk patients  - Consider moving patient to room near nurses station  Outcome: Progressing

## 2023-07-11 NOTE — OCCUPATIONAL THERAPY NOTE
Occupational Therapy Treatment Note      Carol Nevaeh    7/11/2023    Principal Problem:    Central cord syndrome [facility])  Active Problems:    Stage 4 chronic kidney disease (HCC)    Weakness of both upper extremities    Fall    C3 spinal cord injury [facility])      Past Medical History:   Diagnosis Date    Ankle edema     last assessed [id number]    Depression     last assessed [id number]    Hypertension     Kidney stone     Nephrolithiasis     Nocturia     last assessed [id number]    Posterior tibial tendinitis of right lower extremity     last assessed [id number]       Past Surgical History:   Procedure Laterality Date    ANAL FISSURECTOMY      CERVICAL FUSION N/A 7/4/2023    Procedure: Posterior C4-5 laminectomy, C3-6 fixation/fusion;  Surgeon: Wil Cisneros MD;  [facility];  Service: Neurosurgery    COLONOSCOPY  04/22/2009    every 2 years    COLONOSCOPY  03/04/2013    2 yrs d/t h/o polyp    COLONOSCOPY  02/15/2016    colo 2/15/16-repeat 2 yrs    SHOULDER SURGERY          07/11/23 1413   OT Last Visit   OT Visit Date 07/11/23   Note Type   Note Type Treatment   Pain Assessment   Pain Assessment Tool 0-10   Pain Score 4   Pain Location/Orientation Location: Neck   Pain Onset/Description Onset: Ongoing; Descriptor: Aching;Descriptor: Discomfort   Patient's Stated Pain Goal No pain   Hospital Pain Intervention(s) Repositioned; Ambulation/increased activity; Emotional support   Restrictions/Precautions   Weight Bearing Precautions Per Order No   Other Precautions Chair Alarm; Bed Alarm;Multiple lines;Telemetry; Fall Risk;Pain;Spinal precautions;O2  (2L O2)   Lifestyle   Autonomy I adls and mobility - i iadls - shares homemaking with spouse   Reciprocal Relationships supportive family   Service to Others retired   Intrinsic Gratification mostly sedentary   ADL   LB Dressing Assistance 1  Total Assistance   LB Dressing Deficit Don/doff L sock; Don/doff R sock   Bed Mobility   Supine to Sit 2  Maximal assistance Additional items Assist x 2; Increased time required;Verbal cues;LE management   Sit to Supine Unable to assess   Additional Comments pt sat EOB w/ Mod A for sitting balance/trunk control. BP at /77   Transfers   Sit to Stand 2  Maximal assistance   Additional items Assist x 2; Increased time required;Verbal cues   Stand to Sit 2  Maximal assistance   Additional items Assist x 2; Increased time required;Verbal cues   Stand pivot 2  Maximal assistance   Additional items Assist x 2; Increased time required;Verbal cues   Additional Comments B/L HHA used and knee block   Therapeutic Exercise - ROM   UE-ROM Yes   ROM- Right Upper Extremities   R Shoulder AAROM   RUE ROM Comment AAROM of R shoulder; can do full AROM of elbow gravity eliminated; poor grasp   ROM - Left Upper Extremities    L Shoulder AAROM; Flexion   L Elbow AAROM;Elbow flexion;Elbow extension   LUE ROM Comment grossly 2+/5 strength in shoulder; 3+/5 elbow; AAROM   Cognition   Overall Cognitive Status Impaired   Arousal/Participation Responsive; Cooperative   Attention Attends with cues to redirect   Orientation Level Oriented to person;Oriented to place;Oriented to time  (oriented to general place; initially got year wrong. Didn't know exact day of month)   Memory Decreased recall of precautions;Decreased recall of recent events   Following Commands Follows one step commands without difficulty   Comments Pt is pleasant and cooperative; needs cues for safety   Activity Tolerance   Activity Tolerance Patient limited by pain; Patient limited by fatigue   Medical Staff Made Aware PT, Jeanne Kinney, ROSELIA, Chris and Meggan THOMPSON   Assessment   Assessment Patient participated in Skilled OT session 7/11/2023 with interventions consisting of Energy Conservation techniques, deep breathing technique, safety awareness and fall prevention techniques, maintaining spinal precautions, therapeutic exercise to: increase functional use of BUEs, increase BUE muscle strength , therapeutic activities to: increase activity tolerance, increase dynamic sit/ stand balance during functional activity , increase postural control, increase trunk control and increase OOB/ sitting tolerance . Patient agreeable to OT treatment session, upon arrival patient was found supine in bed. In comparison to previous session, patient with improvements in EOB sitting tolerance . Patient requiring verbal cues for safety, verbal cues for correct technique, verbal cues for pacing thru activity steps and frequent rest periods. Patient continues to be functioning below baseline level, occupational performance remains limited secondary to factors listed above and increased risk for falls and injury. From OT standpoint, recommendation at time of d/c would be Short Term Rehab when medically stable. Patient to benefit from continued Occupational Therapy treatment while in the hospital to address deficits as defined above and maximize level of functional independence with ADLs and functional mobility   Plan   Treatment Interventions ADL retraining; Endurance training;UE strengthening/ROM; Functional transfer training;Cognitive reorientation;Patient/family training;Equipment evaluation/education; Compensatory technique education;Continued evaluation; Energy conservation; Activityengagement   Goal Expiration Date 07/21/23   OT Treatment Day 1   OT Frequency 3-5x/wk   Recommendation   OT Discharge Recommendation Post acute rehabilitation services   AM-PAC Daily Activity Inpatient   Lower Body Dressing 1   Bathing 1   Toileting 1   Upper Body Dressing 2   Grooming 2   Eating 2   Daily Activity Raw Score 9   Turning Head Towards Sound 3   Follow Simple Instructions 4   Low Function Daily Activity Raw Score 16   Low Function Daily Activity Standardized Score  27.65   AM-PAC Applied Cognition Inpatient   Following a Speech/Presentation 3   Understanding Ordinary Conversation 4   Taking Medications 4   Remembering Where Things Are Placed or Put Away 4   Remembering List of 4-5 Errands 4   Taking Care of Complicated Tasks 3   Applied Cognition Raw Score 22   Applied Cognition Standardized Score 47.83   End of Consult   Education Provided Yes;Family or social support of family present for education by provider   Patient Position at End of Consult Bedside chair;Bed/Chair alarm activated; All needs within reach   Nurse Communication Nurse aware of consult       Loretta Soulier MS, OTR/L

## 2023-07-11 NOTE — PLAN OF CARE
Problem: PHYSICAL THERAPY ADULT  Goal: Performs mobility at highest level of function for planned discharge setting. See evaluation for individualized goals. Description: Treatment/Interventions: Functional transfer training, LE strengthening/ROM, Therapeutic exercise, Endurance training, Patient/family training, Equipment eval/education, Bed mobility, Gait training, Compensatory technique education, Continued evaluation          See flowsheet documentation for full assessment, interventions and recommendations. Outcome: Progressing  Note: Prognosis: Guarded  Problem List: Decreased strength, Decreased endurance, Impaired balance, Decreased mobility, Impaired sensation  Assessment: Pt seen for PT treatment session today, focusing on bed mobility, sitting balance, and transfers to chair. Pt continues to display significant UE weakness, with LUE strength greater than RUE. BLEs demonstrate grossly at least 3-/5 strength, as evidenced by appreciable movement against gravity. Pt displayed improved sitting tolerance EOB, however continues to require max Ax2 for all mobility, including supine-to-sit, sit<>stand, stand-pivot, and several short steps towards chair. Pt was able to shuffle feet approx. 2ft towards chair before turning and sitting on the drop arm of the chair; required max Ax2 for stand-pivot-transfer to finish transition from bed to chair. Pt reported dizziness and feeling "unwell" prior to standing, though BP was 117/77 when assessed immediately after report, and pt was not desatting. The patient's AM-PAC Basic Mobility Inpatient Short Form Raw Score is 7. A Raw score of less than 16 suggests the patient may benefit from discharge to post-acute rehabilitation services. Please also refer to the recommendation of the Physical Therapist for safe discharge planning. Recommend post-acute rehab services upon d/c; SCI rehab preferable.  Pt would benefit from continued skilled PT intervention to address the aforementioned impairments to allow for optimal functional mobility, safety, and independence upon discharge. All current goals still appropriate at this time. At end of session, pt was left sitting comfortably in recliner with call bell in reach and all current needs met. PT Discharge Recommendation: Post acute rehabilitation services    See flowsheet documentation for full assessment.

## 2023-07-11 NOTE — PLAN OF CARE
Problem: OCCUPATIONAL THERAPY ADULT  Goal: Performs self-care activities at highest level of function for planned discharge setting. See evaluation for individualized goals. Description: Treatment Interventions: ADL retraining, Functional transfer training, UE strengthening/ROM, Endurance training, Patient/family training, Equipment evaluation/education, Neuromuscular reeducation, Fine motor coordination activities, Compensatory technique education, Activityengagement          See flowsheet documentation for full assessment, interventions and recommendations. Outcome: Progressing  Note: Limitation: Decreased ADL status, Decreased UE ROM, Decreased UE strength, Decreased endurance, Decreased sensation, Decreased fine motor control, Decreased self-care trans, Decreased high-level ADLs, Non-func R UE, Non-func L UE  Prognosis: Good, Fair  Assessment: Patient participated in Skilled OT session 7/11/2023 with interventions consisting of Energy Conservation techniques, deep breathing technique, safety awareness and fall prevention techniques, maintaining spinal precautions, therapeutic exercise to: increase functional use of BUEs, increase BUE muscle strength ,  therapeutic activities to: increase activity tolerance, increase dynamic sit/ stand balance during functional activity , increase postural control, increase trunk control and increase OOB/ sitting tolerance . Patient agreeable to OT treatment session, upon arrival patient was found supine in bed. In comparison to previous session, patient with improvements in EOB sitting tolerance . Patient requiring verbal cues for safety, verbal cues for correct technique, verbal cues for pacing thru activity steps and frequent rest periods. Patient continues to be functioning below baseline level, occupational performance remains limited secondary to factors listed above and increased risk for falls and injury.    From OT standpoint, recommendation at time of d/c would be Short Term Rehab when medically stable.    Patient to benefit from continued Occupational Therapy treatment while in the hospital to address deficits as defined above and maximize level of functional independence with ADLs and functional mobility     OT Discharge Recommendation: Post acute rehabilitation services     Rony Ortega MS, OTR/L

## 2023-07-12 PROBLEM — J96.01 ACUTE RESPIRATORY FAILURE WITH HYPOXIA (HCC): Status: ACTIVE | Noted: 2023-01-01

## 2023-07-12 NOTE — ASSESSMENT & PLAN NOTE
- Transferred to [facility] to rule out central cord disorder s/p fall down stairs  - Continued bilateral shoulder pain and weakness in bilateral upper extremities (cannot raise arms off the bed) and complaining of neck pain  - Pt reports continued weakness in bilateral LE as well  - CT Head, C-spine, CAP negative for acute traumatic injuries  - Maintain cervical collar at all times  - MRI with area of cord edema consistent with central cord syndrome  - Neurosurgery consult placed  - Pain control  - DVT ppx  - PT and OT when indicated

## 2023-07-12 NOTE — PHYSICAL THERAPY NOTE
Physical Therapy Progress Note     07/12/23 0930   PT Last Visit   PT Visit Date 07/12/23   Note Type   Note Type Treatment   Pain Assessment   Pain Score No Pain   Restrictions/Precautions   Other Precautions Fall Risk;Spinal precautions; Chair Alarm; Bed Alarm   Subjective   Subjective pt encountered supine in bed, pleasant and agreeable to treatment. Reports controlled pain overall & no changes in status with activity. Does report numbness in distal extremities bilaterally without referred pain. Bed Mobility   Supine to Sit 3  Moderate assistance   Additional items Assist x 1;HOB elevated; Increased time required;Verbal cues   Transfers   Sit to Stand 3  Moderate assistance   Additional items Assist x 2   Stand to Sit 3  Moderate assistance   Additional items Assist x 2   Stand pivot 3  Moderate assistance   Additional items Assist x 2   Balance   Static Sitting Fair -   Dynamic Sitting Poor +   Static Standing Poor   Dynamic Standing Poor   Assessment   Prognosis Guarded   Problem List Decreased strength;Decreased endurance; Impaired balance;Decreased mobility; Impaired sensation   Assessment pt demonstrated improved initiation of all functional transfers today, performing all tasks with instructiosn for sequencing & Ax2 for safety. He remains unable to assist himself in meaningful with with UEs due to weakness as result of acute injury, but is able to maintain sitting & standing balance with Ax1 at points during session. Anticipate he will be appropriate for gait trial in upcoming sessions pending requisite support. Would trial bilateral platform RW at some point in upcoming trials as well to allow pt to support himelf with adequate elbow extension bilaterally despite likely difficulties in managing RW during gait trials. PT POC & d/c recommendations remain appropriate at this time to progress to highest level of funcitonal independence & reduce long term burden of care.    Goals   Patient Goals to keep getting better   STG Expiration Date 07/21/23   PT Treatment Day 1   Plan   Treatment/Interventions Functional transfer training;LE strengthening/ROM; Therapeutic exercise; Endurance training;Patient/family training;Equipment eval/education; Bed mobility; Compensatory technique education   Progress Progressing toward goals   PT Frequency 2-3x/wk   Recommendation   PT Discharge Recommendation Post acute rehabilitation services   Equipment Recommended Wheelchair;Walker  (plan to trial bilateral platform RW vs RW in upcoming sessions)   AM-PAC Basic Mobility Inpatient   Turning in Flat Bed Without Bedrails 2   Lying on Back to Sitting on Edge of Flat Bed Without Bedrails 2   Moving Bed to Chair 1   Standing Up From Chair Using Arms 1   Walk in Room 1   Climb 3-5 Stairs With Railing 1   Basic Mobility Inpatient Raw Score 8   Turning Head Towards Sound 3   Follow Simple Instructions 3   Low Function Basic Mobility Raw Score  14   Low Function Basic Mobility Standardized Score  22.01   Highest Level Of Mobility   JH-HLM Goal 3: Sit at edge of bed   JH-HLM Achieved 4: Move to chair/commode       Champ Liao PTA    An [facility] Basic Mobility Raw Score less than 17 suggests pt would benefit from post acute rehab. Please also refer to the recommendation of the Physical Therapist for safe discharge planning.

## 2023-07-12 NOTE — CASE MANAGEMENT
Case Management Discharge Planning Note    Patient name Adalgisa Monahan  Location [address]/[facility] [id number] MRN [id number]  : 1934 Date 2023       Current Admission Date: 7/3/2023  Current Admission Diagnosis:Central cord syndrome [facility])   Patient Active Problem List    Diagnosis Date Noted   • Acute respiratory failure with hypoxia ([address]) 2023   • C3 spinal cord injury ([address]) 2023   • Central cord syndrome ([address]) 2023   • Weakness of both upper extremities 2023   • Fall 2023   • Proteinuria 2022   • Stage 4 chronic kidney disease ([address]) 2022   • Hypertriglyceridemia 2022   • Low HDL (under 40) 2020   • Nephrolithiasis 2018   • Severe obesity (BMI 35.0-39. 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      LOS (days): 9  Geometric Mean LOS (GMLOS) (days): 9.80  Days to GMLOS:0.8     OBJECTIVE:  Risk of Unplanned Readmission Score: 20.81         Current admission status: Inpatient   Preferred Pharmacy:   [facility] ([address] [facility] [address] [place] [address] [address] [place] [address]  Phone: [phone] Fax: [phone]    [facility] [address] [place], [place] - [address]  [address] [facility] [address]  Phone: [phone] Fax: [address] [place] [facility] ([facility] ) - [place], [address]  [address] [facility]  Phone: [phone] Fax: [phone]    Primary Care Provider: Anni Murphy DO    Primary Insurance: [facility]  Secondary Insurance:     DISCHARGE DETAILS:    Cm spoke to pt's wife.  She would like CM to reach out to [facility] for SCI rehab. She will discuss with the pt.    [facility] can accept for SNF and pt and wife do want to d/c there but also want to at least discuss SCI rehab first.

## 2023-07-12 NOTE — ASSESSMENT & PLAN NOTE
- Pt reports fall, posterior head strike, no loss of consciousness, denies use of antiplatelet or anticoagulation medications  - Status post fall with the below noted injuries. - Fall precautions. - Geriatric Medicine consultation for evaluation, medication review and recommendations.  - PT and OT recommend rehab  - Case Management consultation for disposition planning.

## 2023-07-12 NOTE — ASSESSMENT & PLAN NOTE
Lab Results   Component Value Date    EGFR 27 07/12/2023    EGFR 24 07/11/2023    EGFR 24 07/10/2023    CREATININE 2.06 (H) 07/12/2023    CREATININE 2.31 (H) 07/11/2023    CREATININE 2.29 (H) 07/10/2023     - Continue home lasix  - Continue to monitor

## 2023-07-12 NOTE — PLAN OF CARE
Problem: OCCUPATIONAL THERAPY ADULT  Goal: Performs self-care activities at highest level of function for planned discharge setting. See evaluation for individualized goals. Description: Treatment Interventions: ADL retraining, Functional transfer training, UE strengthening/ROM, Endurance training, Patient/family training, Equipment evaluation/education, Neuromuscular reeducation, Fine motor coordination activities, Compensatory technique education, Activityengagement          See flowsheet documentation for full assessment, interventions and recommendations. Outcome: Progressing  Note: Limitation: Decreased ADL status, Decreased UE ROM, Decreased UE strength, Decreased endurance, Decreased sensation, Decreased fine motor control, Decreased self-care trans, Decreased high-level ADLs, Non-func R UE, Non-func L UE  Prognosis: Good, Fair  Assessment: Pt seen for participation Occupational Therapy session with focus on activity tolerance, bed mob, functional transfers/stand pivot transfers, sitting balance and tolerance and standing tolerance and balance for pt engagement in UB/LB self-care tasks and AAROM to b/l UE for decreased swelling and increased function. Pt cleared by Juju Leos for pt participated in OT session. Pt presented supine/HOB raised pt awake/alert and agreeable to participate in therapy following pt identifiers confirmed. Pt reported his therapy goal to get out of bed. Pt required assist for bed mob, and functional transfers/stand pivot 2* pt decreased strength, coordination and balance  He was able to tolerate  Hand over hand assist for washing his face and required assist for combing hair. Pt granddaughter present for part of session and was instructed on and educated on AAROM for pt b/l UE . Pt granddaughter able to verbalized good understanding. Pt will require post acute rehab service to continue to address these above noted pt deficit which currently impair pt ADL and functional mob.  Pt set up to bedside chair post session, chair alarm activated and all needs within pt reach     OT Discharge Recommendation: Post acute rehabilitation services

## 2023-07-12 NOTE — ASSESSMENT & PLAN NOTE
- patient continuing to require 2 L O2 to maintain O2 sats>92%  - bilateral lower rales on auscultation  - on 20 mg furosemide daily per home medication  - 40 mg IV furosemide once today

## 2023-07-12 NOTE — PLAN OF CARE
Problem: PHYSICAL THERAPY ADULT  Goal: Performs mobility at highest level of function for planned discharge setting. See evaluation for individualized goals. Description: Treatment/Interventions: Functional transfer training, LE strengthening/ROM, Therapeutic exercise, Endurance training, Patient/family training, Equipment eval/education, Bed mobility, Gait training, Compensatory technique education, Continued evaluation          See flowsheet documentation for full assessment, interventions and recommendations. Outcome: Progressing  Note: Prognosis: Guarded  Problem List: Decreased strength, Decreased endurance, Impaired balance, Decreased mobility, Impaired sensation  Assessment: pt demonstrated improved initiation of all functional transfers today, performing all tasks with instructiosn for sequencing & Ax2 for safety. He remains unable to assist himself in meaningful with with UEs due to weakness as result of acute injury, but is able to maintain sitting & standing balance with Ax1 at points during session. Anticipate he will be appropriate for gait trial in upcoming sessions pending requisite support. Would trial bilateral platform RW at some point in upcoming trials as well to allow pt to support himelf with adequate elbow extension bilaterally despite likely difficulties in managing RW during gait trials. PT POC & d/c recommendations remain appropriate at this time to progress to highest level of funcitonal independence & reduce long term burden of care. PT Discharge Recommendation: Post acute rehabilitation services    See flowsheet documentation for full assessment.

## 2023-07-12 NOTE — OCCUPATIONAL THERAPY NOTE
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   Reciprocal Relationships supportive family   Service to Others retired   Intrinsic Gratification mostly sedentary   Therapeutic Exercise - ROM   UE-ROM Yes   ROM- Right Upper Extremities   R Shoulder AAROM   R Elbow AAROM;Elbow flexion;Elbow extension   R Weight/Reps/Sets 8 X-1 each   ROM - Left Upper Extremities    L Shoulder AAROM; Flexion   L Elbow AAROM;Elbow flexion;Elbow extension   L Weight/Reps/Sets 8x 1 set each   Subjective   Subjective pt stated " Yeah, I want to get out of bed"   Cognition   Overall Cognitive Status [facility]   Arousal/Participation Responsive; Cooperative   Attention Attends with cues to redirect   Orientation Level Oriented X4   Memory Decreased recall of precautions;Decreased recall of recent events   Following Commands Follows one step commands without difficulty   Activity Tolerance   Activity Tolerance Patient limited by pain; Patient limited by fatigue   Assessment   Assessment Pt seen for participation Occupational Therapy session with focus on activity tolerance, bed mob, functional transfers/stand pivot transfers, sitting balance and tolerance and standing tolerance and balance for pt engagement in UB/LB self-care tasks and AAROM to b/l UE for decreased swelling and increased function. Pt cleared by [place] for pt participated in OT session. Pt presented supine/HOB raised pt awake/alert and agreeable to participate in therapy following pt identifiers confirmed.  Pt reported his therapy goal to get out of bed. Pt required assist for bed mob, and functional transfers/stand pivot 2* pt decreased strength, coordination and balance  He was able to tolerate  Hand over hand assist for washing his face and required assist for combing hair. Pt granddaughter present for part of session and was instructed on and educated on AAROM for pt b/l UE . Pt granddaughter able to verbalized good understanding. Pt will require post acute rehab service to continue to address these above noted pt deficit which currently impair pt ADL and functional mob.  Pt set up to bedside chair post session, chair alarm activated and all needs within pt reach   Plan   Treatment Interventions ADL retraining   Goal Expiration Date 07/21/23   OT Treatment Day 2   OT Frequency 3-5x/wk   Recommendation   OT Discharge Recommendation Post acute rehabilitation services   AM-PAC Daily Activity Inpatient   Lower Body Dressing 1   Bathing 1   Toileting 1   Upper Body Dressing 2   Grooming 2   Eating 2   Daily Activity Raw Score 9   Turning Head Towards Sound 3   Follow Simple Instructions 4   Low Function Daily Activity Raw Score 16   Low Function Daily Activity Standardized Score  27.65   AM-PAC Applied Cognition Inpatient   Following a Speech/Presentation 3   Understanding Ordinary Conversation 4   Taking Medications 4   Remembering Where Things Are Placed or Put Away 4   Remembering List of 4-5 Errands 4   Taking Care of Complicated Tasks 3   Applied Cognition Raw Score 22   Applied Cognition Standardized Score 47.83         Avery GRANT

## 2023-07-12 NOTE — PROGRESS NOTES
[address] [facility]  Progress Note  Name: Aravind Croft  MRN: [id number]  Unit/Bed#: [facility]PHP 736-28 I Date of Admission: 7/3/2023   Date of Service: 7/12/2023 I Hospital Day: 9    Assessment/Plan   Acute respiratory failure with hypoxia [facility])  Assessment & Plan  - patient continuing to require 2 L O2 to maintain O2 sats>92%  - bilateral lower rales on auscultation  - on 20 mg furosemide daily per home medication  - 40 mg IV furosemide once today    C3 spinal cord injury ([address])  Assessment & Plan  - secondary to fall  - see weakness of both upper extremities for plan    Fall  Assessment & Plan  - Pt reports fall, posterior head strike, no loss of consciousness, denies use of antiplatelet or anticoagulation medications  - Status post fall with the below noted injuries. - Fall precautions. - Geriatric Medicine consultation for evaluation, medication review and recommendations.  - PT and OT recommend rehab  - Case Management consultation for disposition planning.       Weakness of both upper extremities  Assessment & Plan  - Transferred to [facility] to rule out central cord disorder s/p fall down stairs  - Continued bilateral shoulder pain and weakness in bilateral upper extremities (cannot raise arms off the bed) and complaining of neck pain  - Pt reports continued weakness in bilateral LE as well  - CT Head, C-spine, CAP negative for acute traumatic injuries  - Maintain cervical collar at all times  - MRI with area of cord edema consistent with central cord syndrome  - Neurosurgery consult placed  - Pain control  - DVT ppx  - PT and OT when indicated    Stage 4 chronic kidney disease [facility])  Assessment & Plan  Lab Results   Component Value Date    EGFR 27 07/12/2023    EGFR 24 07/11/2023    EGFR 24 07/10/2023    CREATININE 2.06 (H) 07/12/2023    CREATININE 2.31 (H) 07/11/2023    CREATININE 2.29 (H) 07/10/2023     - Continue home lasix  - Continue to monitor    * Central cord syndrome [facility])  Assessment & Plan  - see weakness of both upper extremities for plan           Bowel Regimen: senokot and PEG  VTE Prophylaxis:Heparin     Disposition: rehab    Subjective   Subjective: reporting some neck pain and continued upper extremity weakness. He is still requiring 2 L of O2. He is spitting up some frank sputum but denies cough. Objective   Vitals:   Temp:  [97.3 °F (36.3 °C)-97.5 °F (36.4 °C)] 97.3 °F (36.3 °C)  HR:  [79-84] 81  Resp:  [18-24] 18  BP: (125-138)/(80-88) 138/80    I/O       07/10 0701 07/11 0700 07/11 0701 07/12 0700 07/12 0701 07/13 0700    P. O. 480 620 120    Total Intake(mL/kg) 480 (4.7) 620 (6) 120 (1.2)    Urine (mL/kg/hr) 1021 (0.4) 725 (0.3)     Total Output 1021 725     Net -541 -105 +120                  Physical Exam:   General: awake and alert  CV: RRR  Pulm: rales in bilateral lower bases  Abd: soft and non-tender  Neuro: follows commands in all extremities, diminshed  bilaterally with left weaker than right, unable to raise arms off chair  : pastor present  Skin: no new rashes    Invasive Devices     Peripheral Intravenous Line  Duration           Peripheral IV 07/12/23 Right Wrist <1 day          Drain  Duration           Urethral Catheter 16 Fr. 2 days                      Lab Results:   Results: I have personally reviewed all pertinent laboratory/tests results, BMP/CMP:   Lab Results   Component Value Date    SODIUM 144 07/12/2023    K 5.5 (H) 07/12/2023     (H) 07/12/2023    CO2 29 07/12/2023    BUN 84 (H) 07/12/2023    CREATININE 2.06 (H) 07/12/2023    CALCIUM 8.8 07/12/2023    EGFR 27 07/12/2023    and CBC:   Lab Results   Component Value Date    WBC 7.93 07/12/2023    HGB 10.9 (L) 07/12/2023    HCT 35.8 (L) 07/12/2023    MCV 96 07/12/2023     07/12/2023    RBC 3.72 (L) 07/12/2023    MCH 29.3 07/12/2023    MCHC 30.4 (L) 07/12/2023    RDW 13.5 07/12/2023    MPV 10.0 07/12/2023     Imaging: I have personally reviewed pertinent reports.      Other Studies: n/a

## 2023-07-13 PROBLEM — E87.5 HYPERKALEMIA: Status: ACTIVE | Noted: 2023-01-01

## 2023-07-13 NOTE — CASE MANAGEMENT
[address] [place] [address] received request for authorization from Care Manager. Authorization request for: SNF  Facility Name: [facility] NPI: [id number]  Facility MD: Rodríguez Shah NPI: [id number]  Authorization initiated by contacting insurance: [facility] Via: Phone. Clinicals submitted via: Phone. [phone] Rep: Meet Kaur has received approved authorization from insurance: [address] [place] [address] received for: SNF  Facility: [address] [place] [address] #: [id number]  Start of Care: 07/13/2023  Next Review Date: 07/17/2023  Submit next review to: Phone. [address] [place]. Carla Andrew received request for transport authorization from Care Manager. Type of transport: [facility]  Date of transport: 07/13/2023  Transport company: [email or web address] NPI: [id number]  Authorization initiated by contacting: [facility] Via: Phone.  [address] [place] [address] Road received approval for transport authorization from: [address] [place] [address] #: [id number]   notified: Ann Long

## 2023-07-13 NOTE — PLAN OF CARE
Problem: MOBILITY - ADULT  Goal: Maintain or return to baseline ADL function  Description: INTERVENTIONS:  -  Assess patient's ability to carry out ADLs; assess patient's baseline for ADL function and identify physical deficits which impact ability to perform ADLs (bathing, care of mouth/teeth, toileting, grooming, dressing, etc.)  - Assess/evaluate cause of self-care deficits   - Assess range of motion  - Assess patient's mobility; develop plan if impaired  - Assess patient's need for assistive devices and provide as appropriate  - Encourage maximum independence but intervene and supervise when necessary  - Involve family in performance of ADLs  - Assess for home care needs following discharge   - Consider OT consult to assist with ADL evaluation and planning for discharge  - Provide patient education as appropriate  Outcome: Progressing  Goal: Maintains/Returns to pre admission functional level  Description: INTERVENTIONS:  - Perform BMAT or MOVE assessment daily.   - Set and communicate daily mobility goal to care team and patient/family/caregiver. - Collaborate with rehabilitation services on mobility goals if consulted  - Perform Range of Motion 3 times a day. - Reposition patient every 2 hours.   - Dangle patient 3 times a day  - Stand patient 3 times a day  - Ambulate patient 3 times a day  - Out of bed to chair 3 times a day   - Out of bed for meals 3 times a day  - Out of bed for toileting  - Record patient progress and toleration of activity level   Outcome: Progressing     Problem: PAIN - ADULT  Goal: Verbalizes/displays adequate comfort level or baseline comfort level  Description: Interventions:  - Encourage patient to monitor pain and request assistance  - Assess pain using appropriate pain scale  - Administer analgesics based on type and severity of pain and evaluate response  - Implement non-pharmacological measures as appropriate and evaluate response  - Consider cultural and social influences on pain and pain management  - Notify physician/advanced practitioner if interventions unsuccessful or patient reports new pain  Outcome: Progressing     Problem: INFECTION - ADULT  Goal: Absence or prevention of progression during hospitalization  Description: INTERVENTIONS:  - Assess and monitor for signs and symptoms of infection  - Monitor lab/diagnostic results  - Monitor all insertion sites, i.e. indwelling lines, tubes, and drains  - Monitor endotracheal if appropriate and nasal secretions for changes in amount and color  - [place] appropriate cooling/warming therapies per order  - Administer medications as ordered  - Instruct and encourage patient and family to use good hand hygiene technique  - Identify and instruct in appropriate isolation precautions for identified infection/condition  Outcome: Progressing  Goal: Absence of fever/infection during neutropenic period  Description: INTERVENTIONS:  - Monitor WBC    Outcome: Progressing     Problem: SAFETY ADULT  Goal: Maintain or return to baseline ADL function  Description: INTERVENTIONS:  -  Assess patient's ability to carry out ADLs; assess patient's baseline for ADL function and identify physical deficits which impact ability to perform ADLs (bathing, care of mouth/teeth, toileting, grooming, dressing, etc.)  - Assess/evaluate cause of self-care deficits   - Assess range of motion  - Assess patient's mobility; develop plan if impaired  - Assess patient's need for assistive devices and provide as appropriate  - Encourage maximum independence but intervene and supervise when necessary  - Involve family in performance of ADLs  - Assess for home care needs following discharge   - Consider OT consult to assist with ADL evaluation and planning for discharge  - Provide patient education as appropriate  Outcome: Progressing  Goal: Maintains/Returns to pre admission functional level  Description: INTERVENTIONS:  - Perform BMAT or MOVE assessment daily.   - Set and communicate daily mobility goal to care team and patient/family/caregiver. - Collaborate with rehabilitation services on mobility goals if consulted  - Perform Range of Motion 3 times a day. - Reposition patient every 2 hours.   - Dangle patient 3 times a day  - Stand patient 3 times a day  - Ambulate patient 3 times a day  - Out of bed to chair 3 times a day   - Out of bed for meals 3 times a day  - Out of bed for toileting  - Record patient progress and toleration of activity level   Outcome: Progressing  Goal: Patient will remain free of falls  Description: INTERVENTIONS:  - Educate patient/family on patient safety including physical limitations  - Instruct patient to call for assistance with activity   - Consult OT/PT to assist with strengthening/mobility   - Keep Call bell within reach  - Keep bed low and locked with side rails adjusted as appropriate  - Keep care items and personal belongings within reach  - Initiate and maintain comfort rounds  - Make Fall Risk Sign visible to staff  - Offer Toileting every  Hours, in advance of need  - Initiate/Maintain alarm  - Obtain necessary fall risk management equipmen  - Apply yellow socks and bracelet for high fall risk patients  - Consider moving patient to room near nurses station  Outcome: Progressing     Problem: DISCHARGE PLANNING  Goal: Discharge to home or other facility with appropriate resources  Description: INTERVENTIONS:  - Identify barriers to discharge w/patient and caregiver  - Arrange for needed discharge resources and transportation as appropriate  - Identify discharge learning needs (meds, wound care, etc.)  - Arrange for interpretive services to assist at discharge as needed  - Refer to Case Management Department for coordinating discharge planning if the patient needs post-hospital services based on physician/advanced practitioner order or complex needs related to functional status, cognitive ability, or social support system  Outcome: Progressing     Problem: Knowledge Deficit  Goal: Patient/family/caregiver demonstrates understanding of disease process, treatment plan, medications, and discharge instructions  Description: Complete learning assessment and assess knowledge base. Interventions:  - Provide teaching at level of understanding  - Provide teaching via preferred learning methods  Outcome: Progressing     Problem: NEUROSENSORY - ADULT  Goal: Achieves stable or improved neurological status  Description: INTERVENTIONS  - Monitor and report changes in neurological status  - Monitor vital signs such as temperature, blood pressure, glucose, and any other labs ordered   - Initiate measures to prevent increased intracranial pressure  - Monitor for seizure activity and implement precautions if appropriate      Outcome: Progressing  Goal: Remains free of injury related to seizures activity  Description: INTERVENTIONS  - Maintain airway, patient safety  and administer oxygen as ordered  - Monitor patient for seizure activity, document and report duration and description of seizure to physician/advanced practitioner  - If seizure occurs,  ensure patient safety during seizure  - Reorient patient post seizure  - Seizure pads on all 4 side rails  - Instruct patient/family to notify RN of any seizure activity including if an aura is experienced  - Instruct patient/family to call for assistance with activity based on nursing assessment  - Administer anti-seizure medications if ordered    Outcome: Progressing  Goal: Achieves maximal functionality and self care  Description: INTERVENTIONS  - Monitor swallowing and airway patency with patient fatigue and changes in neurological status  - Encourage and assist patient to increase activity and self care.    - Encourage visually impaired, hearing impaired and aphasic patients to use assistive/communication devices  Outcome: Progressing     Problem: Nutrition/Hydration-ADULT  Goal: Nutrient/Hydration intake appropriate for improving, restoring or maintaining nutritional needs  Description: Monitor and assess patient's nutrition/hydration status for malnutrition. Collaborate with interdisciplinary team and initiate plan and interventions as ordered. Monitor patient's weight and dietary intake as ordered or per policy. Utilize nutrition screening tool and intervene as necessary. Determine patient's food preferences and provide high-protein, high-caloric foods as appropriate.      INTERVENTIONS:  - Monitor oral intake, urinary output, labs, and treatment plans  - Assess nutrition and hydration status and recommend course of action  - Evaluate amount of meals eaten  - Assist patient with eating if necessary   - Allow adequate time for meals  - Recommend/ encourage appropriate diets, oral nutritional supplements, and vitamin/mineral supplements  - Order, calculate, and assess calorie counts as needed  - Recommend, monitor, and adjust tube feedings and TPN/PPN based on assessed needs  - Assess need for intravenous fluids  - Provide specific nutrition/hydration education as appropriate  - Include patient/family/caregiver in decisions related to nutrition  Outcome: Progressing     Problem: SAFETY,RESTRAINT: NV/NON-SELF DESTRUCTIVE BEHAVIOR  Goal: Remains free of harm/injury (restraint for non violent/non self-detsructive behavior)  Description: INTERVENTIONS:  - Instruct patient/family regarding restraint use   - Assess and monitor physiologic and psychological status   - Provide interventions and comfort measures to meet assessed patient needs   - Identify and implement measures to help patient regain control  - Assess readiness for release of restraint   Outcome: Progressing  Goal: Returns to optimal restraint-free functioning  Description: INTERVENTIONS:  - Assess the patient's behavior and symptoms that indicate continued need for restraint  - Identify and implement measures to help patient regain control  - Assess readiness for release of restraint   Outcome: Progressing     Problem: Prexisting or High Potential for Compromised Skin Integrity  Goal: Skin integrity is maintained or improved  Description: INTERVENTIONS:  - Identify patients at risk for skin breakdown  - Assess and monitor skin integrity  - Assess and monitor nutrition and hydration status  - Monitor labs   - Assess for incontinence   - Turn and reposition patient  - Assist with mobility/ambulation  - Relieve pressure over bony prominences  - Avoid friction and shearing  - Provide appropriate hygiene as needed including keeping skin clean and dry  - Evaluate need for skin moisturizer/barrier cream  - Collaborate with interdisciplinary team   - Patient/family teaching  - Consider wound care consult   Outcome: Progressing

## 2023-07-13 NOTE — ASSESSMENT & PLAN NOTE
- Transferred [facility] to rule out central cord disorder s/p fall down stairs  - Continued bilateral shoulder pain and weakness in bilateral upper extremities (cannot raise arms off the bed) and complaining of neck pain  - Pt reports continued weakness in bilateral LE as well  - CT Head, C-spine, CAP negative for acute traumatic injuries  - Maintain cervical collar at all times  - MRI with area of cord edema consistent with central cord syndrome  - Neurosurgery consult placed  - Pain control  - DVT ppx  - PT and OT when indicated

## 2023-07-13 NOTE — CASE MANAGEMENT
Case Management Discharge Planning Note    Patient name Kaleb Cage  Location [address] 6[address]/[facility] 946-36 MRN [id number]  : 1934 Date 2023       Current Admission Date: 7/3/2023  Current Admission Diagnosis:Central cord syndrome [facility])   Patient Active Problem List    Diagnosis Date Noted   • Acute respiratory failure with hypoxia ([address]) 2023   • C3 spinal cord injury ([address]) 2023   • Central cord syndrome ([address]) 2023   • Weakness of both upper extremities 2023   • Fall 2023   • Proteinuria 2022   • Stage 4 chronic kidney disease ([address]) 2022   • Hypertriglyceridemia 2022   • Low HDL (under 40) 2020   • Nephrolithiasis 2018   • Severe obesity (BMI 35.0-39. 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      LOS (days): 10  Geometric Mean LOS (GMLOS) (days): 9.80  Days to GMLOS:0.1     OBJECTIVE:  Risk of Unplanned Readmission Score: 21.01         Current admission status: Inpatient   Preferred Pharmacy:   [facility] ([facility] [address] [facility] [place] [address]  Phone: [phone] Fax: [phone]    [facility] [address] [place], [place] - [address]  [address] [facility] [address]  Phone: [phone] Fax: [address] [facility] ([facility] ) - [place], [address]  [address] [facility]  Phone: [phone] Fax: [phone]    Primary Care Provider: Norma Disla DO    Primary Insurance: [facility]  Secondary Insurance:     DISCHARGE DETAILS: [address] Number: [id number]

## 2023-07-13 NOTE — ASSESSMENT & PLAN NOTE
Lab Results   Component Value Date    EGFR 29 07/13/2023    EGFR 27 07/12/2023    EGFR 24 07/11/2023    CREATININE 1.96 (H) 07/13/2023    CREATININE 2.06 (H) 07/12/2023    CREATININE 2.31 (H) 07/11/2023     - Continue home lasix  - Continue to monitor

## 2023-07-13 NOTE — ASSESSMENT & PLAN NOTE
- patient slowly developing hyperkalemia during admission despite 20 mg furosemide daily  - on review of medications, subQ heparin appears to be the only medication that could cause his hyperkalemia  - will d/c subQ heparin and start 2.5 mg eliquis BID for DVT ppx  - continue diuresis to monitor hyperkalemia

## 2023-07-13 NOTE — PROGRESS NOTES
[address] [facility]  Progress Note  Name: Ro Tompkins  MRN: [id number]  Unit/Bed#: [facility] 617-04 I Date of Admission: 7/3/2023   Date of Service: 7/13/2023 I Hospital Day: 10    Assessment/Plan   Hyperkalemia  Assessment & Plan  - patient slowly developing hyperkalemia during admission despite 20 mg furosemide daily  - on review of medications, subQ heparin appears to be the only medication that could cause his hyperkalemia  - will d/c subQ heparin and start 2.5 mg eliquis BID for DVT ppx  - continue diuresis to monitor hyperkalemia    Acute respiratory failure with hypoxia ([address])  Assessment & Plan  - patient continuing to require 2 L O2 to maintain O2 sats>92%  - bilateral lower rales on auscultation  - on 20 mg furosemide daily per home medication  - 40 mg IV furosemide again today    C3 spinal cord injury ([address])  Assessment & Plan  - secondary to fall  - see weakness of both upper extremities for plan    Fall  Assessment & Plan  - Pt reports fall, posterior head strike, no loss of consciousness, denies use of antiplatelet or anticoagulation medications  - Status post fall with the below noted injuries. - Fall precautions. - Geriatric Medicine consultation for evaluation, medication review and recommendations.  - PT and OT recommend rehab  - Case Management consultation for disposition planning.       Weakness of both upper extremities  Assessment & Plan  - Transferred to [facility] to rule out central cord disorder s/p fall down stairs  - Continued bilateral shoulder pain and weakness in bilateral upper extremities (cannot raise arms off the bed) and complaining of neck pain  - Pt reports continued weakness in bilateral LE as well  - CT Head, C-spine, CAP negative for acute traumatic injuries  - Maintain cervical collar at all times  - MRI with area of cord edema consistent with central cord syndrome  - Neurosurgery consult placed  - Pain control  - DVT ppx  - PT and OT when indicated    Stage 4 chronic kidney disease [facility])  Assessment & Plan  Lab Results   Component Value Date    EGFR 29 07/13/2023    EGFR 27 07/12/2023    EGFR 24 07/11/2023    CREATININE 1.96 (H) 07/13/2023    CREATININE 2.06 (H) 07/12/2023    CREATININE 2.31 (H) 07/11/2023     - Continue home lasix  - Continue to monitor    * Central cord syndrome [facility])  Assessment & Plan  - see weakness of both upper extremities for plan           Bowel Regimen: senokot and PEG  VTE Prophylaxis: eliquis    Disposition: rehab    Subjective   Subjective: no complaints. He reports his breathing has improved since the extra dose of furosemide. He continues to require 2 L of O2. K is elevated again today. Will discontinue heparin and start eliquis for DVT ppx. Objective   Vitals:   Temp:  [97.4 °F (36.3 °C)-97.9 °F (36.6 °C)] 97.9 °F (36.6 °C)  HR:  [] 109  Resp:  [18] 18  BP: (144-161)/(79-94) 161/94    I/O       07/11 0701  07/12 0700 07/12 0701  07/13 0700 07/13 0701  07/14 0700    P. O. 620 1060 0    Total Intake(mL/kg) 620 (6) 1060 (10.3) 0 (0)    Urine (mL/kg/hr) 725 (0.3) 2000 (0.8)     Stool  0     Total Output 725 2000     Net -105 -940 0           Unmeasured Stool Occurrence  1 x            Physical Exam:   General: awake and alert  CV: RRR  Pulm: bilateral lower rales  Abd: soft and non-tender  Neuro: follows commands in all extremities, unable to raise bilateral arms  : pastor present with light yellow urine  Skin: no new rashes    Invasive Devices     Peripheral Intravenous Line  Duration           Peripheral IV 07/12/23 Right Wrist 1 day          Drain  Duration           Urethral Catheter 16 Fr. 3 days                      Lab Results:   Results: I have personally reviewed all pertinent laboratory/tests results, BMP/CMP:   Lab Results   Component Value Date    SODIUM 144 07/13/2023    K 5.7 (H) 07/13/2023     (H) 07/13/2023    CO2 28 07/13/2023    BUN 80 (H) 07/13/2023    CREATININE 1.96 (H) 07/13/2023 CALCIUM 8.9 07/13/2023    EGFR 29 07/13/2023    and CBC:   Lab Results   Component Value Date    WBC 8.51 07/13/2023    HGB 10.7 (L) 07/13/2023    HCT 36.2 (L) 07/13/2023    MCV 97 07/13/2023     07/13/2023    RBC 3.73 (L) 07/13/2023    MCH 28.7 07/13/2023    MCHC 29.6 (L) 07/13/2023    RDW 13.8 07/13/2023    MPV 10.5 07/13/2023    NRBC 0 07/13/2023     Imaging: n/a  Other Studies: n/a

## 2023-07-13 NOTE — ASSESSMENT & PLAN NOTE
- patient continuing to require 2 L O2 to maintain O2 sats>92%  - bilateral lower rales on auscultation  - on 20 mg furosemide daily per home medication  - 40 mg IV furosemide again today

## 2023-07-13 NOTE — PLAN OF CARE
Problem: MOBILITY - ADULT  Goal: Maintain or return to baseline ADL function  Description: INTERVENTIONS:  -  Assess patient's ability to carry out ADLs; assess patient's baseline for ADL function and identify physical deficits which impact ability to perform ADLs (bathing, care of mouth/teeth, toileting, grooming, dressing, etc.)  - Assess/evaluate cause of self-care deficits   - Assess range of motion  - Assess patient's mobility; develop plan if impaired  - Assess patient's need for assistive devices and provide as appropriate  - Encourage maximum independence but intervene and supervise when necessary  - Involve family in performance of ADLs  - Assess for home care needs following discharge   - Consider OT consult to assist with ADL evaluation and planning for discharge  - Provide patient education as appropriate  Outcome: Progressing  Goal: Maintains/Returns to pre admission functional level  Description: INTERVENTIONS:  - Perform BMAT or MOVE assessment daily.   - Set and communicate daily mobility goal to care team and patient/family/caregiver. - Collaborate with rehabilitation services on mobility goals if consulted  - Perform Range of Motion 3 times a day. - Reposition patient every 2 hours.   - Dangle patient 3 times a day  - Stand patient 3 times a day  - Ambulate patient 3 times a day  - Out of bed to chair 3 times a day   - Out of bed for meals 3 times a day  - Out of bed for toileting  - Record patient progress and toleration of activity level   Outcome: Progressing     Problem: PAIN - ADULT  Goal: Verbalizes/displays adequate comfort level or baseline comfort level  Description: Interventions:  - Encourage patient to monitor pain and request assistance  - Assess pain using appropriate pain scale  - Administer analgesics based on type and severity of pain and evaluate response  - Implement non-pharmacological measures as appropriate and evaluate response  - Consider cultural and social influences on pain and pain management  - Notify physician/advanced practitioner if interventions unsuccessful or patient reports new pain  Outcome: Progressing     Problem: INFECTION - ADULT  Goal: Absence or prevention of progression during hospitalization  Description: INTERVENTIONS:  - Assess and monitor for signs and symptoms of infection  - Monitor lab/diagnostic results  - Monitor all insertion sites, i.e. indwelling lines, tubes, and drains  - Monitor endotracheal if appropriate and nasal secretions for changes in amount and color  - Spearman appropriate cooling/warming therapies per order  - Administer medications as ordered  - Instruct and encourage patient and family to use good hand hygiene technique  - Identify and instruct in appropriate isolation precautions for identified infection/condition  Outcome: Progressing  Goal: Absence of fever/infection during neutropenic period  Description: INTERVENTIONS:  - Monitor WBC    Outcome: Progressing     Problem: SAFETY ADULT  Goal: Maintain or return to baseline ADL function  Description: INTERVENTIONS:  -  Assess patient's ability to carry out ADLs; assess patient's baseline for ADL function and identify physical deficits which impact ability to perform ADLs (bathing, care of mouth/teeth, toileting, grooming, dressing, etc.)  - Assess/evaluate cause of self-care deficits   - Assess range of motion  - Assess patient's mobility; develop plan if impaired  - Assess patient's need for assistive devices and provide as appropriate  - Encourage maximum independence but intervene and supervise when necessary  - Involve family in performance of ADLs  - Assess for home care needs following discharge   - Consider OT consult to assist with ADL evaluation and planning for discharge  - Provide patient education as appropriate  Outcome: Progressing  Goal: Maintains/Returns to pre admission functional level  Description: INTERVENTIONS:  - Perform BMAT or MOVE assessment daily.   - Set and communicate daily mobility goal to care team and patient/family/caregiver. - Collaborate with rehabilitation services on mobility goals if consulted  - Perform Range of Motion 3 times a day. - Reposition patient every 2 hours.   - Dangle patient 3 times a day  - Stand patient 3 times a day  - Ambulate patient 3 times a day  - Out of bed to chair 3 times a day   - Out of bed for meals 3 times a day  - Out of bed for toileting  - Record patient progress and toleration of activity level   Outcome: Progressing  Goal: Patient will remain free of falls  Description: INTERVENTIONS:  - Educate patient/family on patient safety including physical limitations  - Instruct patient to call for assistance with activity   - Consult OT/PT to assist with strengthening/mobility   - Keep Call bell within reach  - Keep bed low and locked with side rails adjusted as appropriate  - Keep care items and personal belongings within reach  - Initiate and maintain comfort rounds  - Make Fall Risk Sign visible to staff  - Offer Toileting every  Hours, in advance of need  - Initiate/Maintain alarm  - Obtain necessary fall risk management equipme  - Apply yellow socks and bracelet for high fall risk patients  - Consider moving patient to room near nurses station  Outcome: Progressing     Problem: DISCHARGE PLANNING  Goal: Discharge to home or other facility with appropriate resources  Description: INTERVENTIONS:  - Identify barriers to discharge w/patient and caregiver  - Arrange for needed discharge resources and transportation as appropriate  - Identify discharge learning needs (meds, wound care, etc.)  - Arrange for interpretive services to assist at discharge as needed  - Refer to Case Management Department for coordinating discharge planning if the patient needs post-hospital services based on physician/advanced practitioner order or complex needs related to functional status, cognitive ability, or social support system  Outcome: Progressing     Problem: Knowledge Deficit  Goal: Patient/family/caregiver demonstrates understanding of disease process, treatment plan, medications, and discharge instructions  Description: Complete learning assessment and assess knowledge base. Interventions:  - Provide teaching at level of understanding  - Provide teaching via preferred learning methods  Outcome: Progressing     Problem: NEUROSENSORY - ADULT  Goal: Achieves stable or improved neurological status  Description: INTERVENTIONS  - Monitor and report changes in neurological status  - Monitor vital signs such as temperature, blood pressure, glucose, and any other labs ordered   - Initiate measures to prevent increased intracranial pressure  - Monitor for seizure activity and implement precautions if appropriate      Outcome: Progressing  Goal: Remains free of injury related to seizures activity  Description: INTERVENTIONS  - Maintain airway, patient safety  and administer oxygen as ordered  - Monitor patient for seizure activity, document and report duration and description of seizure to physician/advanced practitioner  - If seizure occurs,  ensure patient safety during seizure  - Reorient patient post seizure  - Seizure pads on all 4 side rails  - Instruct patient/family to notify RN of any seizure activity including if an aura is experienced  - Instruct patient/family to call for assistance with activity based on nursing assessment  - Administer anti-seizure medications if ordered    Outcome: Progressing  Goal: Achieves maximal functionality and self care  Description: INTERVENTIONS  - Monitor swallowing and airway patency with patient fatigue and changes in neurological status  - Encourage and assist patient to increase activity and self care.    - Encourage visually impaired, hearing impaired and aphasic patients to use assistive/communication devices  Outcome: Progressing     Problem: Nutrition/Hydration-ADULT  Goal: Nutrient/Hydration intake appropriate for improving, restoring or maintaining nutritional needs  Description: Monitor and assess patient's nutrition/hydration status for malnutrition. Collaborate with interdisciplinary team and initiate plan and interventions as ordered. Monitor patient's weight and dietary intake as ordered or per policy. Utilize nutrition screening tool and intervene as necessary. Determine patient's food preferences and provide high-protein, high-caloric foods as appropriate.      INTERVENTIONS:  - Monitor oral intake, urinary output, labs, and treatment plans  - Assess nutrition and hydration status and recommend course of action  - Evaluate amount of meals eaten  - Assist patient with eating if necessary   - Allow adequate time for meals  - Recommend/ encourage appropriate diets, oral nutritional supplements, and vitamin/mineral supplements  - Order, calculate, and assess calorie counts as needed  - Recommend, monitor, and adjust tube feedings and TPN/PPN based on assessed needs  - Assess need for intravenous fluids  - Provide specific nutrition/hydration education as appropriate  - Include patient/family/caregiver in decisions related to nutrition  Outcome: Progressing     Problem: SAFETY,RESTRAINT: NV/NON-SELF DESTRUCTIVE BEHAVIOR  Goal: Remains free of harm/injury (restraint for non violent/non self-detsructive behavior)  Description: INTERVENTIONS:  - Instruct patient/family regarding restraint use   - Assess and monitor physiologic and psychological status   - Provide interventions and comfort measures to meet assessed patient needs   - Identify and implement measures to help patient regain control  - Assess readiness for release of restraint   Outcome: Progressing  Goal: Returns to optimal restraint-free functioning  Description: INTERVENTIONS:  - Assess the patient's behavior and symptoms that indicate continued need for restraint  - Identify and implement measures to help patient regain control  - Assess readiness for release of restraint   Outcome: Progressing     Problem: Prexisting or High Potential for Compromised Skin Integrity  Goal: Skin integrity is maintained or improved  Description: INTERVENTIONS:  - Identify patients at risk for skin breakdown  - Assess and monitor skin integrity  - Assess and monitor nutrition and hydration status  - Monitor labs   - Assess for incontinence   - Turn and reposition patient  - Assist with mobility/ambulation  - Relieve pressure over bony prominences  - Avoid friction and shearing  - Provide appropriate hygiene as needed including keeping skin clean and dry  - Evaluate need for skin moisturizer/barrier cream  - Collaborate with interdisciplinary team   - Patient/family teaching  - Consider wound care consult   Outcome: Progressing

## 2023-07-14 PROBLEM — R33.8 ACUTE URINARY RETENTION: Status: ACTIVE | Noted: 2023-01-01

## 2023-07-14 NOTE — PLAN OF CARE
Problem: OCCUPATIONAL THERAPY ADULT  Goal: Performs self-care activities at highest level of function for planned discharge setting. See evaluation for individualized goals. Description: Treatment Interventions: ADL retraining, Functional transfer training, UE strengthening/ROM, Endurance training, Patient/family training, Equipment evaluation/education, Neuromuscular reeducation, Fine motor coordination activities, Compensatory technique education, Activityengagement          See flowsheet documentation for full assessment, interventions and recommendations. Outcome: Progressing  Note: Limitation: Decreased ADL status, Decreased UE ROM, Decreased UE strength, Decreased endurance, Decreased sensation, Decreased fine motor control, Decreased self-care trans, Decreased high-level ADLs, Non-func R UE, Non-func L UE  Prognosis: Good, Fair  Assessment: Pt seen for participation in Occupational Therapy session with focus on activity tolerance, functional transfers/standing balance and tolerance and balance for pt engagement in LB self-care tasks. Pt cleared by Aneta Negro for pt participated in therapy session. Pt presented sitting out of bed to bedside chair and agreeable to participate in therapy following pt identifiers confirmed. Pt reported that he had slept overnight in the hospital recliner chair  Pt reported that he had a difficult time sleeping and discussed with pt techniques to improve sleep. Pt able to verbalize good understanding. Pt required assist for x 2 for standing balance utilizing b/l platform RW. He requires max assist for LB self-care 2* pt balance, coordination and decreased overall strength. Pt will require post acute rehab service to continue to address these above noted pt deficit which currently impair pt ADL and functional mob.  Pt set up to bedside chair post session, chair alarm activated and all needs within pt reach     OT Discharge Recommendation: Post acute rehabilitation services

## 2023-07-14 NOTE — ASSESSMENT & PLAN NOTE
- attempted void trial on 7/7  - patient retained urine and required replacement of pastor on 7/9  - resumed home flomax on 7/8  - likely exacerbation of baseline BPH and urinary issues with this cervical spinal cord injury  - continue pastor and have outpatient follow up with urology

## 2023-07-14 NOTE — PROGRESS NOTES
[address] [facility]  Progress Note  Name: Felice Mayberry  MRN: [id number]  [facility] I Date of Admission: 7/3/2023   Date of Service: 7/14/2023 I Hospital Day: 11    Assessment/Plan   Hyperkalemia  Assessment & Plan  - patient slowly developing hyperkalemia during admission despite 20 mg furosemide daily  - on review of medications, subQ heparin appears to be the only medication that could cause his hyperkalemia  - no significant change with d/c subQ heparin and switching to eliquis on 7/13, subQ heparin was resumed on 7/14  - nephrology consulted with recs to switch to 20 mg torsemide daily and 10 mg lokelma once on 7/14  - continue diuresis to monitor hyperkalemia    Acute respiratory failure with hypoxia ([address])  Assessment & Plan  - patient continuing to require supplemental O2 to maintain O2 sats>92%  - on 20 mg furosemide daily per home medication  - switch to 20 mg torsemide daily on 7/14 as per nephro recommendation  - s/p 40 mg IV furosemide on 7/12   - s/p 40 mg furosemide x2 and 5 mg metolazone 7/13  - chest radiograph today to eval for pneumonia    C3 spinal cord injury [facility])  Assessment & Plan  - secondary to fall  - see weakness of both upper extremities for plan    Fall  Assessment & Plan  - Pt reports fall, posterior head strike, no loss of consciousness, denies use of antiplatelet or anticoagulation medications  - Status post fall with the below noted injuries. - Fall precautions. - Geriatric Medicine consultation for evaluation, medication review and recommendations.  - PT and OT recommend rehab  - Case Management consultation for disposition planning.       Weakness of both upper extremities  Assessment & Plan  - Transferred to [facility] to rule out central cord disorder s/p fall down stairs  - Continued bilateral shoulder pain and weakness in bilateral upper extremities (cannot raise arms off the bed) and complaining of neck pain  - Pt reports continued weakness in bilateral LE as well  - CT Head, C-spine, CAP negative for acute traumatic injuries  - MRI with area of cord edema consistent with central cord syndrome  - Neurosurgery consulted and signed off with 2 week follow up (around 7/24)  - S/p decadron 2 mg Q8H for 72 hours  - s/p MAP>90 goal for 5 days in ICU  - s/p Posterior C4-5 laminectomy, C3-6 fixation/fusion on 7/4  - RONY drain removed on 7/7  - Pain control  - DVT ppx  - PT and OT recommend rehab    Stage 4 chronic kidney disease [facility])  Assessment & Plan  Lab Results   Component Value Date    EGFR 26 07/14/2023    EGFR 28 07/13/2023    EGFR 29 07/13/2023    CREATININE 2.11 (H) 07/14/2023    CREATININE 2.03 (H) 07/13/2023    CREATININE 1.96 (H) 07/13/2023     - Per nephrology recommendations, switch home 20 mg furosemide to 20 mg toresemide daily  - Continue to monitor    * Central cord syndrome [facility])  Assessment & Plan  - see weakness of both upper extremities for plan           Bowel Regimen: senokot and PEG  VTE Prophylaxis:Heparin     Disposition: rehab pending medical clearance    Subjective   Chief Complaint: fatigue    Subjective: patient with worsening fatigue today. He is also reporting increasing numbness of his right arm to nursing, but then told me his left arm when I examined him. He is reporting a new productive cough. Objective   Vitals:   Temp:  [98 °F (36.7 °C)-98.5 °F (36.9 °C)] 98 °F (36.7 °C)  HR:  [89-99] 89  Resp:  [18-20] 20  BP: (136-167)/(67-87) 136/67    I/O       07/12 0701  07/13 0700 07/13 0701  07/14 0700 07/14 0701  07/15 0700    P. O. 1060 240     Total Intake(mL/kg) 1060 (10.3) 240 (2.3)     Urine (mL/kg/hr) 2000 (0.8) 2200 (0.9)     Stool 0 0     Total Output 2000 2200     Net -940 -1960            Unmeasured Stool Occurrence 1 x 1 x            Physical Exam:   General: awake and alert  CV: RRR  Pulm: CTAB  Abd: soft and non-tender  Neuro: follows commands in all extremities, unable to lift arms off bed, left  weaker than right, decreased sensation of entire right arm, decreased sensation of left fingers  : pastor present  Skin: no new rashes    Invasive Devices     Peripheral Intravenous Line  Duration           Peripheral IV 07/12/23 Right Wrist 2 days          Drain  Duration           Urethral Catheter 16 Fr. 4 days                      Lab Results:   Results: I have personally reviewed all pertinent laboratory/tests results and BMP/CMP:   Lab Results   Component Value Date    SODIUM 143 07/14/2023    K 5.7 (H) 07/14/2023     (H) 07/14/2023    CO2 29 07/14/2023    BUN 82 (H) 07/14/2023    CREATININE 2.11 (H) 07/14/2023    CALCIUM 8.9 07/14/2023    EGFR 26 07/14/2023     Imaging: n/a   Other Studies: chest xray today

## 2023-07-14 NOTE — CONSULTS
Nephrology Consultation       Patient: Brandon Merchant               Sex: male          DOA: 7/3/2023  4:00 PM   Date of Birth: @        Age: @        LOS:  LOS: 6 days     REFERRING PHYSICIAN: MD Montez Littlejohn / CONSULTATION: Hyperkalemia not responsive to diuresis and volume overload    DATE OF CONSULTATION / SERVICE: 7/14/2023    ADMISSION DIAGNOSIS: Central cord syndrome ([address])     CHIEF COMPLAINT     Fall/bilateral upper extremity weakness    HPI     Patient is an 41-year-old male with a past medical history of CKD stage IV hypertension ulcerative colitis this gout secondary hyperparathyroidism who presented to [facility] 7/3 for fall and weakness of bilateral upper extremities. MRI 7/3 showed traumatic disc injury to C4-C5 with prevertebral fluid C2-C5. He was transferred to [facility] and admitted to ICU for central cord syndrome. S/p C4-C5 laminectomy/decompression and C3-C6 fixation fusion by neurosurgery 7/4. Patient admitted to the ICU and intubated 7/4 to 7/6. Patient was downgraded to [address] 7/11. Nephro is being consulted due to patient's continuing hyperkalemia unresponsive to diuresis s/p 40 mg IV Lasix x3 and 5 mg Metalozone and volume overload status. Today, patient was sitting up in chair with oxygen on.  Granddaughter sitting on bed next to him. Patient was not talkative and appeared sleepy throughout exam.  Granddaughter answered most of the questions. Wife was on phone listening to encounter. Patient denies muscle cramps, heart palpitations, chest pain, worsening shortness of breath. Currently patient denies nausea, vomiting, headache, dizziness, abdominal pain, constipation or rash.     PAST MEDICAL HISTORY     Past Medical History:   Diagnosis Date   • Ankle edema     last assessed [id number]   • Depression     last assessed [id number]   • Hypertension    • Kidney stone    • Nephrolithiasis    • Nocturia     last assessed [id number]   • Posterior tibial tendinitis of right lower extremity     last assessed [id number]       PAST SURGICAL HISTORY     Past Surgical History:   Procedure Laterality Date   • ANAL FISSURECTOMY     • CERVICAL FUSION N/A 7/4/2023    Procedure: Posterior C4-5 laminectomy, C3-6 fixation/fusion;  Surgeon: Nellie Sanchez MD;  Location: [facility];  Service: Neurosurgery   • COLONOSCOPY  04/22/2009    every 2 years   • COLONOSCOPY  03/04/2013    2 yrs d/t h/o polyp   • COLONOSCOPY  02/15/2016    colo 2/15/16-repeat 2 yrs   • SHOULDER SURGERY         ALLERGIES     No Known Allergies    SOCIAL HISTORY     Social History     Substance and Sexual Activity   Alcohol Use Not Currently    Comment: occassionally     Social History     Substance and Sexual Activity   Drug Use No     Social History     Tobacco Use   Smoking Status Never   Smokeless Tobacco Never       FAMILY HISTORY     Family History   Problem Relation Age of Onset   • Clotting disorder Mother    • Nephrolithiasis Father    • Diabetes Sister    • Heart disease Daughter    • Fibromyalgia Daughter        CURRENT MEDICATIONS       Current Facility-Administered Medications:   •  acetaminophen (TYLENOL) tablet 975 mg, 975 mg, Oral, Q8H [address], Jarrod Singh MD, 975 mg at 07/14/23 5474  •  apixaban (ELIQUIS) tablet 2.5 mg, 2.5 mg, Oral, BID, Jemal Felipe MD, 2.5 mg at 07/14/23 0592  •  bisacodyl (DULCOLAX) rectal suppository 10 mg, 10 mg, Rectal, Daily PRN, Jarrod Singh MD  •  folic acid (FOLVITE) tablet 1 mg, 1 mg, Oral, Daily, Jarrod Singh MD, 1 mg at 07/14/23 9713  •  furosemide (LASIX) tablet 20 mg, 20 mg, Oral, Daily, Jarrod Singh MD, 20 mg at 07/14/23 9698  •  guaiFENesin (MUCINEX) 12 hr tablet 600 mg, 600 mg, Oral, Q12H [address], Jarrod Singh MD, 600 mg at 07/14/23 0490  •  lidocaine (LIDODERM) 5 % patch 1 patch, 1 patch, Topical, Daily, Jarrod Singh MD, 1 patch at 07/11/23 3503  •  melatonin tablet 6 mg, 6 mg, Oral, HS, Jarrod Singh MD, 6 mg at 07/13/23 2253  • methocarbamol (ROBAXIN) tablet 250 mg, 250 mg, Oral, Q6H PRN, Pooja Christie MD, 250 mg at 07/11/23 2111  •  ondansetron (ZOFRAN) injection 4 mg, 4 mg, Intravenous, Q6H PRN, Pooja Christie MD  •  oxyCODONE (ROXICODONE) split tablet 2.5 mg, 2.5 mg, Oral, Q4H PRN, Pooja Christie MD, 2.5 mg at 07/11/23 1507  •  polyethylene glycol (MIRALAX) packet 17 g, 17 g, Oral, Daily, Pooja Christie MD, 17 g at 07/14/23 2532  •  senna-docusate sodium (SENOKOT S) 8.6-50 mg per tablet 1 tablet, 1 tablet, Oral, BID, Pooja Christie MD, 1 tablet at 07/14/23 1685  •  sulfaSALAzine (AZULFIDINE) tablet 1,000 mg, 1,000 mg, Oral, Daily, Pooja Christie MD, 1,000 mg at 07/13/23 1217  •  sulfaSALAzine (AZULFIDINE) tablet 500 mg, 500 mg, Oral, BID, Pooja Christie MD, 500 mg at 07/14/23 1844  •  tamsulosin (FLOMAX) capsule 0.4 mg, 0.4 mg, Oral, Daily, Pooja Christie MD, 0.4 mg at 07/14/23 7256    REVIEW OF SYSTEMS     Complete 10 points of review of systems were obtained and discussed in length with patient today. Complete 10 points of review of systems were negative/unremarkable except mentioned in the HPI section. OBJECTIVE     Current Weight: Weight - Scale: 103 kg (227 lb 11.8 oz)  /87   Pulse 99   Temp 98 °F (36.7 °C)   Resp 20   Ht 5' 5" (1.651 m)   Wt 103 kg (227 lb 11.8 oz)   SpO2 92%   BMI 37.90 kg/m²   Vitals:    07/14/23 0849   BP:    Pulse:    Resp:    Temp:    SpO2: 92%     Body mass index is 37.9 kg/m². Intake/Output Summary (Last 24 hours) at 7/14/2023 0903  Last data filed at 7/14/2023 0240  Gross per 24 hour   Intake 240 ml   Output 2200 ml   Net -1960 ml       PHYSICAL EXAMINATION     Physical Exam  Constitutional:       General: He is not in acute distress. Appearance: He is obese. He is ill-appearing. Comments: Sleepy thoughout encounter. Granddaughter at bedside. HENT:      Mouth/Throat:      Mouth: Mucous membranes are dry. Neck:      Comments: NO JVD; No Hepatojugular reflex.   Cardiovascular:      Rate and Rhythm: Normal rate and regular rhythm. Heart sounds: No murmur heard. Pulmonary:      Effort: No respiratory distress. Breath sounds: No wheezing or rhonchi. Comments: 3L NC  Diffucult to auscultate lung sounds d/t body habitus. Abdominal:      General: Abdomen is flat. There is no distension. Tenderness: There is no abdominal tenderness. Musculoskeletal:      Right lower leg: No edema. Left lower leg: No edema. Neurological:      Mental Status: He is alert. Mental status is at baseline. Motor: Weakness present. Psychiatric:         Mood and Affect: Mood normal.         Behavior: Behavior normal.         Thought Content:  Thought content normal.           LAB RESULTS        Results from last 7 days   Lab Units 07/14/23  0634 07/13/23  1444 07/13/23  0449 07/13/23  0448 07/12/23  0957 07/11/23  0423 07/10/23  0522 07/09/23  0523 07/08/23  0615   WBC Thousand/uL  --   --   --  8.51 7.93 6.85  --  8.11 9.62   HEMOGLOBIN g/dL  --   --   --  10.7* 10.9* 10.2*  --  9.7* 10.0*   HEMATOCRIT %  --   --   --  36.2* 35.8* 33.9*  --  32.1* 32.6*   PLATELETS Thousands/uL  --   --   --  241 240 239  --  251 232   SODIUM mmol/L 143 142 144  --  144 143 143 142 142   POTASSIUM mmol/L 5.7* 5.8* 5.7*  --  5.5* 5.5* 5.3 5.3 5.1   CHLORIDE mmol/L 113* 113* 116*  --  115* 116* 112* 113* 111*   CO2 mmol/L 29 27 28  --  29 31 28 26 27   BUN mg/dL 82* 79* 80*  --  84* 92* 89* 82* 68*   CREATININE mg/dL 2.11* 2.03* 1.96*  --  2.06* 2.31* 2.29* 2.37* 2.21*   EGFR ml/min/1.73sq m 26 28 29  --  27 24 24 23 25   CALCIUM mg/dL 8.9 9.2 8.9  --  8.8 8.3 8.1* 8.3 8.6   MAGNESIUM mg/dL  --   --   --   --  2.7*  --   --  2.3  --    PHOSPHORUS mg/dL  --   --   --   --  3.6  --   --  4.9*  --        I have personally reviewed the old medical records and patient's previously known baseline creatinine level is ~ 1.90-2.40    RADIOLOGY RESULTS     XR chest portable ICU   Final Result by Vicky Shea MD (07/11 3276) Mild pulmonary vascular congestion with pleural-parenchymal density at the right lung base which appears new. Workstation performed: [id number]         XR cervical spine 2 or 3 views   Final Result by True Pepe DO (07/10 1346)      1. Expected postsurgical changes from posterior cervical fusion. No evidence of acute osseous abnormality/malalignment. Workstation performed: [id number]         XR chest portable   Final Result by Oxana Ventura MD (07/06 1507)      No significant change since prior study. Workstation performed: [id number]         MRI cervical spine wo contrast   Final Result by Magda Lawson MD (07/06 5575)      Hyperflexion injury with a defect through the anterior longitudinal ligament at the C4-5 level and widening of the anterior disc space. Prevertebral edema extending from C1-2 through T1-2 ,mildly progressed. Interval spinal decompression and posterior    fixation including bilateral laminectomies at the C4 and C5 levels and bilateral transpedicular screws at the C3-C6 levels with interconnecting rods. Improved patency of the central canal at the C3-4 and C4-5 levels with resolved cord compression. There    is stable hyperintense T2/STIR signal within the dorsal aspect of the cord at the C4 level. Findings are consistent with the preliminary report from [facility] which was provided shortly after completion of the exam.         Workstation performed: [id number]         MRI brain wo contrast   Final Result by Magda Lawson MD (07/06 4368)      1. No acute infarction, edema, or mass effect. 2. Mild chronic microangiopathic ischemic changes. 3. Small calcified lesion is noted along the anterior right frontal convexity, dural calcification versus small meningioma. 4. Right parietal scalp soft tissue swelling.       Findings are consistent with the preliminary report from [facility] which was provided shortly after completion of the exam.         Workstation performed: [id number]         XR chest portable ICU   Final Result by Sandra Gregory MD (07/05 0747)      NG tube in stomach. Persistent left base opacity, atelectasis and/or pneumonia. Workstation performed: [id number]         X-ray chest 1 view   Final Result by Sandra Gregory MD (07/05 1400)      Low lung volumes with moderate opacity in the left base, likely atelectasis. Pneumonia not excluded in the appropriate clinical setting. Workstation performed: [id number]         XR spine cervical 2 or 3 vw injury   Final Result by CEDRIC Montalvo MD (07/04 1500)      Fluoroscopic guidance provided for surgical procedure. Please refer to the separate procedure notes for additional details. Localization procedure was performed, with the OR notified of the level at approximately 12:37 p.m. on 7/4/2023 via telephone conversation with the OR x-ray technologist .      Additional images were obtained after level verification and are present for evaluation. Workstation performed: [id number]         MRI cervical spine wo contrast   Final Result by Vaibhav Black MD (07/03 2214)         1. Traumatic disc injury at C4-C5 where there is anterior disc injury (mild separation of the superior C4-C5 disc from the inferior endplate of the C4 vertebral body) with prevertebral fluid from C2-C5. 2. Cord edema at C4-C5 likely traumatic as this is the site of injury, rather than spondylitic myelopathy but needs to be correlated clinically. I personally discussed this study with Dr. Berny Bell on 7/3/2023 10:13 PM.                  Workstation performed: [id number]         XR spine cervical 2 or 3 vw injury    (Results Pending)       PLAN / RECOMMENDATIONS      Acute Hypoxic Respiratory Failure:  Status postintubation 7/6. Now on 3 L nasal cannula. History of MELIA.   7/10 CXR-- pulmonary vascular congestion with pleural parenchymal densities at right lung base. · Consider follow-up x-ray  · Wean O2 per primary team.  · Order CBC; monitor temperature curve. CKD stage IV  Seen by nephrologist Dr. Robbie OBANDO. Etiology known due to hypertensive nephrosclerosis and advanced age. Baseline CR 1.90-2. 40. Admission creatinine 1.87; now 2.11. Burkett in place. · Avoid nephrotoxic medications  · Strict I's and O's  · Daily weights  · F/U BMP    Volume Status:  Per trauma patient was fluid overloaded past few days with crackles in lungs on exam.  Status post Lasix 40 mg x 3 past 2 days. Today on exam patient euvolemic; no JVD, no crackles on lungs, no peripheral edema, no fluid wave on abdomen. Electrolytes:  K 5.8->5.7  K 4.0 on admission 7/3. Has been increasing over past several days. S/p Lasix 40mg x 3 Diuril x 1  · F/u EKG this am  · Lokelma 10mg TID x 48hrs  · Lasix 20mg BID  · Consider emergent HD if potassium continues to rise greater than 6.0. Or worsening volume status. A/B  Cl 113; sleepy appearance in room this am. Hx MELIA  · Order VBG    H/H/Anemia  History of chronic anemia likely due to chronic kidney disease. Baseline hemoglobin 12.8- 11.0.  · Consider ordering CBC     Lesli Bob DO, PGY-I  Nephrology  7/14/2023      Thank you for allowing me to participate in the care of [employer]. Please don't hesitate to call, text, email, or [employer] with any questions. This text is generated with voice recognition software. There may be translation, syntax,  or grammatical errors. If you have any questions, please contact the dictating provider.

## 2023-07-14 NOTE — PHYSICAL THERAPY NOTE
Physical Therapy Progress Note     07/14/23 1130   PT Last Visit   PT Visit Date 07/14/23   Note Type   Note Type Treatment   Pain Assessment   Pain Score No Pain   Restrictions/Precautions   Other Precautions Fall Risk;Pain;Spinal precautions; Chair Alarm;Multiple lines  (Burkett catheter)   Subjective   Subjective pt encountered seated in reclienr. RN reports he slept in recliner part of night due to being uncomfortable in bed. Pt reports chronic pain in L hip that has be worse with limited mobility during hospital stay. Granddaughter present & confrimed the same. Pt denies pain in neck or referred from incision site. Transfers   Sit to Stand 2  Maximal assistance   Additional items Assist x 2  (inconsistant mod-max A x2)   Stand to Sit 3  Moderate assistance   Additional items Assist x 2   Balance   Static Sitting Fair -   Static Standing Poor   Dynamic Standing Poor   Activity Tolerance   Activity Tolerance Patient tolerated treatment well;Patient limited by fatigue   Assessment   Prognosis Guarded   Problem List Decreased strength;Decreased endurance; Impaired balance;Decreased mobility; Impaired sensation   Assessment pt participated in session primarily consisting of functioanl transfer training & trialing use of platform RW to improve pt's ability to self assist himelf in standing to reduce requisite support to do so.  he performed transfers with improved proficiency during session, but demosntrates most difficulty attaining & maintaining hip & thoracic extension in stance. He was able to place both UEs onto troughs of platform RW without need for assist during 2nd trial, but requires assist to remove them prior to sitting to prevent incidental shoulder stretching & pain. Unable to progress to gait today, and may be more appropriate to do so with bilateral UE support initially to determine LE stability prior to doing so with AD.   PT POC & d/c recommendations remain appropriate at this time to progress to highest level of funcitonal independence & reduce long term burden of care. Goals   Patient Goals to get some sleep tonight   STG Expiration Date 07/21/23   PT Treatment Day 2   Plan   Treatment/Interventions Functional transfer training;LE strengthening/ROM; Therapeutic exercise; Endurance training;Patient/family training;Equipment eval/education; Bed mobility;Gait training   Recommendation   PT Discharge Recommendation Post acute rehabilitation services   Equipment Recommended Wheelchair   AM-PAC Basic Mobility Inpatient   Turning in Flat Bed Without Bedrails 2   Lying on Back to Sitting on Edge of Flat Bed Without Bedrails 2   Moving Bed to Chair 1   Standing Up From Chair Using Arms 1   Walk in Room 1   Climb 3-5 Stairs With Railing 1   Basic Mobility Inpatient Raw Score 8   Turning Head Towards Sound 3   Follow Simple Instructions 3   Low Function Basic Mobility Raw Score  14   Low Function Basic Mobility Standardized Score  22.01   Highest Level Of Mobility   JH-HLM Goal 3: Sit at edge of bed   JH-HLM Achieved 5: Stand (1 or more minutes)       Yadiel Leonard PTA    [facility] Basic Mobility Raw Score less than 17 suggests pt would benefit from post acute rehab. Please also refer to the recommendation of the Physical Therapist for safe discharge planning.

## 2023-07-14 NOTE — CASE MANAGEMENT
Case Management Discharge Planning Note    Patient name Jamas Riedel  Location [address] 634/[facility] 029-71 MRN [id number]  : 1934 Date 2023       Current Admission Date: 7/3/2023  Current Admission Diagnosis:Central cord syndrome [facility])   Patient Active Problem List    Diagnosis Date Noted   • Hyperkalemia 2023   • Acute respiratory failure with hypoxia ([address]) 2023   • C3 spinal cord injury ([address]) 2023   • Central cord syndrome ([address]) 2023   • Weakness of both upper extremities 2023   • Fall 2023   • Proteinuria 2022   • Stage 4 chronic kidney disease ([address]) 2022   • Hypertriglyceridemia 2022   • Low HDL (under 40) 2020   • Nephrolithiasis 2018   • Severe obesity (BMI 35.0-39. 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      LOS (days): 11  Geometric Mean LOS (GMLOS) (days): 9.80  Days to GMLOS:-1     OBJECTIVE:  Risk of Unplanned Readmission Score: 24.14         Current admission status: Inpatient   Preferred Pharmacy:   [facility] ([address] [facility] [address] [place] [address] [address] [place] [address]  Phone: [phone] Fax: [phone]    [facility] [address] [place], [place] - [address]  [address] [facility] [address]  Phone: [phone] Fax: [phone] Delivery ([facility] ) - [address]  [address] [facility]  Phone: [phone] Fax: [phone]    Primary Care Provider: Yesika Thao DO    Primary Insurance: [facility] REP  Secondary Insurance:     DISCHARGE DETAILS:    Pt not medically stable for d/c today.  CM informed [facility]. They're aware. Plan for therapy to see on Saturday and then have auth updated on Monday.

## 2023-07-14 NOTE — ASSESSMENT & PLAN NOTE
- Transferred to [facility] to rule out central cord disorder s/p fall down stairs  - Continued bilateral shoulder pain and weakness in bilateral upper extremities (cannot raise arms off the bed) and complaining of neck pain  - Pt reports continued weakness in bilateral LE as well  - CT Head, C-spine, CAP negative for acute traumatic injuries  - MRI with area of cord edema consistent with central cord syndrome  - Neurosurgery consulted and signed off with 2 week follow up (around 7/24)  - S/p decadron 2 mg Q8H for 72 hours  - s/p MAP>90 goal for 5 days in ICU  - s/p Posterior C4-5 laminectomy, C3-6 fixation/fusion on 7/4  - RONY drain removed on 7/7  - Pain control  - DVT ppx  - PT and OT recommend rehab

## 2023-07-14 NOTE — ASSESSMENT & PLAN NOTE
Lab Results   Component Value Date    EGFR 26 07/14/2023    EGFR 28 07/13/2023    EGFR 29 07/13/2023    CREATININE 2.11 (H) 07/14/2023    CREATININE 2.03 (H) 07/13/2023    CREATININE 1.96 (H) 07/13/2023     - Per nephrology recommendations, switch home 20 mg furosemide to 20 mg toresemide daily  - Continue to monitor

## 2023-07-14 NOTE — PLAN OF CARE
Problem: MOBILITY - ADULT  Goal: Maintain or return to baseline ADL function  Description: INTERVENTIONS:  -  Assess patient's ability to carry out ADLs; assess patient's baseline for ADL function and identify physical deficits which impact ability to perform ADLs (bathing, care of mouth/teeth, toileting, grooming, dressing, etc.)  - Assess/evaluate cause of self-care deficits   - Assess range of motion  - Assess patient's mobility; develop plan if impaired  - Assess patient's need for assistive devices and provide as appropriate  - Encourage maximum independence but intervene and supervise when necessary  - Involve family in performance of ADLs  - Assess for home care needs following discharge   - Consider OT consult to assist with ADL evaluation and planning for discharge  - Provide patient education as appropriate  Outcome: Progressing  Goal: Maintains/Returns to pre admission functional level  Description: INTERVENTIONS:  - Perform BMAT or MOVE assessment daily.   - Set and communicate daily mobility goal to care team and patient/family/caregiver. - Collaborate with rehabilitation services on mobility goals if consulted  - Perform Range of Motion 3 times a day. - Reposition patient every 2 hours.   - Dangle patient 3 times a day  - Stand patient 3 times a day  - Ambulate patient 3 times a day  - Out of bed to chair 3 times a day   - Out of bed for meals 3 times a day  - Out of bed for toileting  - Record patient progress and toleration of activity level   Outcome: Progressing     Problem: PAIN - ADULT  Goal: Verbalizes/displays adequate comfort level or baseline comfort level  Description: Interventions:  - Encourage patient to monitor pain and request assistance  - Assess pain using appropriate pain scale  - Administer analgesics based on type and severity of pain and evaluate response  - Implement non-pharmacological measures as appropriate and evaluate response  - Consider cultural and social influences on pain and pain management  - Notify physician/advanced practitioner if interventions unsuccessful or patient reports new pain  Outcome: Progressing     Problem: INFECTION - ADULT  Goal: Absence or prevention of progression during hospitalization  Description: INTERVENTIONS:  - Assess and monitor for signs and symptoms of infection  - Monitor lab/diagnostic results  - Monitor all insertion sites, i.e. indwelling lines, tubes, and drains  - Monitor endotracheal if appropriate and nasal secretions for changes in amount and color  - [place] appropriate cooling/warming therapies per order  - Administer medications as ordered  - Instruct and encourage patient and family to use good hand hygiene technique  - Identify and instruct in appropriate isolation precautions for identified infection/condition  Outcome: Progressing  Goal: Absence of fever/infection during neutropenic period  Description: INTERVENTIONS:  - Monitor WBC    Outcome: Progressing     Problem: SAFETY ADULT  Goal: Maintain or return to baseline ADL function  Description: INTERVENTIONS:  -  Assess patient's ability to carry out ADLs; assess patient's baseline for ADL function and identify physical deficits which impact ability to perform ADLs (bathing, care of mouth/teeth, toileting, grooming, dressing, etc.)  - Assess/evaluate cause of self-care deficits   - Assess range of motion  - Assess patient's mobility; develop plan if impaired  - Assess patient's need for assistive devices and provide as appropriate  - Encourage maximum independence but intervene and supervise when necessary  - Involve family in performance of ADLs  - Assess for home care needs following discharge   - Consider OT consult to assist with ADL evaluation and planning for discharge  - Provide patient education as appropriate  Outcome: Progressing  Goal: Maintains/Returns to pre admission functional level  Description: INTERVENTIONS:  - Perform BMAT or MOVE assessment daily.   - Set and communicate daily mobility goal to care team and patient/family/caregiver. - Collaborate with rehabilitation services on mobility goals if consulted  - Perform Range of Motion 3 times a day. - Reposition patient every 2 hours.   - Dangle patient 3 times a day  - Stand patient 3 times a day  - Ambulate patient 3 times a day  - Out of bed to chair 3 times a day   - Out of bed for meals 3 times a day  - Out of bed for toileting  - Record patient progress and toleration of activity level   Outcome: Progressing  Goal: Patient will remain free of falls  Description: INTERVENTIONS:  - Educate patient/family on patient safety including physical limitations  - Instruct patient to call for assistance with activity   - Consult OT/PT to assist with strengthening/mobility   - Keep Call bell within reach  - Keep bed low and locked with side rails adjusted as appropriate  - Keep care items and personal belongings within reach  - Initiate and maintain comfort rounds  - Make Fall Risk Sign visible to staff  - Apply yellow socks and bracelet for high fall risk patients  - Consider moving patient to room near nurses station  Outcome: Progressing     Problem: DISCHARGE PLANNING  Goal: Discharge to home or other facility with appropriate resources  Description: INTERVENTIONS:  - Identify barriers to discharge w/patient and caregiver  - Arrange for needed discharge resources and transportation as appropriate  - Identify discharge learning needs (meds, wound care, etc.)  - Arrange for interpretive services to assist at discharge as needed  - Refer to Case Management Department for coordinating discharge planning if the patient needs post-hospital services based on physician/advanced practitioner order or complex needs related to functional status, cognitive ability, or social support system  Outcome: Progressing     Problem: Knowledge Deficit  Goal: Patient/family/caregiver demonstrates understanding of disease process, treatment plan, medications, and discharge instructions  Description: Complete learning assessment and assess knowledge base. Interventions:  - Provide teaching at level of understanding  - Provide teaching via preferred learning methods  Outcome: Progressing     Problem: NEUROSENSORY - ADULT  Goal: Achieves stable or improved neurological status  Description: INTERVENTIONS  - Monitor and report changes in neurological status  - Monitor vital signs such as temperature, blood pressure, glucose, and any other labs ordered   - Initiate measures to prevent increased intracranial pressure  - Monitor for seizure activity and implement precautions if appropriate      Outcome: Progressing  Goal: Remains free of injury related to seizures activity  Description: INTERVENTIONS  - Maintain airway, patient safety  and administer oxygen as ordered  - Monitor patient for seizure activity, document and report duration and description of seizure to physician/advanced practitioner  - If seizure occurs,  ensure patient safety during seizure  - Reorient patient post seizure  - Seizure pads on all 4 side rails  - Instruct patient/family to notify RN of any seizure activity including if an aura is experienced  - Instruct patient/family to call for assistance with activity based on nursing assessment  - Administer anti-seizure medications if ordered    Outcome: Progressing  Goal: Achieves maximal functionality and self care  Description: INTERVENTIONS  - Monitor swallowing and airway patency with patient fatigue and changes in neurological status  - Encourage and assist patient to increase activity and self care.    - Encourage visually impaired, hearing impaired and aphasic patients to use assistive/communication devices  Outcome: Progressing     Problem: Nutrition/Hydration-ADULT  Goal: Nutrient/Hydration intake appropriate for improving, restoring or maintaining nutritional needs  Description: Monitor and assess patient's nutrition/hydration status for malnutrition. Collaborate with interdisciplinary team and initiate plan and interventions as ordered. Monitor patient's weight and dietary intake as ordered or per policy. Utilize nutrition screening tool and intervene as necessary. Determine patient's food preferences and provide high-protein, high-caloric foods as appropriate.      INTERVENTIONS:  - Monitor oral intake, urinary output, labs, and treatment plans  - Assess nutrition and hydration status and recommend course of action  - Evaluate amount of meals eaten  - Assist patient with eating if necessary   - Allow adequate time for meals  - Recommend/ encourage appropriate diets, oral nutritional supplements, and vitamin/mineral supplements  - Order, calculate, and assess calorie counts as needed  - Recommend, monitor, and adjust tube feedings and TPN/PPN based on assessed needs  - Assess need for intravenous fluids  - Provide specific nutrition/hydration education as appropriate  - Include patient/family/caregiver in decisions related to nutrition  Outcome: Progressing     Problem: SAFETY,RESTRAINT: NV/NON-SELF DESTRUCTIVE BEHAVIOR  Goal: Remains free of harm/injury (restraint for non violent/non self-detsructive behavior)  Description: INTERVENTIONS:  - Instruct patient/family regarding restraint use   - Assess and monitor physiologic and psychological status   - Provide interventions and comfort measures to meet assessed patient needs   - Identify and implement measures to help patient regain control  - Assess readiness for release of restraint   Outcome: Progressing  Goal: Returns to optimal restraint-free functioning  Description: INTERVENTIONS:  - Assess the patient's behavior and symptoms that indicate continued need for restraint  - Identify and implement measures to help patient regain control  - Assess readiness for release of restraint   Outcome: Progressing     Problem: Prexisting or High Potential for Compromised Skin Integrity  Goal: Skin integrity is maintained or improved  Description: INTERVENTIONS:  - Identify patients at risk for skin breakdown  - Assess and monitor skin integrity  - Assess and monitor nutrition and hydration status  - Monitor labs   - Assess for incontinence   - Turn and reposition patient  - Assist with mobility/ambulation  - Relieve pressure over bony prominences  - Avoid friction and shearing  - Provide appropriate hygiene as needed including keeping skin clean and dry  - Evaluate need for skin moisturizer/barrier cream  - Collaborate with interdisciplinary team   - Patient/family teaching  - Consider wound care consult   Outcome: Progressing

## 2023-07-14 NOTE — OCCUPATIONAL THERAPY NOTE
Occupational Therapy Treatment Note     07/14/23 1131   OT Last Visit   OT Visit Date 07/14/23   Note Type   Note Type Treatment   Pain Assessment   Pain Assessment Tool 0-10   Pain Score No Pain   Restrictions/Precautions   Weight Bearing Precautions Per Order No   Lifestyle   Autonomy I adls and mobility - i iadls - shares homemaking with spouse   Reciprocal Relationships supportive family   Service to Others retired   Intrinsic Gratification mostly sedentary   ADL   Where Assessed Chair   LB Dressing Assistance 1  Total Assistance   LB Dressing Deficit Thread RLE into pants; Thread LLE into pants;Pull up over hips   Transfers   Sit to Stand 2  Maximal assistance   Additional items Assist x 2   Stand to Sit 3  Moderate assistance   Additional items Assist x 2   Therapeutic Exercise - ROM   UE-ROM Yes   ROM- Right Upper Extremities   R Shoulder AAROM   R Elbow AAROM;Elbow flexion;Elbow extension   R Weight/Reps/Sets 10x 1 set   ROM - Left Upper Extremities    L Shoulder AAROM; Flexion   L Elbow AAROM;Elbow flexion;Elbow extension   L Weight/Reps/Sets 10 x 1 set   Subjective   Subjective pt stated "I slept here" in the recliner. Cognition   Overall Cognitive Status WFL   Arousal/Participation Responsive; Cooperative   Attention Attends with cues to redirect   Orientation Level Oriented to person;Oriented to time;Oriented to situation   Memory Decreased recall of precautions;Decreased recall of recent events   Following Commands Follows one step commands without difficulty   Activity Tolerance   Activity Tolerance Patient tolerated treatment well   Assessment   Assessment Pt seen for participation in Occupational Therapy session with focus on activity tolerance, functional transfers/standing balance and tolerance and balance for pt engagement in LB self-care tasks. Pt cleared by Seema Clay for pt participated in therapy session.  Pt presented sitting out of bed to bedside chair and agreeable to participate in therapy following pt identifiers confirmed. Pt reported that he had slept overnight in the hospital recliner chair  Pt reported that he had a difficult time sleeping and discussed with pt techniques to improve sleep. Pt able to verbalize good understanding. Pt required assist for x 2 for standing balance utilizing b/l platform RW. He requires max assist for LB self-care 2* pt balance, coordination and decreased overall strength. Pt will require post acute rehab service to continue to address these above noted pt deficit which currently impair pt ADL and functional mob.  Pt set up to bedside chair post session, chair alarm activated and all needs within pt reach   Plan   Treatment Interventions ADL retraining   Goal Expiration Date 07/21/23   OT Treatment Day 3   OT Frequency 3-5x/wk   Recommendation   OT Discharge Recommendation Post acute rehabilitation services   AM-PAC Daily Activity Inpatient   Lower Body Dressing 1   Bathing 1   Toileting 1   Upper Body Dressing 2   Grooming 2   Eating 2   Daily Activity Raw Score 9   Turning Head Towards Sound 3   Follow Simple Instructions 4   Low Function Daily Activity Raw Score 16   Low Function Daily Activity Standardized Score  27.65   AM-PAC Applied Cognition Inpatient   Following a Speech/Presentation 3   Understanding Ordinary Conversation 4   Taking Medications 4   Remembering Where Things Are Placed or Put Away 4   Remembering List of 4-5 Errands 4   Taking Care of Complicated Tasks 3   Applied Cognition Raw Score 22   Applied Cognition Standardized Score 47.83         Ryan SANTIAGO/BETO

## 2023-07-14 NOTE — PLAN OF CARE
Problem: PHYSICAL THERAPY ADULT  Goal: Performs mobility at highest level of function for planned discharge setting. See evaluation for individualized goals. Description: Treatment/Interventions: Functional transfer training, LE strengthening/ROM, Therapeutic exercise, Endurance training, Patient/family training, Equipment eval/education, Bed mobility, Gait training, Compensatory technique education, Continued evaluation          See flowsheet documentation for full assessment, interventions and recommendations. Outcome: Progressing  Note: Prognosis: Guarded  Problem List: Decreased strength, Decreased endurance, Impaired balance, Decreased mobility, Impaired sensation  Assessment: pt participated in session primarily consisting of functioanl transfer training & trialing use of platform RW to improve pt's ability to self assist himelf in standing to reduce requisite support to do so.  he performed transfers with improved proficiency during session, but demosntrates most difficulty attaining & maintaining hip & thoracic extension in stance. He was able to place both UEs onto troughs of platform RW without need for assist during 2nd trial, but requires assist to remove them prior to sitting to prevent incidental shoulder stretching & pain. Unable to progress to gait today, and may be more appropriate to do so with bilateral UE support initially to determine LE stability prior to doing so with AD. PT POC & d/c recommendations remain appropriate at this time to progress to highest level of funcitonal independence & reduce long term burden of care. PT Discharge Recommendation: Post acute rehabilitation services    See flowsheet documentation for full assessment.

## 2023-07-14 NOTE — ASSESSMENT & PLAN NOTE
- patient continuing to require supplemental O2 to maintain O2 sats>92%  - on 20 mg furosemide daily per home medication  - switch to 20 mg torsemide daily on 7/14 as per nephro recommendation  - s/p 40 mg IV furosemide on 7/12   - s/p 40 mg furosemide x2 and 5 mg metolazone 7/13  - chest radiograph today to eval for pneumonia

## 2023-07-14 NOTE — ASSESSMENT & PLAN NOTE
- patient slowly developing hyperkalemia during admission despite 20 mg furosemide daily  - on review of medications, subQ heparin appears to be the only medication that could cause his hyperkalemia  - no significant change with d/c subQ heparin and switching to eliquis on 7/13, subQ heparin was resumed on 7/14  - nephrology consulted with recs to switch to 20 mg torsemide daily and 10 mg lokelma once on 7/14  - continue diuresis to monitor hyperkalemia

## 2023-07-14 NOTE — PLAN OF CARE
Problem: MOBILITY - ADULT  Goal: Maintain or return to baseline ADL function  Description: INTERVENTIONS:  -  Assess patient's ability to carry out ADLs; assess patient's baseline for ADL function and identify physical deficits which impact ability to perform ADLs (bathing, care of mouth/teeth, toileting, grooming, dressing, etc.)  - Assess/evaluate cause of self-care deficits   - Assess range of motion  - Assess patient's mobility; develop plan if impaired  - Assess patient's need for assistive devices and provide as appropriate  - Encourage maximum independence but intervene and supervise when necessary  - Involve family in performance of ADLs  - Assess for home care needs following discharge   - Consider OT consult to assist with ADL evaluation and planning for discharge  - Provide patient education as appropriate  Outcome: Progressing  Goal: Maintains/Returns to pre admission functional level  Description: INTERVENTIONS:  - Perform BMAT or MOVE assessment daily.   - Set and communicate daily mobility goal to care team and patient/family/caregiver. - Collaborate with rehabilitation services on mobility goals if consulted  - Perform Range of Motion 3 times a day. - Reposition patient every 2 hours.   - Dangle patient 3 times a day  - Stand patient 3 times a day  - Ambulate patient 3 times a day  - Out of bed to chair 3 times a day   - Out of bed for meals 3 times a day  - Out of bed for toileting  - Record patient progress and toleration of activity level   Outcome: Progressing     Problem: PAIN - ADULT  Goal: Verbalizes/displays adequate comfort level or baseline comfort level  Description: Interventions:  - Encourage patient to monitor pain and request assistance  - Assess pain using appropriate pain scale  - Administer analgesics based on type and severity of pain and evaluate response  - Implement non-pharmacological measures as appropriate and evaluate response  - Consider cultural and social influences on pain and pain management  - Notify physician/advanced practitioner if interventions unsuccessful or patient reports new pain  Outcome: Progressing     Problem: INFECTION - ADULT  Goal: Absence or prevention of progression during hospitalization  Description: INTERVENTIONS:  - Assess and monitor for signs and symptoms of infection  - Monitor lab/diagnostic results  - Monitor all insertion sites, i.e. indwelling lines, tubes, and drains  - Monitor endotracheal if appropriate and nasal secretions for changes in amount and color  - [place] appropriate cooling/warming therapies per order  - Administer medications as ordered  - Instruct and encourage patient and family to use good hand hygiene technique  - Identify and instruct in appropriate isolation precautions for identified infection/condition  Outcome: Progressing  Goal: Absence of fever/infection during neutropenic period  Description: INTERVENTIONS:  - Monitor WBC    Outcome: Progressing     Problem: SAFETY ADULT  Goal: Maintain or return to baseline ADL function  Description: INTERVENTIONS:  -  Assess patient's ability to carry out ADLs; assess patient's baseline for ADL function and identify physical deficits which impact ability to perform ADLs (bathing, care of mouth/teeth, toileting, grooming, dressing, etc.)  - Assess/evaluate cause of self-care deficits   - Assess range of motion  - Assess patient's mobility; develop plan if impaired  - Assess patient's need for assistive devices and provide as appropriate  - Encourage maximum independence but intervene and supervise when necessary  - Involve family in performance of ADLs  - Assess for home care needs following discharge   - Consider OT consult to assist with ADL evaluation and planning for discharge  - Provide patient education as appropriate  Outcome: Progressing  Goal: Maintains/Returns to pre admission functional level  Description: INTERVENTIONS:  - Perform BMAT or MOVE assessment daily.   - Set and communicate daily mobility goal to care team and patient/family/caregiver. - Collaborate with rehabilitation services on mobility goals if consulted  - Perform Range of Motion 3 times a day. - Reposition patient every 2 hours.   - Dangle patient 3 times a day  - Stand patient 3 times a day  - Ambulate patient 3 times a day  - Out of bed to chair 3 times a day   - Out of bed for meals 3 times a day  - Out of bed for toileting  - Record patient progress and toleration of activity level   Outcome: Progressing  Goal: Patient will remain free of falls  Description: INTERVENTIONS:  - Educate patient/family on patient safety including physical limitations  - Instruct patient to call for assistance with activity   - Consult OT/PT to assist with strengthening/mobility   - Keep Call bell within reach  - Keep bed low and locked with side rails adjusted as appropriate  - Keep care items and personal belongings within reach  - Initiate and maintain comfort rounds  - Make Fall Risk Sign visible to staff  - Offer Toileting every 2 Hours, in advance of need  - Initiate/Maintain bed/chair alarm  - Obtain necessary fall risk management equipment  - Apply yellow socks and bracelet for high fall risk patients  - Consider moving patient to room near nurses station  Outcome: Progressing     Problem: DISCHARGE PLANNING  Goal: Discharge to home or other facility with appropriate resources  Description: INTERVENTIONS:  - Identify barriers to discharge w/patient and caregiver  - Arrange for needed discharge resources and transportation as appropriate  - Identify discharge learning needs (meds, wound care, etc.)  - Arrange for interpretive services to assist at discharge as needed  - Refer to Case Management Department for coordinating discharge planning if the patient needs post-hospital services based on physician/advanced practitioner order or complex needs related to functional status, cognitive ability, or social support system  Outcome: Progressing     Problem: Knowledge Deficit  Goal: Patient/family/caregiver demonstrates understanding of disease process, treatment plan, medications, and discharge instructions  Description: Complete learning assessment and assess knowledge base. Interventions:  - Provide teaching at level of understanding  - Provide teaching via preferred learning methods  Outcome: Progressing     Problem: NEUROSENSORY - ADULT  Goal: Achieves stable or improved neurological status  Description: INTERVENTIONS  - Monitor and report changes in neurological status  - Monitor vital signs such as temperature, blood pressure, glucose, and any other labs ordered   - Initiate measures to prevent increased intracranial pressure  - Monitor for seizure activity and implement precautions if appropriate      Outcome: Progressing  Goal: Remains free of injury related to seizures activity  Description: INTERVENTIONS  - Maintain airway, patient safety  and administer oxygen as ordered  - Monitor patient for seizure activity, document and report duration and description of seizure to physician/advanced practitioner  - If seizure occurs,  ensure patient safety during seizure  - Reorient patient post seizure  - Seizure pads on all 4 side rails  - Instruct patient/family to notify RN of any seizure activity including if an aura is experienced  - Instruct patient/family to call for assistance with activity based on nursing assessment  - Administer anti-seizure medications if ordered    Outcome: Progressing  Goal: Achieves maximal functionality and self care  Description: INTERVENTIONS  - Monitor swallowing and airway patency with patient fatigue and changes in neurological status  - Encourage and assist patient to increase activity and self care.    - Encourage visually impaired, hearing impaired and aphasic patients to use assistive/communication devices  Outcome: Progressing     Problem: Nutrition/Hydration-ADULT  Goal: Nutrient/Hydration intake appropriate for improving, restoring or maintaining nutritional needs  Description: Monitor and assess patient's nutrition/hydration status for malnutrition. Collaborate with interdisciplinary team and initiate plan and interventions as ordered. Monitor patient's weight and dietary intake as ordered or per policy. Utilize nutrition screening tool and intervene as necessary. Determine patient's food preferences and provide high-protein, high-caloric foods as appropriate.      INTERVENTIONS:  - Monitor oral intake, urinary output, labs, and treatment plans  - Assess nutrition and hydration status and recommend course of action  - Evaluate amount of meals eaten  - Assist patient with eating if necessary   - Allow adequate time for meals  - Recommend/ encourage appropriate diets, oral nutritional supplements, and vitamin/mineral supplements  - Order, calculate, and assess calorie counts as needed  - Recommend, monitor, and adjust tube feedings and TPN/PPN based on assessed needs  - Assess need for intravenous fluids  - Provide specific nutrition/hydration education as appropriate  - Include patient/family/caregiver in decisions related to nutrition  Outcome: Progressing     Problem: SAFETY,RESTRAINT: NV/NON-SELF DESTRUCTIVE BEHAVIOR  Goal: Remains free of harm/injury (restraint for non violent/non self-detsructive behavior)  Description: INTERVENTIONS:  - Instruct patient/family regarding restraint use   - Assess and monitor physiologic and psychological status   - Provide interventions and comfort measures to meet assessed patient needs   - Identify and implement measures to help patient regain control  - Assess readiness for release of restraint   Outcome: Progressing  Goal: Returns to optimal restraint-free functioning  Description: INTERVENTIONS:  - Assess the patient's behavior and symptoms that indicate continued need for restraint  - Identify and implement measures to help patient regain control  - Assess readiness for release of restraint   Outcome: Progressing     Problem: Prexisting or High Potential for Compromised Skin Integrity  Goal: Skin integrity is maintained or improved  Description: INTERVENTIONS:  - Identify patients at risk for skin breakdown  - Assess and monitor skin integrity  - Assess and monitor nutrition and hydration status  - Monitor labs   - Assess for incontinence   - Turn and reposition patient  - Assist with mobility/ambulation  - Relieve pressure over bony prominences  - Avoid friction and shearing  - Provide appropriate hygiene as needed including keeping skin clean and dry  - Evaluate need for skin moisturizer/barrier cream  - Collaborate with interdisciplinary team   - Patient/family teaching  - Consider wound care consult   Outcome: Progressing

## 2023-07-15 NOTE — PROGRESS NOTES
[address] [facility] [place]  Progress Note  Name: Vu Oscar  MRN: [id number]  Unit/Bed#: [facility] 708-38 I Date of Admission: 7/3/2023   Date of Service: 7/15/2023 I Hospital Day: 12    Assessment/Plan   Acute urinary retention  Assessment & Plan  - attempted void trial on 7/7  - patient retained urine and required replacement of pastor on 7/9  - resumed home flomax on 7/8  - likely exacerbation of baseline BPH and urinary issues with this cervical spinal cord injury  - continue pastor and have outpatient follow up with urology    Hyperkalemia  Assessment & Plan  - patient slowly developing hyperkalemia during admission despite 20 mg furosemide daily  - on review of medications, subQ heparin appears to be the only medication that could cause his hyperkalemia  - no significant change with d/c subQ heparin and switching to eliquis on 7/13, subQ heparin was resumed on 7/14  - nephrology consulted with recs to switch to 20 mg torsemide daily and 10 mg lokelma once on 7/14  - continue diuresis to monitor hyperkalemia    Acute respiratory failure with hypoxia ([address])  Assessment & Plan  - patient continuing to require supplemental O2 to maintain O2 sats>92%  - was on 20 mg furosemide daily per home medication  - switch to 20 mg torsemide daily on 7/14 as per nephro recommendation  - s/p 40 mg IV furosemide on 7/12   - s/p 40 mg furosemide x2 and 5 mg metolazone 7/13  - 7/14 chest radiograph suggestive of right lower pnuemonia vs atelectasis  - 7/15 CT chest today to evaluate for possible parapneumonic effusion with pneumonia vs atelectasis    C3 spinal cord injury ([address])  Assessment & Plan  - secondary to fall  - see weakness of both upper extremities for plan    Fall  Assessment & Plan  - Pt reports fall, posterior head strike, no loss of consciousness, denies use of antiplatelet or anticoagulation medications  - Status post fall with the below noted injuries. - Fall precautions.   - Geriatric Medicine consultation for evaluation, medication review and recommendations.  - PT and OT recommend rehab  - Case Management consultation for disposition planning. Weakness of both upper extremities  Assessment & Plan  - Transferred to \[facility]\"" to rule out central cord disorder s/p fall down stairs  - Continued bilateral shoulder pain and weakness in bilateral upper extremities (cannot raise arms off the bed) and complaining of neck pain  - Pt reports continued weakness in bilateral LE as well  - CT Head, C-spine, CAP negative for acute traumatic injuries  - MRI with area of cord edema consistent with central cord syndrome  - Neurosurgery consulted and signed off with 2 week follow up (around 7/24)  - S/p decadron 2 mg Q8H for 72 hours  - s/p MAP>90 goal for 5 days in ICU  - s/p Posterior C4-5 laminectomy, C3-6 fixation/fusion on 7/4  - RONY drain removed on 7/7  - Pain control  - DVT ppx  - PT and OT recommend rehab    Stage 4 chronic kidney disease [facility])  Assessment & Plan  Lab Results   Component Value Date    EGFR 24 07/15/2023    EGFR 24 07/14/2023    EGFR 26 07/14/2023    CREATININE 2.30 (H) 07/15/2023    CREATININE 2.29 (H) 07/14/2023    CREATININE 2.11 (H) 07/14/2023     - Per nephrology recommendations, switch home 20 mg furosemide to 20 mg toresemide daily  - Continue to monitor    * Central cord syndrome [facility])  Assessment & Plan  - see weakness of both upper extremities for plan             Bowel Regimen: senokot and PEG  VTE Prophylaxis:Heparin     Disposition: rehab pending medical clearance    Subjective   Subjective: patient with no significant complaints today. He is still having a cough and feeling weak. No subjective change in his neurological status. Objective   Vitals:   Temp:  [97.2 °F (36.2 °C)-98.4 °F (36.9 °C)] 97.2 °F (36.2 °C)  HR:  [] 105  Resp:  [17-24] 24  BP: (116-144)/(55-87) 116/57    I/O       07/13 0701  07/14 0700 07/14 0701  07/15 0700 07/15 0701  07/16 0700    P. O. 240 1180 350 Total Intake(mL/kg) 240 (2.3) 1180 (11.5) 350 (3.4)    Urine (mL/kg/hr) 2200 (0.9) 1400 (0.6) 150 (0.4)    Stool 0 0     Total Output 2200 1400 150    Net -1960 -220 +200           Unmeasured Stool Occurrence 1 x 2 x            Physical Exam:   General: awake and alert  CV: RRR  Pulm: decreased breath sounds lower bases  Abd: soft and non-tender  Neuro: follows commands in all extremities, oriented to person, place, time, and situation, decreased  in left hand vs right. Unable to lift right shoulder off pillow. Able to raise left arm to shoulder. : pastor present  Skin: no new rashes    Invasive Devices     Peripheral Intravenous Line  Duration           Peripheral IV 07/12/23 Right Wrist 3 days          Drain  Duration           Urethral Catheter 16 Fr. 5 days                      Lab Results:   Results: I have personally reviewed all pertinent laboratory/tests results, BMP/CMP:   Lab Results   Component Value Date    SODIUM 142 07/15/2023    K 5.1 07/15/2023     (H) 07/15/2023    CO2 31 07/15/2023    BUN 88 (H) 07/15/2023    CREATININE 2.30 (H) 07/15/2023    CALCIUM 8.9 07/15/2023    EGFR 24 07/15/2023    and CBC:   Lab Results   Component Value Date    WBC 13.38 (H) 07/15/2023    HGB 10.6 (L) 07/15/2023    HCT 35.3 (L) 07/15/2023    MCV 97 07/15/2023     07/15/2023    RBC 3.65 (L) 07/15/2023    MCH 29.0 07/15/2023    MCHC 30.0 (L) 07/15/2023    RDW 14.1 07/15/2023    MPV 10.1 07/15/2023    NRBC 0 07/15/2023     Imaging: I have personally reviewed pertinent reports.    right lower pneumonia with questionable effusion  Other Studies: n/a

## 2023-07-15 NOTE — ASSESSMENT & PLAN NOTE
- patient continuing to require supplemental O2 to maintain O2 sats>92%  - was on 20 mg furosemide daily per home medication  - switch to 20 mg torsemide daily on 7/14 as per nephro recommendation  - s/p 40 mg IV furosemide on 7/12   - s/p 40 mg furosemide x2 and 5 mg metolazone 7/13  - 7/14 chest radiograph suggestive of right lower pnuemonia vs atelectasis  - 7/15 CT chest today to evaluate for possible parapneumonic effusion with pneumonia vs atelectasis

## 2023-07-15 NOTE — ASSESSMENT & PLAN NOTE
- Transferred to [facility] to rule out central cord disorder s/p fall down stairs  - Continued bilateral shoulder pain and weakness in bilateral upper extremities (cannot raise arms off the bed) and complaining of neck pain  - Pt reports continued weakness in bilateral LE as well  - CT Head, C-spine, CAP negative for acute traumatic injuries  - MRI with area of cord edema consistent with central cord syndrome  - Neurosurgery consulted and signed off with 2 week follow up (around 7/24)  - S/p decadron 2 mg Q8H for 72 hours  - s/p MAP>90 goal for 5 days in ICU  - s/p Posterior C4-5 laminectomy, C3-6 fixation/fusion on 7/4  - RONY drain removed on 7/7  - Pain control  - DVT ppx  - PT and OT recommend rehab

## 2023-07-15 NOTE — ASSESSMENT & PLAN NOTE
Lab Results   Component Value Date    EGFR 24 07/15/2023    EGFR 24 07/14/2023    EGFR 26 07/14/2023    CREATININE 2.30 (H) 07/15/2023    CREATININE 2.29 (H) 07/14/2023    CREATININE 2.11 (H) 07/14/2023     - Per nephrology recommendations, switch home 20 mg furosemide to 20 mg toresemide daily  - Continue to monitor

## 2023-07-15 NOTE — PROGRESS NOTES
NEPHROLOGY PROGRESS NOTE   Brandon Merchant 80 y.o. male MRN: [id number]  Unit/Bed#: [facility] 634-01 Encounter: [id number]  Reason for Consult: Chronic kidney disease, hyperkalemia    ASSESSMENT/PLAN:  1. Chronic kidney disease, stage IV, baseline creatinine approximately 2.0, currently within previous baseline  2. Hyperkalemia, multifactorial given underlying CKD. 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 Stable with medical management. 3. Hypertension blood pressure currently appears stable. 4. Central cord syndrome status post surgical intervention  5. Anemia of chronic disease hemoglobin appears stable at 10.6. 6. Cardiomyopathy with abnormal diastolic function. Ejection fraction of 50%. 7. History of ulcerative colitis. PLAN:  · Overall renal function remained stable with a creatinine to 2.3.  · Continue with current diuretic regimen  · Potassium remains fairly stable at 5.1. May need to consider continuing phosphate binders. SUBJECTIVE:  Family at bedside. Patient resting comfortably. Noted fatigue and tiredness today. Does not appear short of breath. Review of Systems    OBJECTIVE:  Current Weight: Weight - Scale: 100 kg (220 lb 14.4 oz)  Vitals:    07/15/23 0600 07/15/23 0718 07/15/23 0923 07/15/23 1049   BP:  116/57  116/60   Pulse:  96 105 99   Resp:  20 (!) 24 (!) 24   Temp:  (!) 97.2 °F (36.2 °C)  98 °F (36.7 °C)   TempSrc:       SpO2:  95% 93% 92%   Weight: 100 kg (220 lb 14.4 oz)      Height:           Intake/Output Summary (Last 24 hours) at 7/15/2023 1125  Last data filed at 7/15/2023 8523  Gross per 24 hour   Intake 1130 ml   Output 1550 ml   Net -420 ml       Physical Exam  Constitutional:       Appearance: He is not ill-appearing. Cardiovascular:      Rate and Rhythm: Normal rate and regular rhythm. Pulmonary:      Effort: Pulmonary effort is normal.      Breath sounds: Normal breath sounds. Abdominal:      General: There is no distension. Palpations: Abdomen is soft.    Musculoskeletal: Right lower leg: No edema. Left lower leg: No edema. Skin:     General: Skin is warm and dry. Findings: No rash. Neurological:      Mental Status: He is alert.          Medications:    Current Facility-Administered Medications:   •  acetaminophen (TYLENOL) tablet 975 mg, 975 mg, Oral, Q8H [address], Dell Wheeler MD, 975 mg at 07/15/23 0620  •  bisacodyl (DULCOLAX) rectal suppository 10 mg, 10 mg, Rectal, Daily PRN, Dell Wheeler MD  •  cefepime (MAXIPIME) 1,000 mg in dextrose 5 % 50 mL IVPB, 1,000 mg, Intravenous, Q12H, Bambi Wyatt MD, Last Rate: 100 mL/hr at 07/15/23 1053, 1,000 mg at 51/79/47 5251  •  folic acid (FOLVITE) tablet 1 mg, 1 mg, Oral, Daily, Dell Wheeler MD, 1 mg at 07/15/23 0904  •  guaiFENesin (MUCINEX) 12 hr tablet 600 mg, 600 mg, Oral, Q12H [address], Dell Wheeler MD, 600 mg at 07/15/23 0904  •  heparin (porcine) subcutaneous injection 5,000 Units, 5,000 Units, Subcutaneous, Q8H [address], Bambi Wyatt MD, 5,000 Units at 07/15/23 8737  •  lidocaine (LIDODERM) 5 % patch 1 patch, 1 patch, Topical, Daily, Dell Wheeler MD, 1 patch at 07/11/23 2109  •  melatonin tablet 6 mg, 6 mg, Oral, HS, Dell Wheeler MD, 6 mg at 07/14/23 2147  •  methocarbamol (ROBAXIN) tablet 250 mg, 250 mg, Oral, Q6H PRN, Dell Wheeler MD, 250 mg at 07/11/23 2111  •  ondansetron (ZOFRAN) injection 4 mg, 4 mg, Intravenous, Q6H PRN, Dell Wheeler MD  •  oxyCODONE (ROXICODONE) split tablet 2.5 mg, 2.5 mg, Oral, Q4H PRN, Dell Wheeler MD, 2.5 mg at 07/11/23 1507  •  polyethylene glycol (MIRALAX) packet 17 g, 17 g, Oral, Daily, Dell Wheeler MD, 17 g at 07/15/23 0904  •  senna-docusate sodium (SENOKOT S) 8.6-50 mg per tablet 1 tablet, 1 tablet, Oral, BID, Dell Wheeler MD, 1 tablet at 07/15/23 6242  •  sulfaSALAzine (AZULFIDINE) tablet 1,000 mg, 1,000 mg, Oral, Daily, Dell Wheeler MD, 1,000 mg at 07/14/23 1327  •  sulfaSALAzine (AZULFIDINE) tablet 500 mg, 500 mg, Oral, BID, Dell Wheeler MD, 500 mg at 07/15/23 0905  •  tamsulosin (FLOMAX) capsule 0.4 mg, 0.4 mg, Oral, Daily, America Hernandez MD, 0.4 mg at 07/15/23 8733  •  torsemide [facility]) tablet 20 mg, 20 mg, Oral, Daily, Rossana Peterson MD, 20 mg at 07/15/23 0904  •  vancomycin (VANCOCIN) 2,000 mg in sodium chloride 0.9 % 500 mL IVPB, 25 mg/kg (Adjusted), Intravenous, Once, Beatriz Monday, MD    Laboratory Results:  Results from last 7 days   Lab Units 07/15/23  0643 07/14/23  1540 07/14/23  0634 07/13/23  1444 07/13/23  0449 07/13/23  0448 07/12/23  0957 07/11/23  0423 07/10/23  0522 07/09/23  0523   WBC Thousand/uL 13.38*  --   --   --   --  8.51 7.93 6.85  --  8.11   HEMOGLOBIN g/dL 10.6*  --   --   --   --  10.7* 10.9* 10.2*  --  9.7*   HEMATOCRIT % 35.3*  --   --   --   --  36.2* 35.8* 33.9*  --  32.1*   PLATELETS Thousands/uL 210  --   --   --   --  241 240 239  --  251   POTASSIUM mmol/L 5.1 4.7 5.7* 5.8* 5.7*  --  5.5* 5.5*   < > 5.3   CHLORIDE mmol/L 111* 111* 113* 113* 116*  --  115* 116*   < > 113*   CO2 mmol/L 31 30 29 27 28  --  29 31   < > 26   BUN mg/dL 88* 86* 82* 79* 80*  --  84* 92*   < > 82*   CREATININE mg/dL 2.30* 2.29* 2.11* 2.03* 1.96*  --  2.06* 2.31*   < > 2.37*   CALCIUM mg/dL 8.9 8.6 8.9 9.2 8.9  --  8.8 8.3   < > 8.3   MAGNESIUM mg/dL 2.4  --   --   --   --   --  2.7*  --   --  2.3   PHOSPHORUS mg/dL  --   --   --   --   --   --  3.6  --   --  4.9*    < > = values in this interval not displayed.

## 2023-07-15 NOTE — PLAN OF CARE
Problem: MOBILITY - ADULT  Goal: Maintain or return to baseline ADL function  Description: INTERVENTIONS:  -  Assess patient's ability to carry out ADLs; assess patient's baseline for ADL function and identify physical deficits which impact ability to perform ADLs (bathing, care of mouth/teeth, toileting, grooming, dressing, etc.)  - Assess/evaluate cause of self-care deficits   - Assess range of motion  - Assess patient's mobility; develop plan if impaired  - Assess patient's need for assistive devices and provide as appropriate  - Encourage maximum independence but intervene and supervise when necessary  - Involve family in performance of ADLs  - Assess for home care needs following discharge   - Consider OT consult to assist with ADL evaluation and planning for discharge  - Provide patient education as appropriate  Outcome: Progressing  Goal: Maintains/Returns to pre admission functional level  Description: INTERVENTIONS:  - Perform BMAT or MOVE assessment daily.   - Set and communicate daily mobility goal to care team and patient/family/caregiver. - Collaborate with rehabilitation services on mobility goals if consulted  - Perform Range of Motion 3 times a day. - Reposition patient every 2 hours.   - Dangle patient 3 times a day  - Stand patient 3 times a day  - Ambulate patient 3 times a day  - Out of bed to chair 3 times a day   - Out of bed for meals 3 times a day  - Out of bed for toileting  - Record patient progress and toleration of activity level   Outcome: Progressing     Problem: PAIN - ADULT  Goal: Verbalizes/displays adequate comfort level or baseline comfort level  Description: Interventions:  - Encourage patient to monitor pain and request assistance  - Assess pain using appropriate pain scale  - Administer analgesics based on type and severity of pain and evaluate response  - Implement non-pharmacological measures as appropriate and evaluate response  - Consider cultural and social influences on pain and pain management  - Notify physician/advanced practitioner if interventions unsuccessful or patient reports new pain  Outcome: Progressing     Problem: INFECTION - ADULT  Goal: Absence or prevention of progression during hospitalization  Description: INTERVENTIONS:  - Assess and monitor for signs and symptoms of infection  - Monitor lab/diagnostic results  - Monitor all insertion sites, i.e. indwelling lines, tubes, and drains  - Monitor endotracheal if appropriate and nasal secretions for changes in amount and color  - [place] appropriate cooling/warming therapies per order  - Administer medications as ordered  - Instruct and encourage patient and family to use good hand hygiene technique  - Identify and instruct in appropriate isolation precautions for identified infection/condition  Outcome: Progressing  Goal: Absence of fever/infection during neutropenic period  Description: INTERVENTIONS:  - Monitor WBC    Outcome: Progressing     Problem: SAFETY ADULT  Goal: Maintain or return to baseline ADL function  Description: INTERVENTIONS:  -  Assess patient's ability to carry out ADLs; assess patient's baseline for ADL function and identify physical deficits which impact ability to perform ADLs (bathing, care of mouth/teeth, toileting, grooming, dressing, etc.)  - Assess/evaluate cause of self-care deficits   - Assess range of motion  - Assess patient's mobility; develop plan if impaired  - Assess patient's need for assistive devices and provide as appropriate  - Encourage maximum independence but intervene and supervise when necessary  - Involve family in performance of ADLs  - Assess for home care needs following discharge   - Consider OT consult to assist with ADL evaluation and planning for discharge  - Provide patient education as appropriate  Outcome: Progressing  Goal: Maintains/Returns to pre admission functional level  Description: INTERVENTIONS:  - Perform BMAT or MOVE assessment daily.   - Set and communicate daily mobility goal to care team and patient/family/caregiver. - Collaborate with rehabilitation services on mobility goals if consulted  - Perform Range of Motion 3 times a day. - Reposition patient every 2 hours.   - Dangle patient 3 times a day  - Stand patient 3 times a day  - Ambulate patient 3 times a day  - Out of bed to chair 3 times a day   - Out of bed for meals 3 times a day  - Out of bed for toileting  - Record patient progress and toleration of activity level   Outcome: Progressing  Goal: Patient will remain free of falls  Description: INTERVENTIONS:  - Educate patient/family on patient safety including physical limitations  - Instruct patient to call for assistance with activity   - Consult OT/PT to assist with strengthening/mobility   - Keep Call bell within reach  - Keep bed low and locked with side rails adjusted as appropriate  - Keep care items and personal belongings within reach  - Initiate and maintain comfort rounds  - Make Fall Risk Sign visible to staff  - Apply yellow socks and bracelet for high fall risk patients  - Consider moving patient to room near nurses station  Outcome: Progressing     Problem: DISCHARGE PLANNING  Goal: Discharge to home or other facility with appropriate resources  Description: INTERVENTIONS:  - Identify barriers to discharge w/patient and caregiver  - Arrange for needed discharge resources and transportation as appropriate  - Identify discharge learning needs (meds, wound care, etc.)  - Arrange for interpretive services to assist at discharge as needed  - Refer to Case Management Department for coordinating discharge planning if the patient needs post-hospital services based on physician/advanced practitioner order or complex needs related to functional status, cognitive ability, or social support system  Outcome: Progressing     Problem: Knowledge Deficit  Goal: Patient/family/caregiver demonstrates understanding of disease process, treatment plan, medications, and discharge instructions  Description: Complete learning assessment and assess knowledge base. Interventions:  - Provide teaching at level of understanding  - Provide teaching via preferred learning methods  Outcome: Progressing     Problem: NEUROSENSORY - ADULT  Goal: Achieves stable or improved neurological status  Description: INTERVENTIONS  - Monitor and report changes in neurological status  - Monitor vital signs such as temperature, blood pressure, glucose, and any other labs ordered   - Initiate measures to prevent increased intracranial pressure  - Monitor for seizure activity and implement precautions if appropriate      Outcome: Progressing  Goal: Remains free of injury related to seizures activity  Description: INTERVENTIONS  - Maintain airway, patient safety  and administer oxygen as ordered  - Monitor patient for seizure activity, document and report duration and description of seizure to physician/advanced practitioner  - If seizure occurs,  ensure patient safety during seizure  - Reorient patient post seizure  - Seizure pads on all 4 side rails  - Instruct patient/family to notify RN of any seizure activity including if an aura is experienced  - Instruct patient/family to call for assistance with activity based on nursing assessment  - Administer anti-seizure medications if ordered    Outcome: Progressing  Goal: Achieves maximal functionality and self care  Description: INTERVENTIONS  - Monitor swallowing and airway patency with patient fatigue and changes in neurological status  - Encourage and assist patient to increase activity and self care.    - Encourage visually impaired, hearing impaired and aphasic patients to use assistive/communication devices  Outcome: Progressing     Problem: Nutrition/Hydration-ADULT  Goal: Nutrient/Hydration intake appropriate for improving, restoring or maintaining nutritional needs  Description: Monitor and assess patient's nutrition/hydration status for malnutrition. Collaborate with interdisciplinary team and initiate plan and interventions as ordered. Monitor patient's weight and dietary intake as ordered or per policy. Utilize nutrition screening tool and intervene as necessary. Determine patient's food preferences and provide high-protein, high-caloric foods as appropriate.      INTERVENTIONS:  - Monitor oral intake, urinary output, labs, and treatment plans  - Assess nutrition and hydration status and recommend course of action  - Evaluate amount of meals eaten  - Assist patient with eating if necessary   - Allow adequate time for meals  - Recommend/ encourage appropriate diets, oral nutritional supplements, and vitamin/mineral supplements  - Order, calculate, and assess calorie counts as needed  - Recommend, monitor, and adjust tube feedings and TPN/PPN based on assessed needs  - Assess need for intravenous fluids  - Provide specific nutrition/hydration education as appropriate  - Include patient/family/caregiver in decisions related to nutrition  Outcome: Progressing     Problem: SAFETY,RESTRAINT: NV/NON-SELF DESTRUCTIVE BEHAVIOR  Goal: Remains free of harm/injury (restraint for non violent/non self-detsructive behavior)  Description: INTERVENTIONS:  - Instruct patient/family regarding restraint use   - Assess and monitor physiologic and psychological status   - Provide interventions and comfort measures to meet assessed patient needs   - Identify and implement measures to help patient regain control  - Assess readiness for release of restraint   Outcome: Progressing  Goal: Returns to optimal restraint-free functioning  Description: INTERVENTIONS:  - Assess the patient's behavior and symptoms that indicate continued need for restraint  - Identify and implement measures to help patient regain control  - Assess readiness for release of restraint   Outcome: Progressing     Problem: Prexisting or High Potential for Compromised Skin Integrity  Goal: Skin integrity is maintained or improved  Description: INTERVENTIONS:  - Identify patients at risk for skin breakdown  - Assess and monitor skin integrity  - Assess and monitor nutrition and hydration status  - Monitor labs   - Assess for incontinence   - Turn and reposition patient  - Assist with mobility/ambulation  - Relieve pressure over bony prominences  - Avoid friction and shearing  - Provide appropriate hygiene as needed including keeping skin clean and dry  - Evaluate need for skin moisturizer/barrier cream  - Collaborate with interdisciplinary team   - Patient/family teaching  - Consider wound care consult   Outcome: Progressing

## 2023-07-16 NOTE — ASSESSMENT & PLAN NOTE
- patient continuing to require supplemental O2 to maintain O2 sats>92%  - was on 20 mg furosemide daily per home medication  - switch to 20 mg torsemide daily on 7/14 as per nephro recommendation  - s/p 40 mg IV furosemide on 7/12   - s/p 40 mg furosemide x2 and 5 mg metolazone 7/13  - 7/14 chest radiograph suggestive of right lower pnuemonia vs atelectasis  - 7/15 CT chest today - new pleural effusion, atelectasis vs superimposed pna cannot be determined

## 2023-07-16 NOTE — ASSESSMENT & PLAN NOTE
Lab Results   Component Value Date    EGFR 22 07/15/2023    EGFR 24 07/15/2023    EGFR 24 07/14/2023    CREATININE 2.43 (H) 07/15/2023    CREATININE 2.30 (H) 07/15/2023    CREATININE 2.29 (H) 07/14/2023     - Per nephrology recommendations, switch home 20 mg furosemide to 20 mg toresemide daily  - Continue to monitor

## 2023-07-16 NOTE — ASSESSMENT & PLAN NOTE
- Transferred to [facility] to rule out central cord disorder s/p fall down stairs  - Continued bilateral shoulder pain and weakness in bilateral upper extremities (cannot raise arms off the bed) and complaining of neck pain  - Pt reports continued weakness in bilateral LE as well  - CT Head, C-spine, CAP negative for acute traumatic injuries  - MRI with area of cord edema consistent with central cord syndrome  - Neurosurgery consulted and signed off with 2 week follow up (around 7/24)  - S/p decadron 2 mg Q8H for 72 hours  - s/p MAP>90 goal for 5 days in ICU  - s/p Posterior C4-5 laminectomy, C3-6 fixation/fusion on 7/4  - RONY drain removed on 7/7  - Pain control  - DVT ppx  - PT and OT recommend rehab

## 2023-07-16 NOTE — PROGRESS NOTES
[address] [facility]  Progress Note  Name: Stanley Thakkar  MRN: [id number]  Unit/Bed#: [facility] 019-08 I Date of Admission: 7/3/2023   Date of Service: 7/16/2023 I Hospital Day: 13    Assessment/Plan   Acute urinary retention  Assessment & Plan  - attempted void trial on 7/7  - patient retained urine and required replacement of pastor on 7/9  - resumed home flomax on 7/8  - likely exacerbation of baseline BPH and urinary issues with this cervical spinal cord injury  - continue pastor and have outpatient follow up with urology    Hyperkalemia  Assessment & Plan  - patient slowly developing hyperkalemia during admission despite 20 mg furosemide daily  - on review of medications, subQ heparin appears to be the only medication that could cause his hyperkalemia  - no significant change with d/c subQ heparin and switching to eliquis on 7/13, subQ heparin was resumed on 7/14  - nephrology consulted with recs to switch to 20 mg torsemide daily and 10 mg lokelma once on 7/14  - continue diuresis to monitor hyperkalemia    Acute respiratory failure with hypoxia ([address])  Assessment & Plan  - patient continuing to require supplemental O2 to maintain O2 sats>92%  - was on 20 mg furosemide daily per home medication  - switch to 20 mg torsemide daily on 7/14 as per nephro recommendation  - s/p 40 mg IV furosemide on 7/12   - s/p 40 mg furosemide x2 and 5 mg metolazone 7/13  - 7/14 chest radiograph suggestive of right lower pnuemonia vs atelectasis  - 7/15 CT chest today - new pleural effusion, atelectasis vs superimposed pna cannot be determined     C3 spinal cord injury ([address])  Assessment & Plan  - secondary to fall  - see weakness of both upper extremities for plan    Fall  Assessment & Plan  - Pt reports fall, posterior head strike, no loss of consciousness, denies use of antiplatelet or anticoagulation medications  - Status post fall with the below noted injuries. - Fall precautions.   - Geriatric Medicine consultation for evaluation, medication review and recommendations.  - PT and OT recommend rehab  - Case Management consultation for disposition planning. Weakness of both upper extremities  Assessment & Plan  - Transferred to [facility] to rule out central cord disorder s/p fall down stairs  - Continued bilateral shoulder pain and weakness in bilateral upper extremities (cannot raise arms off the bed) and complaining of neck pain  - Pt reports continued weakness in bilateral LE as well  - CT Head, C-spine, CAP negative for acute traumatic injuries  - MRI with area of cord edema consistent with central cord syndrome  - Neurosurgery consulted and signed off with 2 week follow up (around 7/24)  - S/p decadron 2 mg Q8H for 72 hours  - s/p MAP>90 goal for 5 days in ICU  - s/p Posterior C4-5 laminectomy, C3-6 fixation/fusion on 7/4  - RONY drain removed on 7/7  - Pain control  - DVT ppx  - PT and OT recommend rehab    Stage 4 chronic kidney disease [facility])  Assessment & Plan  Lab Results   Component Value Date    EGFR 22 07/15/2023    EGFR 24 07/15/2023    EGFR 24 07/14/2023    CREATININE 2.43 (H) 07/15/2023    CREATININE 2.30 (H) 07/15/2023    CREATININE 2.29 (H) 07/14/2023     - Per nephrology recommendations, switch home 20 mg furosemide to 20 mg toresemide daily  - Continue to monitor    * Central cord syndrome [facility])  Assessment & Plan  - see weakness of both upper extremities for plan             Bowel Regimen: senokot, miralax  VTE Prophylaxis:Heparin     Disposition: rehab following medical clearance    Subjective   Chief Complaint: sob     Subjective: Nayely Vera. This AM patient was feeling anxious and SOB while on BIPAP. Came to room with bipap switched to NC and patient feeling more comfortable. Pt still weak in UE with no subjective changes.       Objective   Vitals:   Temp:  [98 °F (36.7 °C)-98.7 °F (37.1 °C)] 98 °F (36.7 °C)  HR:  [] 91  Resp:  [18-26] 18  BP: (116-128)/(60-90) 123/67    I/O       07/14 0701  07/15 0700 07/15 0701  07/16 0700 07/16 0701  07/17 0700    P. O. 1180 870     I.V. (mL/kg)  60 (0.6)     IV Piggyback  550     Total Intake(mL/kg) 1180 (11.8) 1480 (14.8)     Urine (mL/kg/hr) 1400 (0.6) 1300 (0.5)     Stool 0      Total Output 1400 1300     Net -220 +180            Unmeasured Stool Occurrence 2 x             Physical Exam:   Physical Exam:    General: NAD, non-toxic  HEENT: NC/AT, PERRL  Neck: No JVD, trachea midline  CV: RRR, no m/r/g  Pulm: effort WNL, decreased breath sounds and rales RLL, pt requiring 3L NC  GI: soft, NT/ND, no rebound/guarding  : Burkett in place  MSK: no peripheral edema  Neuro: AAOx3, CN II-XII grossly intact, R proximal UE 2/5, distal RUE 1/5, L proximal UE 3/5, distal 2/5 strength. B/l legs 5/5 strength   Skin: no rashes/wounds        Invasive Devices     Peripheral Intravenous Line  Duration           Peripheral IV 07/15/23 Dorsal (posterior); Left Forearm <1 day    Peripheral IV 07/15/23 Dorsal (posterior); Left Forearm <1 day          Drain  Duration           Urethral Catheter 16 Fr. 6 days                      Lab Results:   Results: I have personally reviewed all pertinent laboratory/tests results, BMP/CMP:   Lab Results   Component Value Date    SODIUM 143 07/15/2023    K 5.2 07/15/2023     (H) 07/15/2023    CO2 30 07/15/2023    BUN 94 (H) 07/15/2023    CREATININE 2.43 (H) 07/15/2023    CALCIUM 8.5 07/15/2023    EGFR 22 07/15/2023    and CBC:   Lab Results   Component Value Date    WBC 11.35 (H) 07/15/2023    HGB 9.7 (L) 07/15/2023    HCT 32.8 (L) 07/15/2023    MCV 98 07/15/2023     07/15/2023    RBC 3.35 (L) 07/15/2023    MCH 29.0 07/15/2023    MCHC 29.6 (L) 07/15/2023    RDW 14.2 07/15/2023    MPV 10.0 07/15/2023    NRBC 0 07/15/2023     Imaging: I have personally reviewed pertinent reports.      Other Studies:

## 2023-07-16 NOTE — PLAN OF CARE
Problem: MOBILITY - ADULT  Goal: Maintain or return to baseline ADL function  Description: INTERVENTIONS:  -  Assess patient's ability to carry out ADLs; assess patient's baseline for ADL function and identify physical deficits which impact ability to perform ADLs (bathing, care of mouth/teeth, toileting, grooming, dressing, etc.)  - Assess/evaluate cause of self-care deficits   - Assess range of motion  - Assess patient's mobility; develop plan if impaired  - Assess patient's need for assistive devices and provide as appropriate  - Encourage maximum independence but intervene and supervise when necessary  - Involve family in performance of ADLs  - Assess for home care needs following discharge   - Consider OT consult to assist with ADL evaluation and planning for discharge  - Provide patient education as appropriate  Outcome: Progressing  Goal: Maintains/Returns to pre admission functional level  Description: INTERVENTIONS:  - Perform BMAT or MOVE assessment daily.   - Set and communicate daily mobility goal to care team and patient/family/caregiver. - Collaborate with rehabilitation services on mobility goals if consulted  - Perform Range of Motion 3 times a day. - Reposition patient every 2 hours.   - Dangle patient 3 times a day  - Stand patient 3 times a day  - Ambulate patient 3 times a day  - Out of bed to chair 3 times a day   - Out of bed for meals 3 times a day  - Out of bed for toileting  - Record patient progress and toleration of activity level   Outcome: Progressing     Problem: PAIN - ADULT  Goal: Verbalizes/displays adequate comfort level or baseline comfort level  Description: Interventions:  - Encourage patient to monitor pain and request assistance  - Assess pain using appropriate pain scale  - Administer analgesics based on type and severity of pain and evaluate response  - Implement non-pharmacological measures as appropriate and evaluate response  - Consider cultural and social influences on pain and pain management  - Notify physician/advanced practitioner if interventions unsuccessful or patient reports new pain  Outcome: Progressing     Problem: INFECTION - ADULT  Goal: Absence or prevention of progression during hospitalization  Description: INTERVENTIONS:  - Assess and monitor for signs and symptoms of infection  - Monitor lab/diagnostic results  - Monitor all insertion sites, i.e. indwelling lines, tubes, and drains  - Monitor endotracheal if appropriate and nasal secretions for changes in amount and color  - [place] appropriate cooling/warming therapies per order  - Administer medications as ordered  - Instruct and encourage patient and family to use good hand hygiene technique  - Identify and instruct in appropriate isolation precautions for identified infection/condition  Outcome: Progressing  Goal: Absence of fever/infection during neutropenic period  Description: INTERVENTIONS:  - Monitor WBC    Outcome: Progressing     Problem: SAFETY ADULT  Goal: Maintain or return to baseline ADL function  Description: INTERVENTIONS:  -  Assess patient's ability to carry out ADLs; assess patient's baseline for ADL function and identify physical deficits which impact ability to perform ADLs (bathing, care of mouth/teeth, toileting, grooming, dressing, etc.)  - Assess/evaluate cause of self-care deficits   - Assess range of motion  - Assess patient's mobility; develop plan if impaired  - Assess patient's need for assistive devices and provide as appropriate  - Encourage maximum independence but intervene and supervise when necessary  - Involve family in performance of ADLs  - Assess for home care needs following discharge   - Consider OT consult to assist with ADL evaluation and planning for discharge  - Provide patient education as appropriate  Outcome: Progressing  Goal: Maintains/Returns to pre admission functional level  Description: INTERVENTIONS:  - Perform BMAT or MOVE assessment daily.   - Set and communicate daily mobility goal to care team and patient/family/caregiver. - Collaborate with rehabilitation services on mobility goals if consulted  - Perform Range of Motion 3 times a day. - Reposition patient every 2 hours.   - Dangle patient 3 times a day  - Stand patient 3 times a day  - Ambulate patient 3 times a day  - Out of bed to chair 3 times a day   - Out of bed for meals 3 times a day  - Out of bed for toileting  - Record patient progress and toleration of activity level   Outcome: Progressing  Goal: Patient will remain free of falls  Description: INTERVENTIONS:  - Educate patient/family on patient safety including physical limitations  - Instruct patient to call for assistance with activity   - Consult OT/PT to assist with strengthening/mobility   - Keep Call bell within reach  - Keep bed low and locked with side rails adjusted as appropriate  - Keep care items and personal belongings within reach  - Initiate and maintain comfort rounds  - Make Fall Risk Sign visible to staff  - Apply yellow socks and bracelet for high fall risk patients  - Consider moving patient to room near nurses station  Outcome: Progressing     Problem: DISCHARGE PLANNING  Goal: Discharge to home or other facility with appropriate resources  Description: INTERVENTIONS:  - Identify barriers to discharge w/patient and caregiver  - Arrange for needed discharge resources and transportation as appropriate  - Identify discharge learning needs (meds, wound care, etc.)  - Arrange for interpretive services to assist at discharge as needed  - Refer to Case Management Department for coordinating discharge planning if the patient needs post-hospital services based on physician/advanced practitioner order or complex needs related to functional status, cognitive ability, or social support system  Outcome: Progressing     Problem: Knowledge Deficit  Goal: Patient/family/caregiver demonstrates understanding of disease process, treatment plan, medications, and discharge instructions  Description: Complete learning assessment and assess knowledge base. Interventions:  - Provide teaching at level of understanding  - Provide teaching via preferred learning methods  Outcome: Progressing     Problem: NEUROSENSORY - ADULT  Goal: Achieves stable or improved neurological status  Description: INTERVENTIONS  - Monitor and report changes in neurological status  - Monitor vital signs such as temperature, blood pressure, glucose, and any other labs ordered   - Initiate measures to prevent increased intracranial pressure  - Monitor for seizure activity and implement precautions if appropriate      Outcome: Progressing  Goal: Remains free of injury related to seizures activity  Description: INTERVENTIONS  - Maintain airway, patient safety  and administer oxygen as ordered  - Monitor patient for seizure activity, document and report duration and description of seizure to physician/advanced practitioner  - If seizure occurs,  ensure patient safety during seizure  - Reorient patient post seizure  - Seizure pads on all 4 side rails  - Instruct patient/family to notify RN of any seizure activity including if an aura is experienced  - Instruct patient/family to call for assistance with activity based on nursing assessment  - Administer anti-seizure medications if ordered    Outcome: Progressing  Goal: Achieves maximal functionality and self care  Description: INTERVENTIONS  - Monitor swallowing and airway patency with patient fatigue and changes in neurological status  - Encourage and assist patient to increase activity and self care.    - Encourage visually impaired, hearing impaired and aphasic patients to use assistive/communication devices  Outcome: Progressing     Problem: Nutrition/Hydration-ADULT  Goal: Nutrient/Hydration intake appropriate for improving, restoring or maintaining nutritional needs  Description: Monitor and assess patient's nutrition/hydration status for malnutrition. Collaborate with interdisciplinary team and initiate plan and interventions as ordered. Monitor patient's weight and dietary intake as ordered or per policy. Utilize nutrition screening tool and intervene as necessary. Determine patient's food preferences and provide high-protein, high-caloric foods as appropriate.      INTERVENTIONS:  - Monitor oral intake, urinary output, labs, and treatment plans  - Assess nutrition and hydration status and recommend course of action  - Evaluate amount of meals eaten  - Assist patient with eating if necessary   - Allow adequate time for meals  - Recommend/ encourage appropriate diets, oral nutritional supplements, and vitamin/mineral supplements  - Order, calculate, and assess calorie counts as needed  - Recommend, monitor, and adjust tube feedings and TPN/PPN based on assessed needs  - Assess need for intravenous fluids  - Provide specific nutrition/hydration education as appropriate  - Include patient/family/caregiver in decisions related to nutrition  Outcome: Progressing     Problem: SAFETY,RESTRAINT: NV/NON-SELF DESTRUCTIVE BEHAVIOR  Goal: Remains free of harm/injury (restraint for non violent/non self-detsructive behavior)  Description: INTERVENTIONS:  - Instruct patient/family regarding restraint use   - Assess and monitor physiologic and psychological status   - Provide interventions and comfort measures to meet assessed patient needs   - Identify and implement measures to help patient regain control  - Assess readiness for release of restraint   Outcome: Progressing  Goal: Returns to optimal restraint-free functioning  Description: INTERVENTIONS:  - Assess the patient's behavior and symptoms that indicate continued need for restraint  - Identify and implement measures to help patient regain control  - Assess readiness for release of restraint   Outcome: Progressing     Problem: Prexisting or High Potential for Compromised Skin Integrity  Goal: Skin integrity is maintained or improved  Description: INTERVENTIONS:  - Identify patients at risk for skin breakdown  - Assess and monitor skin integrity  - Assess and monitor nutrition and hydration status  - Monitor labs   - Assess for incontinence   - Turn and reposition patient  - Assist with mobility/ambulation  - Relieve pressure over bony prominences  - Avoid friction and shearing  - Provide appropriate hygiene as needed including keeping skin clean and dry  - Evaluate need for skin moisturizer/barrier cream  - Collaborate with interdisciplinary team   - Patient/family teaching  - Consider wound care consult   Outcome: Progressing

## 2023-07-16 NOTE — PROGRESS NOTES
NEPHROLOGY PROGRESS NOTE   Huong Vanessa 80 y.o. male MRN: [id number]  Unit/Bed#: [facility] 634-01 Encounter: [id number]  Reason for Consult: Chronic kidney disease    ASSESSMENT/PLAN:  1. Chronic kidney disease, stage IV, baseline creatinine approximately 2.0, currently 2.5.  2. Hyperkalemia, multifactorial given underlying CKD. 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 Stable with medical management. Currently 4.7. 3. Hypertension blood pressure currently appears stable. 4. Central cord syndrome status post surgical intervention  5. Anemia of chronic disease hemoglobin appears stable at 10.6. 6. Cardiomyopathy with abnormal diastolic function. Ejection fraction of 50%. Volume status stable  7. History of ulcerative colitis.     PLAN:  · Overall renal function slightly elevated with a creatinine of 2.5. Volume status appears stable as well. · Consider reducing torsemide if renal function continues to worsen. · Continue to avoid angiotensin receptor blocker  · No current changes from nephrology standpoint. SUBJECTIVE:  Seen and examined. Complains of fatigue. Does not appear short of breath. No active chest pain or pressure. No abdominal pain. Family at bedside. Review of Systems    OBJECTIVE:  Current Weight: Weight - Scale: 100 kg (220 lb 14.4 oz)  Vitals:    07/16/23 0711 07/16/23 0803 07/16/23 0850 07/16/23 0902   BP: 123/67  121/66    Pulse: 91 72 97    Resp: 18  16    Temp: 98 °F (36.7 °C)  97.8 °F (36.6 °C)    TempSrc:       SpO2: 95% 95% 94% 94%   Weight:       Height:           Intake/Output Summary (Last 24 hours) at 7/16/2023 1141  Last data filed at 7/16/2023 0601  Gross per 24 hour   Intake 1050 ml   Output 1150 ml   Net -100 ml       Physical Exam  Constitutional:       Appearance: He is not ill-appearing. Eyes:      General: No scleral icterus. Cardiovascular:      Rate and Rhythm: Normal rate and regular rhythm.    Pulmonary:      Effort: Pulmonary effort is normal.      Breath sounds: Normal breath sounds. Abdominal:      General: There is no distension. Palpations: Abdomen is soft. Musculoskeletal:      Right lower leg: No edema. Left lower leg: No edema. Skin:     General: Skin is warm and dry. Neurological:      Mental Status: He is alert and oriented to person, place, and time.          Medications:    Current Facility-Administered Medications:   •  acetaminophen (TYLENOL) tablet 975 mg, 975 mg, Oral, Q8H [address], Yesi Amador MD, 975 mg at 07/16/23 3231  •  bisacodyl (DULCOLAX) rectal suppository 10 mg, 10 mg, Rectal, Daily PRN, Yesi Amador MD  •  cefepime (MAXIPIME) 1,000 mg in dextrose 5 % 50 mL IVPB, 1,000 mg, Intravenous, Q12H, Uma Browning MD, Last Rate: 100 mL/hr at 07/16/23 0928, 1,000 mg at 94/13/83 9317  •  folic acid (FOLVITE) tablet 1 mg, 1 mg, Oral, Daily, Yesi Amador MD, 1 mg at 07/16/23 0925  •  guaiFENesin (MUCINEX) 12 hr tablet 600 mg, 600 mg, Oral, Q12H [address], Yesi Amador MD, 600 mg at 07/16/23 9855  •  heparin (porcine) subcutaneous injection 5,000 Units, 5,000 Units, Subcutaneous, Q8H [address], Uma Browning MD, 5,000 Units at 07/16/23 0032  •  lidocaine (LIDODERM) 5 % patch 1 patch, 1 patch, Topical, Daily, Yesi Amador MD, 1 patch at 07/15/23 2050  •  melatonin tablet 6 mg, 6 mg, Oral, HS, Yesi Amador MD, 6 mg at 07/14/23 2147  •  methocarbamol (ROBAXIN) tablet 250 mg, 250 mg, Oral, Q6H PRN, Yesi Amador MD, 250 mg at 07/11/23 2111  •  ondansetron (ZOFRAN) injection 4 mg, 4 mg, Intravenous, Q6H PRN, Yesi Amador MD  •  oxyCODONE (ROXICODONE) split tablet 2.5 mg, 2.5 mg, Oral, Q4H PRN, Yesi Amador MD, 2.5 mg at 07/11/23 1507  •  polyethylene glycol (MIRALAX) packet 17 g, 17 g, Oral, Daily, Yesi Amador MD, 17 g at 07/16/23 4958  •  senna-docusate sodium (SENOKOT S) 8.6-50 mg per tablet 1 tablet, 1 tablet, Oral, BID, Yesi Amador MD, 1 tablet at 07/16/23 9208  •  sulfaSALAzine (AZULFIDINE) tablet 1,000 mg, 1,000 mg, Oral, Daily, Yesi Amador MD, 1,000 mg at 07/15/23 1205  •  sulfaSALAzine (AZULFIDINE) tablet 500 mg, 500 mg, Oral, BID, Dennie Backer, MD, 500 mg at 07/16/23 9594  •  tamsulosin (FLOMAX) capsule 0.4 mg, 0.4 mg, Oral, Daily, Dennie Backer, MD, 0.4 mg at 07/16/23 9327  •  torsemide [facility]) tablet 20 mg, 20 mg, Oral, Daily, Dhaval Ulrich MD, 20 mg at 07/16/23 1036    Laboratory Results:  Results from last 7 days   Lab Units 07/16/23  1036 07/15/23  2105 07/15/23  0643 07/14/23  1540 07/14/23  0634 07/13/23  1444 07/13/23  0449 07/13/23  0448 07/12/23  0957 07/11/23  0423   WBC Thousand/uL  --  11.35* 13.38*  --   --   --   --  8.51 7.93 6.85   HEMOGLOBIN g/dL  --  9.7* 10.6*  --   --   --   --  10.7* 10.9* 10.2*   HEMATOCRIT %  --  32.8* 35.3*  --   --   --   --  36.2* 35.8* 33.9*   PLATELETS Thousands/uL  --  194 210  --   --   --   --  241 240 239   POTASSIUM mmol/L 4.7 5.2 5.1 4.7 5.7* 5.8* 5.7*  --  5.5* 5.5*   CHLORIDE mmol/L 111* 110* 111* 111* 113* 113* 116*  --  115* 116*   CO2 mmol/L 30 30 31 30 29 27 28  --  29 31   BUN mg/dL 92* 94* 88* 86* 82* 79* 80*  --  84* 92*   CREATININE mg/dL 2.55* 2.43* 2.30* 2.29* 2.11* 2.03* 1.96*  --  2.06* 2.31*   CALCIUM mg/dL 8.5 8.5 8.9 8.6 8.9 9.2 8.9  --  8.8 8.3   MAGNESIUM mg/dL  --   --  2.4  --   --   --   --   --  2.7*  --    PHOSPHORUS mg/dL  --   --   --   --   --   --   --   --  3.6  --

## 2023-07-16 NOTE — PLAN OF CARE
Problem: INFECTION - ADULT  Goal: Absence or prevention of progression during hospitalization  Description: INTERVENTIONS:  - Assess and monitor for signs and symptoms of infection  - Monitor lab/diagnostic results  - Monitor all insertion sites, i.e. indwelling lines, tubes, and drains  - Monitor endotracheal if appropriate and nasal secretions for changes in amount and color  - [place] appropriate cooling/warming therapies per order  - Administer medications as ordered  - Instruct and encourage patient and family to use good hand hygiene technique  - Identify and instruct in appropriate isolation precautions for identified infection/condition  Outcome: Progressing  Goal: Absence of fever/infection during neutropenic period  Description: INTERVENTIONS:  - Monitor WBC    Outcome: Progressing     Problem: SAFETY ADULT  Goal: Maintain or return to baseline ADL function  Description: INTERVENTIONS:  -  Assess patient's ability to carry out ADLs; assess patient's baseline for ADL function and identify physical deficits which impact ability to perform ADLs (bathing, care of mouth/teeth, toileting, grooming, dressing, etc.)  - Assess/evaluate cause of self-care deficits   - Assess range of motion  - Assess patient's mobility; develop plan if impaired  - Assess patient's need for assistive devices and provide as appropriate  - Encourage maximum independence but intervene and supervise when necessary  - Involve family in performance of ADLs  - Assess for home care needs following discharge   - Consider OT consult to assist with ADL evaluation and planning for discharge  - Provide patient education as appropriate  Outcome: Progressing  Goal: Maintains/Returns to pre admission functional level  Description: INTERVENTIONS:  - Perform BMAT or MOVE assessment daily.   - Set and communicate daily mobility goal to care team and patient/family/caregiver.    - Collaborate with rehabilitation services on mobility goals if consulted  - Perform Range of Motion 3 times a day. - Reposition patient every 2 hours.   - Dangle patient 3 times a day  - Stand patient 3 times a day  - Ambulate patient 3 times a day  - Out of bed to chair 3 times a day   - Out of bed for meals 3 times a day  - Out of bed for toileting  - Record patient progress and toleration of activity level   Outcome: Progressing  Goal: Patient will remain free of falls  Description: INTERVENTIONS:  -  Assess patient's ability to carry out ADLs; assess patient's baseline for ADL function and identify physical deficits which impact ability to perform ADLs (bathing, care of mouth/teeth, toileting, grooming, dressing, etc.)  - Assess/evaluate cause of self-care deficits   - Assess range of motion  - Assess patient's mobility; develop plan if impaired  - Assess patient's need for assistive devices and provide as appropriate  - Encourage maximum independence but intervene and supervise when necessary  - Involve family in performance of ADLs  - Assess for home care needs following discharge   - Consider OT consult to assist with ADL evaluation and planning for discharge  - Provide patient education as appropriate  Outcome: Progressing

## 2023-07-17 NOTE — ASSESSMENT & PLAN NOTE
- patient slowly developing hyperkalemia during admission despite 20 mg furosemide daily  -Improved on torsemide. Continue on discharge per nephrology.    - continue diuresis to monitor hyperkalemia

## 2023-07-17 NOTE — INCIDENTAL FINDINGS
The following findings require follow up:  Radiographic finding   Finding: Small sliding-type hiatal hernia. Bilateral renal cysts, most simple, varying in size measuring up to 5.6 cm in the interpolar right kidney. A lobulated cyst exophytic at the lower pole of the right kidney with thin septations measures up to 4.3 cm. No solid renal mass. No traumatic renal injury. No urinary tract calculus. No hydronephrosis.    Follow up required: family doctor   Follow up should be done within 2 week(s)

## 2023-07-17 NOTE — ASSESSMENT & PLAN NOTE
- Secondary to pneumonia   - CT chest on 7/15 reveals new pleural effusion, atelectasis vs superimposed pna cannot be determined   - patient continuing to require supplemental O2 to maintain O2 sats>92%. Weaned to 1 L NC today. - Day #3/5 of cefepime. Discharge on Cefdinir to complete the 5-day course. - aggressive chest PT  - continue torsemide at 20 mg daily. Required diuresis earlier this admission.

## 2023-07-17 NOTE — PHYSICAL THERAPY NOTE
Physical Therapy Progress Note     07/17/23 1105   PT Last Visit   PT Visit Date 07/17/23   Note Type   Note Type Treatment   Pain Assessment   Pain Score No Pain   Restrictions/Precautions   Other Precautions Chair Alarm; Bed Alarm;Pain; Fall Risk;Spinal precautions;O2   Subjective   Subjective Pt encountered supine in bed, pleasant and agreeable to treatment, but appeared fatigued throughout session. Initially appeared to be in depressive state, but remained motivated to participate in PT when asked if anything was wrong. No changes in status noted with activity. Bed Mobility   Supine to Sit 3  Moderate assistance   Additional items Assist x 2   Transfers   Sit to Stand 2  Maximal assistance   Additional items Assist x 2   Stand to Sit 2  Maximal assistance   Additional items Assist x 2   Stand pivot 2  Maximal assistance   Additional items Assist x 2   Balance   Static Sitting Fair -   Static Standing Poor   Ambulatory Poor -   Activity Tolerance   Activity Tolerance Patient tolerated treatment well;Patient limited by fatigue   Assessment   Prognosis Guarded   Problem List Decreased strength;Decreased endurance; Impaired balance;Decreased mobility; Impaired sensation   Assessment pt continues to require Ax2 for all transfers due to gross weakness & balance deficits as result of acute injuries. Participates in all tasks when prompted and initiates transfers without complaints, but without effective use of UEs for assist, he will continue to require assist until balance & lower body strength improves to compensate. Once standing, he maintains forward flexed posture, but is able to shuffle sideways into drop arm recliner without overt LOB. Further transfers with bilateral assist or use of platform RW deferred at this time due to complaints of dizziness & observed fatigue. All vitals remained WFL when assessed throughout session.   Plan to further practice mobility and balance tasks during session,and continued to encourage family to assist with UE movements as able to improve his ability to self assist with these tasks prn. Continue to recommend rehab at d/c to address functional deficits & reduce long term burden of care. Goals   Patient Goals to rest   STG Expiration Date 07/21/23   PT Treatment Day 3   Plan   Treatment/Interventions Functional transfer training;LE strengthening/ROM; Therapeutic exercise; Endurance training;Patient/family training;Equipment eval/education; Bed mobility;Gait training   Progress Slow progress, decreased activity tolerance   PT Frequency 2-3x/wk   Recommendation   PT Discharge Recommendation Post acute rehabilitation services   Equipment Recommended Wheelchair;Walker   Change/add to [employer]? Yes, Add Accessories   Walker Accessories Platform attachment - right arm;Platform attachment - left arm   AM-PAC Basic Mobility Inpatient   Turning in Flat Bed Without Bedrails 2   Lying on Back to Sitting on Edge of Flat Bed Without Bedrails 1   Moving Bed to Chair 1   Standing Up From Chair Using Arms 1   Walk in Room 1   Climb 3-5 Stairs With Railing 1   Basic Mobility Inpatient Raw Score 7   Turning Head Towards Sound 3   Follow Simple Instructions 3   Low Function Basic Mobility Raw Score  13   Low Function Basic Mobility Standardized Score  20.14   Highest Level Of Mobility   JH-HLM Goal 2: Bed activities/Dependent transfer   JH-HLM Achieved 4: Move to chair/commode       Radhames Fuentes PTA    An [facility] Basic Mobility Raw Score less than 17 suggests pt would benefit from post acute rehab. Please also refer to the recommendation of the Physical Therapist for safe discharge planning.

## 2023-07-17 NOTE — PLAN OF CARE
Problem: PHYSICAL THERAPY ADULT  Goal: Performs mobility at highest level of function for planned discharge setting. See evaluation for individualized goals. Description: Treatment/Interventions: Functional transfer training, LE strengthening/ROM, Therapeutic exercise, Endurance training, Patient/family training, Equipment eval/education, Bed mobility, Gait training, Compensatory technique education, Continued evaluation          See flowsheet documentation for full assessment, interventions and recommendations. Outcome: Progressing  Note: Prognosis: Guarded  Problem List: Decreased strength, Decreased endurance, Impaired balance, Decreased mobility, Impaired sensation  Assessment: pt continues to require Ax2 for all transfers due to gross weakness & balance deficits as result of acute injuries. Participates in all tasks when prompted and initiates transfers without complaints, but without effective use of UEs for assist, he will continue to require assist until balance & lower body strength improves to compensate. Once standing, he maintains forward flexed posture, but is able to shuffle sideways into drop arm recliner without overt LOB. Further transfers with bilateral assist or use of platform RW deferred at this time due to complaints of dizziness & observed fatigue. All vitals remained WFL when assessed throughout session. Plan to further practice mobility and balance tasks during session,and continued to encourage family to assist with UE movements as able to improve his ability to self assist with these tasks prn. Continue to recommend rehab at d/c to address functional deficits & reduce long term burden of care. PT Discharge Recommendation: Post acute rehabilitation services    See flowsheet documentation for full assessment.

## 2023-07-17 NOTE — PLAN OF CARE
Problem: MOBILITY - ADULT  Goal: Maintain or return to baseline ADL function  Description: INTERVENTIONS:  -  Assess patient's ability to carry out ADLs; assess patient's baseline for ADL function and identify physical deficits which impact ability to perform ADLs (bathing, care of mouth/teeth, toileting, grooming, dressing, etc.)  - Assess/evaluate cause of self-care deficits   - Assess range of motion  - Assess patient's mobility; develop plan if impaired  - Assess patient's need for assistive devices and provide as appropriate  - Encourage maximum independence but intervene and supervise when necessary  - Involve family in performance of ADLs  - Assess for home care needs following discharge   - Consider OT consult to assist with ADL evaluation and planning for discharge  - Provide patient education as appropriate  7/17/2023 1648 by Caren Watson RN  Outcome: Completed  7/17/2023 0900 by Caren Watson RN  Outcome: Progressing  Goal: Maintains/Returns to pre admission functional level  Description: INTERVENTIONS:  - Perform BMAT or MOVE assessment daily.   - Set and communicate daily mobility goal to care team and patient/family/caregiver. - Collaborate with rehabilitation services on mobility goals if consulted  - Perform Range of Motion 3 times a day. - Reposition patient every 2 hours.   - Dangle patient 3 times a day  - Stand patient 3 times a day  - Ambulate patient 3 times a day  - Out of bed to chair 3 times a day   - Out of bed for meals 3 times a day  - Out of bed for toileting  - Record patient progress and toleration of activity level   7/17/2023 1648 by Caren Watson RN  Outcome: Completed  7/17/2023 0900 by Caren Watson RN  Outcome: Progressing     Problem: PAIN - ADULT  Goal: Verbalizes/displays adequate comfort level or baseline comfort level  Description: Interventions:  - Encourage patient to monitor pain and request assistance  - Assess pain using appropriate pain scale  - Administer analgesics based on type and severity of pain and evaluate response  - Implement non-pharmacological measures as appropriate and evaluate response  - Consider cultural and social influences on pain and pain management  - Notify physician/advanced practitioner if interventions unsuccessful or patient reports new pain  7/17/2023 1648 by Valarie Byrd RN  Outcome: Completed  7/17/2023 0900 by Valarie Byrd RN  Outcome: Progressing     Problem: INFECTION - ADULT  Goal: Absence or prevention of progression during hospitalization  Description: INTERVENTIONS:  - Assess and monitor for signs and symptoms of infection  - Monitor lab/diagnostic results  - Monitor all insertion sites, i.e. indwelling lines, tubes, and drains  - Monitor endotracheal if appropriate and nasal secretions for changes in amount and color  - Turner appropriate cooling/warming therapies per order  - Administer medications as ordered  - Instruct and encourage patient and family to use good hand hygiene technique  - Identify and instruct in appropriate isolation precautions for identified infection/condition  7/17/2023 1648 by Valarie Byrd RN  Outcome: Completed  7/17/2023 0900 by Valarie Byrd RN  Outcome: Progressing  Goal: Absence of fever/infection during neutropenic period  Description: INTERVENTIONS:  - Monitor WBC    7/17/2023 1648 by Valarie Byrd RN  Outcome: Completed  7/17/2023 0900 by Valarie Byrd RN  Outcome: Progressing     Problem: SAFETY ADULT  Goal: Maintain or return to baseline ADL function  Description: INTERVENTIONS:  -  Assess patient's ability to carry out ADLs; assess patient's baseline for ADL function and identify physical deficits which impact ability to perform ADLs (bathing, care of mouth/teeth, toileting, grooming, dressing, etc.)  - Assess/evaluate cause of self-care deficits   - Assess range of motion  - Assess patient's mobility; develop plan if impaired  - Assess patient's need for assistive devices and provide as appropriate  - Encourage maximum independence but intervene and supervise when necessary  - Involve family in performance of ADLs  - Assess for home care needs following discharge   - Consider OT consult to assist with ADL evaluation and planning for discharge  - Provide patient education as appropriate  7/17/2023 1648 by Maciej Boss RN  Outcome: Completed  7/17/2023 0900 by Maciej Boss RN  Outcome: Progressing  Goal: Maintains/Returns to pre admission functional level  Description: INTERVENTIONS:  - Perform BMAT or MOVE assessment daily.   - Set and communicate daily mobility goal to care team and patient/family/caregiver. - Collaborate with rehabilitation services on mobility goals if consulted  - Perform Range of Motion 3 times a day. - Reposition patient every 2 hours.   - Dangle patient 3 times a day  - Stand patient 3 times a day  - Ambulate patient 3 times a day  - Out of bed to chair 3 times a day   - Out of bed for meals 3 times a day  - Out of bed for toileting  - Record patient progress and toleration of activity level   7/17/2023 1648 by Maciej Boss RN  Outcome: Completed  7/17/2023 0900 by Maciej Boss RN  Outcome: Progressing  Goal: Patient will remain free of falls  Description: INTERVENTIONS:  - Educate patient/family on patient safety including physical limitations  - Instruct patient to call for assistance with activity   - Consult OT/PT to assist with strengthening/mobility   - Keep Call bell within reach  - Keep bed low and locked with side rails adjusted as appropriate  - Keep care items and personal belongings within reach  - Initiate and maintain comfort rounds  - Make Fall Risk Sign visible to staff  - Offer Toileting every 2 Hours, in advance of need  - Initiate/Maintain alarm  - Obtain necessary fall risk management equipment:   - Apply yellow socks and bracelet for high fall risk patients  - Consider moving patient to room near nurses station  7/17/2023 1648 by Elsa Sanchez RN  Outcome: Completed  7/17/2023 0900 by Elsa Sanchez RN  Outcome: Progressing     Problem: DISCHARGE PLANNING  Goal: Discharge to home or other facility with appropriate resources  Description: INTERVENTIONS:  - Identify barriers to discharge w/patient and caregiver  - Arrange for needed discharge resources and transportation as appropriate  - Identify discharge learning needs (meds, wound care, etc.)  - Arrange for interpretive services to assist at discharge as needed  - Refer to Case Management Department for coordinating discharge planning if the patient needs post-hospital services based on physician/advanced practitioner order or complex needs related to functional status, cognitive ability, or social support system  7/17/2023 1648 by Elsa Sanchez RN  Outcome: Completed  7/17/2023 0900 by Elsa Sanchez RN  Outcome: Progressing     Problem: Knowledge Deficit  Goal: Patient/family/caregiver demonstrates understanding of disease process, treatment plan, medications, and discharge instructions  Description: Complete learning assessment and assess knowledge base.   Interventions:  - Provide teaching at level of understanding  - Provide teaching via preferred learning methods  7/17/2023 1648 by Elsa Sanchez RN  Outcome: Completed  7/17/2023 0900 by Elsa Sanchez RN  Outcome: Progressing     Problem: NEUROSENSORY - ADULT  Goal: Achieves stable or improved neurological status  Description: INTERVENTIONS  - Monitor and report changes in neurological status  - Monitor vital signs such as temperature, blood pressure, glucose, and any other labs ordered   - Initiate measures to prevent increased intracranial pressure  - Monitor for seizure activity and implement precautions if appropriate      7/17/2023 1648 by Elsa Sanchez RN  Outcome: Completed  7/17/2023 0900 by Elsa Sanchez RN  Outcome: Progressing  Goal: Remains free of injury related to seizures activity  Description: INTERVENTIONS  - Maintain airway, patient safety  and administer oxygen as ordered  - Monitor patient for seizure activity, document and report duration and description of seizure to physician/advanced practitioner  - If seizure occurs,  ensure patient safety during seizure  - Reorient patient post seizure  - Seizure pads on all 4 side rails  - Instruct patient/family to notify RN of any seizure activity including if an aura is experienced  - Instruct patient/family to call for assistance with activity based on nursing assessment  - Administer anti-seizure medications if ordered    7/17/2023 1648 by Aria Cota RN  Outcome: Completed  7/17/2023 0900 by Aria Cota RN  Outcome: Progressing  Goal: Achieves maximal functionality and self care  Description: INTERVENTIONS  - Monitor swallowing and airway patency with patient fatigue and changes in neurological status  - Encourage and assist patient to increase activity and self care. - Encourage visually impaired, hearing impaired and aphasic patients to use assistive/communication devices  7/17/2023 1648 by Aria Cota RN  Outcome: Completed  7/17/2023 0900 by Aria Cota RN  Outcome: Progressing     Problem: Nutrition/Hydration-ADULT  Goal: Nutrient/Hydration intake appropriate for improving, restoring or maintaining nutritional needs  Description: Monitor and assess patient's nutrition/hydration status for malnutrition. Collaborate with interdisciplinary team and initiate plan and interventions as ordered. Monitor patient's weight and dietary intake as ordered or per policy. Utilize nutrition screening tool and intervene as necessary. Determine patient's food preferences and provide high-protein, high-caloric foods as appropriate.      INTERVENTIONS:  - Monitor oral intake, urinary output, labs, and treatment plans  - Assess nutrition and hydration status and recommend course of action  - Evaluate amount of meals eaten  - Assist patient with eating if necessary   - Allow adequate time for meals  - Recommend/ encourage appropriate diets, oral nutritional supplements, and vitamin/mineral supplements  - Order, calculate, and assess calorie counts as needed  - Recommend, monitor, and adjust tube feedings and TPN/PPN based on assessed needs  - Assess need for intravenous fluids  - Provide specific nutrition/hydration education as appropriate  - Include patient/family/caregiver in decisions related to nutrition  7/17/2023 1648 by Maria Luisa Montero RN  Outcome: Completed  7/17/2023 0900 by Maria Luisa Montero RN  Outcome: Progressing     Problem: SAFETY,RESTRAINT: NV/NON-SELF DESTRUCTIVE BEHAVIOR  Goal: Remains free of harm/injury (restraint for non violent/non self-detsructive behavior)  Description: INTERVENTIONS:  - Instruct patient/family regarding restraint use   - Assess and monitor physiologic and psychological status   - Provide interventions and comfort measures to meet assessed patient needs   - Identify and implement measures to help patient regain control  - Assess readiness for release of restraint   7/17/2023 1648 by Maria Luisa Montero RN  Outcome: Completed  7/17/2023 0900 by Maria Luisa Montero RN  Outcome: Progressing  Goal: Returns to optimal restraint-free functioning  Description: INTERVENTIONS:  - Assess the patient's behavior and symptoms that indicate continued need for restraint  - Identify and implement measures to help patient regain control  - Assess readiness for release of restraint   7/17/2023 1648 by Maria Luisa Montero RN  Outcome: Completed  7/17/2023 0900 by Maria Luisa Montero RN  Outcome: Progressing     Problem: Prexisting or High Potential for Compromised Skin Integrity  Goal: Skin integrity is maintained or improved  Description: INTERVENTIONS:  - Identify patients at risk for skin breakdown  - Assess and monitor skin integrity  - Assess and monitor nutrition and hydration status  - Monitor labs   - Assess for incontinence   - Turn and reposition patient  - Assist with mobility/ambulation  - Relieve pressure over bony prominences  - Avoid friction and shearing  - Provide appropriate hygiene as needed including keeping skin clean and dry  - Evaluate need for skin moisturizer/barrier cream  - Collaborate with interdisciplinary team   - Patient/family teaching  - Consider wound care consult   7/17/2023 1648 by Valarie Byrd RN  Outcome: Completed  7/17/2023 0900 by Valarie Byrd RN  Outcome: Progressing

## 2023-07-17 NOTE — PLAN OF CARE
Problem: OCCUPATIONAL THERAPY ADULT  Goal: Performs self-care activities at highest level of function for planned discharge setting. See evaluation for individualized goals. Description: Treatment Interventions: ADL retraining, Functional transfer training, UE strengthening/ROM, Endurance training, Patient/family training, Equipment evaluation/education, Neuromuscular reeducation, Fine motor coordination activities, Compensatory technique education, Activityengagement          See flowsheet documentation for full assessment, interventions and recommendations. 7/17/2023 1330 by PAMELA Caballero  Outcome: Progressing  Note: Limitation: Decreased ADL status, Decreased UE ROM, Decreased UE strength, Decreased endurance, Decreased sensation, Decreased fine motor control, Decreased self-care trans, Decreased high-level ADLs, Non-func R UE, Non-func L UE  Prognosis: Good, Fair  Assessment: pt participated in am ot session and was seen focusing on adl tsdks, bed mobiilty, sitting tolerence eob, activity tolerence, sit to stand and spt trasnfer with b ue arom/ aarom/ self feeding trials. pt with some arom b ue's. r ue with more distal movement and controll and l ue with more proximal arom. pt has b shoulder shrug when supine. pt with cold r hand wand war, left. minimal swelling noted bilaterally. pts supportive grand daughter was present this session.   Recommendation: Physiatry Consult, Geriatric Consult (pt with flatter affect reportedly, provided limited answers, appears very [place], little eye contact, ate 25% breakfast. per nsg)  OT Discharge Recommendation: Post acute rehabilitation services       7/17/2023 1330 by PAMELA Caballero  Outcome: Progressing  Note: Limitation: Decreased ADL status, Decreased UE ROM, Decreased UE strength, Decreased endurance, Decreased sensation, Decreased fine motor control, Decreased self-care trans, Decreased high-level ADLs, Non-func R UE, Non-func L UE  Prognosis: Good, Fair  Assessment: pt participated in am ot session and was seen focusing on adl tsdks, bed mobiilty, sitting tolerence eob, activity tolerence, sit to stand and spt trasnfer with b ue arom/ aarom/ self feeding trials. pt with some arom b ue's. r ue with more distal movement and controll and l ue with more proximal arom. pt has b shoulder shrug when supine. pt with cold r hand wand war, left. minimal swelling noted bilaterally. pts supportive grand daughter was present this session.   Recommendation: Physiatry Consult, Geriatric Consult (pt with flatter affect reportedly, provided limited answers, appears very [place], little eye contact, ate 25% breakfast. per nsg)  OT Discharge Recommendation: Post acute rehabilitation services     April A Storm

## 2023-07-17 NOTE — DISCHARGE SUMMARY
[address] [facility]  Discharge- Carol Herring 1934, 80 y.o. male MRN: [id number]  Unit/Bed#: [facility] 634-01 Encounter: [id number]  Primary Care Provider: Renny Abdullahi DO   Date and time admitted to hospital: 7/3/2023  4:00 PM    Acute urinary retention  Assessment & Plan  - attempted void trial on 7/7  - patient retained urine and required replacement of pastor on 7/9  - resumed home flomax on 7/8  - likely exacerbation of baseline BPH and urinary issues with this cervical spinal cord injury  - continue pastor and have outpatient follow up with urology    Hyperkalemia  Assessment & Plan  - patient slowly developing hyperkalemia during admission despite 20 mg furosemide daily  -Improved on torsemide. Continue on discharge per nephrology. - continue diuresis to monitor hyperkalemia    Acute respiratory failure with hypoxia [facility])  Assessment & Plan  - Secondary to pneumonia   - CT chest on 7/15 reveals new pleural effusion, atelectasis vs superimposed pna cannot be determined   - patient continuing to require supplemental O2 to maintain O2 sats>92%. Weaned to 1 L NC today. - Day #3/5 of cefepime. Discharge on Cefdinir to complete the 5-day course. - aggressive chest PT  - continue torsemide at 20 mg daily. Required diuresis earlier this admission. C3 spinal cord injury [facility])  Assessment & Plan  - secondary to fall  - see weakness of both upper extremities for plan    Fall  Assessment & Plan  - Pt reports fall, posterior head strike, no loss of consciousness, denies use of antiplatelet or anticoagulation medications  - Status post fall with the below noted injuries. - Fall precautions. - Geriatric Medicine consultation for evaluation, medication review and recommendations.  - PT and OT recommend rehab  - Case Management consultation for disposition planning. D/C to [facility] today.        Weakness of both upper extremities  Assessment & Plan  - Transferred to [facility] to rule out central cord disorder s/p fall down stairs  - Continued bilateral shoulder pain and weakness in bilateral upper extremities (cannot raise arms off the bed) and complaining of neck pain  - Pt reports continued weakness in bilateral LE as well  - CT Head, C-spine, CAP negative for acute traumatic injuries  - MRI with area of cord edema consistent with central cord syndrome  - Neurosurgery consulted and signed off with 2 week follow up (around 7/24)  - S/p decadron 2 mg Q8H for 72 hours  - s/p MAP>90 goal for 5 days in ICU - completed  - s/p Posterior C4-5 laminectomy, C3-6 fixation/fusion on 7/4  - RONY drain removed on 7/7  - Pain control  - DVT ppx  - PT and OT recommend rehab  - d/c to SNF today and f/u with neurosurgery in 6 weeks. 2 week incision check completed prior to d/c on 7/17. Stage 4 chronic kidney disease [facility])  Assessment & Plan  Lab Results   Component Value Date    EGFR 20 07/17/2023    EGFR 21 07/16/2023    EGFR 22 07/15/2023    CREATININE 2.60 (H) 07/17/2023    CREATININE 2.55 (H) 07/16/2023    CREATININE 2.43 (H) 07/15/2023     - Per nephrology recommendations, switch home 20 mg furosemide to 20 mg toresemide daily  - Continue to monitor  - f/u with nephrology as an outpatient. Hold losartan on discharge. * Central cord syndrome [facility])  Assessment & Plan  - see weakness of both upper extremities for plan              Medical Problems     Resolved Problems  Date Reviewed: 7/17/2023   None         Admission Date:   Admission Orders (From admission, onward)     Ordered        07/03/23 1716  Inpatient Admission  Once                        Admitting Diagnosis: Neck pain [M54.2]  Weakness of both upper extremities [R29.898]  Unspecified multiple injuries, initial encounter [T07. XXXA]    HPI: As documented by Dr. Enma Marcelino who evaluated the patient on admission, "Dami Montelongo is a 80 y.o. male who presents after a fall.   Patient is a poor historian, however wife is at bedside and reports that she heard a thud and found him laying on his back near the stairs. Patient reports a fall, no loss of consciousness, no use of antiplatelet or anticoagulation medications. He reports he hit the back of his head on the ground. Patient complains of neck pain and shoulder pain and weakness in his upper and lower extremities. She reports that his upper extremities are more weak than his lower extremities, and he also is experiencing numbness in his hands. He reports he was unable to get up off the floor due to his weakness."    Procedures Performed: No orders of the defined types were placed in this encounter. Summary of Hospital Course: Patient was placed the trauma service following fall when he sustained the above stated injuries. He went to the OR on 7/4 for posterior C4-C5 laminectomy and C3-C6 fixation/fusion. He was maintained on MAP pushes in the ICU then transferred out of the ICU. RONY drain was pulled on 7/7. He was up and ambulating with PT/OT who recommended d/c to SNF. He has persistent LUE weakness greater than RUE weakness. He is tolerating a diet. He had a 2 week incision check by neurosurgery on 7/17. No cervical collar is needed. He is to f/u in 6 weeks with repeat upright xrays at that time with neursurgery. Today he has no new complaints. He denies pain. Exam:  Vitals:    07/17/23 1402   BP: 139/70   Pulse: 86   Resp: 18   Temp: (!) 96 °F (35.6 °C)   SpO2: 93%     GEN: NAD  HEENT: NCAT  NEURO: GCS 14 (4, 4,6); + LUE 2/5 in shoulder, biceps/triceps and 4/5 L ; + R  4/5 strength and 5/5 proximally; + 5/5 strength B/L LE  CV: RRR, no MGR  PULM: CTA bilaterally  GI: soft,non tender,non-distended  : voiding  MSK: No edema, contusion or deformity  SKIN: pink, warm, dry      Significant Findings, Care, Treatment and Services Provided:   XR chest portable    Result Date: 7/6/2023  Impression: No significant change since prior study.  Workstation performed: [id number]     MRI cervical spine wo contrast    Result Date: 7/6/2023  Impression: Hyperflexion injury with a defect through the anterior longitudinal ligament at the C4-5 level and widening of the anterior disc space. Prevertebral edema extending from C1-2 through T1-2 ,mildly progressed. Interval spinal decompression and posterior fixation including bilateral laminectomies at the C4 and C5 levels and bilateral transpedicular screws at the C3-C6 levels with interconnecting rods. Improved patency of the central canal at the C3-4 and C4-5 levels with resolved cord compression. There is stable hyperintense T2/STIR signal within the dorsal aspect of the cord at the C4 level. Findings are consistent with the preliminary report from [facility] which was provided shortly after completion of the exam. Workstation performed: [id number]     MRI brain wo contrast    Result Date: 7/6/2023  Impression: 1. No acute infarction, edema, or mass effect. 2. Mild chronic microangiopathic ischemic changes. 3. Small calcified lesion is noted along the anterior right frontal convexity, dural calcification versus small meningioma. 4. Right parietal scalp soft tissue swelling. Findings are consistent with the preliminary report from [facility] which was provided shortly after completion of the exam. Workstation performed: [id number]     XR chest portable ICU    Result Date: 7/5/2023  Impression: NG tube in stomach. Persistent left base opacity, atelectasis and/or pneumonia. Workstation performed: [id number]     X-ray chest 1 view    Result Date: 7/5/2023  Impression: Low lung volumes with moderate opacity in the left base, likely atelectasis. Pneumonia not excluded in the appropriate clinical setting. Workstation performed: [id number]     XR spine cervical 2 or 3 vw injury    Result Date: 7/4/2023  Impression: Fluoroscopic guidance provided for surgical procedure. Please refer to the separate procedure notes for additional details.  Localization procedure was performed, with the OR notified of the level at approximately 12:37 p.m. on 7/4/2023 via telephone conversation with the OR x-ray technologist . Additional images were obtained after level verification and are present for evaluation. Workstation performed: [id number]     MRI cervical spine wo contrast    Result Date: 7/3/2023  Impression: 1. Traumatic disc injury at C4-C5 where there is anterior disc injury (mild separation of the superior C4-C5 disc from the inferior endplate of the C4 vertebral body) with prevertebral fluid from C2-C5. 2. Cord edema at C4-C5 likely traumatic as this is the site of injury, rather than spondylitic myelopathy but needs to be correlated clinically. I personally discussed this study with Dr. Cheyenne Chavez on 7/3/2023 10:13 PM. Workstation performed: [id number]     TRAUMA - CT spine cervical wo contrast    Result Date: 7/3/2023  Impression: No cervical spine fracture or traumatic malalignment. Workstation performed: [id number]     CT recon only thoracolumbar (No Charge)    Result Date: 7/3/2023  Impression: No fracture or traumatic subluxation. Workstation performed: [id number]     TRAUMA - CT chest abdomen pelvis w contrast    Result Date: 7/3/2023  Impression: No acute traumatic visceral injury in the chest, abdomen or pelvis. No acute fracture. Workstation performed: [id number]     TRAUMA - CT head wo contrast    Result Date: 7/3/2023  Impression: No acute intracranial abnormality. Workstation performed: [id number]     XR pelvis ap only 1 or 2 vw    Result Date: 7/3/2023  Impression: No acute osseous abnormality. Workstation performed: [id number]     XR chest 1 view portable    Result Date: 7/3/2023  Impression: Unchanged cardiomegaly. Cannot exclude mild pulmonary vascular congestion.  Workstation performed: [id number]       Complications: none    Condition at Discharge: good         Discharge instructions/Information to patient and family:   See after visit summary for information provided to patient and family. Provisions for Follow-Up Care:  See after visit summary for information related to follow-up care and any pertinent home health orders. PCP: Odessa Barros DO    [facility] Readmission: No    Discharge Statement   I spent 25 minutes discharging the patient. This time was spent on the day of discharge. I had direct contact with the patient on the day of discharge. Additional documentation is required if more than 30 minutes were spent on discharge. Discharge Medications:  See after visit summary for reconciled discharge medications provided to patient and family.

## 2023-07-17 NOTE — PLAN OF CARE
Problem: OCCUPATIONAL THERAPY ADULT  Goal: Performs self-care activities at highest level of function for planned discharge setting. See evaluation for individualized goals. Description: Treatment Interventions: ADL retraining, Functional transfer training, UE strengthening/ROM, Endurance training, Patient/family training, Equipment evaluation/education, Neuromuscular reeducation, Fine motor coordination activities, Compensatory technique education, Activityengagement          See flowsheet documentation for full assessment, interventions and recommendations. Outcome: Progressing  Note: Limitation: Decreased ADL status, Decreased UE ROM, Decreased UE strength, Decreased endurance, Decreased sensation, Decreased fine motor control, Decreased self-care trans, Decreased high-level ADLs, Non-func R UE, Non-func L UE  Prognosis: Good, Fair  Assessment: pt participated in am ot session and was seen focusing on adl tsdks, bed mobiilty, sitting tolerence eob, activity tolerence, sit to stand and spt trasnfer with b ue arom/ aarom/ self feeding trials. pt with some arom b ue's. r ue with more distal movement and controll and l ue with more proximal arom. pt has b shoulder shrug when supine. pt with cold r hand wand war, left. minimal swelling noted bilaterally. pts supportive grand daughter was present this session. Recommendation: Physiatry Consult, Geriatric Consult (pt with flatter affect reportedly, provided limited answers, appears very [place], little eye contact, ate 25% breakfast. per nsg)  OT Discharge Recommendation: Post acute rehabilitation services     . me

## 2023-07-17 NOTE — PLAN OF CARE
Problem: MOBILITY - ADULT  Goal: Maintain or return to baseline ADL function  Description: INTERVENTIONS:  -  Assess patient's ability to carry out ADLs; assess patient's baseline for ADL function and identify physical deficits which impact ability to perform ADLs (bathing, care of mouth/teeth, toileting, grooming, dressing, etc.)  - Assess/evaluate cause of self-care deficits   - Assess range of motion  - Assess patient's mobility; develop plan if impaired  - Assess patient's need for assistive devices and provide as appropriate  - Encourage maximum independence but intervene and supervise when necessary  - Involve family in performance of ADLs  - Assess for home care needs following discharge   - Consider OT consult to assist with ADL evaluation and planning for discharge  - Provide patient education as appropriate  Outcome: Progressing  Goal: Maintains/Returns to pre admission functional level  Description: INTERVENTIONS:  - Perform BMAT or MOVE assessment daily.   - Set and communicate daily mobility goal to care team and patient/family/caregiver. - Collaborate with rehabilitation services on mobility goals if consulted  - Perform Range of Motion 3 times a day. - Reposition patient every 2 hours.   - Dangle patient 3 times a day  - Stand patient 3 times a day  - Ambulate patient 3 times a day  - Out of bed to chair 3 times a day   - Out of bed for meals 3 times a day  - Out of bed for toileting  - Record patient progress and toleration of activity level   Outcome: Progressing     Problem: PAIN - ADULT  Goal: Verbalizes/displays adequate comfort level or baseline comfort level  Description: Interventions:  - Encourage patient to monitor pain and request assistance  - Assess pain using appropriate pain scale  - Administer analgesics based on type and severity of pain and evaluate response  - Implement non-pharmacological measures as appropriate and evaluate response  - Consider cultural and social influences on pain and pain management  - Notify physician/advanced practitioner if interventions unsuccessful or patient reports new pain  Outcome: Progressing

## 2023-07-17 NOTE — CASE MANAGEMENT
Case Management Discharge Planning Note    Patient name Anila Gautam  Location [address] 6[address]/[facility] [id number] MRN [id number]  : 1934 Date 2023       Current Admission Date: 7/3/2023  Current Admission Diagnosis:Central cord syndrome [facility])   Patient Active Problem List    Diagnosis Date Noted   • Acute urinary retention 2023   • Hyperkalemia 2023   • Acute respiratory failure with hypoxia ([address]) 2023   • C3 spinal cord injury ([address]) 2023   • Central cord syndrome ([address]) 2023   • Weakness of both upper extremities 2023   • Fall 2023   • Proteinuria 2022   • Stage 4 chronic kidney disease ([address]) 2022   • Hypertriglyceridemia 2022   • Low HDL (under 40) 2020   • Nephrolithiasis 2018   • Severe obesity (BMI 35.0-39. 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      LOS (days): 14  Geometric Mean LOS (GMLOS) (days): 9.80  Days to GMLOS:-4.1     OBJECTIVE:  Risk of Unplanned Readmission Score: 22.75         Current admission status: Inpatient   Preferred Pharmacy:   [facility] ([employer] [facility] [place] [address]  Phone: [phone] Fax: [phone]    [facility] [address] [place], [place] - [address]  [address] [facility] [address]  Phone: [phone] Fax: [phone] Delivery ([facility] ) - [address]  [address] [facility]  Phone: [phone] Fax: [phone]    Primary Care Provider: Brandt Clark DO    Primary Insurance: [facility]  Secondary Insurance:     DISCHARGE DETAILS:    Auth updated and obtained. Pt will d/c to [facility] today @8418.  Pt's granddaughter made aware

## 2023-07-17 NOTE — PROGRESS NOTES
[address] NOTE   Savana Faria 80 y.o. male MRN: [id number]  Unit/Bed#: [facility] 634-01 Encounter: [id number]  Reason for Consult: Chronic kidney disease, stage IV    ASSESSMENT and PLAN:  66-year-old male who presented after a fall. Nephrology was consulted for management of CKD stage IV    Elevated creatinine on chronic kidney disease, stage IV with persistent proteinuria:  · Baseline creatinine: 1.9 to 2.4 mg/dL  · Currently creatinine 2.6. Slightly above baseline  · Etiology:   · Escalation in diuretic therapy. Currently on daily dose of torsemide. Blood pressure acceptable. Volume status stable. · Nonoliguric urine output 1100 mL. Burkett catheter in place  · Blood pressure acceptable  · Last urinalysis 12/30/2022: 3+ protein, 1-2 RBCs  · UPCR: 1.38 g as of 12/30/2022  · CT with contrast on 7/3: Bilateral renal cysts. No hydronephrosis  · No changes at this time. If creatinine continues to increase consider holding diuretic. · Continue to hold losartan  · Avoid hypotension, no nephrotoxic agents. · Maintain adequate oral hydration  · Monitor intake and output. Continue to monitor. Urinary retention:  · Failed voiding trial on 7/7. Burkett replaced on 7/9  · Flomax resumed on 7/8  · Etiology: Likely multifactorial with recent surgery, BPH    Hyperkalemia:  · Potassium 5.0, stable  · Last dose of Lokelma 7/14    Hypertension/volume:  · Blood pressure acceptable  · Volume status:   · Previously receiving intermittent doses of IV Lasix. Last dose on 7/14. Received a dose of metolazone on 7/13  · CT of the chest: New right small pleural effusion. Pulmonary artery enlargement which can be seen with pulmonary hypertension  · Remains on 3 L nasal cannula  · Home medications: Furosemide 20 mg daily, losartan 100 mg daily  · Current medications: Torsemide 20 mg (started on 7/15).   Losartan on hold    C3 spinal cord injury/Central cord syndrome status postsurgical intervention  · Trauma/neurosurgery following    Anemia of chronic disease:  · Hemoglobin stable, 10.6    Cardiomyopathy with diastolic dysfunction:  · Echo 7/5: Ejection fraction 50%. Mild global hypokinesis. Grade 2 diastolic dysfunction aortic valve sclerosis mild to moderate MR mild to moderate TR  · No evidence of decompensation  · On daily dose of torsemide    Renal cyst:  · CT with contrast on 7/3: Lobulated cyst exophytic at the lower pole of the right kidney with thin septations measuring 4.3 cm. No solid renal masses noted  · Outpatient follow-up    Other: History of ulcerative colitis, nephrolithiasis      SUBJECTIVE / 24H INTERVAL HISTORY:  Seems rather tired. Falls asleep easily. Awakens to voice. No complaints offered. No overnight events    OBJECTIVE:  Current Weight: Weight - Scale: 100 kg (220 lb 14.4 oz)  Vitals:    07/17/23 0024 07/17/23 0026 07/17/23 0235 07/17/23 0724   BP:  132/66  136/71   Pulse: 80 79  86   Resp: 20   19   Temp:    97.6 °F (36.4 °C)   TempSrc:       SpO2: 95% 98% 97% 98%   Weight:       Height:           Intake/Output Summary (Last 24 hours) at 7/17/2023 6945  Last data filed at 7/17/2023 0501  Gross per 24 hour   Intake 880 ml   Output 1100 ml   Net -220 ml     General: NAD  Skin: no rash, skin warm and dry  Eyes: anicteric sclera  ENT: moist mucous membrane  Neck: supple, no JVD  Chest: CTA b/l, no ronchii, no wheeze, no rubs, no rales, equal breath sounds, decreased at the bases. 3 L nasal cannula  CVS: D9I7, soft systolic murmur, no gallop, no rub. Normal rate, regular rhythm  Abdomen: soft, nontender, hyperactive bowel sounds, protuberant  Extremities: no edema LE b/l. No mottling or cyanosis  : Burkett catheter  Neuro: Awakens to voice.   Oriented  Psych: normal affect  Medications:    Current Facility-Administered Medications:   •  acetaminophen (TYLENOL) tablet 975 mg, 975 mg, Oral, Q8H [facility], Tien Cornelius MD, 975 mg at 07/17/23 0518  •  bisacodyl (DULCOLAX) rectal suppository 10 mg, 10 mg, Rectal, Daily PRN, Fabrice Borden MD  •  cefepime (MAXIPIME) 1,000 mg in dextrose 5 % 50 mL IVPB, 1,000 mg, Intravenous, Q12H, Shaq Freeman MD, Last Rate: 100 mL/hr at 07/16/23 2207, 1,000 mg at 60/05/38 4965  •  folic acid (FOLVITE) tablet 1 mg, 1 mg, Oral, Daily, Fabrice Borden MD, 1 mg at 07/16/23 0925  •  guaiFENesin (MUCINEX) 12 hr tablet 600 mg, 600 mg, Oral, Q12H [address], Fabrice Borden MD, 600 mg at 07/16/23 2204  •  heparin (porcine) subcutaneous injection 5,000 Units, 5,000 Units, Subcutaneous, Q8H [address], Shaq Freeman MD, 5,000 Units at 07/17/23 0518  •  lidocaine (LIDODERM) 5 % patch 1 patch, 1 patch, Topical, Daily, Fabrice Borden MD, 1 patch at 07/15/23 2050  •  melatonin tablet 6 mg, 6 mg, Oral, HS, Fabrice Borden MD, 6 mg at 07/16/23 2204  •  methocarbamol (ROBAXIN) tablet 250 mg, 250 mg, Oral, Q6H PRN, Fabrice Borden MD, 250 mg at 07/11/23 2111  •  ondansetron (ZOFRAN) injection 4 mg, 4 mg, Intravenous, Q6H PRN, Fabrice Borden MD  •  oxyCODONE (ROXICODONE) split tablet 2.5 mg, 2.5 mg, Oral, Q4H PRN, Fabrice Borden MD, 2.5 mg at 07/11/23 1507  •  polyethylene glycol (MIRALAX) packet 17 g, 17 g, Oral, Daily, Fabrice Borden MD, 17 g at 07/16/23 0695  •  senna-docusate sodium (SENOKOT S) 8.6-50 mg per tablet 1 tablet, 1 tablet, Oral, BID, Fabrice Borden MD, 1 tablet at 07/16/23 1753  •  sulfaSALAzine (AZULFIDINE) tablet 1,000 mg, 1,000 mg, Oral, Daily, Fabrice Borden MD, 1,000 mg at 07/16/23 1144  •  sulfaSALAzine (AZULFIDINE) tablet 500 mg, 500 mg, Oral, BID, Fabrice Borden MD, 500 mg at 07/16/23 1753  •  tamsulosin (FLOMAX) capsule 0.4 mg, 0.4 mg, Oral, Daily, Fabrice Borden MD, 0.4 mg at 07/16/23 6532  •  torsemide [facility]) tablet 20 mg, 20 mg, Oral, Daily, Sydney Castro MD, 20 mg at 07/16/23 6888    Laboratory Results:  Results from last 7 days   Lab Units 07/17/23  0545 07/16/23  1036 07/15/23  2105 07/15/23  7483 07/14/23  1540 07/14/23  3931 07/13/23  1444 07/13/23  0449 07/13/23  0448 07/12/23  0957 07/11/23  0423   WBC Thousand/uL 11.15*  --  11.35* 13.38*  --   --   --   --  8.51 7.93 6.85   HEMOGLOBIN g/dL 10.6*  --  9.7* 10.6*  --   --   --   --  10.7* 10.9* 10.2*   HEMATOCRIT % 35.3*  --  32.8* 35.3*  --   --   --   --  36.2* 35.8* 33.9*   PLATELETS Thousands/uL 196  --  194 210  --   --   --   --  241 240 239   POTASSIUM mmol/L 5.0 4.7 5.2 5.1 4.7 5.7* 5.8*   < >  --  5.5* 5.5*   CHLORIDE mmol/L 110* 111* 110* 111* 111* 113* 113*   < >  --  115* 116*   CO2 mmol/L 30 30 30 31 30 29 27   < >  --  29 31   BUN mg/dL 99* 92* 94* 88* 86* 82* 79*   < >  --  84* 92*   CREATININE mg/dL 2.60* 2.55* 2.43* 2.30* 2.29* 2.11* 2.03*   < >  --  2.06* 2.31*   CALCIUM mg/dL 8.8 8.5 8.5 8.9 8.6 8.9 9.2   < >  --  8.8 8.3   MAGNESIUM mg/dL  --   --   --  2.4  --   --   --   --   --  2.7*  --    PHOSPHORUS mg/dL  --   --   --   --   --   --   --   --   --  3.6  --     < > = values in this interval not displayed.

## 2023-07-17 NOTE — OCCUPATIONAL THERAPY NOTE
Occupational Therapy Treatment Note:      07/17/23 1104   OT Last Visit   OT Visit Date 07/17/23   Note Type   Note Type Treatment   Pain Assessment   Pain Assessment Tool 0-10   Pain Score No Pain   ADL   Where Assessed Chair   Eating Assistance 2  Maximal Assistance   Eating Deficit   (able to brin cup if filled half full with lid / straw to mouth with min asst for guidance of stray when cup was placed into pts hand)   Grooming Assistance 3  Moderate Assistance   Grooming Comments proximal support of arm during sustained task. UB Dressing Assistance 2  Maximal Assistance   LB Dressing Assistance 2  Maximal Assistance   Toileting Assistance  1  Total Assistance   Functional Standing Tolerance   Time max a x 2 for standing. pt with no ability to balance needing asstfrom 2. pt was able to take steps to chair with facilitation and knee support. pt with flexed posture   Bed Mobility   Supine to Sit 2  Maximal assistance   Additional items Assist x 2   Transfers   Sit to Stand 2  Maximal assistance   Additional items Assist x 2   Stand to Sit 2  Maximal assistance   Additional items Assist x 2   Stand pivot   (took few steps with asst to weigfht shift. pt requires max asst x 2 for forced production)   Additional Comments b hha/ knee support bilaterally, incrased time required,  no device this session. pt with dizziness in stance, b/p taken post session were wfl see  emr   ROM- Right Upper Extremities   R Shoulder AAROM   R Elbow AAROM   R Wrist AROM   R Hand AROM   RUE ROM Comment pt is unable to oppose finger tips to thumb r hand   ROM - Left Upper Extremities    L Shoulder AROM   L Elbow AROM   L Wrist AROM   L Hand AAROM  (unable to oppose fingers)   LUE ROM Comment pt was able to bend elbow and bring hand near mouth l ue, and assists with shoulder rom. + shoulder shrug /arom. when supine pt was able to bring half full foam cup to mouth and drink from straw. with asst to place cup into l hand.    Cognition Arousal/Participation Responsive   Attention Attends with cues to redirect   Memory Decreased recall of precautions   Following Commands Follows one step commands without difficulty   Comments [place], minimall verbal.  pt offers simple verbal vs headods this session   Activity Tolerance   Activity Tolerance Patient tolerated treatment well   Assessment   Assessment pt participated in am ot session and was seen focusing on adl tsdks, bed mobiilty, sitting tolerence eob, activity tolerence, sit to stand and spt trasnfer with b ue arom/ aarom/ self feeding trials. pt with some arom b ue's. r ue with more distal movement and controll and l ue with more proximal arom. pt has b shoulder shrug when supine. pt with cold r hand wand war, left. minimal swelling noted bilaterally. pts supportive grand daughter was present this session. Plan   Treatment Interventions ADL retraining;Functional transfer training; Endurance training;Patient/family training;Equipment evaluation/education; Activityengagement   Goal Expiration Date 07/21/23   OT Treatment Day 4   OT Frequency 3-5x/wk   Recommendation   Recommendation Physiatry Consult;Geriatric Consult  (pt with flatter affect reportedly, provided limited answers, appears very [place], little eye contact, ate 25% breakfast. per nsg)   OT Discharge Recommendation Post acute rehabilitation services   AM-PAC Daily Activity Inpatient   Lower Body Dressing 2   Bathing 1   Toileting 1   Upper Body Dressing 2   Grooming 2   Eating 2   Daily Activity Raw Score 10   AM-PAC Applied Cognition Inpatient   Following a Speech/Presentation 2   Understanding Ordinary Conversation 3   Taking Medications 3   Remembering Where Things Are Placed or Put Away 3   Remembering List of 4-5 Errands 3   Taking Care of Complicated Tasks 3   Applied Cognition Raw Score 17   Applied Cognition Standardized Score 36.52     April A Storm

## 2023-07-17 NOTE — ASSESSMENT & PLAN NOTE
- Pt reports fall, posterior head strike, no loss of consciousness, denies use of antiplatelet or anticoagulation medications  - Status post fall with the below noted injuries. - Fall precautions. - Geriatric Medicine consultation for evaluation, medication review and recommendations.  - PT and OT recommend rehab  - Case Management consultation for disposition planning. D/C to [facility] today.

## 2023-07-17 NOTE — PLAN OF CARE
Problem: MOBILITY - ADULT  Goal: Maintain or return to baseline ADL function  Description: INTERVENTIONS:  -  Assess patient's ability to carry out ADLs; assess patient's baseline for ADL function and identify physical deficits which impact ability to perform ADLs (bathing, care of mouth/teeth, toileting, grooming, dressing, etc.)  - Assess/evaluate cause of self-care deficits   - Assess range of motion  - Assess patient's mobility; develop plan if impaired  - Assess patient's need for assistive devices and provide as appropriate  - Encourage maximum independence but intervene and supervise when necessary  - Involve family in performance of ADLs  - Assess for home care needs following discharge   - Consider OT consult to assist with ADL evaluation and planning for discharge  - Provide patient education as appropriate  Outcome: Progressing  Goal: Maintains/Returns to pre admission functional level  Description: INTERVENTIONS:  - Perform BMAT or MOVE assessment daily.   - Set and communicate daily mobility goal to care team and patient/family/caregiver. - Collaborate with rehabilitation services on mobility goals if consulted  - Perform Range of Motion 3 times a day. - Reposition patient every 2 hours.   - Dangle patient 3 times a day  - Stand patient 3 times a day  - Ambulate patient 3 times a day  - Out of bed to chair 3 times a day   - Out of bed for meals 3 times a day  - Out of bed for toileting  - Record patient progress and toleration of activity level   Outcome: Progressing     Problem: PAIN - ADULT  Goal: Verbalizes/displays adequate comfort level or baseline comfort level  Description: Interventions:  - Encourage patient to monitor pain and request assistance  - Assess pain using appropriate pain scale  - Administer analgesics based on type and severity of pain and evaluate response  - Implement non-pharmacological measures as appropriate and evaluate response  - Consider cultural and social influences on pain and pain management  - Notify physician/advanced practitioner if interventions unsuccessful or patient reports new pain  Outcome: Progressing     Problem: INFECTION - ADULT  Goal: Absence or prevention of progression during hospitalization  Description: INTERVENTIONS:  - Assess and monitor for signs and symptoms of infection  - Monitor lab/diagnostic results  - Monitor all insertion sites, i.e. indwelling lines, tubes, and drains  - Monitor endotracheal if appropriate and nasal secretions for changes in amount and color  - [place] appropriate cooling/warming therapies per order  - Administer medications as ordered  - Instruct and encourage patient and family to use good hand hygiene technique  - Identify and instruct in appropriate isolation precautions for identified infection/condition  Outcome: Progressing  Goal: Absence of fever/infection during neutropenic period  Description: INTERVENTIONS:  - Monitor WBC    Outcome: Progressing     Problem: SAFETY ADULT  Goal: Maintain or return to baseline ADL function  Description: INTERVENTIONS:  -  Assess patient's ability to carry out ADLs; assess patient's baseline for ADL function and identify physical deficits which impact ability to perform ADLs (bathing, care of mouth/teeth, toileting, grooming, dressing, etc.)  - Assess/evaluate cause of self-care deficits   - Assess range of motion  - Assess patient's mobility; develop plan if impaired  - Assess patient's need for assistive devices and provide as appropriate  - Encourage maximum independence but intervene and supervise when necessary  - Involve family in performance of ADLs  - Assess for home care needs following discharge   - Consider OT consult to assist with ADL evaluation and planning for discharge  - Provide patient education as appropriate  Outcome: Progressing  Goal: Maintains/Returns to pre admission functional level  Description: INTERVENTIONS:  - Perform BMAT or MOVE assessment daily.   - Set and communicate daily mobility goal to care team and patient/family/caregiver. - Collaborate with rehabilitation services on mobility goals if consulted  - Perform Range of Motion 3 times a day. - Reposition patient every 2 hours.   - Dangle patient 3 times a day  - Stand patient 3 times a day  - Ambulate patient 3 times a day  - Out of bed to chair 3 times a day   - Out of bed for meals 3 times a day  - Out of bed for toileting  - Record patient progress and toleration of activity level   Outcome: Progressing  Goal: Patient will remain free of falls  Description: INTERVENTIONS:  - Educate patient/family on patient safety including physical limitations  - Instruct patient to call for assistance with activity   - Consult OT/PT to assist with strengthening/mobility   - Keep Call bell within reach  - Keep bed low and locked with side rails adjusted as appropriate  - Keep care items and personal belongings within reach  - Initiate and maintain comfort rounds  - Make Fall Risk Sign visible to staff  - Offer Toileting every 2 Hours, in advance of need  - Initiate/Maintain alarm  - Obtain necessary fall risk management equipment:   - Apply yellow socks and bracelet for high fall risk patients  - Consider moving patient to room near nurses station  Outcome: Progressing     Problem: DISCHARGE PLANNING  Goal: Discharge to home or other facility with appropriate resources  Description: INTERVENTIONS:  - Identify barriers to discharge w/patient and caregiver  - Arrange for needed discharge resources and transportation as appropriate  - Identify discharge learning needs (meds, wound care, etc.)  - Arrange for interpretive services to assist at discharge as needed  - Refer to Case Management Department for coordinating discharge planning if the patient needs post-hospital services based on physician/advanced practitioner order or complex needs related to functional status, cognitive ability, or social support system  Outcome: Progressing     Problem: Knowledge Deficit  Goal: Patient/family/caregiver demonstrates understanding of disease process, treatment plan, medications, and discharge instructions  Description: Complete learning assessment and assess knowledge base. Interventions:  - Provide teaching at level of understanding  - Provide teaching via preferred learning methods  Outcome: Progressing     Problem: NEUROSENSORY - ADULT  Goal: Achieves stable or improved neurological status  Description: INTERVENTIONS  - Monitor and report changes in neurological status  - Monitor vital signs such as temperature, blood pressure, glucose, and any other labs ordered   - Initiate measures to prevent increased intracranial pressure  - Monitor for seizure activity and implement precautions if appropriate      Outcome: Progressing  Goal: Remains free of injury related to seizures activity  Description: INTERVENTIONS  - Maintain airway, patient safety  and administer oxygen as ordered  - Monitor patient for seizure activity, document and report duration and description of seizure to physician/advanced practitioner  - If seizure occurs,  ensure patient safety during seizure  - Reorient patient post seizure  - Seizure pads on all 4 side rails  - Instruct patient/family to notify RN of any seizure activity including if an aura is experienced  - Instruct patient/family to call for assistance with activity based on nursing assessment  - Administer anti-seizure medications if ordered    Outcome: Progressing  Goal: Achieves maximal functionality and self care  Description: INTERVENTIONS  - Monitor swallowing and airway patency with patient fatigue and changes in neurological status  - Encourage and assist patient to increase activity and self care.    - Encourage visually impaired, hearing impaired and aphasic patients to use assistive/communication devices  Outcome: Progressing     Problem: Nutrition/Hydration-ADULT  Goal: Nutrient/Hydration intake appropriate for improving, restoring or maintaining nutritional needs  Description: Monitor and assess patient's nutrition/hydration status for malnutrition. Collaborate with interdisciplinary team and initiate plan and interventions as ordered. Monitor patient's weight and dietary intake as ordered or per policy. Utilize nutrition screening tool and intervene as necessary. Determine patient's food preferences and provide high-protein, high-caloric foods as appropriate.      INTERVENTIONS:  - Monitor oral intake, urinary output, labs, and treatment plans  - Assess nutrition and hydration status and recommend course of action  - Evaluate amount of meals eaten  - Assist patient with eating if necessary   - Allow adequate time for meals  - Recommend/ encourage appropriate diets, oral nutritional supplements, and vitamin/mineral supplements  - Order, calculate, and assess calorie counts as needed  - Recommend, monitor, and adjust tube feedings and TPN/PPN based on assessed needs  - Assess need for intravenous fluids  - Provide specific nutrition/hydration education as appropriate  - Include patient/family/caregiver in decisions related to nutrition  Outcome: Progressing     Problem: SAFETY,RESTRAINT: NV/NON-SELF DESTRUCTIVE BEHAVIOR  Goal: Remains free of harm/injury (restraint for non violent/non self-detsructive behavior)  Description: INTERVENTIONS:  - Instruct patient/family regarding restraint use   - Assess and monitor physiologic and psychological status   - Provide interventions and comfort measures to meet assessed patient needs   - Identify and implement measures to help patient regain control  - Assess readiness for release of restraint   Outcome: Progressing  Goal: Returns to optimal restraint-free functioning  Description: INTERVENTIONS:  - Assess the patient's behavior and symptoms that indicate continued need for restraint  - Identify and implement measures to help patient regain control  - Assess readiness for release of restraint   Outcome: Progressing     Problem: Prexisting or High Potential for Compromised Skin Integrity  Goal: Skin integrity is maintained or improved  Description: INTERVENTIONS:  - Identify patients at risk for skin breakdown  - Assess and monitor skin integrity  - Assess and monitor nutrition and hydration status  - Monitor labs   - Assess for incontinence   - Turn and reposition patient  - Assist with mobility/ambulation  - Relieve pressure over bony prominences  - Avoid friction and shearing  - Provide appropriate hygiene as needed including keeping skin clean and dry  - Evaluate need for skin moisturizer/barrier cream  - Collaborate with interdisciplinary team   - Patient/family teaching  - Consider wound care consult   Outcome: Progressing

## 2023-07-17 NOTE — DISCHARGE INSTRUCTIONS
Discharge Instructions  Posterior cervical decompression and fixation/fusion      Surgical incisional care:  Keep incision clean and dry. Avoid applying creams, lotion or antiseptic to incision area. Check the wound daily. If the incision becomes red, swollen, tender, warm, or has increased drainage please notify physician immediately. Okay to apply a padded dressing over incision while wearing VISTA collar in order to reduce risk of irritation. May shower 3 days after surgery, but do not soak in a tub and no swimming. Use mild antimicrobial soap and water. Pat incision dry after showering. Cervical VISTA collar to be worn at all times except for showering. Change from VISTA (grey) collar to the [place] (peach) collar prior to showering. Incision may be cleaned with water and a mild antimicrobial soap using a clean washcloth. Incision is to be gently patted dry with a clean towel. Once dry, collar should be changed back to a VISTA (grey) collar with clean pads in place. Hand wash collar pads with mild soap and water. They are to be laid flat to dry on a clean towel. Recommend changing every 1-2 days. Please refer to VISTA collar instructions for further details. Activity Restrictions:  No heavy lifting greater than 5 - 10lbs. No strenuous activities. May walk as tolerated. Encourage at least 4 short walks per day. No driving while requiring cervical collar, anticipated six weeks. No significant neck movement. Postoperative medication:  Please take pain medications to relieve incision pain, and muscle relaxants to prevent spasms as directed. Please see after visit summary (AVS) for details. Please take over the counter stool softeners such as colace or senna-s to avoid constipation while on narcotics. Do not take ibuprofen, Naproxen/Aleve or any NSAID until cleared by surgeon. May take Tylenol instead.   If taking Coumadin, Aspirin, or Plavix, you may resume these medications when cleared by Neurosurgery. Follow-up as scheduled for a 2 week incision check. Follow-up 6 weeks after surgery with a repeat cervical spine upright x-rays to be completed prior to visit. **Please notify MD immediately if you experience a fever of 101. F, have increased neck or arm pain, new numbness and/or weakness in your arms, difficulty swallowing or breathing especially while lying down, numbness or weakness in arms or legs. **          Routine pastor catheter care. F/u with urology if fails voiding trial at rehab once more mobile.

## 2023-07-17 NOTE — ASSESSMENT & PLAN NOTE
- Transferred to [facility] to rule out central cord disorder s/p fall down stairs  - Continued bilateral shoulder pain and weakness in bilateral upper extremities (cannot raise arms off the bed) and complaining of neck pain  - Pt reports continued weakness in bilateral LE as well  - CT Head, C-spine, CAP negative for acute traumatic injuries  - MRI with area of cord edema consistent with central cord syndrome  - Neurosurgery consulted and signed off with 2 week follow up (around 7/24)  - S/p decadron 2 mg Q8H for 72 hours  - s/p MAP>90 goal for 5 days in ICU - completed  - s/p Posterior C4-5 laminectomy, C3-6 fixation/fusion on 7/4  - RONY drain removed on 7/7  - Pain control  - DVT ppx  - PT and OT recommend rehab  - d/c to SNF today and f/u with neurosurgery in 6 weeks. 2 week incision check completed prior to d/c on 7/17.

## 2023-07-17 NOTE — ASSESSMENT & PLAN NOTE
Lab Results   Component Value Date    EGFR 20 07/17/2023    EGFR 21 07/16/2023    EGFR 22 07/15/2023    CREATININE 2.60 (H) 07/17/2023    CREATININE 2.55 (H) 07/16/2023    CREATININE 2.43 (H) 07/15/2023     - Per nephrology recommendations, switch home 20 mg furosemide to 20 mg toresemide daily  - Continue to monitor  - f/u with nephrology as an outpatient. Hold losartan on discharge.

## 2023-07-17 NOTE — CASE MANAGEMENT
Case Management Discharge Planning Note    Patient name Payton Go  Location [address] 634/[facility] [id number] MRN [id number]  : 1934 Date 2023       Current Admission Date: 7/3/2023  Current Admission Diagnosis:Central cord syndrome [facility])   Patient Active Problem List    Diagnosis Date Noted   • Acute urinary retention 2023   • Hyperkalemia 2023   • Acute respiratory failure with hypoxia ([address]) 2023   • C3 spinal cord injury ([address]) 2023   • Central cord syndrome ([address]) 2023   • Weakness of both upper extremities 2023   • Fall 2023   • Proteinuria 2022   • Stage 4 chronic kidney disease ([address]) 2022   • Hypertriglyceridemia 2022   • Low HDL (under 40) 2020   • Nephrolithiasis 2018   • Severe obesity (BMI 35.0-39. 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      LOS (days): 14  Geometric Mean LOS (GMLOS) (days): 9.80  Days to GMLOS:-4.1     OBJECTIVE:  Risk of Unplanned Readmission Score: 22.75         Current admission status: Inpatient   Preferred Pharmacy:   [facility] ([facility] [address] [place] [address]  Phone: [phone] Fax: [phone]    [place] [address] [place], [place] - [address]  [address] [facility] [address]  Phone: [phone] Fax: [phone] Delivery ([facility] ) - [address]  [address] [facility]  Phone: [phone] Fax: [phone]    Primary Care Provider: Jhonatan Em DO    Primary Insurance: [facility] REP  Secondary Insurance:     DISCHARGE DETAILS: [facility] Number: [id number]

## 2023-07-17 NOTE — PROGRESS NOTES
[address] [facility]  Progress Note  Name: Aravind Croft  MRN: [id number]  [facility] Date of Admission: 7/3/2023   Date of Service: 7/17/2023 I Hospital Day: 14    Assessment/Plan   Weakness of both upper extremities  Assessment & Plan  POD 13  Posterior C4-5 laminectomy, C3-6 fixation/fusion for central cord syndrome ([facility], 7/4/23)  · Cervical spinal cord injury @ C3-4  · s/p unwitnessed fall down stairs with posterior head strike on 7/3   · on arrival with complaints of neck pain and fabien UE weakness and numbness   · Pt with new R C5 palsy post-op     Imaging:   · XR cervical spine, 7/9/23: Expected post op changes with appropriate alignment and hardware    Plan:   · Continue to closely monitor neuro exam   · No collar required. · Pain control per primary team   · Medical management per primary team   · PT/OT/PMR evaluations appreciated  · DVT ppx: SCDs, SQH  · Social work following for assistance with dispo. Pt anticipated to d/c to rehab today. Neurosurgery will sign off at this time. Patient will follow up with neurosurgery for 6 weeks for POV. Please call with questions or concerns. * Central cord syndrome [facility])  Assessment & Plan  see plan as documented above            Subjective/Objective     Chief Complaint: post op follow up in 2 weeks    Subjective: The patient was accompanied by his granddaughter who reports that the patient is gradually improving. Pt reported decreased numbness and weakness in BUE and BLE. She states that he has been more tired recently. He admits to mild pain in his neck that is controlled with his current pain regimen. Objective: This is a pleasant male who is sitting upright in his bed and is in no acute distress. I/O       07/15 0701  07/16 0700 07/16 0701  07/17 0700 07/17 0701  07/18 0700    P. O. 870 880 720    I.V. (mL/kg) 60 (0.6)      IV Piggyback 550  65    Total Intake(mL/kg) 1480 (14.8) 880 (8.8) 785 (7.9)    Urine (mL/kg/hr) 1300 (0.5) 1100 (0.5)     Stool       Total Output 1300 1100     Net +180 -220 +785                 Invasive Devices     Drain  Duration           Urethral Catheter 16 Fr. 8 days                Physical Exam:  Vitals: Blood pressure 139/70, pulse 86, temperature (!) 96 °F (35.6 °C), resp. rate 18, height 5' 5" (1.651 m), weight 100 kg (220 lb 14.4 oz), SpO2 93 %. ,Body mass index is 36.76 kg/m².     General appearance:  Appears stated age  Head: Normocephalic, without obvious abnormality, atraumatic  Eyes: EOMI, PERRL  Neck: supple, symmetrical, trachea midline   Lungs: non labored breathing  Heart: regular heart rate  Neurologic:   Mental status: Alert, oriented x 3, thought content appropriate  Cranial nerves: grossly intact (Cranial nerves II-XII)  Sensory: subjective decreased sensation to bilateral upper and lower extremities  Motor: motor exam partly limited secondary to participation  RUE: 3/5 , 4-/5 elbow flex/ext, 2/5 delt abd, 4/5 delt add  LUE: 2/5 , 4-/5 elbow flex/ext, 2/5 delt abd, 4/5 delt add  RLE: 4/5 DF/PF   LLE: 4/5 DF/PF  Reflexes: Chaney's and ankle clonus negative bilaterally      Lab Results:  Results from last 7 days   Lab Units 07/17/23  0545 07/15/23  2105 07/15/23  0643   WBC Thousand/uL 11.15* 11.35* 13.38*   HEMOGLOBIN g/dL 10.6* 9.7* 10.6*   HEMATOCRIT % 35.3* 32.8* 35.3*   PLATELETS Thousands/uL 196 194 210   NEUTROS PCT % 75 79* 78*   MONOS PCT % 11 10 11   EOS PCT % 3 3 2     Results from last 7 days   Lab Units 07/17/23  0545 07/16/23  1036 07/15/23  2105   POTASSIUM mmol/L 5.0 4.7 5.2   CHLORIDE mmol/L 110* 111* 110*   CO2 mmol/L 30 30 30   BUN mg/dL 99* 92* 94*   CREATININE mg/dL 2.60* 2.55* 2.43*   CALCIUM mg/dL 8.8 8.5 8.5     Results from last 7 days   Lab Units 07/15/23  0643 07/12/23  0957   MAGNESIUM mg/dL 2.4 2.7*     Results from last 7 days   Lab Units 07/12/23  0957   PHOSPHORUS mg/dL 3.6         No results found for: "TROPONINT"  ABG:  Lab Results Component Value Date    PHART 7.235 (LL) 07/09/2023    SZO0NDS 59.5 (HH) 07/09/2023    PO2ART 112.6 07/09/2023    MIR9EYR 24.6 07/09/2023    BEART -3.4 07/09/2023    SOURCE Line, Arterial 07/09/2023       Imaging Studies: I have personally reviewed pertinent reports and I have personally reviewed pertinent films in PACS    EKG, Pathology, and Other Studies: I have personally reviewed pertinent reports. PLEASE NOTE:  This encounter may have been completed utilizing the [employer] Direct Speech Voice Recognition Software. Grammatical errors, random word insertions, pronoun errors and incomplete sentences are occasional consequences of the system due to software limitations, ambient noise and hardware issues. These may be missed by proof reading prior to affixing electronic signature. Any questions or concerns about the content, text or information contained within the body of this dictation should be directly addressed to the advanced practitioner or physician for clarification.

## 2023-07-17 NOTE — CASE MANAGEMENT
Called [facility] to update authorization. [facility] received request for authorization from Care Manager. Authorization request for: SNF   Facility Name: Alicia Munoz NPI: [id number]   Facility MD: Caleb Li NPI: [id number]   Authorization initiated by contacting insurance: [facility] Via: Phone. Clinicals submitted via: Phone. [phone] Rep: [facility] has received approved authorization from insurance: [address] [place] [address] received for: [facility]   Facility: [address] [place] [address] #: [id number]  Start of Care: 7/17  Next Review Date:  7/24  Submit next review to: Phone.  [phone]   Transport auth #: Case- [id number]   notified: Jennie Taveras

## 2023-07-17 NOTE — ASSESSMENT & PLAN NOTE
POD 13  Posterior C4-5 laminectomy, C3-6 fixation/fusion for central cord syndrome ([facility], 7/4/23)  · Cervical spinal cord injury @ C3-4  · s/p unwitnessed fall down stairs with posterior head strike on 7/3   · on arrival with complaints of neck pain and fabien UE weakness and numbness   · Pt with new R C5 palsy post-op     Imaging:   · XR cervical spine, 7/9/23: Expected post op changes with appropriate alignment and hardware    Plan:   · Continue to closely monitor neuro exam   · No collar required. · Pain control per primary team   · Medical management per primary team   · PT/OT/PMR evaluations appreciated  · DVT ppx: SCDs, SQH  · Social work following for assistance with dispo. Pt anticipated to d/c to rehab today. Neurosurgery will sign off at this time. Patient will follow up with neurosurgery for 6 weeks for POV. Please call with questions or concerns.

## 2023-07-18 NOTE — UTILIZATION REVIEW
NOTIFICATION OF ADMISSION DISCHARGE   This is a Notification of Discharge from [facility]. Please be advised that this patient has been discharge from our facility. Below you will find the admission and discharge date and time including the patient’s disposition. UTILIZATION REVIEW CONTACT:  Lilian Boss  Utilization   Network Utilization Review Department  Phone: [phone] x carefully listen to the prompts. All voicemails are confidential.  Email: [email or web address]     ADMISSION INFORMATION  PRESENTATION DATE: 7/3/2023  4:00 PM  OBERVATION ADMISSION DATE:   INPATIENT ADMISSION DATE: 7/3/23  5:17 PM   DISCHARGE DATE: 7/17/2023  4:58 PM   DISPOSITION:Non [facility] SNF/TCU/SNU    IMPORTANT INFORMATION:  Send all requests for admission clinical reviews, approved or denied determinations and any other requests to dedicated fax number below belonging to the campus where the patient is receiving treatment.  List of dedicated fax numbers:  [place] DENIALS (Administrative/Medical Necessity) [phone] [address] [facility] (Maternity/NICU/Pediatrics) [phone]   [facility] [phone]   [facility] [phone] [facility] [phone]   [address] [facility] [phone]   [facility] [phone]   [address] [place] [address] [facility] [phone]   [address] [place] [address] [phone]   [address] [facility] [phone] [address] [facility] [address] [facility] [phone]

## 2023-07-18 NOTE — TELEPHONE ENCOUNTER
[facility]    ----- Message from [address] sent at 7/17/2023  3:01 PM EDT -----  Patient hospitalized at [facility] plan on 7/17. Please arrange for follow-up BMP approximately 5 to 7 days after discharge.   He was previously seen by Dr. Marissa Galeas and will need follow-up appointment

## 2023-07-18 NOTE — TELEPHONE ENCOUNTER
Received call on nurseline from facility requesting xray order be faxed to them at [phone]. This RN faxed order.

## 2023-07-18 NOTE — TELEPHONE ENCOUNTER
[facility] and spoke with the nurse. She denies any incisional issues or fevers at this time. Reminded staff of patient's 6 week POV with HDM and provided the date time and location and reminded them that patient needs XR prior. If going outside of [facility] for imaging, will need to bring a disc with images on it along with the report to the next appointment. Script was faxed to them at [phone]. Encouraged them to call with any further questions or concerns or if any incisional issues were to arise. She verbalized an understanding and was appreciative for the coordination.

## 2023-07-19 PROBLEM — R55 SYNCOPE: Status: ACTIVE | Noted: 2023-01-01

## 2023-07-19 PROBLEM — I48.91 ATRIAL FIBRILLATION (HCC): Status: ACTIVE | Noted: 2023-01-01

## 2023-07-19 PROBLEM — I50.32 CHRONIC DIASTOLIC CONGESTIVE HEART FAILURE (HCC): Status: ACTIVE | Noted: 2023-01-01

## 2023-07-19 PROBLEM — J90 PLEURAL EFFUSION ON RIGHT: Status: ACTIVE | Noted: 2023-01-01

## 2023-07-19 NOTE — PLAN OF CARE
Problem: MOBILITY - ADULT  Goal: Maintain or return to baseline ADL function  Description: INTERVENTIONS:  -  Assess patient's ability to carry out ADLs; assess patient's baseline for ADL function and identify physical deficits which impact ability to perform ADLs (bathing, care of mouth/teeth, toileting, grooming, dressing, etc.)  - Assess/evaluate cause of self-care deficits   - Assess range of motion  - Assess patient's mobility; develop plan if impaired  - Assess patient's need for assistive devices and provide as appropriate  - Encourage maximum independence but intervene and supervise when necessary  - Involve family in performance of ADLs  - Assess for home care needs following discharge   - Consider OT consult to assist with ADL evaluation and planning for discharge  - Provide patient education as appropriate  Outcome: Progressing  Goal: Maintains/Returns to pre admission functional level  Description: INTERVENTIONS:  - Perform BMAT or MOVE assessment daily.   - Set and communicate daily mobility goal to care team and patient/family/caregiver.    - Collaborate with rehabilitation services on mobility goals if consulted  - Out of bed for toileting  - Record patient progress and toleration of activity level   Outcome: Progressing     Problem: PAIN - ADULT  Goal: Verbalizes/displays adequate comfort level or baseline comfort level  Description: Interventions:  - Encourage patient to monitor pain and request assistance  - Assess pain using appropriate pain scale  - Administer analgesics based on type and severity of pain and evaluate response  - Implement non-pharmacological measures as appropriate and evaluate response  - Consider cultural and social influences on pain and pain management  - Notify physician/advanced practitioner if interventions unsuccessful or patient reports new pain  Outcome: Progressing     Problem: INFECTION - ADULT  Goal: Absence or prevention of progression during hospitalization  Description: INTERVENTIONS:  - Assess and monitor for signs and symptoms of infection  - Monitor lab/diagnostic results  - Monitor all insertion sites, i.e. indwelling lines, tubes, and drains  - Monitor endotracheal if appropriate and nasal secretions for changes in amount and color  - [place] appropriate cooling/warming therapies per order  - Administer medications as ordered  - Instruct and encourage patient and family to use good hand hygiene technique  - Identify and instruct in appropriate isolation precautions for identified infection/condition  Outcome: Progressing  Goal: Absence of fever/infection during neutropenic period  Description: INTERVENTIONS:  - Monitor WBC    Outcome: Progressing     Problem: SAFETY ADULT  Goal: Maintain or return to baseline ADL function  Description: INTERVENTIONS:  -  Assess patient's ability to carry out ADLs; assess patient's baseline for ADL function and identify physical deficits which impact ability to perform ADLs (bathing, care of mouth/teeth, toileting, grooming, dressing, etc.)  - Assess/evaluate cause of self-care deficits   - Assess range of motion  - Assess patient's mobility; develop plan if impaired  - Assess patient's need for assistive devices and provide as appropriate  - Encourage maximum independence but intervene and supervise when necessary  - Involve family in performance of ADLs  - Assess for home care needs following discharge   - Consider OT consult to assist with ADL evaluation and planning for discharge  - Provide patient education as appropriate  Outcome: Progressing  Goal: Maintains/Returns to pre admission functional level  Description: INTERVENTIONS:  - Perform BMAT or MOVE assessment daily.   - Set and communicate daily mobility goal to care team and patient/family/caregiver.    - Collaborate with rehabilitation services on mobility goals if consulted  - Out of bed for toileting  - Record patient progress and toleration of activity level   Outcome: Progressing  Goal: Patient will remain free of falls  Description: INTERVENTIONS:  - Educate patient/family on patient safety including physical limitations  - Instruct patient to call for assistance with activity   - Consult OT/PT to assist with strengthening/mobility   - Keep Call bell within reach  - Keep bed low and locked with side rails adjusted as appropriate  - Keep care items and personal belongings within reach  - Initiate and maintain comfort rounds  - Make Fall Risk Sign visible to staff  - Apply yellow socks and bracelet for high fall risk patients  - Consider moving patient to room near nurses station  Outcome: Progressing     Problem: DISCHARGE PLANNING  Goal: Discharge to home or other facility with appropriate resources  Description: INTERVENTIONS:  - Identify barriers to discharge w/patient and caregiver  - Arrange for needed discharge resources and transportation as appropriate  - Identify discharge learning needs (meds, wound care, etc.)  - Arrange for interpretive services to assist at discharge as needed  - Refer to Case Management Department for coordinating discharge planning if the patient needs post-hospital services based on physician/advanced practitioner order or complex needs related to functional status, cognitive ability, or social support system  Outcome: Progressing     Problem: Knowledge Deficit  Goal: Patient/family/caregiver demonstrates understanding of disease process, treatment plan, medications, and discharge instructions  Description: Complete learning assessment and assess knowledge base.   Interventions:  - Provide teaching at level of understanding  - Provide teaching via preferred learning methods  Outcome: Progressing

## 2023-07-19 NOTE — ASSESSMENT & PLAN NOTE
Patient discharged 2 days ago from trauma service to [place] after a fall and C3 spinal cord injury, quadriplegia, today returns due to increased work of breathing, dyspnea and hypoxia noted in [place]. There was also question of periods of unresponsiveness while he had difficulty breathing  In ER, patient noted to be hypoxic, required BiPAP briefly  No tachycardia, no tachypnea. VBG reveals mild hypercarbia, no acidosis noted  Chest x-ray reveals persistent bilateral, more on right-sided pleural effusion, mildly elevated proBNP. Patient was treated with pneumonia last week with IV cefepime then cefdinir, continue cefdinir until completion.   Today low likelihood of pneumonia, obtain procal.  Hold home torsemide  We will start patient on IV Lasix twice daily  Wean off oxygen as able to  Intake, output, daily weight

## 2023-07-19 NOTE — CONSULTS
[id number] [place] [address] 80 y.o. 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 Fam Abraham  · Baseline creatinine: 1.9-2.4. Most recently creatinine up to 2.6 with escalation in diuretic therapy. Discharged on 7/17  · Etiology: Hypertensive nephrosclerosis as well as advanced age nephron loss  · Readmitted 7/19. Creatinine 2.56. At baseline. · Burkett catheter in place since recent hospitalization. · Urinalysis: 2+ protein, moderate blood, 1-2 RBCs, moderate leuks, occasional bacteria  · Plan/Recommendations:  · Continue to hold losartan  · IV diuretic as needed. Received 40 mg of IV Lasix at 1548. Monitor response  · Burkett catheter, monitor intake and output  · Avoid hypotension and nephrotoxic agents  · Check labs in the a.m. Hyperkalemia:  · Potassium 5.7  · Unclear etiology but recurrent  · Status post calcium gluconate, IV Lasix  · Low potassium diet  · May need standing dose of Lokelma-possibly every other day or several times a week. Acute hypoxic respiratory failure:  · On nasal cannula oxygen  · Use of accessory muscles  · Chest x-ray personally reviewed. Decreased lung volumes/bilateral pleural effusions right greater than left. Awaiting final read  · Possible thoracentesis    CKD-MBD:  · Phosphorus 4.7  · Low phosphorus diet    Anemia:  · Hemoglobin 10.3    Urinary retention:   · Burkett catheter in place at recent discharge on 7/17  · On Flomax    Change in mental status:   · Patient reported having hallucinations during the night  · CT of the head negative for acute pathology    Atrial fibrillation:  · Not on antiarrhythmic therapy  · Cardiology consulted  · [facility]    Cardiomyopathy with diastolic dysfunction:  · Echo 7/5: Ejection fraction 50% with mild global hypokinesis. Grade 2 diastolic dysfunction.   Aortic valve sclerosis, mild to moderate MR, mild to moderate TR. · Started on daily dose of oral torsemide 20 mg during recent hospitalization  · Currently on IV Lasix. Central cord syndrome:  · Recent fall  · C3 cord injury  · Weakness upper and lower extremities    Anemia of chronic disease:  · Hemoglobin unchanged since recent discharge, 10.3  · Monitor    Other problems:  · Renal cyst: CT 7/3. No solid renal mass. Right kidney lower pole cyst with thin septations. Follow-up outpatient. · Gout: On allopurinol  · Ulcerative colitis: On sulfasalazine    SUMMARY OF RECOMMENDATIONS:  Monitor effect of IV Lasix. Consider Linard Rail for management of recurrent hyperkalemia  Monitor renal function. Continue to hold losartan  Burkett catheter per urology management    HISTORY OF PRESENT ILLNESS:  Requesting Physician: Edilberto Rosales MD  Reason for Consult: Chronic kidney disease, stage IV:    Key Salinas is a 80 y.o. 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 after presenting with dyspnea. Asterosvaldo Karlo states that he was brought back to the hospital because he was having episodes. He described the episodes as "hallucinations" which he had several times. He was recently hospitalized after a fall and seen by our service for management of CKD stage IV. Baseline creatinine is near 1.9-2.4. He was discharged 2 days prior to readmission with a creatinine of 2.6 in the setting of escalation in diuretic therapy. Creatinine 2.57 on initial labs. Renal function at baseline. A renal consultation is requested today for assistance in the management of CKD stage IV.     PAST MEDICAL HISTORY:  Past Medical History:   Diagnosis Date   • Ankle edema     last assessed [id number]   • Depression     last assessed [id number]   • Hypertension • Kidney stone    • Nephrolithiasis    • Nocturia     last assessed [id number]   • Posterior tibial tendinitis of right lower extremity     last assessed [id number]       PAST SURGICAL HISTORY:  Past Surgical History:   Procedure Laterality Date   • ANAL FISSURECTOMY     • CERVICAL FUSION N/A 7/4/2023    Procedure: Posterior C4-5 laminectomy, C3-6 fixation/fusion;  Surgeon: Carlota Hunt MD;  Location: [facility];  Service: Neurosurgery   • COLONOSCOPY  04/22/2009    every 2 years   • COLONOSCOPY  03/04/2013    2 yrs d/t h/o polyp   • COLONOSCOPY  02/15/2016    colo 2/15/16-repeat 2 yrs   • SHOULDER SURGERY         ALLERGIES:  No Known Allergies    SOCIAL HISTORY:  Social History     Substance and Sexual Activity   Alcohol Use Not Currently    Comment: occassionally     Social History     Substance and Sexual Activity   Drug Use No     Social History     Tobacco Use   Smoking Status Never   Smokeless Tobacco Never       FAMILY HISTORY:  Family History   Problem Relation Age of Onset   • Clotting disorder Mother    • Nephrolithiasis Father    • Diabetes Sister    • Heart disease Daughter    • Fibromyalgia Daughter        MEDICATIONS:    Current Facility-Administered Medications:   •  acetaminophen (TYLENOL) tablet 650 mg, 650 mg, Oral, Q6H PRN, Eleni Quintanilla MD  •  allopurinol (ZYLOPRIM) tablet 100 mg, 100 mg, Oral, Daily, Lobo Foster MD  •  bisacodyl (DULCOLAX) rectal suppository 10 mg, 10 mg, Rectal, Daily PRN, Eleni Quintanilla MD  •  calcium gluconate 1 g in sodium chloride 0.9% 50 mL (premix), 1 g, Intravenous, Once, Eleni Quintanilla MD, Last Rate: 100 mL/hr at 07/19/23 1548, 1 g at 07/19/23 1548  •  cefdinir (OMNICEF) capsule 300 mg, 300 mg, Oral, Q24H, Lobo Foster MD  •  cholecalciferol (VITAMIN D3) tablet 2,000 Units, 2,000 Units, Oral, Daily, Lobo Foster MD  •  folic acid (FOLVITE) tablet 1 mg, 1 mg, Oral, Daily, Lobo Foster MD  •  furosemide (LASIX) injection 40 mg, 40 mg, Intravenous, BID (diuretic), Chao Acuña MD  •  guaiFENesin (MUCINEX) 12 hr tablet 600 mg, 600 mg, Oral, Q12H [address], Chao Acuña MD  •  heparin (ACS LOW), , Intravenous, Once, Chao Acuña MD  •  multivitamin stress formula tablet 1 tablet, 1 tablet, Oral, Daily, Chao Acuña MD  •  ondansetron (ZOFRAN) injection 4 mg, 4 mg, Intravenous, Q6H PRN, Chao Acuña MD  •  polyethylene glycol (MIRALAX) packet 17 g, 17 g, Oral, Daily, Chao Acuña MD  •  senna-docusate sodium (SENOKOT S) 8.6-50 mg per tablet 1 tablet, 1 tablet, Oral, BID, Chao Acuña MD  •  [START ON 7/20/2023] sulfaSALAzine (AZULFIDINE) tablet 1,000 mg, 1,000 mg, Oral, Daily Before Lunch, Chao Acuña MD  •  sulfaSALAzine (AZULFIDINE) tablet 500 mg, 500 mg, Oral, BID, Lobo Foster MD  •  tamsulosin (FLOMAX) capsule 0.4 mg, 0.4 mg, Oral, Daily, Chao Acuña MD    Current Outpatient Medications:   •  acetaminophen (TYLENOL) 325 mg tablet, Take 3 tablets (975 mg total) by mouth every 8 (eight) hours, Disp: , Rfl: 0  •  allopurinol (ZYLOPRIM) 100 mg tablet, TAKE 1 TABLET BY MOUTH  DAILY, Disp: 90 tablet, Rfl: 2  •  bisacodyl (DULCOLAX) 10 mg suppository, Insert 1 suppository (10 mg total) into the rectum daily as needed for constipation, Disp: 12 suppository, Rfl: 0  •  cefdinir (OMNICEF) 300 mg capsule, Take 1 capsule (300 mg total) by mouth every 24 hours for 3 days, Disp: 3 capsule, Rfl: 0  •  cholecalciferol (VITAMIN D3) 1,000 units tablet, Take 2 tablets by mouth daily , Disp: , Rfl:   •  folic acid (FOLVITE) 1 mg tablet, Take 1 mg by mouth daily. , Disp: , Rfl:   •  guaiFENesin (MUCINEX) 600 mg 12 hr tablet, Take 1 tablet (600 mg total) by mouth every 12 (twelve) hours, Disp: , Rfl: 0  •  Heparin Sodium, Porcine, (heparin, porcine,) 5,000 units/mL, Inject 1 mL (5,000 Units total) under the skin every 8 (eight) hours, Disp: 1 mL, Rfl: 0  •  lidocaine (LIDODERM) 5 %, Apply 1 patch topically over 12 hours daily Remove & Discard patch within 12 hours or as directed by MD, Disp: , Rfl: 0  •  Multiple Vitamins-Minerals (PRESERVISION AREDS) CAPS, Take 1 capsule by mouth daily, Disp: , Rfl:   •  ofloxacin (OCUFLOX) 0.3 % ophthalmic solution, Install 1 drop in the L eye 4 times daily for 5 days after eye injection as directed, Disp: , Rfl: 3  •  polyethylene glycol (MIRALAX) 17 g packet, Take 17 g by mouth daily Do not start before July 18, 2023., Disp: , Rfl: 0  •  senna-docusate sodium (SENOKOT S) 8.6-50 mg per tablet, Take 1 tablet by mouth 2 (two) times a day, Disp: , Rfl: 0  •  sulfaSALAzine (AZULFIDINE) 500 mg tablet, TAKE 1 TABLET IN THE MORNING, 2 TABLETS AT NOON AND 1 TABLET IN THE EVENING, Disp: 360 tablet, Rfl: 1  •  tamsulosin (FLOMAX) 0.4 mg, TAKE 1 CAPSULE BY MOUTH  DAILY, Disp: 90 capsule, Rfl: 2  •  torsemide (DEMADEX) 20 mg tablet, Take 1 tablet (20 mg total) by mouth daily Do not start before July 18, 2023., Disp: , Rfl: 0    REVIEW OF SYSTEMS:  Constitutional: Reports fatigue and difficulty sleeping  HENT: Negative for postnasal drip  Eyes: Negative for visual disturbance. Respiratory: Denies shortness of breath although appears to be reason for readmission  Cardiovascular: Negative for chest pain  Gastrointestinal: Negative for abdominal pain, constipation, diarrhea, nausea and vomiting. Genitourinary: Burkett catheter in place  Musculoskeletal: Negative for unusual arthralgia  Skin: Negative for rash. Neurological: Positive for weakness  Hematological: Negative for  bleeding. Psychiatric/Behavioral: Reports visual hallucinations on several occasions during the night. All the systems were reviewed and were negative except as documented on the HPI.     PHYSICAL EXAM:  Current Weight:    First Weight:    Vitals:    07/19/23 1357 07/19/23 1430 07/19/23 1528 07/19/23 1530   BP: 150/64 147/62  167/72   BP Location: Right arm   Right arm   Pulse: 84 88  92   Resp: 16 14  (!) 26   Temp:       TempSrc:       SpO2: 98% 98% 95% 94%       Intake/Output Summary (Last 24 hours) at 7/19/2023 1609  Last data filed at 7/19/2023 1330  Gross per 24 hour   Intake --   Output 600 ml   Net -600 ml     Physical Exam  Constitutional:       General: He is not in acute distress. Appearance: He is well-developed. He is ill-appearing. HENT:      Head: Normocephalic and atraumatic. Eyes:      Pupils: Pupils are equal, round, and reactive to light. Neck:      Vascular: No carotid bruit or JVD. Trachea: No tracheal deviation. Cardiovascular:      Rate and Rhythm: Tachycardia present. Rhythm irregular. Heart sounds: No murmur heard. No friction rub. No gallop. Pulmonary:      Effort: Accessory muscle usage and prolonged expiration present. Breath sounds: No wheezing, rhonchi or rales. Abdominal:      General: Bowel sounds are normal. There is no distension. Palpations: Abdomen is soft. There is no mass. Tenderness: There is no abdominal tenderness. There is no guarding or rebound. Musculoskeletal:         General: No swelling, tenderness or signs of injury. Normal range of motion. Cervical back: Normal range of motion and neck supple. No rigidity or tenderness. Right lower leg: No edema. Left lower leg: No edema. Skin:     General: Skin is warm and dry. Capillary Refill: Capillary refill takes 2 to 3 seconds. Coloration: Skin is not jaundiced or pale. Findings: No erythema or rash. Neurological:      General: No focal deficit present. Mental Status: He is alert. Comments: Bilateral weakness   Psychiatric:         Attention and Perception: Attention normal. He does not perceive visual hallucinations. Mood and Affect: Affect is flat. Speech: Speech is delayed. Behavior: Behavior is slowed. Cognition and Memory: Memory is impaired. Invasive Devices:   Urethral Catheter 16 Fr.  (Active)     Lab Results:   Results from last 7 days   Lab Units 07/19/23  1249 07/17/23  0557 07/16/23  1036 07/15/23  2105 07/15/23  0643   WBC Thousand/uL 10.88* 11.15*  --  11.35* 13.38*   HEMOGLOBIN g/dL 10.3* 10.6*  --  9.7* 10.6*   HEMATOCRIT % 35.6* 35.3*  --  32.8* 35.3*   PLATELETS Thousands/uL 231 196  --  194 210   POTASSIUM mmol/L 5.7* 5.0 4.7 5.2 5.1   CHLORIDE mmol/L 110* 110* 111* 110* 111*   CO2 mmol/L 31 30 30 30 31   BUN mg/dL 100* 99* 92* 94* 88*   CREATININE mg/dL 2.57* 2.60* 2.55* 2.43* 2.30*   CALCIUM mg/dL 9.0 8.8 8.5 8.5 8.9   MAGNESIUM mg/dL 2.7*  --   --   --  2.4   PHOSPHORUS mg/dL 4.7*  --   --   --   --    ALK PHOS U/L 86  --   --   --   --    ALT U/L 31  --   --   --   --    AST U/L 25  --   --   --   --      Other Studies:

## 2023-07-19 NOTE — ED ATTENDING ATTESTATION
7/19/2023  IMildred MD, saw and evaluated the patient. I have discussed the patient with the resident/non-physician practitioner and agree with the resident's/non-physician practitioner's findings, Plan of Care, and MDM as documented in the resident's/non-physician practitioner's note, except where noted. All available labs and Radiology studies were reviewed. I was present for key portions of any procedure(s) performed by the resident/non-physician practitioner and I was immediately available to provide assistance. At this point I agree with the current assessment done in the Emergency Department. I have conducted an independent evaluation of this patient a history and physical is as follows:    ED Course         Critical Care Time  Procedures    81 yo male with recent trauma and had c3 fx and now quadraplegic and bedridden. Pt with chronic pastor. Pt at rehab facility for one week. Pt today had 2 syncopal episodes while in bed. Pt with sob and bodyaches. Pt on 4liters oxygen currently, 1l at baseline for a pna. Vss, afebrile, lungs cta, rrr, abdomen soft nontender, sensation intact, no movement of legs, weak  strength bilateral hands. Cardiac workup, labs, vbg, ct head, cxr.

## 2023-07-19 NOTE — CASE MANAGEMENT
Case Management Assessment & Discharge Planning Note    Patient name Lazarus Luna  Location [address] [place] [address] [address]/[facility] [id number] MRN [id number]  : 1934 Date 2023       Current Admission Date: 2023  Current Admission Diagnosis:Acute respiratory failure with hypoxia [facility])   Patient Active Problem List    Diagnosis Date Noted   • Syncope 2023   • Pleural effusion on right 2023   • Atrial fibrillation ([address]) 2023   • Chronic diastolic congestive heart failure ([address]) 2023   • Acute urinary retention 2023   • Hyperkalemia 2023   • Acute respiratory failure with hypoxia ([address]) 2023   • C3 spinal cord injury ([address]) 2023   • Central cord syndrome ([address]) 2023   • Weakness of both upper extremities 2023   • Fall 2023   • Proteinuria 2022   • Stage 4 chronic kidney disease ([address]) 2022   • Hypertriglyceridemia 2022   • Low HDL (under 40) 2020   • Nephrolithiasis 2018   • Severe obesity (BMI 35.0-39. 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      LOS (days): 0  Geometric Mean LOS (GMLOS) (days):   Days to GMLOS:     OBJECTIVE:  PATIENT READMITTED TO HOSPITAL  Risk of Unplanned Readmission Score: 29.22         Current admission status: Inpatient       Preferred Pharmacy:   [facility] ([address]  [address] [place] [address]  Phone: [phone] Fax: [phone] [place] [facility] [address] Leah Cespedes  [address] [place] [address] Drive  Phone: [phone] Fax: [phone] [address] [facility] ([facility] ) - Brody COLON [facility] [address] [facility]  [place] [address]  Phone: [phone] Fax: [phone]    Primary Care Provider: Rupali Hall DO    Primary Insurance: [facility]  Secondary Insurance:     ASSESSMENT:  [employer] Proxies    There are no active Health Care Proxies on file.                  Readmission Root Cause  30 Day Readmission: Yes  Who directed you to return to the hospital?: Other (comment) (SNF)  During previous admission, was a post-acute recommendation made?: Yes  What post-acute resources were offered?: STR  Patient was readmitted due to: hypoxia  Action Plan: bipap    Patient Information  Admitted from[de-identified] Facility  Mental Status: Alert                                 DISCHARGE DETAILS:    Discharge planning discussed with[de-identified] readmit-see assessment completed 7/4/23  Freedom of Choice: Yes                   Contacts  Patient Contacts: david Hernandez

## 2023-07-19 NOTE — ED PROVIDER NOTES
History  Chief Complaint   Patient presents with   • Syncope     Pt from [place] rehab, per rehab, pt had 2 syncopal episodes today once at 0800 and another arouind 1130. Pt reports pain "all over." Pt quadriplegic, was in bed during syncopal episodes, -HS/fall     80year-old male past medical history of recent spinal cord injury status post fall, central cord syndrome, now quadriplegic presents to the ED from outpatient rehab facility for 2 episodes of syncope. Patient was apparently in bed, unable to move upper and lower extremities secondary to recent fall at the beginning of July. He was noted to syncopized around 8 AM and again at 1130. Patient cannot tell me very much about the episodes of syncope. He tells me that he "passed out" and does not remember how or why. Patient overall has been feeling very fatigued, weak and sleepy. He is keeping his eyes closed on my exam but does easily arouse to voice and is appropriate on questioning. Patient also endorses shortness of breath. He is on 4 L O2 currently and was treated for pneumonia while he was inpatient  2 weeks ago. Prior to that hospitalization, he was not on any O2. Patient currently denies any chest pain, no fevers or chills. He does endorse decreased appetite and nausea but no vomiting. He has an indwelling Burkett in place and urine appears clear. Prior to Admission Medications   Prescriptions Last Dose Informant Patient Reported? Taking?    Heparin Sodium, Porcine, (heparin, porcine,) 5,000 units/mL 7/18/2023  No Yes   Sig: Inject 1 mL (5,000 Units total) under the skin every 8 (eight) hours   Multiple Vitamins-Minerals (PRESERVISION AREDS) CAPS 7/18/2023  Yes Yes   Sig: Take 1 capsule by mouth daily   acetaminophen (TYLENOL) 325 mg tablet 7/18/2023  No Yes   Sig: Take 3 tablets (975 mg total) by mouth every 8 (eight) hours   allopurinol (ZYLOPRIM) 100 mg tablet 7/18/2023  No Yes   Sig: TAKE 1 TABLET BY MOUTH  DAILY   bisacodyl (DULCOLAX) 10 mg suppository Unknown  No No   Sig: Insert 1 suppository (10 mg total) into the rectum daily as needed for constipation   cefdinir (OMNICEF) 300 mg capsule 7/18/2023  No Yes   Sig: Take 1 capsule (300 mg total) by mouth every 24 hours for 3 days   cholecalciferol (VITAMIN D3) 1,000 units tablet 7/18/2023  Yes Yes   Sig: Take 2 tablets by mouth daily    folic acid (FOLVITE) 1 mg tablet 7/18/2023  Yes Yes   Sig: Take 1 mg by mouth daily. guaiFENesin (MUCINEX) 600 mg 12 hr tablet 7/18/2023  No Yes   Sig: Take 1 tablet (600 mg total) by mouth every 12 (twelve) hours   lidocaine (LIDODERM) 5 % 7/18/2023  No Yes   Sig: Apply 1 patch topically over 12 hours daily Remove & Discard patch within 12 hours or as directed by MD   ofloxacin (OCUFLOX) 0.3 % ophthalmic solution 7/18/2023  Yes Yes   Sig: Install 1 drop in the L eye 4 times daily for 5 days after eye injection as directed   polyethylene glycol (MIRALAX) 17 g packet 7/18/2023  No Yes   Sig: Take 17 g by mouth daily Do not start before July 18, 2023. senna-docusate sodium (SENOKOT S) 8.6-50 mg per tablet 7/18/2023  No Yes   Sig: Take 1 tablet by mouth 2 (two) times a day   sulfaSALAzine (AZULFIDINE) 500 mg tablet 7/18/2023  No Yes   Sig: TAKE 1 TABLET IN THE MORNING, 2 TABLETS AT NOON AND 1 TABLET IN THE EVENING   tamsulosin (FLOMAX) 0.4 mg 7/18/2023  No Yes   Sig: TAKE 1 CAPSULE BY MOUTH  DAILY   torsemide (DEMADEX) 20 mg tablet 7/18/2023  No Yes   Sig: Take 1 tablet (20 mg total) by mouth daily Do not start before July 18, 2023.       Facility-Administered Medications: None       Past Medical History:   Diagnosis Date   • Ankle edema     last assessed [id number]   • Depression     last assessed [id number]   • Hypertension    • Kidney stone    • Nephrolithiasis    • Nocturia     last assessed [id number]   • Posterior tibial tendinitis of right lower extremity     last assessed [id number]       Past Surgical History:   Procedure Laterality Date   • ANAL FISSURECTOMY     • CERVICAL FUSION N/A 7/4/2023    Procedure: Posterior C4-5 laminectomy, C3-6 fixation/fusion;  Surgeon: Garth Ruelas MD;  Location: [facility];  Service: Neurosurgery   • COLONOSCOPY  04/22/2009    every 2 years   • COLONOSCOPY  03/04/2013    2 yrs d/t h/o polyp   • COLONOSCOPY  02/15/2016    colo 2/15/16-repeat 2 yrs   • IR THORACENTESIS  7/20/2023   • SHOULDER SURGERY         Family History   Problem Relation Age of Onset   • Clotting disorder Mother    • Nephrolithiasis Father    • Diabetes Sister    • Heart disease Daughter    • Fibromyalgia Daughter      I have reviewed and agree with the history as documented. E-Cigarette/Vaping   • E-Cigarette Use Never User      E-Cigarette/Vaping Substances   • Nicotine No    • THC No    • CBD No    • Flavoring No    • Other No    • Unknown No      Social History     Tobacco Use   • Smoking status: Never   • Smokeless tobacco: Never   Vaping Use   • Vaping Use: Never used   Substance Use Topics   • Alcohol use: Not Currently     Comment: occassionally   • Drug use: No        Review of Systems   Unable to perform ROS: Mental status change       Physical Exam  ED Triage Vitals   Temperature Pulse Respirations Blood Pressure SpO2   07/19/23 1217 07/19/23 1217 07/19/23 1217 07/19/23 1217 07/19/23 1217   98.1 °F (36.7 °C) 85 22 160/70 93 %      Temp Source Heart Rate Source Patient Position - Orthostatic VS BP Location FiO2 (%)   07/19/23 1217 07/19/23 1217 07/19/23 1357 07/19/23 1357 --   Rectal Monitor Lying Right arm       Pain Score       07/19/23 1615       No Pain             Orthostatic Vital Signs  Vitals:    07/20/23 1225 07/20/23 1341 07/20/23 1532 07/20/23 1615   BP: 131/67 130/65 122/76 124/74   Pulse: 96 59 55 93   Patient Position - Orthostatic VS:           Physical Exam  Vitals and nursing note reviewed. Constitutional:       General: He is not in acute distress. Appearance: He is well-developed. He is ill-appearing.    HENT: Head: Normocephalic and atraumatic. Eyes:      Conjunctiva/sclera: Conjunctivae normal.   Cardiovascular:      Rate and Rhythm: Normal rate and regular rhythm. Heart sounds: No murmur heard. Pulmonary:      Effort: Pulmonary effort is normal. No respiratory distress. Breath sounds: Rales (In the bases bilaterally) present. Abdominal:      Palpations: Abdomen is soft. Tenderness: There is no abdominal tenderness. Musculoskeletal:         General: No swelling. Cervical back: Neck supple. Skin:     General: Skin is warm and dry. Capillary Refill: Capillary refill takes less than 2 seconds. Neurological:      Mental Status: He is alert. GCS: GCS eye subscore is 4. GCS verbal subscore is 4. GCS motor subscore is 6. Comments: 3 out of 5 handgrip on the right, 1 out of 5 handgrip on the left. 3 out of 5 strength with plantarflexion and dorsiflexion in bilateral lower extremities. Patient unable to lift legs antigravity bilaterally.    Psychiatric:         Mood and Affect: Mood normal.         ED Medications  Medications   morphine injection 2 mg (2 mg Intravenous Given 7/21/23 1538)   haloperidol lactate (HALDOL) injection 0.5 mg (has no administration in time range)   LORazepam (ATIVAN) injection 1 mg (1 mg Intravenous Given 7/20/23 1850)   glycopyrrolate (ROBINUL) injection 0.1 mg (has no administration in time range)   furosemide (LASIX) injection 40 mg (40 mg Intravenous Given 7/19/23 1548)   calcium gluconate 1 g in sodium chloride 0.9% 50 mL (premix) (1 g Intravenous New Bag 7/19/23 1548)   cefdinir (OMNICEF) capsule 300 mg (300 mg Oral Given 7/19/23 1821)   insulin regular (HumuLIN R,NovoLIN R) injection 10 Units (10 Units Intravenous Given 7/20/23 1048)   dextrose 50 % IV solution 25 mL (25 mL Intravenous Given 7/20/23 1024)   calcium gluconate 1 g in sodium chloride 0.9% 50 mL (premix) (0 g Intravenous Stopped 7/20/23 2231)   sodium polystyrene (KAYEXALATE) powder 15 g (15 g Rectal Given 7/20/23 1400)   dextrose 50 % IV solution 25 mL (25 mL Intravenous Given 7/20/23 1601)   insulin regular (HumuLIN R,NovoLIN R) injection 10 Units (10 Units Intravenous Given 7/20/23 1657)   albuterol inhalation solution 10 mg (10 mg Nebulization Given 7/20/23 1623)   furosemide (LASIX) injection 40 mg (40 mg Intravenous Given 7/20/23 1609)       Diagnostic Studies  Results Reviewed     Procedure Component Value Units Date/Time    Basic metabolic panel [[id number]]  (Abnormal) Collected: 07/20/23 0821    Lab Status: Final result Specimen: Blood from Hand, Right Updated: 07/20/23 0850     Sodium 139 mmol/L      Potassium 6.0 mmol/L      Chloride 110 mmol/L      CO2 30 mmol/L      ANION GAP -1 mmol/L       mg/dL      Creatinine 2.69 mg/dL      Glucose 93 mg/dL      Calcium 9.1 mg/dL      eGFR 20 ml/min/1.73sq m     Narrative:      [place] guidelines for Chronic Kidney Disease (CKD):   •  Stage 1 with normal or high GFR (GFR > 90 mL/min/1.73 square meters)  •  Stage 2 Mild CKD (GFR = 60-89 mL/min/1.73 square meters)  •  Stage 3A Moderate CKD (GFR = 45-59 mL/min/1.73 square meters)  •  Stage 3B Moderate CKD (GFR = 30-44 mL/min/1.73 square meters)  •  Stage 4 Severe CKD (GFR = 15-29 mL/min/1.73 square meters)  •  Stage 5 End Stage CKD (GFR <15 mL/min/1.73 square meters)  Note: GFR calculation is accurate only with a steady state creatinine    Protime-INR [[id number]]  (Normal) Collected: 07/20/23 0821    Lab Status: Final result Specimen: Blood from Hand, Right Updated: 07/20/23 0849     Protime 13.3 seconds      INR 1.00    CBC (With Platelets) [[id number]]  (Abnormal) Collected: 07/20/23 0821    Lab Status: Final result Specimen: Blood from Hand, Right Updated: 07/20/23 0834     WBC 11.15 Thousand/uL      RBC 3.64 Million/uL      Hemoglobin 10.6 g/dL      Hematocrit 36.5 %       fL      MCH 29.1 pg      MCHC 29.0 g/dL      RDW 13.9 %      Platelets 236 Thousands/uL      MPV 9.8 fL     Procalcitonin [[id number]]  (Normal) Collected: 07/19/23 1645    Lab Status: Final result Specimen: Blood from Arm, Right Updated: 07/20/23 0607     Procalcitonin 0.20 ng/ml     HS Troponin I 4hr [[id number]]  (Normal) Collected: 07/19/23 1645    Lab Status: Final result Specimen: Blood from Arm, Right Updated: 07/19/23 1742     hs TnI 4hr 31 ng/L      Delta 4hr hsTnI 4 ng/L     APTT [[id number]]  (Abnormal) Collected: 07/19/23 1645    Lab Status: Final result Specimen: Blood from Arm, Right Updated: 07/19/23 1738     PTT 72 seconds     Protime-INR [[id number]]  (Normal) Collected: 07/19/23 1645    Lab Status: Final result Specimen: Blood from Arm, Right Updated: 07/19/23 1738     Protime 12.8 seconds      INR 0.94    Potassium [[id number]]  (Abnormal) Collected: 07/19/23 1645    Lab Status: Final result Specimen: Blood from Arm, Right Updated: 07/19/23 1731     Potassium 5.7 mmol/L     CBC [[id number]]  (Abnormal) Collected: 07/19/23 1645    Lab Status: Final result Specimen: Blood from Arm, Right Updated: 07/19/23 1723     WBC 11.25 Thousand/uL      RBC 3.72 Million/uL      Hemoglobin 10.8 g/dL      Hematocrit 36.9 %      MCV 99 fL      MCH 29.0 pg      MCHC 29.3 g/dL      RDW 14.2 %      Platelets 281 Thousands/uL      MPV 10.5 fL     HS Troponin I 2hr [[id number]]  (Normal) Collected: 07/19/23 1455    Lab Status: Final result Specimen: Blood from Arm, Left Updated: 07/19/23 1527     hs TnI 2hr 28 ng/L      Delta 2hr hsTnI 1 ng/L     Urine Microscopic [[id number]]  (Abnormal) Collected: 07/19/23 1322    Lab Status: Final result Specimen: Urine, Indwelling Burkett Catheter Updated: 07/19/23 1429     RBC, UA 1-2 /hpf      WBC, UA 4-10 /hpf      Epithelial Cells None Seen /hpf      Bacteria, UA Occasional /hpf      MUCUS THREADS Occasional    UA w Reflex to Microscopic w Reflex to Culture [[id number]]  (Abnormal) Collected: 07/19/23 1322    Lab Status: Final result Specimen: Urine, Indwelling Burkett Catheter Updated: 07/19/23 1427     Color, UA Light Yellow     Clarity, UA Clear     Specific Gravity, UA 1.017     pH, UA 5.5     Leukocytes, UA Moderate     Nitrite, UA Negative     Protein,  (2+) mg/dl      Glucose, UA Negative mg/dl      Ketones, UA Negative mg/dl      Urobilinogen, UA <2.0 mg/dl      Bilirubin, UA Negative     Occult Blood, UA Moderate    FLU/RSV/COVID - if FLU/RSV clinically relevant [[id number]]  (Normal) Collected: 07/19/23 1249    Lab Status: Final result Specimen: Nares from Nose Updated: 07/19/23 1343     SARS-CoV-2 Negative     INFLUENZA A PCR Negative     INFLUENZA B PCR Negative     RSV PCR Negative    Narrative:      FOR PEDIATRIC PATIENTS - copy/paste COVID Guidelines URL to browser: [email or web address]    SARS-CoV-2 assay is a Nucleic Acid Amplification assay intended for the  qualitative detection of nucleic acid from SARS-CoV-2 in nasopharyngeal  swabs. Results are for the presumptive identification of SARS-CoV-2 RNA. Positive results are indicative of infection with SARS-CoV-2, the virus  causing COVID-19, but do not rule out bacterial infection or co-infection  with other viruses. Laboratories within the [facility] and its  territories are required to report all positive results to the appropriate  public health authorities. Negative results do not preclude SARS-CoV-2  infection and should not be used as the sole basis for treatment or other  patient management decisions. Negative results must be combined with  clinical observations, patient history, and epidemiological information. This test has not been FDA cleared or approved. This test has been authorized by FDA under an Emergency Use Authorization  (EUA).  This test is only authorized for the duration of time the  declaration that circumstances exist justifying the authorization of the  emergency use of an in vitro diagnostic tests for detection of SARS-CoV-2  virus and/or diagnosis of COVID-19 infection under section 564(b)(1) of  the Act, 21 U. S.C. 644VJJ-0(P)(1), unless the authorization is terminated  or revoked sooner. The test has been validated but independent review by FDA  and CLIA is pending. Test performed using [employer] GeneXpert: This RT-PCR assay targets N2,  a region unique to SARS-CoV-2. A conserved region in the E-gene was chosen  for pan-Sarbecovirus detection which includes SARS-CoV-2. According to CMS-2020-01-R, this platform meets the definition of high-throughput technology.     HS Troponin 0hr (reflex protocol) [[id number]]  (Normal) Collected: 07/19/23 1249    Lab Status: Final result Specimen: Blood from Arm, Left Updated: 07/19/23 1330     hs TnI 0hr 27 ng/L     B-Type Natriuretic Peptide(BNP) [[id number]]  (Abnormal) Collected: 07/19/23 1249    Lab Status: Final result Specimen: Blood from Arm, Left Updated: 07/19/23 1328      pg/mL     Comprehensive metabolic panel [[id number]]  (Abnormal) Collected: 07/19/23 1249    Lab Status: Final result Specimen: Blood from Arm, Left Updated: 07/19/23 1319     Sodium 141 mmol/L      Potassium 5.7 mmol/L      Chloride 110 mmol/L      CO2 31 mmol/L      ANION GAP 0 mmol/L       mg/dL      Creatinine 2.57 mg/dL      Glucose 96 mg/dL      Calcium 9.0 mg/dL      Corrected Calcium 10.1 mg/dL      AST 25 U/L      ALT 31 U/L      Alkaline Phosphatase 86 U/L      Total Protein 7.4 g/dL      Albumin 2.6 g/dL      Total Bilirubin 0.42 mg/dL      eGFR 21 ml/min/1.73sq m     Narrative:      [place] guidelines for Chronic Kidney Disease (CKD):   •  Stage 1 with normal or high GFR (GFR > 90 mL/min/1.73 square meters)  •  Stage 2 Mild CKD (GFR = 60-89 mL/min/1.73 square meters)  •  Stage 3A Moderate CKD (GFR = 45-59 mL/min/1.73 square meters)  •  Stage 3B Moderate CKD (GFR = 30-44 mL/min/1.73 square meters)  •  Stage 4 Severe CKD (GFR = 15-29 mL/min/1.73 square meters)  •  Stage 5 End Stage CKD (GFR <15 mL/min/1.73 square meters)  Note: GFR calculation is accurate only with a steady state creatinine    Magnesium [[id number]]  (Abnormal) Collected: 07/19/23 1249    Lab Status: Final result Specimen: Blood from Arm, Left Updated: 07/19/23 1319     Magnesium 2.7 mg/dL     Phosphorus [[id number]]  (Abnormal) Collected: 07/19/23 1249    Lab Status: Final result Specimen: Blood from Arm, Left Updated: 07/19/23 1319     Phosphorus 4.7 mg/dL     Blood gas, venous [[id number]]  (Abnormal) Collected: 07/19/23 1249    Lab Status: Final result Specimen: Blood from Arm, Left Updated: 07/19/23 1302     pH, Agustin 7.315     pCO2, Agustin 53.5 mm Hg      pO2, Agustin 56.1 mm Hg      HCO3, Agustin 26.6 mmol/L      Base Excess, Agustin -0.1 mmol/L      O2 Content, Agustin 12.9 ml/dL      O2 HGB, VENOUS 84.6 %     CBC and differential [[id number]]  (Abnormal) Collected: 07/19/23 1249    Lab Status: Final result Specimen: Blood from Arm, Left Updated: 07/19/23 1300     WBC 10.88 Thousand/uL      RBC 3.58 Million/uL      Hemoglobin 10.3 g/dL      Hematocrit 35.6 %      MCV 99 fL      MCH 28.8 pg      MCHC 28.9 g/dL      RDW 14.0 %      MPV 10.1 fL      Platelets 278 Thousands/uL      nRBC 0 /100 WBCs      Neutrophils Relative 76 %      Immat GRANS % 1 %      Lymphocytes Relative 7 %      Monocytes Relative 12 %      Eosinophils Relative 3 %      Basophils Relative 1 %      Neutrophils Absolute 8.37 Thousands/µL      Immature Grans Absolute 0.06 Thousand/uL      Lymphocytes Absolute 0.79 Thousands/µL      Monocytes Absolute 1.25 Thousand/µL      Eosinophils Absolute 0.35 Thousand/µL      Basophils Absolute 0.06 Thousands/µL                  IR IN-Patient Thoracentesis   Final Result by Gatito Scales MD (07/21 1631)   Limited ultrasound demonstrating absence of significant pleural fluid suitable for aspiration.       Workstation performed: [id number]         XR chest portable   Final Result by Daniel Rosales MD (07/20 1655)      Persistent right basilar opacity, likely due to combination of atelectasis and small effusion although underlying airspace consolidation is not excluded. Workstation performed: [id number]         XR chest portable   Final Result by Debra Pradhan MD (07/20 1724)      Hypoinflated lungs. Persistent right basilar opacity, likely due to a combination of atelectasis and small pleural effusion although underlying airspace consolidation is not excluded. Workstation performed: [id number]         CT head wo contrast   Final Result by Sandra Mason MD (07/19 1345)      No acute intracranial abnormality. Workstation performed: [id number]               Procedures  Procedures      ED Course  ED Course as of 07/21/23 1710   Wed Jul 19, 2023   1420 Contacted [facility] for admission                             SBIRT 20yo+    [employer] Most Recent Value   Initial Alcohol Screen: US AUDIT-C     1. How often do you have a drink containing alcohol? 0 Filed at: 07/19/2023 1218   2. How many drinks containing alcohol do you have on a typical day you are drinking? 0 Filed at: 07/19/2023 1218   3a. Male UNDER 65: How often do you have five or more drinks on one occasion? 0 Filed at: 07/19/2023 1218   3b. FEMALE Any Age, or MALE 65+: How often do you have 4 or more drinks on one occassion? 0 Filed at: 07/19/2023 1218   Audit-C Score 0 Filed at: 07/19/2023 1218   FOSTER: How many times in the past year have you. .. Used an illegal drug or used a prescription medication for non-medical reasons? Never Filed at: 07/19/2023 1218                Medical Decision Making  51-year-old male past medical history of recent spinal cord injury status post fall, central cord syndrome, now quadriplegic presents to the ED from outpatient rehab facility for 2 episodes of syncope. DDx: Cardiac syncope, pneumonia, dysautonomia secondary to spinal cord injury.     Labs obtained and concerning for slight hyperkalemia of 5.7 and hypercarbia on VBG. Patient appears sleepy and with rails in the bases on auscultation. Patient placed on BiPAP for to improve hypercarbia. Following trial on BiPAP, patient more awake and alert. EKG showing a rate controlled A-fib, unchanged from prior. Discussed case with medicine, patient admitted for further evaluation and management of history of syncope with concerning features indicating possible cardiac syncope. Amount and/or Complexity of Data Reviewed  Labs: ordered. Radiology: ordered. Risk  Decision regarding hospitalization.             Disposition  Final diagnoses:   Generalized weakness   Hypercarbia   Fatigue     Time reflects when diagnosis was documented in both MDM as applicable and the Disposition within this note     Time User Action Codes Description Comment    7/19/2023  2:47 PM Elfego Cap Add [R53.1] Generalized weakness     7/19/2023  2:47 PM Elfego Cap Add [R06.89] Hypercarbia     7/19/2023  2:48 PM Elfego Cap Add [R53.83] Fatigue     7/19/2023  3:30 PM Lobo Foster Add [N18.4] Stage 4 chronic kidney disease ([address])     7/19/2023  3:30 PM Husam Fosterwa Add [J90] Pleural effusion on right     7/19/2023  3:30 PM Lobo Foster Add [U24.79] Chronic diastolic congestive heart failure ([address])     7/19/2023  3:30 PM Husam Fosterwa Add [I48.91] Atrial fibrillation ([address])     7/20/2023  9:02 AM Jose Arvizu Add [E87.5] Hyperkalemia     7/20/2023 12:29 PM Jose Arvizu Add [J96.01] Acute respiratory failure with hypoxia ([address])     7/20/2023 12:29 PM Jose Arvizu Remove [J96.01] Acute respiratory failure with hypoxia ([address])     7/20/2023 12:29 PM Jose Arvizu Add [J96.01] Acute respiratory failure with hypoxia ([address])     7/20/2023 12:30 PM Jose Arvizu Add [J96.01,  J96.02] Acute respiratory failure with hypoxia and hypercapnia ([address])     7/21/2023  8:25 AM Jose Arvizu Add [Z51.5] End of life care       ED Disposition     ED Disposition   Admit    Condition   Stable    Date/Time   Wed Jul 19, 2023  2:47 PM    Comment   Case was discussed with Dr. Ari Monteiro and the patient's admission status was agreed to be Admission Status: inpatient status to the service of Dr. Ari Monteiro. Follow-up Information    None       Date, Time and Cause of Death    Date of Death: 7/21/23  Time of Death:  5:05 PM  Preliminary Cause of Death: Acute on chronic respiratory failure with hypoxia and hypercapnia ([address])  Entered by: Franklin Newman DO[PS1.1]     Attribution     PS1.1 Raquel White  07/21/23 17:10        Current Discharge Medication List      CONTINUE these medications which have NOT CHANGED    Details   acetaminophen (TYLENOL) 325 mg tablet Take 3 tablets (975 mg total) by mouth every 8 (eight) hours  Refills: 0    Associated Diagnoses: Central cord syndrome (HCC)      allopurinol (ZYLOPRIM) 100 mg tablet TAKE 1 TABLET BY MOUTH  DAILY  Qty: 90 tablet, Refills: 2    Associated Diagnoses: Gout, unspecified cause, unspecified chronicity, unspecified site      cholecalciferol (VITAMIN D3) 1,000 units tablet Take 2 tablets by mouth daily       folic acid (FOLVITE) 1 mg tablet Take 1 mg by mouth daily.       guaiFENesin (MUCINEX) 600 mg 12 hr tablet Take 1 tablet (600 mg total) by mouth every 12 (twelve) hours  Refills: 0    Associated Diagnoses: Central cord syndrome (HCC)      Heparin Sodium, Porcine, (heparin, porcine,) 5,000 units/mL Inject 1 mL (5,000 Units total) under the skin every 8 (eight) hours  Qty: 1 mL, Refills: 0    Associated Diagnoses: Central cord syndrome (HCC)      lidocaine (LIDODERM) 5 % Apply 1 patch topically over 12 hours daily Remove & Discard patch within 12 hours or as directed by MD  Refills: 0    Associated Diagnoses: Central cord syndrome (HCC)      Multiple Vitamins-Minerals (PRESERVISION AREDS) CAPS Take 1 capsule by mouth daily      ofloxacin (OCUFLOX) 0.3 % ophthalmic solution Install 1 drop in the L eye 4 times daily for 5 days after eye injection as directed  Refills: 3      polyethylene glycol (MIRALAX) 17 g packet Take 17 g by mouth daily Do not start before July 18, 2023. Refills: 0    Associated Diagnoses: Central cord syndrome (HCC)      senna-docusate sodium (SENOKOT S) 8.6-50 mg per tablet Take 1 tablet by mouth 2 (two) times a day  Refills: 0    Associated Diagnoses: Central cord syndrome (HCC)      sulfaSALAzine (AZULFIDINE) 500 mg tablet TAKE 1 TABLET IN THE MORNING, 2 TABLETS AT NOON AND 1 TABLET IN THE EVENING  Qty: 360 tablet, Refills: 1    Comments: DX Code Needed  . Associated Diagnoses: Ulcerative colitis without complications, unspecified location (HCC)      tamsulosin (FLOMAX) 0.4 mg TAKE 1 CAPSULE BY MOUTH  DAILY  Qty: 90 capsule, Refills: 2    Associated Diagnoses: Benign prostatic hyperplasia, unspecified whether lower urinary tract symptoms present      torsemide (DEMADEX) 20 mg tablet Take 1 tablet (20 mg total) by mouth daily Do not start before July 18, 2023. Refills: 0    Associated Diagnoses: Central cord syndrome (HCC)      bisacodyl (DULCOLAX) 10 mg suppository Insert 1 suppository (10 mg total) into the rectum daily as needed for constipation  Qty: 12 suppository, Refills: 0    Associated Diagnoses: Central cord syndrome (HCC)         STOP taking these medications       cefdinir (OMNICEF) 300 mg capsule Comments:   Reason for Stopping:             No discharge procedures on file. PDMP Review       Value Time User    PDMP Reviewed  Yes 7/17/2023  2:56 PM Gucci Neville PA-C           ED Provider  Attending physically available and evaluated Brandon Merchant. I managed the patient along with the ED Attending.     Electronically Signed by         Stefani Reich MD  07/21/23 9027

## 2023-07-19 NOTE — ASSESSMENT & PLAN NOTE
As reported by nursing facility, they noted. Of unresponsiveness while patient was having increased work of breathing.   CT head negative  Likely secondary to hypoxia, hypercarbia

## 2023-07-19 NOTE — ASSESSMENT & PLAN NOTE
Recurrent hyperkalemia noted today potassium is 5.7.   Calcium gluconate, IV Lasix  Repeat potassium every 4 hours  Telemetry monitor

## 2023-07-19 NOTE — ASSESSMENT & PLAN NOTE
Noted on EKG, A-fib rate controlled. Upon reviewing chart, patient noted to have A-fib in previous hospital stay as well. Reviewed echocardiogram from last admission, LVEF 50%, moderate diastolic dysfunction noted. Patient currently not on any antiarrhythmic or AV indira blockade medication.     Placed on telemetry monitor  Start on heparin infusion ACS protocol until a decision is made for p.o. anticoagulation  Cardiology consult

## 2023-07-19 NOTE — RESPIRATORY THERAPY NOTE
RT Protocol Note  Gloria Zuñiga 80 y.o. male MRN: [id number]  Unit/Bed#: ED 24 Encounter: [id number]    Assessment    Active Problems: There are no active Hospital Problems. Home Pulmonary Medications:  None       Past Medical History:   Diagnosis Date    Ankle edema     last assessed [id number]    Depression     last assessed [id number]    Hypertension     Kidney stone     Nephrolithiasis     Nocturia     last assessed [id number]    Posterior tibial tendinitis of right lower extremity     last assessed [id number]     Social History     Socioeconomic History    Marital status: /Civil Union     Spouse name: None    Number of children: None    Years of education: None    Highest education level: None   Occupational History    Occupation: retired   Tobacco Use    Smoking status: Never    Smokeless tobacco: Never   Vaping Use    Vaping Use: Never used   Substance and Sexual Activity    Alcohol use: Not Currently     Comment: occassionally    Drug use: No    Sexual activity: Not Currently   Other Topics Concern    None   Social History Narrative    None     Social Determinants of Health     Financial Resource Strain: Low Risk  (1/3/2023)    Overall Financial Resource Strain (CARDIA)     Difficulty of Paying Living Expenses: Not hard at all   Food Insecurity: No Food Insecurity (7/4/2023)    Hunger Vital Sign     Worried About Running Out of Food in the Last Year: Never true     Ran Out of Food in the Last Year: Never true   Transportation Needs: No Transportation Needs (7/4/2023)    PRAPARE - Transportation     Lack of Transportation (Medical): No     Lack of Transportation (Non-Medical):  No   Physical Activity: Not on file   Stress: Not on file   Social Connections: Not on file   Intimate Partner Violence: Not on file   Housing Stability: Low Risk  (7/4/2023)    Housing Stability Vital Sign     Unable to Pay for Housing in the Last Year: No     Number of Places Lived in the Last Year: 1     Unstable Housing in the Last Year: No       Subjective         Objective    Physical Exam:   Assessment Type: Assess only  General Appearance: Drowsy  Respiratory Pattern: Spontaneous, Tachypneic  Chest Assessment: Chest expansion symmetrical  Bilateral Breath Sounds: Diminished    Vitals:  Blood pressure 160/70, pulse 85, temperature 98.1 °F (36.7 °C), temperature source Rectal, resp. rate 22, SpO2 93 %. Imaging and other studies: I have personally reviewed pertinent reports. Plan    Respiratory Plan: Vent/NIV/HFNC        Resp Comments: Called by  to place pt on Bipap, pt started on 18/6 50%. Pt getting adequate volumes. Pt appears to be tolerating these settings well, will continue to monitor pt per protocol.  Will order resp protocol

## 2023-07-19 NOTE — ASSESSMENT & PLAN NOTE
Wt Readings from Last 3 Encounters:   07/15/23 100 kg (220 lb 14.4 oz)   07/03/23 106 kg (233 lb 11 oz)   07/03/23 106 kg (233 lb 11 oz)     Last echo earlier this month, LVEF 07%, diastolic dysfunction, pulmonary hypertension  was discharged on torsemide  Switch to IV Lasix

## 2023-07-19 NOTE — ASSESSMENT & PLAN NOTE
Lab Results   Component Value Date    EGFR 21 07/19/2023    EGFR 20 07/17/2023    EGFR 21 07/16/2023    CREATININE 2.57 (H) 07/19/2023    CREATININE 2.60 (H) 07/17/2023    CREATININE 2.55 (H) 07/16/2023     Started on IV Lasix  Due to hyperkalemia, CKD stage IV, will consult nephrology

## 2023-07-19 NOTE — ASSESSMENT & PLAN NOTE
Was discharged 2 days ago from trauma service, was admitted after fall and C3 central cord injury, leading to weakness in upper and lower extremity bilaterally.

## 2023-07-19 NOTE — ASSESSMENT & PLAN NOTE
Persistent right-sided pleural effusion noted on x-ray. Patient now has increased work of breathing, hypoxia. Started on IV Lasix.   We will consult IR for thoracentesis, labs ordered for first time thoracentesis

## 2023-07-20 NOTE — RAPID RESPONSE
Rapid Response Note  Jordan Angel 80 y.o. male MRN: [id number]  [facility] Encounter: [id number]    Rapid Response Notification(s):   Response called date/time:  7/20/2023 12:09 PM  Response team arrival date/time:  7/20/2023 12:10 PM  Response end date/time:  7/20/2023 12:40 PM  Level of care:  Medsur  Rapid response location:  [facility] unit  Primary reason for rapid response call:  Acute change in neuro status    Rapid Response Intervention(s):   Breathing:  Oxygen (Bipap)  Circulation:  None  Fluids administered:  None  Medications administered:  None       Assessment:   · Acute hypercapneic respiratory failure  · Acute encephalopathy  · Hyperkalemia  · C3 fx    Plan:   · Bipap - if no improvement or requires continued bipap will need to be upgraded to SD1 or critical care. Discussed with MICU fellow and SLIM attending  · Nephrology following for hyperkalemia     Rapid Response Outcome:   Transfer:  Transfer to stepdown 2  Primary service notified of transfer: Yes    Code Status: Level 1 (Full Code)      Family notified of transfer: yes  Family member contacted: Wife contacted by Dr. Elvis Juares     Background/Situation:   Jordan Angel is a 80 y.o. male who was recently admitted for C3 fx s/p decompression. D/c to rehab on 7/17, returned 7/19 with syncopal event and SOB. Admitted to medicine, rapid response called today for AMS. ABG w/ pH 7.23 and PCO2 of 79. Started on Bipap    Review of Systems   Unable to perform ROS: Mental status change       Objective:   Vitals:    07/20/23 1212 07/20/23 1214 07/20/23 1225 07/20/23 1230   BP: 139/66 139/66 131/67    Pulse: 89 101 96    Resp: (!) 27 (!) 30     Temp: 98.6 °F (37 °C)      TempSrc:       SpO2: 93% 93%  94%   Weight:         Physical Exam  Vitals and nursing note reviewed. Constitutional:       General: He is not in acute distress. Appearance: He is obese. He is ill-appearing. Interventions: Nasal cannula in place.    HENT:      Head: Normocephalic and atraumatic. Eyes:      Pupils: Pupils are equal, round, and reactive to light. Cardiovascular:      Rate and Rhythm: Normal rate and regular rhythm. Heart sounds: Heart sounds not distant. No murmur heard. No friction rub. No gallop. Pulmonary:      Effort: Respiratory distress present. Breath sounds: Decreased breath sounds present. Abdominal:      General: There is no distension. Palpations: Abdomen is soft. Skin:     General: Skin is warm and dry. Neurological:      Mental Status: He is lethargic. Comments: Opens eyes to voice. Does not answer questioning. Does not move any extremities.             Danitza Gunn PA-C

## 2023-07-20 NOTE — BRIEF OP NOTE (RAD/CATH)
IR THORACENTESIS Procedure Note    PATIENT NAME: Luciano Rodriguez  : 1934  MRN: [id number]    Pre-op Diagnosis:   1. Generalized weakness    2. Hypercarbia    3. Fatigue    4. Stage 4 chronic kidney disease ([address])    5. Pleural effusion on right    6. Chronic diastolic congestive heart failure ([address])    7. Atrial fibrillation ([address])    8. Hyperkalemia    9. Acute respiratory failure with hypoxia (HCC)    10. Acute respiratory failure with hypoxia and hypercapnia (HCC)      Post-op Diagnosis:   1. Generalized weakness    2. Hypercarbia    3. Fatigue    4. Stage 4 chronic kidney disease ([address])    5. Pleural effusion on right    6. Chronic diastolic congestive heart failure ([address])    7. Atrial fibrillation ([address])    8. Hyperkalemia    9. Acute respiratory failure with hypoxia (HCC)    10. Acute respiratory failure with hypoxia and hypercapnia [facility])        Surgeon:   Nicky Paiz, [address] [place] [address]  Assistants:     No qualified resident was available, Resident is only observing    Estimated Blood Loss: none  Findings: no pleural fluid to drain/aspirate. No thoracentesis was performed.     Specimens: none    Complications:  None immediate    Anesthesia: none    [address] [place] [address]     Date: 2023  Time: 5:44 PM

## 2023-07-20 NOTE — PROGRESS NOTES
Responded to referral for patient's family support. Attempted to visit patient's room. His wife, daughter, granddaughter and  present at this time. Introduced self and cultivated a relationship of care and support. Provided hospitality for family.  remains availability for family as need. His wife Raphael Arreola expressed her appreciates and thanked for supporting and stop by her 's room.    07/20/23 1400   Clinical Encounter Type   Visited With Patient and family together;Health care provider   Routine Visit Introduction   Referral From Nurse

## 2023-07-20 NOTE — PROGRESS NOTES
[address] 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 He returned after approximately 48 hours due to shortness of breath, increased work of breathing, and hypoxia. · Work-up in the emergency room showed pleural effusions and mild hypercapnia. He was treated with BiPAP in the emergency room but transferred to the floor on the hospital service after improvement. · Shortly after admission and again patient became progressively somnolent with increased work of breathing. ABG revealed significant hypercapnia with CO2 level at 79. He was started on BiPAP with minimal improvement over the course of 4 hours. · Pulmonology, cardiology, and critical care were all consulted. · Ultimately it is felt that the patient has neuromuscular compromise secondary to his cervical injury affecting his diaphragmatic performance. His hypercapnic respiratory failure has been repetitive since the hospitalization and he has been refusing BiPAP  As an outpatient. Further work-up or treatment would likely lead to permanent tracheostomy. This is not what the patient would want per family discussion. Byron Mccain discussion was held with the family at bedside with critical care service and family opted for comfort measures.         Chronic diastolic congestive heart failure (HCC)  Assessment & Plan  Comfort measures only      Atrial fibrillation (HCC)  Assessment & Plan  Comfort measures only      Pleural effusion on right  Assessment & Plan  Comfort measures only    Hyperkalemia  Assessment & Plan  Comfort measures only    Central cord syndrome [facility])  Assessment & Plan  Was discharged 2 days ago from trauma service, was admitted after fall and C3 central cord injury, leading to weakness in upper and lower extremity bilaterally. Now on comfort measures only    Stage 4 chronic kidney disease ([address])  Assessment & Plan  Comfort measures only         VTE Pharmacologic Prophylaxis: Comfort measures only, no DVT prophylaxis. Patient Centered Rounds: I performed bedside rounds with nursing staff today. Discussions with Specialists or Other Care Team Provider: CM. Nephrology. Pulmonology. Critical care. Speech therapy. Education and Discussions with Family / Patient: Multiple family discussions held with patient's wife and daughters and granddaughters at bedside. Total Time Spent on Date of Encounter in care of patient: 65 minutes This time was spent on one or more of the following: performing physical exam; counseling and coordination of care; obtaining or reviewing history; documenting in the medical record; reviewing/ordering tests, medications or procedures; communicating with other healthcare professionals and discussing with patient's family/caregivers. Current Length of Stay: 1 day(s)  Current Patient Status: Inpatient   Certification Statement: The patient will continue to require additional inpatient hospital stay due to End-of-life care  Discharge Plan: To be determined pending hospice consultation    Code Status: Level 4 - Comfort Care    Subjective:   Patient seen and examined. Patient evaluated multiple times throughout the day due to respiratory failure. Eventually placed on BiPAP due to his somnolence and work of breathing. Minimal improvement after several hours. Ultimately opted for comfort care per family wishes.     Objective:     Vitals:   Temp (24hrs), Av.7 °F (37.1 °C), Min:98 °F (36.7 °C), Max:99.3 °F (37.4 °C)    Temp:  [98 °F (36.7 °C)-99.3 °F (37.4 °C)] 99 °F (37.2 °C)  HR:  [] 93  Resp:  [16-30] 16  BP: (122-140)/(65-81) 124/74  SpO2:  [91 %-98 %] 98 %  Body mass index is 38.11 kg/m². Input and Output Summary (last 24 hours): Intake/Output Summary (Last 24 hours) at 7/20/2023 1731  Last data filed at 7/20/2023 5159  Gross per 24 hour   Intake 100 ml   Output 1100 ml   Net -1000 ml       PHYSICAL EXAM:    Vitals signs reviewed  Constitutional  somnolent. Distressed. Head/Neck   Normocephalic. Atraumatic. HEENT   No scleral icterus. EOMI. Heart   Regular rate and rhythm. No murmers. Lungs  tachypnea. Increased work of breathing. Poor inspiratory effort. Abdomen   Soft. Nontender. Nondistended. Skin   Skin color pale. No rashes. Extremities   No deformities. No peripheral edema. Neuro  somnolent but arousable. Does not follow commands. Spontaneously moves feet and arms but significant global weakness. Psych  somnolent. Additional Data:     Labs:  Results from last 7 days   Lab Units 07/20/23  1219 07/20/23  0821 07/19/23  1645 07/19/23  1249   WBC Thousand/uL  --  11.15*   < > 10.88*   HEMOGLOBIN g/dL  --  10.6*   < > 10.3*   I STAT HEMOGLOBIN g/dl 11.2*  --   --   --    HEMATOCRIT %  --  36.5   < > 35.6*   HEMATOCRIT, ISTAT % 33*  --   --   --    PLATELETS Thousands/uL  --  235   < > 231   NEUTROS PCT %  --   --   --  76*   LYMPHS PCT %  --   --   --  7*   MONOS PCT %  --   --   --  12   EOS PCT %  --   --   --  3    < > = values in this interval not displayed.      Results from last 7 days   Lab Units 07/20/23  1223 07/19/23  1645 07/19/23  1249   SODIUM mmol/L 140   < > 141   POTASSIUM mmol/L 6.2*   < > 5.7*   CHLORIDE mmol/L 108   < > 110*   CO2 mmol/L 31   < > 31   CO2, I-STAT   --    < >  --    BUN mg/dL 102*   < > 100*   CREATININE mg/dL 2.77*   < > 2.57*   ANION GAP mmol/L 1   < > 0   CALCIUM mg/dL 9.2   < > 9.0   ALBUMIN g/dL  --   --  2.6*   TOTAL BILIRUBIN mg/dL  --   --  0.42   ALK PHOS U/L  --   --  86   ALT U/L  --   --  31   AST U/L  --   --  25   GLUCOSE RANDOM mg/dL 181*   < > 96    < > = values in this interval not displayed. Results from last 7 days   Lab Units 07/20/23  0821   INR  1.00     Results from last 7 days   Lab Units 07/20/23  1211 07/20/23  1136   POC GLUCOSE mg/dl 188* 196*         Results from last 7 days   Lab Units 07/19/23  1645 07/15/23  1010 07/15/23  0643   LACTIC ACID mmol/L  --  1.2  --    PROCALCITONIN ng/ml 0.20  --  0.15       Lines/Drains:  Invasive Devices     Peripheral Intravenous Line  Duration           Peripheral IV 07/19/23 Left;Ventral (anterior) Forearm <1 day    Peripheral IV 07/20/23 Left;Upper;Ventral (anterior) Arm <1 day          Drain  Duration           Urethral Catheter 16 Fr. 11 days              Urinary Catheter:  Goal for removal: N/A- Discharging with [place]               Imaging: No pertinent imaging reviewed. Recent Cultures (last 7 days):   Results from last 7 days   Lab Units 07/15/23  1428 07/15/23  1007   BLOOD CULTURE   --  No Growth After 4 Days. No Growth After 4 Days. SPUTUM CULTURE  2+ Growth of Haemophilus influenzae*  4+ Growth of  --    GRAM STAIN RESULT  2+ Epithelial cells per low power field*  Rare Polys*  3+ Gram positive rods*  3+ Gram positive cocci in pairs, chains and clusters*  2+ Gram negative rods*  Rare Budding yeast*  --        Last 24 Hours Medication List:   Current Facility-Administered Medications   Medication Dose Route Frequency Provider Last Rate   • glycopyrrolate  0.1 mg Intravenous Q4H PRN Reyna Arvizu DO     • haloperidol lactate  0.5 mg Intravenous Q2H PRN Reyna Arvizu DO     • LORazepam  1 mg Intravenous Q10 Min PRN Reyna Arvizu DO     • morphine injection  2 mg Intravenous Q1H PRN Ming Cyr DO          Today, Patient Was Seen By: Reyna Arvizu DO    **Please Note: This note may have been constructed using a voice recognition system. **

## 2023-07-20 NOTE — CONSULTS
Consult Note - Pulmonary   Jamir Sanz 80 y.o. male MRN: [id number]  Unit/Bed#: [facility] 834-01 Encounter: [id number]      Reason for consultation: Acute hypoxic respiratory failure    Requesting physician: Dr. Teddy Michelle & Recommendations:   Acute hypoxic respiratory failure with R pleural effusion  - Hypercapnic to 79 on POC blod gas on 7/20  - Pleural effusion appears to have worsened on CXR 7/20  Acute on chronic diastolic heart failure  Syncope of unclear etiology  C3 central cord injury - s/p posterior C4-5 laminectomy, C3-6 fixation/fusion on 7/4  CKD stage 4    Recommendations:  - Continue BiPAP for excess CO2  - Repeat ABG this afternoon  - Thoracentesis by IR scheduled for this afternoon, follow up results  - Continue Lasix 40 mg IV BID  - Rest of care per primary team    History of Present Illness   HPI:  Jamir Sanz is a 80 y.o. male who was discharged on 7/17 for a fall resulting in a C3 injury with bilateral upper and lower extremity weakness who presents from [facility] with increased work of breathing, dyspnea, and possible syncopal episodes. Patient was noted to be hypoxic and in increasing respiratory distress in the ED and placed on BiPAP. VBG revealed a pH of 7.315, pCO2 53.5, pO2 56.1. BNP was elevated to 232, CMP revealed he was hyperkalemic to 5.7. Chest x-ray revealed persistent bilateral pleural effusions, worse on the right. Patient is completing a course of cefdinir for a possible pneumonia. Patient seen and examined. Patient somnolent and only able to answer a few questions before falling asleep, patient's history provided by family at bedside. Review of systems:  12 point review of systems was completed and was otherwise negative except as listed in HPI. Review of Systems   Constitutional: Positive for activity change and fatigue. Negative for chills and fever. Respiratory: Positive for cough and shortness of breath. Cardiovascular: Negative for chest pain. Gastrointestinal: Negative for abdominal pain. Neurological: Positive for light-headedness. Negative for headaches.          Historical Information   Past Medical History:   Diagnosis Date   • Ankle edema     last assessed [id number]   • Depression     last assessed [id number]   • Hypertension    • Kidney stone    • Nephrolithiasis    • Nocturia     last assessed 31Mar2016   • Posterior tibial tendinitis of right lower extremity     last assessed [id number]     Past Surgical History:   Procedure Laterality Date   • ANAL FISSURECTOMY     • CERVICAL FUSION N/A 7/4/2023    Procedure: Posterior C4-5 laminectomy, C3-6 fixation/fusion;  Surgeon: Salma Fontana MD;  Location: [facility];  Service: Neurosurgery   • COLONOSCOPY  04/22/2009    every 2 years   • COLONOSCOPY  03/04/2013    2 yrs d/t h/o polyp   • COLONOSCOPY  02/15/2016    colo 2/15/16-repeat 2 yrs   • SHOULDER SURGERY       Family History   Problem Relation Age of Onset   • Clotting disorder Mother    • Nephrolithiasis Father    • Diabetes Sister    • Heart disease Daughter    • Fibromyalgia Daughter        Occupational History: Agriculture, welding, auto mechanics    Social History:   Alcohol: Formerly  Smoking: Never  Illicit drugs: Never  Home heating system:   Pets: Puppy  Exposures: Welding, auto work, birds in his childhood (amio, nitrofurantoin, cyclophos, asbestos, beryllium, glass cutting, mining, sandblasting, farming, woodwork, Birds, down bedding, hot tubs)        Meds/Allergies   Current Facility-Administered Medications   Medication Dose Route Frequency   • acetaminophen (TYLENOL) tablet 650 mg  650 mg Oral Q6H PRN   • allopurinol (ZYLOPRIM) tablet 100 mg  100 mg Oral Daily   • bisacodyl (DULCOLAX) rectal suppository 10 mg  10 mg Rectal Daily PRN   • cholecalciferol (VITAMIN D3) tablet 2,000 Units  2,000 Units Oral Daily   • folic acid (FOLVITE) tablet 1 mg  1 mg Oral Daily   • furosemide (LASIX) injection 40 mg  40 mg Intravenous BID (diuretic)   • guaiFENesin (MUCINEX) 12 hr tablet 600 mg  600 mg Oral Q12H UDAY   • heparin (porcine) 25,000 units in 0.45% NaCl 250 mL infusion (premix)  3-20 Units/kg/hr (Order-Specific) Intravenous Titrated   • heparin (porcine) injection 2,000 Units  2,000 Units Intravenous Q6H PRN   • heparin (porcine) injection 4,000 Units  4,000 Units Intravenous Q6H PRN   • multivitamin stress formula tablet 1 tablet  1 tablet Oral Daily   • ondansetron (ZOFRAN) injection 4 mg  4 mg Intravenous Q6H PRN   • polyethylene glycol (MIRALAX) packet 17 g  17 g Oral Daily   • senna-docusate sodium (SENOKOT S) 8.6-50 mg per tablet 1 tablet  1 tablet Oral BID   • sodium polystyrene (KAYEXALATE) powder 15 g  15 g Rectal Once   • sulfaSALAzine (AZULFIDINE) tablet 1,000 mg  1,000 mg Oral Daily Before Lunch   • sulfaSALAzine (AZULFIDINE) tablet 500 mg  500 mg Oral BID   • tamsulosin (FLOMAX) capsule 0.4 mg  0.4 mg Oral Daily     Medications Prior to Admission   Medication   • acetaminophen (TYLENOL) 325 mg tablet   • allopurinol (ZYLOPRIM) 100 mg tablet   • cefdinir (OMNICEF) 300 mg capsule   • cholecalciferol (VITAMIN D3) 1,000 units tablet   • folic acid (FOLVITE) 1 mg tablet   • guaiFENesin (MUCINEX) 600 mg 12 hr tablet   • Heparin Sodium, Porcine, (heparin, porcine,) 5,000 units/mL   • lidocaine (LIDODERM) 5 %   • Multiple Vitamins-Minerals (PRESERVISION AREDS) CAPS   • ofloxacin (OCUFLOX) 0.3 % ophthalmic solution   • polyethylene glycol (MIRALAX) 17 g packet   • senna-docusate sodium (SENOKOT S) 8.6-50 mg per tablet   • sulfaSALAzine (AZULFIDINE) 500 mg tablet   • tamsulosin (FLOMAX) 0.4 mg   • torsemide (DEMADEX) 20 mg tablet   • bisacodyl (DULCOLAX) 10 mg suppository     No Known Allergies    Vitals: Blood pressure 130/65, pulse 59, temperature 98.8 °F (37.1 °C), resp. rate (!) 30, weight 104 kg (229 lb), SpO2 95 %. , BiPAP, Body mass index is 38.11 kg/m².       Intake/Output Summary (Last 24 hours) at 7/20/2023 5392  Last data filed at 7/20/2023 0842  Gross per 24 hour   Intake 100 ml   Output 1100 ml   Net -1000 ml       Physical Exam  Physical Exam  Vitals and nursing note reviewed. Constitutional:       Appearance: He is ill-appearing. HENT:      Head: Normocephalic and atraumatic. Cardiovascular:      Rate and Rhythm: Normal rate and regular rhythm. Heart sounds: No murmur heard. Pulmonary:      Effort: Respiratory distress present. Breath sounds: No wheezing, rhonchi or rales. Abdominal:      Tenderness: There is no abdominal tenderness. There is no guarding. Musculoskeletal:      Right lower leg: No edema. Left lower leg: No edema. Neurological:      Mental Status: He is lethargic. Motor: Weakness present. Labs: I have personally reviewed pertinent lab results.   Laboratory and Diagnostics  Results from last 7 days   Lab Units 07/20/23  1219 07/20/23  0821 07/19/23 1645 07/19/23  1249 07/17/23  0545 07/15/23  2105 07/15/23  0643   WBC Thousand/uL  --  11.15* 11.25* 10.88* 11.15* 11.35* 13.38*   HEMOGLOBIN g/dL  --  10.6* 10.8* 10.3* 10.6* 9.7* 10.6*   I STAT HEMOGLOBIN g/dl 11.2*  --   --   --   --   --   --    HEMATOCRIT %  --  36.5 36.9 35.6* 35.3* 32.8* 35.3*   HEMATOCRIT, ISTAT % 33*  --   --   --   --   --   --    PLATELETS Thousands/uL  --  235 236 231 196 194 210   NEUTROS PCT %  --   --   --  76* 75 79* 78*   MONOS PCT %  --   --   --  12 11 10 11   EOS PCT %  --   --   --  3 3 3 2     Results from last 7 days   Lab Units 07/20/23  1223 07/20/23  1219 07/20/23  0821 07/19/23  1645 07/19/23  1249 07/17/23  0545 07/16/23  1036 07/15/23  2105 07/15/23  0643   SODIUM mmol/L 140  --  139  --  141 140 141 143 142   POTASSIUM mmol/L 6.2*  --  6.0* 5.7* 5.7* 5.0 4.7 5.2 5.1   CHLORIDE mmol/L 108  --  110*  --  110* 110* 111* 110* 111*   CO2 mmol/L 31  --  30  --  31 30 30 30 31   CO2, I-STAT mmol/L  --  36*  --   --   --   --   --   --   --    ANION GAP mmol/L 1  --  -1  --  0 0 0 3 0   BUN mg/dL 102*  --  107*  --  100* 99* 92* 94* 88*   CREATININE mg/dL 2.77*  --  2.69*  --  2.57* 2.60* 2.55* 2.43* 2.30*   CALCIUM mg/dL 9.2  --  9.1  --  9.0 8.8 8.5 8.5 8.9   GLUCOSE RANDOM mg/dL 181*  --  93  --  96 113 196* 172* 101   ALT U/L  --   --   --   --  31  --   --   --   --    AST U/L  --   --   --   --  25  --   --   --   --    ALK PHOS U/L  --   --   --   --  86  --   --   --   --    ALBUMIN g/dL  --   --   --   --  2.6*  --   --   --   --    TOTAL BILIRUBIN mg/dL  --   --   --   --  0.42  --   --   --   --      Results from last 7 days   Lab Units 07/19/23  1249 07/15/23  0643   MAGNESIUM mg/dL 2.7* 2.4   PHOSPHORUS mg/dL 4.7*  --       Results from last 7 days   Lab Units 07/20/23  0821 07/20/23  0059 07/19/23  1645   INR  1.00  --  0.94   PTT seconds 146* 141* 72*          Results from last 7 days   Lab Units 07/15/23  1010   LACTIC ACID mmol/L 1.2                     Results from last 7 days   Lab Units 07/19/23  1645 07/15/23  0643   PROCALCITONIN ng/ml 0.20 0.15       ABG:       Imaging and other studies: I have personally reviewed pertinent reports. and I have personally reviewed pertinent films in PACS    Pulmonary function testing: N/A    EKG, Pathology, and Other Studies: I have personally reviewed pertinent reports. Code Status: Level 1 - Full Code    Karely Delatorre  MS4      Disclaimer: Portions of the record may have been created with voice recognition software. Occasional wrong word or "sound a like" substitutions may have occurred due to the inherent limitations of voice recognition software. Careful consideration should be taken to recognize, using context, where substitutions have occurred.

## 2023-07-20 NOTE — CONSULTS
Consultation - Cardiology Team One  Wilfrid Walsh 80 y.o. male MRN: [id number]  Unit/Bed#: [facility] 834-01 Encounter: [id number]    Inpatient consult to Cardiology  Consult performed by: DAMION Petty  Consult ordered by: Zeny Adams MD      Physician Requesting Consult: Bree Peterson, *  Reason for Consult / Principal Problem: Atrial fibrillation     Assessment/ Plan    1. Atrial fibrillation   Reviewed ECG from this admission showing atrial fibrillation   ECG from recent admission also shows atrial fibrillation 7/6 with slow ventricular rates in the setting of #5. No prior known history of atrial fibrillation   [id number] VASc score: 4. Recommend eliquis 2.5 mg PO BID due to age and creatinine. Currently on heparin gtt   Not on AV Saima blocker. Will start metoprolol 25 mg PO BID     2. Acute on Chronic diastolic heart failure     Chest xray shows pleural effusions   On furosemide 40 mg IV BID   On torsemide 20 mg PO daily at home   Monitor I/Os  Daily weights   Currently NPO    3. Acute hypoxic/hypercarbnic respiratory failure with R pleural effusion   On 4 L NC on admission now on BiPAP   Receiving IV diuretics  Followed by primary team     4. Syncope   Unclear etiology     5. C3 central cord injury   S/p fall on 7/3/2023  Post op posterior C4-5 laminectomy, C3-6 fixation/fusion 7/4/2023  Followed by primary team     6. CKD stage IV   Creatinine 2.7    History of Present Illness   HPI: Wilfrid Walsh is a 80y.o. year old male who has a history of paroxysmal atrial fibrillation, C3 central cord injury, CKD stage IV, ulcerative colitis. He does not follow with a cardiologist.             Patient was recently admitted 7/3/2023-7/17/2023 s/p fall with C3 central cord injury requiring posterior C4-5 laminectomy, C3-6 fixation/fusion 7/4/2023. He was discharge to  [address] home. He presents to [facility] ER 7/19/2023 with acute hypoxic respiratory failure with R pleural effusion on imaging. Cardiology consulted for atrial fibrillation and CHF. Patient was a rapid response this morning for unresponsiveness. ABG showed pH 7.2 and pCO2 79. He was placed on BiPAP. Mental status is improving. He is also on IV diuretics: furosemide 40 mg IV BID. He remains on BiPAP and does not offer any complaints. Family at bedside and updated. Echocardiogram 7/5/2023 showed EF 50%, grade II diastolic dysfunction, mild to moderate MR, mild to moderate TR. EKG reviewed personally:   Atrial fibrillation   Ventricular rate 91 bpm  QRSD 80 ms  QT interval 324 ms  QTc interval 398 ms     Telemetry reviewed personally:   Atrial fibrillation HR     Review of Systems   Unable to perform ROS: acuity of condition   All other systems reviewed and are negative.      Historical Information   Past Medical History:   Diagnosis Date   • Ankle edema     last assessed [id number]   • Depression     last assessed [id number]   • Hypertension    • Kidney stone    • Nephrolithiasis    • Nocturia     last assessed [id number]   • Posterior tibial tendinitis of right lower extremity     last assessed [id number]     Past Surgical History:   Procedure Laterality Date   • ANAL FISSURECTOMY     • CERVICAL FUSION N/A 7/4/2023    Procedure: Posterior C4-5 laminectomy, C3-6 fixation/fusion;  Surgeon: Arina Evans MD;  Location: [facility];  Service: Neurosurgery   • COLONOSCOPY  04/22/2009    every 2 years   • COLONOSCOPY  03/04/2013    2 yrs d/t h/o polyp   • COLONOSCOPY  02/15/2016    colo 2/15/16-repeat 2 yrs   • SHOULDER SURGERY       Social History     Substance and Sexual Activity   Alcohol Use Not Currently    Comment: occassionally     Social History     Substance and Sexual Activity   Drug Use No     Social History     Tobacco Use   Smoking Status Never   Smokeless Tobacco Never     Family History:   Family History   Problem Relation Age of Onset   • Clotting disorder Mother    • Nephrolithiasis Father    • Diabetes Sister • Heart disease Daughter    • Fibromyalgia Daughter        Meds/Allergies   all current active meds have been reviewed  heparin (porcine), 3-20 Units/kg/hr (Order-Specific), Last Rate: 5.1 Units/kg/hr (23 0957)        No Known Allergies    Objective   Vitals: Blood pressure 140/81, pulse (!) 108, temperature 98 °F (36.7 °C), resp. rate 18, weight 104 kg (229 lb), SpO2 95 %. ,     Body mass index is 38.11 kg/m². ,     Systolic (81YPZ), OD , Min:138 , PFA:742     Diastolic (16LIH), DSE:19, Min:62, Max:81            Intake/Output Summary (Last 24 hours) at 2023 1207  Last data filed at 2023 2162  Gross per 24 hour   Intake 100 ml   Output 1700 ml   Net -1600 ml     Weight (last 2 days)     Date/Time Weight    23 1544 104 (229)        Invasive Devices     Peripheral Intravenous Line  Duration           Peripheral IV 23 Left;Ventral (anterior) Forearm <1 day    Peripheral IV 23 Left;Upper;Ventral (anterior) Arm <1 day          Drain  Duration           Urethral Catheter 16 Fr. 10 days              Physical Exam  Constitutional:       General: He is not in acute distress. Appearance: He is obese. HENT:      Head: Normocephalic. Mouth/Throat:      Mouth: Mucous membranes are moist.   Cardiovascular:      Rate and Rhythm: Normal rate. Rhythm irregular. Pulses: Normal pulses. Pulmonary:      Comments: Decreased in bases   BiPAP   Abdominal:      General: Bowel sounds are normal.      Palpations: Abdomen is soft. Musculoskeletal:         General: No swelling. Cervical back: Neck supple. Skin:     General: Skin is warm and dry. Capillary Refill: Capillary refill takes less than 2 seconds. Neurological:      Mental Status: He is alert.       Comments: Opens eyes to name    Psychiatric:         Mood and Affect: Mood normal.           LABORATORY RESULTS:  Results from last 7 days   Lab Units 23  1540   CK TOTAL U/L 22*     CBC with diff:   Results from last 7 days   Lab Units 07/20/23  0821 07/19/23  1645 07/19/23  1249 07/17/23  0545 07/15/23  2105 07/15/23  0643   WBC Thousand/uL 11.15* 11.25* 10.88* 11.15* 11.35* 13.38*   HEMOGLOBIN g/dL 10.6* 10.8* 10.3* 10.6* 9.7* 10.6*   HEMATOCRIT % 36.5 36.9 35.6* 35.3* 32.8* 35.3*   MCV fL 100* 99* 99* 99* 98 97   PLATELETS Thousands/uL 235 236 231 196 194 210   RBC Million/uL 3.64* 3.72* 3.58* 3.57* 3.35* 3.65*   MCH pg 29.1 29.0 28.8 29.7 29.0 29.0   MCHC g/dL 29.0* 29.3* 28.9* 30.0* 29.6* 30.0*   RDW % 13.9 14.2 14.0 14.1 14.2 14.1   MPV fL 9.8 10.5 10.1 10.7 10.0 10.1   NRBC AUTO /100 WBCs  --   --  0 0 0 0       CMP:  Results from last 7 days   Lab Units 07/20/23  0821 07/19/23  1645 07/19/23  1249 07/17/23  0545 07/16/23  1036 07/15/23  2105 07/15/23  0643 07/14/23  1540   POTASSIUM mmol/L 6.0* 5.7* 5.7* 5.0 4.7 5.2 5.1 4.7   CHLORIDE mmol/L 110*  --  110* 110* 111* 110* 111* 111*   CO2 mmol/L 30  --  31 30 30 30 31 30   BUN mg/dL 107*  --  100* 99* 92* 94* 88* 86*   CREATININE mg/dL 2.69*  --  2.57* 2.60* 2.55* 2.43* 2.30* 2.29*   CALCIUM mg/dL 9.1  --  9.0 8.8 8.5 8.5 8.9 8.6   AST U/L  --   --  25  --   --   --   --   --    ALT U/L  --   --  31  --   --   --   --   --    ALK PHOS U/L  --   --  86  --   --   --   --   --    EGFR ml/min/1.73sq m 20  --  21 20 21 22 24 24       BMP:  Results from last 7 days   Lab Units 07/20/23  0821 07/19/23  1645 07/19/23  1249 07/17/23  0545 07/16/23  1036 07/15/23  2105 07/15/23  0643 07/14/23  1540   POTASSIUM mmol/L 6.0* 5.7* 5.7* 5.0 4.7 5.2 5.1 4.7   CHLORIDE mmol/L 110*  --  110* 110* 111* 110* 111* 111*   CO2 mmol/L 30  --  31 30 30 30 31 30   BUN mg/dL 107*  --  100* 99* 92* 94* 88* 86*   CREATININE mg/dL 2.69*  --  2.57* 2.60* 2.55* 2.43* 2.30* 2.29*   CALCIUM mg/dL 9.1  --  9.0 8.8 8.5 8.5 8.9 8.6        Results from last 7 days   Lab Units 07/19/23  1249 07/15/23  0643   MAGNESIUM mg/dL 2.7* 2.4       Results from last 7 days   Lab Units 07/20/23  0821 07/19/23  1645 INR  1.00 0.94     Lipid Profile:   Lab Results   Component Value Date    CHOL 171 2016    CHOL 163 2015    CHOL 164 2014     Lab Results   Component Value Date    HDL 31 (L) 2022    HDL 39 (L) 2021    HDL 36 (L) 2020     Lab Results   Component Value Date    LDLCALC 79 2022    LDLCALC 96 2021    LDLCALC 91 2020     Lab Results   Component Value Date    TRIG 170 (H) 2022    TRIG 147 2021    TRIG 119 2020     Cardiac testing:   Results for orders placed during the hospital encounter of 16    Echo complete with contrast if indicated    Narrative  [id number]  [phone]    Transthoracic Echocardiogram  2D, M-mode, Doppler, and Color Doppler    Study date:  26-Sep-2016    Patient: Jamir Sanz  MR number: [id number]  Account number: [de-identified]  : 1934  Age: 80 years  Gender: Male  Status: Outpatient  Location: Echo lab  Height: 66 in  Weight: 239.6 lb  BP: 132/ 62 mmHg    Indications: Edema-ankle. Diagnoses: M25.473 - Effusion, unspecified ankle    Sonographer:  [facility] AE-PE  Primary Physician:  Be Patel DO  Referring Physician:  Be Patel DO  Group:  [facility]  Interpreting Physician:  Hannah Marshall MD    SUMMARY    LEFT VENTRICLE:  Size was normal.  Systolic function was normal. Ejection fraction was estimated to be 55 %. Wall thickness was normal.  Doppler parameters were consistent with abnormal left ventricular relaxation  (grade 1 diastolic dysfunction). RIGHT VENTRICLE:  The size was normal.  Systolic function was normal.    TRICUSPID VALVE:  There was trace regurgitation. HISTORY: PRIOR HISTORY: CKD. Risk factors: hypertension and morbid obesity. PROCEDURE: The procedure was performed in the echo lab. This was a routine  study. The transthoracic approach was used.  The study included complete 2D  imaging, M-mode, complete spectral Doppler, and color Doppler. The heart rate  was 101 bpm, at the start of the study. Intravenous contrast (Definity solution  [1.3 ml Definity/8.7ml normal saline solution], 3 ml) was administered to  opacify the left ventricle. Echocardiographic views were limited due to  decreased penetration and lung interference. This was a technically difficult  study. LEFT VENTRICLE: Size was normal. Systolic function was normal. Ejection  fraction was estimated to be 55 %. There were no regional wall motion  abnormalities. Wall thickness was normal. DOPPLER: Doppler parameters were  consistent with abnormal left ventricular relaxation (grade 1 diastolic  dysfunction). RIGHT VENTRICLE: The size was normal. Systolic function was normal. Wall  thickness was normal.    LEFT ATRIUM: Size was at the upper limits of normal.    RIGHT ATRIUM: Size was normal.    MITRAL VALVE: Valve structure was normal. There was normal leaflet separation. DOPPLER: The transmitral velocity was within the normal range. There was no  evidence for stenosis. There was no regurgitation. AORTIC VALVE: The valve was trileaflet. Leaflets exhibited mildly increased  thickness, mild calcification, normal cuspal separation, and sclerosis. DOPPLER: Transaortic velocity was within the normal range. There was no  evidence for stenosis. There was no regurgitation. TRICUSPID VALVE: The valve structure was normal. There was normal leaflet  separation. DOPPLER: The transtricuspid velocity was within the normal range. There was no evidence for stenosis. There was trace regurgitation. Pulmonary  artery systolic pressure was within the normal range. Estimated peak PA  pressure was 35 mmHg. PULMONIC VALVE: Leaflets exhibited normal thickness, no calcification, and  normal cuspal separation. DOPPLER: The transpulmonic velocity was within the  normal range. There was no regurgitation.     PERICARDIUM: There was no pericardial effusion. The pericardium was normal in  appearance. AORTA: The root exhibited normal size. SYSTEMIC VEINS: IVC: The inferior vena cava was not well visualized. SYSTEM MEASUREMENT TABLES    2D  %FS: 30.47 %  Ao Diam: 3.43 cm  EDV(Teich): 121.76 ml  EF(Teich): 57.63 %  ESV(Cube): 43.64 ml  ESV(Teich): 51.59 ml  IVSd: 0.96 cm  LA Diam: 4.08 cm  LVEDV MOD A4C: 86.7 ml  LVEF MOD A4C: 64.78 %  LVESV MOD A4C: 30.54 ml  LVIDd: 5.06 cm  LVIDs: 3.52 cm  LVLd A4C: 7.03 cm  LVLs A4C: 5.5 cm  LVPWd: 1.04 cm  SI(Cube): 39.9 ml/m2  SI(Teich): 32.49 ml/m2  SV MOD A4C: 56.16 ml  SV(Cube): 86.19 ml  SV(Teich): 70.17 ml    CW  TR Vmax: 2.54 m/s  TR maxP.8 mmHg    MM  TAPSE: 2.26 cm    PW  E': 0.05 m/s  E/E': 10.92  MV A [place]: 1.08 m/s  MV Dec Dane: 2.73 m/s2  MV DecT: 219.78 ms  MV E [place]: 0.6 m/s  MV E/A Ratio: 0.55    [facility] Accredited Echocardiography Laboratory    Prepared and electronically signed by    Nithya Sánchez MD  Signed 26-Sep-2016 15:47:50    No results found for this or any previous visit. No valid procedures specified. No results found for this or any previous visit. Imaging: I have personally reviewed pertinent reports. XR chest portable    Result Date: 2023  Narrative: CHEST INDICATION:   SOB. COMPARISON: Chest radiograph dated 2023. EXAM PERFORMED/VIEWS:  XR CHEST PORTABLE FINDINGS: Cardiomediastinal silhouette is stably enlarged. The lungs are hypoinflated. There is persistent right basilar opacity, likely due to a combination of atelectasis and small pleural effusion although underlying airspace consolidation is not excluded. No pneumothorax. Osseous structures appear within normal limits for patient age. Impression: Hypoinflated lungs. Persistent right basilar opacity, likely due to a combination of atelectasis and small pleural effusion although underlying airspace consolidation is not excluded.  Workstation performed: [id number] CT head wo contrast    Result Date: 7/19/2023  Narrative: CT BRAIN - WITHOUT CONTRAST INDICATION:   AMS. COMPARISON: 7/3/2023 TECHNIQUE:  CT examination of the brain was performed. Multiplanar 2D reformatted images were created from the source data. Radiation dose length product (DLP) for this visit:  870.88 mGy-cm . This examination, like all CT scans performed in the [facility], was performed utilizing techniques to minimize radiation dose exposure, including the use of iterative  reconstruction and automated exposure control. IMAGE QUALITY:  Diagnostic. FINDINGS: PARENCHYMA: Decreased attenuation is noted in periventricular and subcortical white matter demonstrating an appearance that is statistically most likely to represent mild microangiopathic change. No CT signs of acute infarction. No intracranial mass, mass effect or midline shift. No acute parenchymal hemorrhage. VENTRICLES AND EXTRA-AXIAL SPACES:  Normal for the patient's age. VISUALIZED ORBITS: Normal visualized orbits. PARANASAL SINUSES: Normal visualized paranasal sinuses. CALVARIUM AND EXTRACRANIAL SOFT TISSUES:  Normal.     Impression: No acute intracranial abnormality. Workstation performed: [id number]     XR chest 1 view    Result Date: 7/16/2023  Narrative: CHEST INDICATION:   Sob. COMPARISON: 7/14/2023 EXAM PERFORMED/VIEWS:  XR CHEST 1 VIEW FINDINGS: Cardiomediastinal silhouette appears unremarkable. There is a small right pleural effusion. There is subjacent consolidation. . Osseous structures appear within normal limits for patient age. Impression: Stable small right pleural effusion with subjacent compressive atelectasis versus pneumonia.  Workstation performed: [id number]     CT chest wo contrast    Result Date: 7/15/2023  Narrative: CT CHEST WITHOUT IV CONTRAST INDICATION:   "Pneumonia, effusion or abscess suspected, xray done right lower infiltrate, eval for effusion vs pneumonia vs atelectasis." Per my review of the medical record, patient admitted on 7/3/2023 after fall. COMPARISON: Chest radiograph 7/14/2023 and chest CT 7/3/2023. TECHNIQUE: Chest CT without intravenous contrast.  Axial, sagittal, coronal 2D reformats and coronal MIPS from source data. Radiation dose length product (DLP):  575.66 mGy-cm . Radiation dose exposure minimized using iterative reconstruction and automated exposure control. FINDINGS: LUNGS: Mild atelectasis in the right middle lobe and moderate atelectasis in the right lower lobe. AIRWAYS: No significant filling defects. PLEURA: New small right pleural effusion. HEART/GREAT VESSELS: Moderate cardiomegaly. Mild coronary artery calcification indicating atherosclerotic heart disease. Pulmonary artery enlargement. MEDIASTINUM AND SOFIA:  Unremarkable. CHEST WALL AND LOWER NECK: Unremarkable. UPPER ABDOMEN: Cholelithiasis. Colonic diverticuli. Partially imaged bilateral renal cysts. OSSEOUS STRUCTURES: Mild degenerative disease in the spine. Impression: New small right pleural effusion with mild atelectasis in the right middle lobe and moderate atelectasis in the right lower lobe. Superimposed pneumonia cannot be excluded in the appropriate clinical setting. Pulmonary artery enlargement which can be seen with pulmonary hypertension. Workstation performed: [id number]     XR chest portable    Result Date: 7/15/2023  Narrative: CHEST INDICATION:   continued hypoxia with cough, eval for pneumonia. COMPARISON: 07/10/2023 EXAM PERFORMED/VIEWS:  XR CHEST PORTABLE Images: 2 FINDINGS: Cardiomediastinal silhouette appears enlarged. Elevation of the right hemidiaphragm. Infiltrate or atelectasis at the right lung base. The left lung is clear. No pneumothorax or pleural effusion. Osseous structures appear within normal limits for patient age. Impression: Infiltrate or atelectasis at the right lung base.  Workstation performed: [id number]     XR chest portable ICU    Result Date: 7/11/2023  Narrative: CHEST INDICATION:   hypoxia. COMPARISON: 7/6/2023 7 EXAM PERFORMED/VIEWS:  XR CHEST PORTABLE ICU FINDINGS:     Hypoventilation is again noted. The cardiac silhouette is borderline enlarged. There is elevation of the right hemidiaphragm. Minimal increase pulmonary vascular congestion is noted as well as airspace opacity at the right lung base as well as small right effusion. Infiltrate or atelectasis may be present. The left lung is clear. There is no pneumothorax. Osseous structures appear within normal limits for patient age. Impression: Mild pulmonary vascular congestion with pleural-parenchymal density at the right lung base which appears new. Workstation performed: [id number]     XR cervical spine 2 or 3 views    Result Date: 7/10/2023  Narrative: CERVICAL SPINE INDICATION:   s/p c3-6 fusion. COMPARISON: MR cervical spine 7/6/2023. Intraoperative fluoroscopic images obtained 7/4/2023. CT cervical spine 7/3/2023. VIEWS:  XR SPINE CERVICAL 2 OR 3 VW INJURY FINDINGS: Postsurgical changes from recent posterior cervical fusion are seen spanning the C3-C6 levels. Fusion hardware appears grossly intact. There is no evidence of acute fracture or malalignment. Moderate degenerative changes are seen throughout the cervical spine, most prominent at the C5-C6 and C6-C7 levels. Prevertebral soft tissues are unremarkable. The visualized lung apices appear clear. Impression: 1. Expected postsurgical changes from posterior cervical fusion. No evidence of acute osseous abnormality/malalignment. Workstation performed: [id number]     XR chest portable    Result Date: 7/6/2023  Narrative: CHEST INDICATION:   follow up hypoxia. COMPARISON: Chest radiograph 7/5/2023. EXAM PERFORMED/VIEWS:  XR CHEST PORTABLE FINDINGS: Endotracheal tube tip terminates 2.5 cm above the ibeth. Enteric tube terminates in the stomach. Heart shadow is enlarged but unchanged from prior exam. Lung markings are crowded secondary to low lung volumes. Similar left lung base opacity. No pneumothorax or pleural effusion. Osseous structures appear within normal limits for patient age. Impression: No significant change since prior study. Workstation performed: [id number]     MRI cervical spine wo contrast    Result Date: 7/6/2023  Narrative: MRI CERVICAL SPINE WITHOUT CONTRAST INDICATION: Brain and C-spine, new RUE weakness POD 1 from emergent posterior laminectomy for central cord syndrome. COMPARISON: CT cervical spine 7/3/2023 TECHNIQUE:  Multiplanar, multisequence imaging of the cervical spine was performed. . IMAGE QUALITY:  Diagnostic FINDINGS: There is again evidence of hyperextension injury with a defect through the anterior longitudinal ligament at the C4-5 level and widening of the anterior disc space. Interval spinal decompression and posterior fixation including bilateral laminectomies at  the C4 and C5 levels and bilateral transpedicular screws at the C3-C6 levels with interconnecting rods. MARROW SIGNAL:  Normal marrow signal is identified within the visualized bony structures. No discrete marrow lesion. CERVICAL AND VISUALIZED THORACIC CORD:  There is stable hyperintense T2/STIR signal within the dorsal aspect of the cord at the C4 level PREVERTEBRAL AND PARASPINAL SOFT TISSUES:  There is prevertebral edema extending from the C1-2 level down to the T1-2 level, mildly progressed. VISUALIZED POSTERIOR FOSSA:  The visualized posterior fossa demonstrates no abnormal signal. CERVICAL DISC SPACES: C2-3: Stable broad-based central disc protrusion. Mild central canal narrowing. Mild right neural foraminal stenosis is again noted. C3-4: Stable diffuse disc osteophyte complex. . Improved patency of the central canal status post bilateral laminectomies. Limited evaluation of the neural foramina secondary to fixation hardware artifacts.  C4-5: Stable diffuse disc osteophyte complex with bilateral uncovertebral hypertrophy partially effacing the ventral subarachnoid space. Improved patency of the central canal status post bilateral laminectomies. Limited evaluation of the neural foramina secondary to fixation hardware artifacts. C5-6: Stable diffuse disc osteophyte complex with bilateral uncovertebral hypertrophy with a superimposed left paracentral disc protrusion. Mild to moderate central canal narrowing. Limited evaluation of the neural foramina secondary to fixation hardware artifacts. C6-7: Stable diffuse disc osteophyte complex with bilateral uncovertebral hypertrophy partially effacing the ventral subarachnoid space. Mild central canal narrowing. Moderate to severe right and moderate left neural foraminal stenosis is again noted. C7-T1: Stable diffuse disc osteophyte complex with bilateral uncovertebral hypertrophy partially effacing the ventral subarachnoid space. No central canal narrowing. No neural foraminal stenosis. UPPER THORACIC DISC SPACES:  Normal.     Impression: Hyperflexion injury with a defect through the anterior longitudinal ligament at the C4-5 level and widening of the anterior disc space. Prevertebral edema extending from C1-2 through T1-2 ,mildly progressed. Interval spinal decompression and posterior fixation including bilateral laminectomies at the C4 and C5 levels and bilateral transpedicular screws at the C3-C6 levels with interconnecting rods. Improved patency of the central canal at the C3-4 and C4-5 levels with resolved cord compression. There is stable hyperintense T2/STIR signal within the dorsal aspect of the cord at the C4 level. Findings are consistent with the preliminary report from [facility] which was provided shortly after completion of the exam. Workstation performed: [id number]     MRI brain wo contrast    Result Date: 7/6/2023  Narrative: MRI BRAIN WITHOUT CONTRAST INDICATION: Brain and C-spine, new RUE weakness POD 1 from emergent posterior laminectomy for central cord syndrome.  COMPARISON:   CT head 7/3/2023 TECHNIQUE:  Multiplanar, multisequence imaging of the brain was performed. IMAGE QUALITY:  Diagnostic. FINDINGS: BRAIN PARENCHYMA:  There is no discrete mass, mass effect or midline shift. There is no intracranial hemorrhage. There is no evidence of acute infarction and diffusion imaging is unremarkable. Small scattered hyperintensities on T2/FLAIR imaging are noted in the periventricular and subcortical white matter demonstrating an appearance that is statistically most likely to represent mild microangiopathic change. Small calcified lesion is noted along the anterior right frontal convexity, dural calcification versus small meningioma. Punctate focus of susceptibility is noted within the left occipital horn, possible sequela of remote hemorrhage. VENTRICLES:  Normal for the patient's age. SELLA AND PITUITARY GLAND:  Normal. ORBITS: Thinning of the bilateral ocular lenses which can be degenerative or postsurgical. PARANASAL SINUSES: Ethmoidal and left maxillary sinus mucosal thickening. The remainder of the visualized paranasal sinus cavities and mastoid air cells are clear VASCULATURE:  Evaluation of the major intracranial vasculature demonstrates appropriate flow voids. CALVARIUM AND SKULL BASE:  Normal. EXTRACRANIAL SOFT TISSUES: Right parietal scalp soft tissue swelling. Impression: 1. No acute infarction, edema, or mass effect. 2. Mild chronic microangiopathic ischemic changes. 3. Small calcified lesion is noted along the anterior right frontal convexity, dural calcification versus small meningioma. 4. Right parietal scalp soft tissue swelling.  Findings are consistent with the preliminary report from [facility] which was provided shortly after completion of the exam. Workstation performed: [id number]     Echo complete w/ contrast if indicated    Result Date: 7/5/2023  Narrative: •  Left Ventricle: Left ventricular cavity size is normal. Wall thickness is normal. The left ventricular ejection fraction is 50%. Systolic function is mildly reduced. There is mild global hypokinesis. Diastolic function is moderately abnormal, consistent with grade II (pseudonormal) relaxation. Left atrial filling pressure is elevated. •  Right Ventricle: Right ventricular cavity size is mildly dilated. •  Aortic Valve: There is aortic valve sclerosis. The aortic valve velocity is normal. •  Mitral Valve: There is mild annular calcification. There is mild to moderate regurgitation with an eccentrically directed jet. •  Tricuspid Valve: There is mild to moderate regurgitation. The tricuspid valve regurgitation jet is central. The right ventricular systolic pressure is at least mildly elevated. •  Pulmonary Veins: There is systolic blunting in the pulmonary veins. XR chest portable ICU    Result Date: 7/5/2023  Narrative: CHEST INDICATION:   suspected worsened pulmonary edema, effusions. COMPARISON: CXR 7/4/2023 and chest CT 7/3/2023. EXAM PERFORMED/VIEWS:  XR CHEST PORTABLE ICU. FINDINGS: ET tube 4 cm above the ibeth. NG tube in stomach. Cardiomediastinal silhouette normal. Persistent opacity in the left base. No effusion or pneumothorax. Persistent elevation of the right hemidiaphragm. Upper abdomen normal. Bones normal for age. Impression: NG tube in stomach. Persistent left base opacity, atelectasis and/or pneumonia. Workstation performed: [id number]     X-ray chest 1 view    Result Date: 7/5/2023  Narrative: CHEST INDICATION:   Endotracheal tube positioning. COMPARISON: CXR and chest CT 7/3/2023. EXAM PERFORMED/VIEWS:  XR CHEST 1 VIEW. FINDINGS: ET tube 4 cm above the ibeth. Cardiomediastinal silhouette normal. Low lung volumes. Moderate opacity in the left base. No effusion or pneumothorax. Upper abdomen normal. Bones normal for age. Impression: Low lung volumes with moderate opacity in the left base, likely atelectasis. Pneumonia not excluded in the appropriate clinical setting.  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 Procedure guidance. COMPARISON:  None TECHNIQUE: FLUOROSCOPY TIME:   26 seconds 4 FLUOROSCOPIC IMAGES FINDINGS: Fluoroscopic guidance provided for surgical procedure. Osseous and soft tissue detail limited by technique. Impression: Fluoroscopic guidance provided for surgical procedure. Please refer to the separate procedure notes for additional details. Localization procedure was performed, with the OR notified of the level at approximately 12:37 p.m. on 7/4/2023 via telephone conversation with the OR x-ray technologist . Additional images were obtained after level verification and are present for evaluation. Workstation performed: [id number]     MRI cervical spine wo contrast    Result Date: 7/3/2023  Narrative: MRI CERVICAL SPINE WITHOUT CONTRAST INDICATION: Weakness of both upper extremities. COMPARISON:  None. TECHNIQUE:  Multiplanar, multisequence imaging of the cervical spine was performed. . IMAGE QUALITY:  Diagnostic FINDINGS: ALIGNMENT: Straightening of the cervical lordosis. Mild kyphotic deformity at C4-C5 where there is suspected traumatic injury to the disc at C4-C5 (separation of the superior portion of the C4-C5 disc from the inferior endplate of the C4 vertebral body). MARROW SIGNAL:  Normal marrow signal is identified within the visualized bony structures. No discrete marrow lesion. CERVICAL AND VISUALIZED THORACIC CORD: Spinal cord signal abnormality from C4-C5 could be either degenerative spondylitic myelopathy versus traumatic. Correlate clinically. PREVERTEBRAL AND PARASPINAL SOFT TISSUES: Prevertebral soft tissue edema from C2-C5 likely traumatic.  VISUALIZED POSTERIOR FOSSA:  The visualized posterior fossa demonstrates no abnormal signal. CERVICAL DISC SPACES: C2-C3: Disc osteophyte complex without significant C3-C4: Disc osteophyte complex causing mild spinal canal stenosis. Uncovertebral hypertrophy and facet arthrosis causing moderate to severe bilateral neuroforaminal narrowing. C4-C5: Fluid signal abnormality along the anterior aspect of the C4-C5 disc likely. Disc osteophyte complex and ligamentum flavum hypertrophy causing mild spinal canal stenosis. Facet arthrosis and uncovertebral hypertrophy causing moderate to severe bilateral neuroforaminal narrowing. C5-C6: Disc osteophyte complex causing mild spinal canal stenosis. Facet arthrosis and uncovertebral hypertrophy causing moderate to severe bilateral neuroforaminal narrowing C6-C7: Disc osteophyte complex causing anterior impression on the thecal sac. Uncovertebral hypertrophy and facet arthrosis causing moderate to severe right and moderate left neuroforaminal narrowing. C7-T1: Disc osteophyte complex causing impression on the thecal sac. UPPER THORACIC DISC SPACES:  Normal. OTHER FINDINGS:  None. Impression: 1. Traumatic disc injury at C4-C5 where there is anterior disc injury (mild separation of the superior C4-C5 disc from the inferior endplate of the C4 vertebral body) with prevertebral fluid from C2-C5. 2. Cord edema at C4-C5 likely traumatic as this is the site of injury, rather than spondylitic myelopathy but needs to be correlated clinically. I personally discussed this study with Dr. Rosibel Randle on 7/3/2023 10:13 PM. Workstation performed: [id number]     TRAUMA - CT spine cervical wo contrast    Result Date: 7/3/2023  Narrative: CT CERVICAL SPINE - WITHOUT CONTRAST INDICATION:   TRAUMA. Fall. Upper extremity weakness. Back pain COMPARISON: February 22, 2023 TECHNIQUE:  CT examination of the cervical spine was performed without intravenous contrast.  Contiguous axial images were obtained. Multiplanar 2D reformatted images were created from the source data.  Radiation dose length product (DLP) for this visit:  0367 8199769 mGy-cm . This examination, like all CT scans performed in the [facility], was performed utilizing techniques to minimize radiation dose exposure, including the use of iterative reconstruction and automated exposure control. There was unavoidable motion artifact on first attempt and the examination was therefore repeated, with better results. IMAGE QUALITY:  Diagnostic. FINDINGS: ALIGNMENT:  Normal alignment of the cervical spine. No subluxation. VERTEBRAE:  No fracture. DEGENERATIVE CHANGES:  Moderate multilevel cervical degenerative changes are noted. Degenerative changes quite similar from February 22, 2023 and there is no osseous critical central canal stenosis. PREVERTEBRAL AND PARASPINAL SOFT TISSUES: Unremarkable THORACIC INLET:  Normal.     Impression: No cervical spine fracture or traumatic malalignment. Workstation performed: [id number]     CT recon only thoracolumbar (No Charge)    Result Date: 7/3/2023  Narrative: CT THORACIC AND LUMBAR SPINE INDICATION:   trauma. Fall. COMPARISON: None. TECHNIQUE: Axial CT examination of the thoracic and lumbar spine was obtained utilizing reconstructed images from CT of the chest abdomen and pelvis performed the same day. Images were reformatted in the sagittal and coronal planes. This examination, like all CT scans performed in the [facility], was performed utilizing techniques to minimize radiation dose exposure, including the use of iterative reconstruction and automated exposure control. FINDINGS: ALIGNMENT: Normal alignment. No spondylolisthesis. No scoliosis. VERTEBRAE:  No fracture. No acute osseous abnormality. DEGENERATIVE CHANGES: Age appropriate degenerative changes. Fusion of osteophytes throughout the thoracic spine. No critical central canal stenosis in the thoracic or lumbar spine. PREVERTEBRAL AND PARASPINAL SOFT TISSUES: No prevertebral or paraspinal hematoma or soft tissue mass.  Please see separate report of chest, abdomen, and pelvis CT, performed concurrently and dictated under separate accession number. Impression: No fracture or traumatic subluxation. Workstation performed: [id number]     TRAUMA - CT chest abdomen pelvis w contrast    Result Date: 7/3/2023  Narrative: CT CHEST, ABDOMEN AND PELVIS WITH IV CONTRAST INDICATION:   TRAUMA. Fall. Head injury. Back pain. Upper extremity weakness. COMPARISON:  None. TECHNIQUE: CT examination of the chest, abdomen and pelvis was performed. Multiplanar 2D reformatted images were created from the source data. This examination, like all CT scans performed in the [facility], was performed utilizing techniques to minimize radiation dose exposure, including the use of iterative reconstruction and automated exposure control. Radiation dose length product (DLP) for this visit:  1733.55 mGy-cm IV Contrast:  80 mL of iohexol (OMNIPAQUE) Enteric Contrast: Enteric contrast was administered. FINDINGS: CHEST LUNGS: Low lung volumes. Atelectasis especially in the dependent right costophrenic angle. No pulmonary contusion or pulmonary laceration. PLEURA:  Unremarkable. HEART/GREAT VESSELS: Cardiomegaly. Coronary artery calcification. No thoracic aortic aneurysm. MEDIASTINUM AND SOFIA: No mediastinal hematoma. No mediastinal or hilar lymphadenopathy. Small sliding-type hiatal hernia. CHEST WALL AND LOWER NECK:  Unremarkable. ABDOMEN LIVER/BILIARY TREE:  Unremarkable. GALLBLADDER: There are gallstone(s) within the gallbladder, without pericholecystic inflammatory changes. SPLEEN:  Unremarkable. PANCREAS:  Unremarkable. ADRENAL GLANDS:  Unremarkable. KIDNEYS/URETERS: Bilateral renal cysts, most simple, varying in size measuring up to 5.6 cm in the interpolar right kidney. A lobulated cyst exophytic at the lower pole of the right kidney with thin septations measures up to 4.3 cm. No solid renal mass. No traumatic renal injury. No urinary tract calculus. No hydronephrosis. STOMACH AND BOWEL: Extensive colonic diverticulosis with no evidence for acute diverticulitis. Duodenal diverticulum. No bowel wall thickening or bowel obstruction. APPENDIX:  No findings to suggest appendicitis. ABDOMINOPELVIC CAVITY:  No ascites. No pneumoperitoneum. No lymphadenopathy. VESSELS:  Unremarkable for patient's age. PELVIS REPRODUCTIVE ORGANS: Prostatomegaly. URINARY BLADDER:  Unremarkable. ABDOMINAL WALL/INGUINAL REGIONS:  Unremarkable. OSSEOUS STRUCTURES: Marginal osteophytes throughout the thoracic and upper lumbar spine. No acute fracture. No destructive osseous lesion. Impression: No acute traumatic visceral injury in the chest, abdomen or pelvis. No acute fracture. Workstation performed: [id number]     TRAUMA - CT head wo contrast    Result Date: 7/3/2023  Narrative: CT BRAIN - WITHOUT CONTRAST INDICATION:   TRAUMA. Fall. Lower back pain. Upper extremity weakness. COMPARISON: February 22, 2023 TECHNIQUE:  CT examination of the brain was performed. Multiplanar 2D reformatted images were created from the source data. Radiation dose length product (DLP) for this visit:  1003 mGy-cm . This examination, like all CT scans performed in the [facility], was performed utilizing techniques to minimize radiation dose exposure, including the use of iterative reconstruction and automated exposure control. IMAGE QUALITY:  Diagnostic. FINDINGS: PARENCHYMA: Decreased attenuation is noted in periventricular and subcortical white matter demonstrating an appearance that is statistically most likely to represent moderate microangiopathic change. No CT signs of acute infarction. No intracranial mass, mass effect or midline shift. No acute parenchymal hemorrhage. VENTRICLES AND EXTRA-AXIAL SPACES:  Normal for the patient's age. VISUALIZED ORBITS: Normal visualized orbits.  PARANASAL SINUSES: Partial opacification of left maxillary sinus with some central desiccated secretions, similar from February 22, 2023. Mild mucosal thickening in ethmoid air cells. Sinuses and mastoid air cells are otherwise clear. CALVARIUM AND EXTRACRANIAL SOFT TISSUES: No calvarial fracture. Right parietal scalp contusion. Impression: No acute intracranial abnormality. Workstation performed: [id number]     XR pelvis ap only 1 or 2 vw    Result Date: 7/3/2023  Narrative: PELVIS INDICATION:   trauma. COMPARISON: 2/22/2023 VIEWS:  XR PELVIS AP ONLY 1 OR 2 VW FINDINGS: No acute fracture or hip dislocation. Mild bilateral hip osteoarthritis. No lytic or blastic osseous lesion. Soft tissues are unremarkable. The visualized lumbar spine is unremarkable. Impression: No acute osseous abnormality. Workstation performed: [id number]     XR chest 1 view portable    Result Date: 7/3/2023  Narrative: CHEST INDICATION:   trauma. COMPARISON: 2/22/2023 EXAM PERFORMED/VIEWS:  XR CHEST PORTABLE FINDINGS: Heart shadow is enlarged but unchanged from prior exam. There is no focal consolidation. Cannot exclude mild pulmonary vascular congestion. No pneumothorax or pleural effusion. Osseous structures appear within normal limits for patient age. Impression: Unchanged cardiomegaly. Cannot exclude mild pulmonary vascular congestion. Workstation performed: [id number]       Thank you for allowing us to participate in this patient's care. This pt will follow up with          once discharged. Counseling / Coordination of Care  Total floor / unit time spent today 45 minutes. Greater than 50% of total time was spent with the patient and / or family counseling and / or coordination of care. A description of the counseling / coordination of care: Review of history, current assessment, development of a plan. Code Status: Level 1 - Full Code    ** Please Note: Dragon 360 Dictation voice to text software may have been used in the creation of this document.  **

## 2023-07-20 NOTE — PROGRESS NOTES
NEPHROLOGY PROGRESS NOTE   Bety Gee 80 y.o. male MRN: [id number]  Unit/Bed#: [facility] 834-01 Encounter: [id number]    ASSESSMENT & PLAN:  55-year-old male history of CKD 4, cardiomyopathy, hypertension, C3 spinal cord injury, ulcerative colitis, A-fib, chronic diastolic CHF presented with complaint of dyspnea and required BiPAP briefly. Chest x-ray showed bilateral pleural effusion, worse on the right. Recent hospital admission in early July with acute respiratory failure with hypoxia secondary to pneumonia and pleural effusion and was continued on torsemide 20 mg daily. Nephrology was consulted for chronic kidney disease stage IV and hyperkalemia    Chronic kidney disease stage IV  -Outpatient nephrologist: Dr. Quiana Church  -Baseline creatinine 1.9-2.4. 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 May have to accept slightly higher creatinine  -Avoid nephrotoxins and avoid hypotension  - patient currently with Burkett catheter. Monitor renal function with repeat BMP tomorrow    Hyperkalemia, POA  -Likely in the setting of CKD and use of ARB  -Admission potassium 5.7 on 7/19  -Status post IV calcium gluconate,  Lasix   -Potassium level has trended up to 6.0 today, ordered for IV insulin, dextrose, Kayexalate, IV Lasix twice daily, discussed with primary team about treatment of hyperkalemia and plan. Recommend low potassium diet  -BMP repeat at 2 PM.     Anemia, unspecified, hemoglobin stable at 10.6 g/dL, continue to monitor per primary team    Urinary retention, Burkett catheter in place at recent discharge on 7/17, continue Flomax continue Burkett catheter. Recommend outpatient follow-up with urology    Cardiomyopathy with diastolic dysfunction/pleural effusion suggestive of fluid overload  -Echo from July 5 showed ejection fraction 50% with grade 2 diastolic dysfunction, aortic valve sclerosis with mild to moderate MR and TR  -Continue IV Lasix 40 mg twice daily. Noted plan for IR thoracentesis    Altered mental status/history of hallucination: CT head was negative, work-up and management per primary team  - VBG - PCO2 53.5- having CO2 retention - May need bipap but defer to primary team    A-fib, currently on heparin drip, noted plan for cardiology consult    Bilateral renal cyst,  mostly simple cyst with finding of lobulated exophytic cyst at lower pole of right kidney with thin septation measuring 4.3 cm in size , recommend outpatient follow-up with repeat renal ultrasound  History of gout on allopurinol  History of ulcerative colitis on sulfasalazine    Above plan regarding management of chronic kidney disease stage IV and hyperkalemia was discussed with primary team and agreed with the plan to treat hyperkalemia with medical treatment as mentioned above    Addendum 3:45 PM  -Patient now on BiPAP slightly more awake, family member at bedside. Potassium elevated 6.2 on last labs. He received rectal Kayexalate after the last blood work. Nonoliguric. Last creatinine was 2.7. Ordered for another round of medical treatment with insulin dextrose. Continue IV Lasix, order additional 40 mg x 1 dose. Next blood work at 8 PM to monitor potassium level.  -discussed with patient's wife, daughter as well as granddaughter at bedside that if potassium level does not improve may need to consider renal replacement therapy, dialysis consent was obtained and placed in the chart after discussing risk and benefit      SUBJECTIVE:  Appears lethargic.   No chest pain or shortness of breath, nausea vomiting    OBJECTIVE:  Current Weight: Weight - Scale: 104 kg (229 lb)  Vitals: 07/20/23 0702   BP: 140/81   Pulse: (!) 108   Resp: 18   Temp: 98 °F (36.7 °C)   SpO2: 95%       Intake/Output Summary (Last 24 hours) at 7/20/2023 0917  Last data filed at 7/20/2023 0842  Gross per 24 hour   Intake 100 ml   Output 1700 ml   Net -1600 ml       Physical Exam  General:  Ill looking, awake. On oxygen by NC   Eyes: Conjunctivae pink,  Sclera anicteric  ENT: lips and mucous membranes moist  Neck: supple   Chest: Clear to Auscultation both lungs,  no crackles, ronchus or wheezing. CVS: S1 & S2 present, normal rate, regular rhythm, no murmur.   Abdomen: soft, non-tender, non-distended, Bowel sounds normoactive  Extremities: no edema of  legs  Skin: no rash  Neuro: lethargic, oriented x1   Psych: Mood and affect not able to assess as patient lethargic     Medications:    Current Facility-Administered Medications:   •  acetaminophen (TYLENOL) tablet 650 mg, 650 mg, Oral, Q6H PRN, Trinidad Hyatt MD  •  allopurinol (ZYLOPRIM) tablet 100 mg, 100 mg, Oral, Daily, Lobo Foster MD, 100 mg at 07/19/23 1821  •  bisacodyl (DULCOLAX) rectal suppository 10 mg, 10 mg, Rectal, Daily PRN, Trinidad Hyatt MD  •  calcium gluconate 1 g in sodium chloride 0.9% 50 mL (premix), 1 g, Intravenous, Once, [employer] [facility], DO  •  cholecalciferol (VITAMIN D3) tablet 2,000 Units, 2,000 Units, Oral, Daily, Trinidad Hyatt MD, 2,000 Units at 07/19/23 1821  •  dextrose 50 % IV solution 25 mL, 25 mL, Intravenous, Once, [employer] Nolberto, DO  •  folic acid (FOLVITE) tablet 1 mg, 1 mg, Oral, Daily, Trinidad Hyatt MD, 1 mg at 07/19/23 1821  •  furosemide (LASIX) injection 40 mg, 40 mg, Intravenous, BID (diuretic), Trinidad Hyatt MD, 40 mg at 07/19/23 2303  •  guaiFENesin (MUCINEX) 12 hr tablet 600 mg, 600 mg, Oral, Q12H [address], Lobo Foster MD, 600 mg at 07/19/23 2010  •  heparin (porcine) 25,000 units in 0.45% NaCl 250 mL infusion (premix), 3-20 Units/kg/hr (Order-Specific), Intravenous, Titrated, Trinidad Hyatt MD, Held at 07/20/23 9751  • heparin (porcine) injection 2,000 Units, 2,000 Units, Intravenous, Q6H PRN, Shameka Connolly MD  •  heparin (porcine) injection 4,000 Units, 4,000 Units, Intravenous, Q6H PRN, Shameka Connolly MD  •  insulin regular (HumuLIN R,NovoLIN R) injection 10 Units, 10 Units, Intravenous, Once, Diane Foot Starssteve, DO  •  multivitamin stress formula tablet 1 tablet, 1 tablet, Oral, Daily, Shameka Connolly MD, 1 tablet at 07/19/23 1821  •  ondansetron (ZOFRAN) injection 4 mg, 4 mg, Intravenous, Q6H PRN, Shameka Connolly MD  •  polyethylene glycol (MIRALAX) packet 17 g, 17 g, Oral, Daily, Lobo Foster MD, 17 g at 07/19/23 1821  •  senna-docusate sodium (SENOKOT S) 8.6-50 mg per tablet 1 tablet, 1 tablet, Oral, BID, Shameka Connolly MD, 1 tablet at 07/19/23 1821  •  sodium polystyrene (KAYEXALATE) powder 15 g, 15 g, Oral, Once, [facility], DO  •  sulfaSALAzine (AZULFIDINE) tablet 1,000 mg, 1,000 mg, Oral, Daily Before Lunch, Shameka Connolly MD  •  sulfaSALAzine (AZULFIDINE) tablet 500 mg, 500 mg, Oral, BID, Lobo Foster MD, 500 mg at 07/19/23 1821  •  tamsulosin (FLOMAX) capsule 0.4 mg, 0.4 mg, Oral, Daily, Lobo Foster MD, 0.4 mg at 07/19/23 1821    Invasive Devices:   Urethral Catheter 16 Fr.  (Active)   Burkett Care Done 07/20/23 0842   Output (mL) 450 mL 07/20/23 0100       Lab Results:   Results from last 7 days   Lab Units 07/20/23  0821 07/19/23  1645 07/19/23  1249 07/17/23  0545 07/15/23  2105 07/15/23  0643   WBC Thousand/uL 11.15* 11.25* 10.88* 11.15*   < > 13.38*   HEMOGLOBIN g/dL 10.6* 10.8* 10.3* 10.6*   < > 10.6*   HEMATOCRIT % 36.5 36.9 35.6* 35.3*   < > 35.3*   PLATELETS Thousands/uL 235 236 231 196   < > 210   POTASSIUM mmol/L 6.0* 5.7* 5.7* 5.0   < > 5.1   CHLORIDE mmol/L 110*  --  110* 110*   < > 111*   CO2 mmol/L 30  --  31 30   < > 31   BUN mg/dL 107*  --  100* 99*   < > 88*   CREATININE mg/dL 2.69*  --  2.57* 2.60*   < > 2.30*   CALCIUM mg/dL 9.1  --  9.0 8.8   < > 8.9   MAGNESIUM mg/dL  --   --  2.7*  --   --  2.4 PHOSPHORUS mg/dL  --   --  4.7*  --   --   --    ALK PHOS U/L  --   --  86  --   --   --    ALT U/L  --   --  31  --   --   --    AST U/L  --   --  25  --   --   --     < > = values in this interval not displayed. Previous work up:  Chest x-ray was personally reviewed by me in PACS, images were seen, finding of trace bilateral pleural effusion      Portions of the record may have been created with voice recognition software. Occasional wrong word or "sound a like" substitutions may have occurred due to the inherent limitations of voice recognition software. Read the chart carefully and recognize, using context, where substitutions have occurred. If you have any questions, please contact the dictating provider.

## 2023-07-20 NOTE — ASSESSMENT & PLAN NOTE
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 He returned after approximately 48 hours due to shortness of breath, increased work of breathing, and hypoxia. · Work-up in the emergency room showed pleural effusions and mild hypercapnia. He was treated with BiPAP in the emergency room but transferred to the floor on the hospital service after improvement. · Shortly after admission and again patient became progressively somnolent with increased work of breathing. ABG revealed significant hypercapnia with CO2 level at 79. He was started on BiPAP with minimal improvement over the course of 4 hours. · Pulmonology, cardiology, and critical care were all consulted. · Ultimately it is felt that the patient has neuromuscular compromise secondary to his cervical injury affecting his diaphragmatic performance. His hypercapnic respiratory failure has been repetitive since the hospitalization and he has been refusing BiPAP  As an outpatient. Further work-up or treatment would likely lead to permanent tracheostomy. This is not what the patient would want per family discussion. Maik Houston discussion was held with the family at bedside with critical care service and family opted for comfort measures. Approximately 65 minutes spent caring for this patient throughout the day including reviewing tests and imaging, ordering and reviewing ABG, discussing with numerous specialties, coordinating with nursing staff, and multiple family discussions.

## 2023-07-20 NOTE — PLAN OF CARE
Problem: MOBILITY - ADULT  Goal: Maintain or return to baseline ADL function  Description: INTERVENTIONS:  -  Assess patient's ability to carry out ADLs; assess patient's baseline for ADL function and identify physical deficits which impact ability to perform ADLs (bathing, care of mouth/teeth, toileting, grooming, dressing, etc.)  - Assess/evaluate cause of self-care deficits   - Assess range of motion  - Assess patient's mobility; develop plan if impaired  - Assess patient's need for assistive devices and provide as appropriate  - Encourage maximum independence but intervene and supervise when necessary  - Involve family in performance of ADLs  - Assess for home care needs following discharge   - Consider OT consult to assist with ADL evaluation and planning for discharge  - Provide patient education as appropriate  Outcome: Progressing  Goal: Maintains/Returns to pre admission functional level  Description: INTERVENTIONS:  - Perform BMAT or MOVE assessment daily.   - Set and communicate daily mobility goal to care team and patient/family/caregiver.    - Collaborate with rehabilitation services on mobility goals if consulted  - Out of bed for toileting  - Record patient progress and toleration of activity level   Outcome: Progressing     Problem: PAIN - ADULT  Goal: Verbalizes/displays adequate comfort level or baseline comfort level  Description: Interventions:  - Encourage patient to monitor pain and request assistance  - Assess pain using appropriate pain scale  - Administer analgesics based on type and severity of pain and evaluate response  - Implement non-pharmacological measures as appropriate and evaluate response  - Consider cultural and social influences on pain and pain management  - Notify physician/advanced practitioner if interventions unsuccessful or patient reports new pain  Outcome: Progressing     Problem: INFECTION - ADULT  Goal: Absence or prevention of progression during hospitalization  Description: INTERVENTIONS:  - Assess and monitor for signs and symptoms of infection  - Monitor lab/diagnostic results  - Monitor all insertion sites, i.e. indwelling lines, tubes, and drains  - Monitor endotracheal if appropriate and nasal secretions for changes in amount and color  - [place] appropriate cooling/warming therapies per order  - Administer medications as ordered  - Instruct and encourage patient and family to use good hand hygiene technique  - Identify and instruct in appropriate isolation precautions for identified infection/condition  Outcome: Progressing  Goal: Absence of fever/infection during neutropenic period  Description: INTERVENTIONS:  - Monitor WBC    Outcome: Progressing     Problem: DISCHARGE PLANNING  Goal: Discharge to home or other facility with appropriate resources  Description: INTERVENTIONS:  - Identify barriers to discharge w/patient and caregiver  - Arrange for needed discharge resources and transportation as appropriate  - Identify discharge learning needs (meds, wound care, etc.)  - Arrange for interpretive services to assist at discharge as needed  - Refer to Case Management Department for coordinating discharge planning if the patient needs post-hospital services based on physician/advanced practitioner order or complex needs related to functional status, cognitive ability, or social support system  Outcome: Progressing     Problem: Knowledge Deficit  Goal: Patient/family/caregiver demonstrates understanding of disease process, treatment plan, medications, and discharge instructions  Description: Complete learning assessment and assess knowledge base.   Interventions:  - Provide teaching at level of understanding  - Provide teaching via preferred learning methods  Outcome: Progressing

## 2023-07-20 NOTE — UTILIZATION REVIEW
Initial Clinical Review    Admission: Date/Time/Statement:   Admission Orders (From admission, onward)     Ordered        07/19/23 1448  INPATIENT ADMISSION  Once                      Orders Placed This Encounter   Procedures   • INPATIENT ADMISSION     Standing Status:   Standing     Number of Occurrences:   1     Order Specific Question:   Level of Care     Answer:   Med Surg [16]     Order Specific Question:   Estimated length of stay     Answer:   More than 2 Midnights     Order Specific Question:   Certification     Answer:   I certify that inpatient services are medically necessary for this patient for a duration of greater than two midnights. See H&P and MD Progress Notes for additional information about the patient's course of treatment. ED Arrival Information     Expected   -    Arrival   7/19/2023 12:14    Acuity   Emergent            Means of arrival   Ambulance    Escorted by   [address] [facility]    Service   Hospitalist    Admission type   Emergency            Arrival complaint   syncope           Chief Complaint   Patient presents with   • Syncope     Pt from [place] rehab, per rehab, pt had 2 syncopal episodes today once at 0800 and another arouind 1130. Pt reports pain "all over." Pt quadriplegic, was in bed during syncopal episodes, -HS/fall       Initial Presentation: 80 y.o. male who presented to [facility] ED due to increased work of breathing, dyspnea and possible syncopal episodes, fatigue. Upon arrival to ER, patient noted to have hypoxia, increased work of breathing, placed on BiPAP briefly. Labs reveals mild hypercarbia on VBG, hyperkalemia potassium 5.7. Veronda Bue proBNP mildly elevated, chest x-ray reveals persistent BL pleural effusion, worse on right. Patient was recently treated with IV antibiotic for possible pneumonia, currently completing antibiotic with cefdinir. Patient will be admitted for acute respiratory failure, persistent pleural effusion, hyperkalemia.  Recent dc'd after fall and C3 spincal cord injury, weakness to BL upper and lower extremity, quadriplegia. PMHx:  HTN, depression, quadriplegia. Admitted Inpatient status dt Acute respiratory failure with hypoxia. Plan:  med surg telemetry, start IV lasix, wean O2 as able, IO and daily wts, order procal, cardiology consult for AFib, IR consult for thoracentesis, nephrology consult for CKD. Date: 7/20   Day 2:   Rapid response called today for altered mental status, ABG w/ pH 7.23 and PCO2 of 79. Started on BiPAP. Respiratory distress noted, decreased breath sounds, lethargic. Pulmonology consult. Transfer to Level 2 stepdown unit.      7/20 Per Pulmonology: Acute hypoxic respiratory failure with R pleural effusion: continue BiPAP, repeat ABG this afternoon, Thoracentesis by IR scheduled for this afternoon, follow up results, continue IV lasix. 7/20 Per Cardiology: His transthoracic echocardiogram demonstrates grade 2 diastolic dysfunction, low normal LV systolic function, mild to moderate MR, mild to moderate TR. I agree with ongoing diuresis, Lasix 40 mg IV twice daily. IR to evaluate for thoracentesis. 7/20 Per Nephrology: Chronic kidney disease stage IV: Admission creatinine on 7/19 was 2.57 mg/dL  - renal function is slightly worsened to creatinine 2.69 mg/dL today possibly due to diuresis, continue to monitor. May have to accept slightly higher creatinine  -Avoid nephrotoxins and avoid hypotension  - patient currently with Burkett catheter. Monitor renal function with repeat BMP tomorrow  Hyperkalemia: -Potassium level has trended up to 6.0 today, ordered for IV insulin, dextrose, Kayexalate, IV Lasix twice daily, discussed with primary team about treatment of hyperkalemia and plan. Recommend low potassium diet  -BMP repeat at 2 PM.     Date: 7/21   Day 3: Has surpassed a 2nd midnight with active treatments and services, which include supplemental O2, pt and family have transitioned to comfort care. ED Triage Vitals   Temperature Pulse Respirations Blood Pressure SpO2   07/19/23 1217 07/19/23 1217 07/19/23 1217 07/19/23 1217 07/19/23 1217   98.1 °F (36.7 °C) 85 22 160/70 93 %      Temp Source Heart Rate Source Patient Position - Orthostatic VS BP Location FiO2 (%)   07/19/23 1217 07/19/23 1217 07/19/23 1357 07/19/23 1357 --   Rectal Monitor Lying Right arm       Pain Score       07/19/23 1615       No Pain          Wt Readings from Last 1 Encounters:   07/19/23 104 kg (229 lb)     Additional Vital Signs:   Date/Time Temp Pulse Resp BP MAP (mmHg) SpO2 Calculated FIO2 (%) - Nasal Cannula Nasal Cannula O2 Flow Rate (L/min) O2 Device O2 Interface Device Patient Position - Orthostatic VS   07/20/23 1632 -- -- -- -- -- 98 % -- -- -- Face mask --   07/20/23 1626 -- -- -- -- -- 95 % -- -- -- -- --   07/20/23 16:15:13 -- 93 -- 124/74 91 93 % -- -- -- -- --   07/20/23 15:32:33 99 °F (37.2 °C) 55 16 122/76 91 91 % -- -- -- -- --   07/20/23 13:41:21 98.8 °F (37.1 °C) 59 -- 130/65 87 95 % -- -- -- -- --   07/20/23 1230 -- -- -- -- -- 94 % -- -- -- Face mask --   07/20/23 12:25:09 -- 96 -- 131/67 -- -- -- -- -- -- --   07/20/23 1220 -- -- -- -- -- -- -- -- -- Face mask --   07/20/23 12:14:02 -- 101 30 Abnormal  139/66 -- 93 % -- -- -- -- --   07/20/23 12:12:38 98.6 °F (37 °C) 89 27 Abnormal  139/66 90 93 % -- -- -- -- --   07/20/23 07:02:06 98 °F (36.7 °C) 108 Abnormal  18 140/81 101 95 % -- -- -- -- --   07/20/23 03:05:33 98.5 °F (36.9 °C) 80 18 140/81 101 93 % -- -- -- -- --   07/19/23 22:53:37 99.3 °F (37.4 °C) 102 24 Abnormal  138/76 97 94 % -- -- -- -- --   07/19/23 1950 -- -- -- -- -- -- -- -- None (Room air) -- --   07/19/23 18:44:07 98.5 °F (36.9 °C) 96 20 139/75 96 92 % -- -- -- -- --   07/19/23 16:17:20 98.9 °F (37.2 °C) 99 22 139/74 96 95 % -- -- -- -- --   07/19/23 1530 -- 92 26 Abnormal  167/72 96 94 % -- -- Nasal cannula  -- Lying   O2 Device: 4L at 07/19/23 1530   07/19/23 1528 -- -- -- -- -- 95 % 36 4 L/min Nasal cannula -- --   07/19/23 1430 -- 88 14 147/62 89 98 % -- -- -- -- --   07/19/23 1357 -- 84 16 150/64 94 98 % -- -- BiPAP -- Lying   07/19/23 1337 -- -- -- -- -- -- -- -- -- Face mask --   07/19/23 1221 -- -- -- -- -- -- -- -- Nasal cannula -- --   07/19/23 1217 98.1 °F (36.7 °C) 85 22 160/70 -- 93 % 36 4 L/min Nasal cannula -- --     Pertinent Labs/Diagnostic Test Results:   7/19 EKG: AFib    XR chest portable   Final Result by Douglas Silverio MD (07/20 1335)      Persistent right basilar opacity, likely due to combination of atelectasis and small effusion although underlying airspace consolidation is not excluded. Workstation performed: [id number]         XR chest portable   Final Result by Douglas Silverio MD (07/20 3957)      Hypoinflated lungs. Persistent right basilar opacity, likely due to a combination of atelectasis and small pleural effusion although underlying airspace consolidation is not excluded. Workstation performed: [id number]         CT head wo contrast   Final Result by Rich Gerardo MD (07/19 1345)      No acute intracranial abnormality.                   Workstation performed: [id number]         IR IN-Patient Thoracentesis    (Results Pending)     Results from last 7 days   Lab Units 07/19/23  1249   SARS-COV-2  Negative     Results from last 7 days   Lab Units 07/20/23  1219 07/20/23  0821 07/19/23  1645 07/19/23  1249 07/17/23  0545 07/15/23  2105   WBC Thousand/uL  --  11.15* 11.25* 10.88* 11.15* 11.35*   HEMOGLOBIN g/dL  --  10.6* 10.8* 10.3* 10.6* 9.7*   I STAT HEMOGLOBIN g/dl 11.2*  --   --   --   --   --    HEMATOCRIT %  --  36.5 36.9 35.6* 35.3* 32.8*   HEMATOCRIT, ISTAT % 33*  --   --   --   --   --    PLATELETS Thousands/uL  --  235 236 231 196 194   NEUTROS ABS Thousands/µL  --   --   --  8.37* 8.38* 8.92*         Results from last 7 days   Lab Units 07/20/23  1223 07/20/23  1219 07/20/23  0821 07/19/23  1645 07/19/23  1249 07/17/23  0545 07/16/23  1036 07/15/23  2105 07/15/23  0643   SODIUM mmol/L 140  --  139  --  141 140 141   < > 142   POTASSIUM mmol/L 6.2*  --  6.0* 5.7* 5.7* 5.0 4.7   < > 5.1   CHLORIDE mmol/L 108  --  110*  --  110* 110* 111*   < > 111*   CO2 mmol/L 31  --  30  --  31 30 30   < > 31   CO2, I-STAT mmol/L  --  36*  --   --   --   --   --   --   --    ANION GAP mmol/L 1  --  -1  --  0 0 0   < > 0   BUN mg/dL 102*  --  107*  --  100* 99* 92*   < > 88*   CREATININE mg/dL 2.77*  --  2.69*  --  2.57* 2.60* 2.55*   < > 2.30*   EGFR ml/min/1.73sq m 19  --  20  --  21 20 21   < > 24   CALCIUM mg/dL 9.2  --  9.1  --  9.0 8.8 8.5   < > 8.9   CALCIUM, IONIZED, ISTAT mmol/L  --  1.31  --   --   --   --   --   --   --    MAGNESIUM mg/dL  --   --   --   --  2.7*  --   --   --  2.4   PHOSPHORUS mg/dL  --   --   --   --  4.7*  --   --   --   --     < > = values in this interval not displayed.      Results from last 7 days   Lab Units 07/19/23  1249   AST U/L 25   ALT U/L 31   ALK PHOS U/L 86   TOTAL PROTEIN g/dL 7.4   ALBUMIN g/dL 2.6*   TOTAL BILIRUBIN mg/dL 0.42     Results from last 7 days   Lab Units 07/20/23  1211 07/20/23  1136   POC GLUCOSE mg/dl 188* 196*     Results from last 7 days   Lab Units 07/20/23  1223 07/20/23  0821 07/19/23  1249 07/17/23  0545 07/16/23  1036 07/15/23  2105 07/15/23  0643 07/14/23  1540   GLUCOSE RANDOM mg/dL 181* 93 96 113 196* 172* 101 181*     Results from last 7 days   Lab Units 07/20/23  1135 07/19/23  1249   PH YVON  7.278* 7.315   PCO2 YVON mm Hg 62.7* 53.5*   PO2 YVON mm Hg 175.4* 56.1*   HCO3 YVON mmol/L 28.7 26.6   BASE EXC YVON mmol/L 0.9 -0.1   O2 CONTENT YVON ml/dL 15.0 12.9   O2 HGB, VENOUS % 94.5* 84.6*     Results from last 7 days   Lab Units 07/20/23  1219   I STAT BASE EXC mmol/L 4*   I STAT O2 SAT % 98*   ISTAT PH ART  7.239*   I STAT ART PCO2 mm HG 79.0*   I STAT ART PO2 mm .0*   I STAT ART HCO3 mmol/L 33.8*     Results from last 7 days   Lab Units 07/14/23  1540   CK TOTAL U/L 22*     Results from last 7 days   Lab Units 07/19/23  1645 07/19/23  1455 07/19/23  1249   HS TNI 0HR ng/L  --   --  27   HS TNI 2HR ng/L  --  28  --    HSTNI D2 ng/L  --  1  --    HS TNI 4HR ng/L 31  --   --    HSTNI D4 ng/L 4  --   --          Results from last 7 days   Lab Units 07/20/23  1447 07/20/23  0821 07/20/23  0059 07/19/23  1645   PROTIME seconds  --  13.3  --  12.8   INR   --  1.00  --  0.94   PTT seconds 75* 146* 141* 72*         Results from last 7 days   Lab Units 07/19/23  1645 07/15/23  0643   PROCALCITONIN ng/ml 0.20 0.15     Results from last 7 days   Lab Units 07/15/23  1010   LACTIC ACID mmol/L 1.2     Results from last 7 days   Lab Units 07/19/23  1249   BNP pg/mL 232*     Results from last 7 days   Lab Units 07/19/23  1322   CLARITY UA  Clear   COLOR UA  Light Yellow   SPEC GRAV UA  1.017   PH UA  5.5   GLUCOSE UA mg/dl Negative   KETONES UA mg/dl Negative   BLOOD UA  Moderate*   PROTEIN UA mg/dl 100 (2+)*   NITRITE UA  Negative   BILIRUBIN UA  Negative   UROBILINOGEN UA (BE) mg/dl <2.0   LEUKOCYTES UA  Moderate*   WBC UA /hpf 4-10*   RBC UA /hpf 1-2   BACTERIA UA /hpf Occasional   EPITHELIAL CELLS WET PREP /hpf None Seen   MUCUS THREADS  Occasional*     Results from last 7 days   Lab Units 07/19/23  1249   INFLUENZA A PCR  Negative   INFLUENZA B PCR  Negative   RSV PCR  Negative     Results from last 7 days   Lab Units 07/15/23  1428 07/15/23  1007   BLOOD CULTURE   --  No Growth After 5 Days. No Growth After 5 Days.    SPUTUM CULTURE  2+ Growth of Haemophilus influenzae*  4+ Growth of  --    GRAM STAIN RESULT  2+ Epithelial cells per low power field*  Rare Polys*  3+ Gram positive rods*  3+ Gram positive cocci in pairs, chains and clusters*  2+ Gram negative rods*  Rare Budding yeast*  --      ED Treatment:   Medication Administration from 07/19/2023 1214 to 07/19/2023 1614       Date/Time Order Dose Route Action     07/19/2023 1548 EDT furosemide (LASIX) injection 40 mg 40 mg Intravenous Given     07/19/2023 1548 EDT calcium gluconate 1 g in sodium chloride 0.9% 50 mL (premix) 1 g Intravenous New Bag        Past Medical History:   Diagnosis Date   • Ankle edema     last assessed 17Nov2016   • Depression     last assessed [id number]   • Hypertension    • Kidney stone    • Nephrolithiasis    • Nocturia     last assessed 31Mar2016   • Posterior tibial tendinitis of right lower extremity     last assessed [id number]     Present on Admission:  • Central cord syndrome ([address])  • Hyperkalemia  • Stage 4 chronic kidney disease (HCC)  • Acute respiratory failure with hypoxia (HCC)  • Ulcerative colitis without complications (HCC)      Admitting Diagnosis: Atrial fibrillation (HCC) [I48.91]  Hypercarbia [R06.89]  Fatigue [R53.83]  Pleural effusion on right [P93]  Chronic diastolic congestive heart failure (HCC) [I50.32]  Generalized weakness [R53.1]  Stage 4 chronic kidney disease ([address]) [N18.4]  Age/Sex: 80 y.o. male  Admission Orders:  Current Facility-Administered Medications:   •  acetaminophen (TYLENOL) tablet 650 mg, 650 mg, Oral, Q6H PRN, Ana Woody MD  •  allopurinol (ZYLOPRIM) tablet 100 mg, 100 mg, Oral, Daily, Lobo Foster MD  •  bisacodyl (DULCOLAX) rectal suppository 10 mg, 10 mg, Rectal, Daily PRN, Ana Woody MD  •  calcium gluconate 1 g in sodium chloride 0.9% 50 mL (premix), 1 g, Intravenous, Once, Ana Woody MD, Last Rate: 100 mL/hr at 07/19/23 1548, 1 g at 07/19/23 1548  •  cefdinir (OMNICEF) capsule 300 mg, 300 mg, Oral, Q24H, Lobo Foster MD  •  cholecalciferol (VITAMIN D3) tablet 2,000 Units, 2,000 Units, Oral, Daily, Lobo Foster MD  •  folic acid (FOLVITE) tablet 1 mg, 1 mg, Oral, Daily, Lobo Foster MD  •  furosemide (LASIX) injection 40 mg, 40 mg, Intravenous, BID (diuretic), Ana Woody MD  •  guaiFENesin (MUCINEX) 12 hr tablet 600 mg, 600 mg, Oral, Q12H [address], Lobo Foster MD  •  heparin (ACS LOW), , Intravenous, Once, Ana Woody MD  •  multivitamin stress formula tablet 1 tablet, 1 tablet, Oral, Daily, Dione Portillo MD  •  ondansetron (ZOFRAN) injection 4 mg, 4 mg, Intravenous, Q6H PRN, Dione Portillo MD  •  polyethylene glycol (MIRALAX) packet 17 g, 17 g, Oral, Daily, Dione Portillo MD  •  senna-docusate sodium (SENOKOT S) 8.6-50 mg per tablet 1 tablet, 1 tablet, Oral, BID, Dione Portillo MD  •  [START ON 7/20/2023] sulfaSALAzine (AZULFIDINE) tablet 1,000 mg, 1,000 mg, Oral, Daily Before Shaw Cordero MD  •  sulfaSALAzine (AZULFIDINE) tablet 500 mg, 500 mg, Oral, BID, Lobo Foster MD  •  tamsulosin (FLOMAX) capsule 0.4 mg, 0.4 mg, Oral, Daily, Dione Portillo MD     Current Outpatient Medications:   •  acetaminophen (TYLENOL) 325 mg tablet, Take 3 tablets (975 mg total) by mouth every 8 (eight) hours, Disp: , Rfl: 0  •  allopurinol (ZYLOPRIM) 100 mg tablet, TAKE 1 TABLET BY MOUTH  DAILY, Disp: 90 tablet, Rfl: 2  •  bisacodyl (DULCOLAX) 10 mg suppository, Insert 1 suppository (10 mg total) into the rectum daily as needed for constipation, Disp: 12 suppository, Rfl: 0  •  cefdinir (OMNICEF) 300 mg capsule, Take 1 capsule (300 mg total) by mouth every 24 hours for 3 days, Disp: 3 capsule, Rfl: 0  •  cholecalciferol (VITAMIN D3) 1,000 units tablet, Take 2 tablets by mouth daily , Disp: , Rfl:   •  folic acid (FOLVITE) 1 mg tablet, Take 1 mg by mouth daily. , Disp: , Rfl:   •  guaiFENesin (MUCINEX) 600 mg 12 hr tablet, Take 1 tablet (600 mg total) by mouth every 12 (twelve) hours, Disp: , Rfl: 0  •  Heparin Sodium, Porcine, (heparin, porcine,) 5,000 units/mL, Inject 1 mL (5,000 Units total) under the skin every 8 (eight) hours, Disp: 1 mL, Rfl: 0  •  lidocaine (LIDODERM) 5 %, Apply 1 patch topically over 12 hours daily Remove & Discard patch within 12 hours or as directed by MD, Disp: , Rfl: 0  •  Multiple Vitamins-Minerals (PRESERVISION AREDS) CAPS, Take 1 capsule by mouth daily, Disp: , Rfl:   •  ofloxacin (OCUFLOX) 0.3 % ophthalmic solution, Install 1 drop in the L eye 4 times daily for 5 days after eye injection as directed, Disp: , Rfl: 3  •  polyethylene glycol (MIRALAX) 17 g packet, Take 17 g by mouth daily Do not start before July 18, 2023., Disp: , Rfl: 0  •  senna-docusate sodium (SENOKOT S) 8.6-50 mg per tablet, Take 1 tablet by mouth 2 (two) times a day, Disp: , Rfl: 0  •  sulfaSALAzine (AZULFIDINE) 500 mg tablet, TAKE 1 TABLET IN THE MORNING, 2 TABLETS AT NOON AND 1 TABLET IN THE EVENING, Disp: 360 tablet, Rfl: 1  •  tamsulosin (FLOMAX) 0.4 mg, TAKE 1 CAPSULE BY MOUTH  DAILY, Disp: 90 capsule, Rfl: 2  •  torsemide (DEMADEX) 20 mg tablet, Take 1 tablet (20 mg total) by mouth daily Do not start before July 18, 2023., Disp: , Rfl: 0               Continuous IV Infusions:  heparin (porcine) 25,000 units in 0.45% NaCl 250 mL infusion (premix)  Rate: 2.7-18 mL/hr Dose: 3-20 Units/kg/hr  Weight Dosing Info: 90 kg (Order-Specific)  Freq: Titrated Route: IV  Last Dose: Stopped (07/20/23 2231)  Start: 07/19/23 1630 End: 07/20/23 1718     PRN Meds:  glycopyrrolate, 0.1 mg, Intravenous, Q4H PRN  haloperidol lactate, 0.5 mg, Intravenous, Q2H PRN  LORazepam, 1 mg, Intravenous, Q10 Min PRN  morphine injection, 2 mg, Intravenous, Q1H PRN    scd      IP CONSULT TO CARDIOLOGY  IP CONSULT TO NEPHROLOGY  IP CONSULT TO PULMONOLOGY    Network Utilization Review Department  ATTENTION: Please call with any questions or concerns to [phone] and carefully listen to the prompts so that you are directed to the right person. All voicemails are confidential.  Marcial Vargas all requests for admission clinical reviews, approved or denied determinations and any other requests to dedicated fax number below belonging to the campus where the patient is receiving treatment.  List of dedicated fax numbers for the Facilities:  [place] DENIALS (Administrative/Medical Necessity) [phone]   [address] [place] [address] (Maternity/NICU/Pediatrics) [address] [place] [address] Yessi Carreon [phone]   [address] [phone]   [address] [facility] [address] [place] [address] [phone]   [address] [address]  [phone]

## 2023-07-20 NOTE — ASSESSMENT & PLAN NOTE
Was discharged 2 days ago from trauma service, was admitted after fall and C3 central cord injury, leading to weakness in upper and lower extremity bilaterally.     Now on comfort measures only

## 2023-07-20 NOTE — SPEECH THERAPY NOTE
Speech Language/Pathology  Speech-Language Pathology Bedside Swallow Evaluation        Patient Name: Juvencio Schaeffer    [id number] Date: 7/20/2023     Problem List  Principal Problem:    Acute respiratory failure with hypoxia [facility])  Active Problems:    Ulcerative colitis without complications ([address] [facility])    Stage 4 chronic kidney disease ([address])    Central cord syndrome (HCC)    Hyperkalemia    Syncope    Pleural effusion on right    Atrial fibrillation (HCC)    Chronic diastolic congestive heart failure ([address] [facility])         Past Medical History  Past Medical History:   Diagnosis Date   • Ankle edema     last assessed [id number]   • Depression     last assessed [id number]   • Hypertension    • Kidney stone    • Nephrolithiasis    • Nocturia     last assessed [id number]   • Posterior tibial tendinitis of right lower extremity     last assessed [id number]       Past Surgical History  Past Surgical History:   Procedure Laterality Date   • ANAL FISSURECTOMY     • CERVICAL FUSION N/A 7/4/2023    Procedure: Posterior C4-5 laminectomy, C3-6 fixation/fusion;  Surgeon: Alina Schuster MD;  Location: [facility];  Service: Neurosurgery   • COLONOSCOPY  04/22/2009    every 2 years   • COLONOSCOPY  03/04/2013    2 yrs d/t h/o polyp   • COLONOSCOPY  02/15/2016    colo 2/15/16-repeat 2 yrs   • SHOULDER SURGERY         Summary    Pt presents with mild oral and suspected at least mild-mod pharyngeal dysphagia. This is characterized by mildly prolonged mastication and bolus formation, ?premature spill into pharynx, and suspected reduced airway protection. There were inconsistent s/s of aspiration during breakfast (cough, throat clear), but consistent s/s of aspiration immediate after swallowing thin and NTL, even by teaspoon. Cough observed immediately after HTL via straw, but no s/s of aspiration with HTL via teaspoon. This is a significant change in swallowing since pt was last seen by ST. Pt appears much weaker now.  Recommend VBS to further assess swallow. Modify diet as below until VBS completed. Recommendations:   Diet: soft/level 3 diet and honey thick liquids   Meds: crushed with puree   Frequent Oral care: 2x/day  Full feed  Aspiration precautions and compensatory swallowing strategies: upright posture, only feed when fully alert, slow rate of feeding and small bites/sips  Other Recommendations/ considerations: ST to see for VBS in the next 24-48 hours. Current Medical Status  Copied from admission/physician notes:  Kaleb Cage is a 80 y.o. male who was discharged 2 days ago from trauma service after he had a fall and C3 spinal cord injury, leading to weakness in both upper and lower extremity, quadriplegia, presents from [facility] with complaint of increased work of breathing, dyspnea and possible syncopal episodes. Upon arrival to ER, patient noted to have hypoxia, increased work of breathing, placed on BiPAP briefly. Labs reveals mild hypercarbia on VBG, hyperkalemia potassium 5.7. Tylene Emmer proBNP mildly elevated, chest x-ray reveals persistent right sided pleural effusion. Patient endorses to feeling tired and fatigued. On my exam, patient is awake, alert, oriented x3. Chest x-ray reveals bilateral pleural effusion, worse on right side. Patient was recently treated with IV antibiotic for possible pneumonia, currently completing antibiotic with cefdinir. Patient will be admitted for acute respiratory failure, persistent pleural effusion, hyperkalemia. Acute respiratory failure with hypoxia [facility])  Assessment & Plan  Patient discharged 2 days ago from trauma service to [place] after a fall and C3 spinal cord injury, quadriplegia, today returns due to increased work of breathing, dyspnea and hypoxia noted in [place]. There was also question of periods of unresponsiveness while he had difficulty breathing  In ER, patient noted to be hypoxic, required BiPAP briefly  No tachycardia, no tachypnea.   VBG reveals mild hypercarbia, no acidosis noted  Chest x-ray reveals persistent bilateral, more on right-sided pleural effusion, mildly elevated proBNP. Patient was treated with pneumonia last week with IV cefepime then cefdinir, continue cefdinir until completion. Today low likelihood of pneumonia, obtain procal.  Hold home torsemide  We will start patient on IV Lasix twice daily  Wean off oxygen as able to  Intake, output, daily weight      Order received and chart reviewed. Discussed with nurse. Pt coughing on thin liquids this morning. He was on bipap overnight. Pt was seen by ST on 7/7/23 for dysphagia eval. At that time oropharyngeal swallow appeared WNL and regular diet/thin liquids were recommended. Past medical history:   Please see H&P for details    Special Studies:  CT head-  No acute intracranial abnormality. CXR pending      Social/Education/Vocational Hx:  Pt lives in SNF[facility]Critical access hospital, [facility] Information   Current Risks for Dysphagia & Aspiration: advanced age, bedbound  Current Symptoms/Concerns: change in respiratory status and change in cough strength and breathy voice  Current Diet: regular diet and thin liquids   Baseline Diet: regular diet and thin liquids    Baseline Assessment   Behavior/Cognition: awake but appears fatigued  Speech/Language Status: able to participate in basic conversation, able to follow commands and Vocal quality is weak, breathy   Patient Positioning: upright in bed     Swallow Mechanism Exam   Facial: symmetrical  Labial: WFL  Lingual: WFL  Velum: unable to visualize  Mandible:  decreased ROM  Dentition: adequate and partials in place  Vocal quality:breathy and weak   Volitional Cough: weak   Respiratory: NC, sats at 93%      Consistencies Assessed and Performance   Consistencies Administered: thin liquids, nectar thick, honey thick, puree, soft solids and mixed consistency    Oral Stage: Adequate bolus retrieval and draw from straw, mildly prolonged mastication and bolus formation.  No residue or pocketing. Adequate lip seal.     Pharyngeal Stage: Hyolaryngeal elevation observed and palpated. Swallows appear fairly prompt, ?premature spill into pharynx. There was throat clearing and cough inconsistently throughout breakfast. Consistent cough immediately after thin and NTL via straw, cup, teaspoon, and consistent cough immediately after HTL via teaspoon. No overt s/s of aspiration immediately after HTL via teaspoon. Esophageal Concerns: belching      Results Reviewed with: patient, RN, MD and aspiration precautions posted   Dysphagia Goals: 1. Pt will tolerate dysphagia 3 diet and HTL via teaspoon with no overt s/s of aspiration. 2. Pt will participate in VBS to further assess swallow. 3. Pt will tolerate LRD without s/s of aspiration.    Discharge recommendation: SNF    Speech Therapy Prognosis   Prognosis: fair    Prognosis Considerations: medical status, medically fragile status and therapeutic potential

## 2023-07-20 NOTE — ACP (ADVANCE CARE PLANNING)
Advanced Care Planning Note     Raul Carpenter 80 y.o. male MRN: [id number]    Unit/Bed#: [facility] 834-01 Encounter: [id number]    Raul Carpenter is a 80year-old male that presented today secondary to having an acute condition requiring critical care provider evaluation. The patient has chronic comorbidities, including but not limited to acute on chronic hypoxemic/hypercapnic respiratory failure in the setting of spinal cord injury C3, and now is inflicted with following acute conditions: worsening respiratory failure. Due to the severity of the patient's acute condition and/or the extent of chronic conditions, there is need for advanced care planning at this time. Please see my previous documentation in regards to the patient's current condition, assessment, and treatment plan. During this discussion with the patient and/or family members, it was established that all stake holders were agreeable and understood the rationale for advanced care planning to occur at this time. The following individuals were present & participated in the face to face conversation I had about the patient's advanced directives & advanced care planning: Daughter, wife, 2 granddaughters, patient at bedside  Please see my following comments for details of the conversation & decisions that were made:    Reviewed patient's recent history. Reviewed trouble with BIPAP since discharge to rehab for C3 injury with quadriplegia. Discussed with family that intubation would only be of benefit if the patient would accept a tracheostomy. The family clearly states he would not want that. They elect for him to be DNR/DNI. Then the patient expressed breathlessness. Due to this I discussed comfort-directed care. Family decided to transition the patient to hospice. Total time spent, (20) minutes.     CODE STATUS: Level 4 - Comfort Care  POA:    POLST:      SIGNATURE: Chris Sousa MD  DATE: July 20, 2023  TIME: 5:39 PM

## 2023-07-21 PROBLEM — R55 SYNCOPE: Status: RESOLVED | Noted: 2023-01-01 | Resolved: 2023-01-01

## 2023-07-21 PROBLEM — J96.02 ACUTE RESPIRATORY FAILURE WITH HYPOXIA AND HYPERCAPNIA (HCC): Status: ACTIVE | Noted: 2023-01-01

## 2023-07-21 NOTE — TREATMENT PLAN
Patient has been transitioned to comfort care, discussed with primary team, will sign off, please let us know if there is any change in plan.

## 2023-07-21 NOTE — PROGRESS NOTES
23 1000   Clinical Encounter Type   Visited With Patient and family together   Routine Visit Follow-up      Pastoral Care Progress Note    2023  Patient: Chris Duke : 1934  Admission Date & Time: 2023 1215  MRN: [id number] CSN: [id number]          Crista check in with family and patient. No needs at this time, just visited for a bit. Home  visits frequently, they were expecting a visit shortly. Chaplains remain available.

## 2023-07-21 NOTE — PROGRESS NOTES
D/jef pastor and saline locks. Washed body and prepared for [facility].   Notified transport to transport pt to [facility]

## 2023-07-21 NOTE — PROGRESS NOTES
Chart reviewed. Patient has transitioned to comfort oriented care. Cardiology will sign off, and remain available for re-consultation as needed.     Rayo Maurer MD

## 2023-07-21 NOTE — ASSESSMENT & PLAN NOTE
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 He returned after approximately 48 hours due to shortness of breath, increased work of breathing, and hypoxia. · Work-up in the emergency room showed pleural effusions and mild hypercapnia. He was treated with BiPAP in the emergency room but transferred to the floor on the hospital service after improvement. · Shortly after admission and again patient became progressively somnolent with increased work of breathing. ABG revealed significant hypercapnia with CO2 level at 79. He was started on BiPAP with minimal improvement over the course of 4 hours. · Pulmonology, cardiology, and critical care were all consulted. · Ultimately it is felt that the patient has neuromuscular compromise secondary to his cervical injury affecting his diaphragmatic performance. His hypercapnic respiratory failure has been recurrent since the hospitalization and he has been refusing/intolerant of BiPAP as an outpatient. Further work-up or treatment would have most likely lead to permanent tracheostomy. Given his ongoing hyperkalemia, patient probably would have required hemodialysis as well. Family felt that this was not congruent with the patient's wishes. Maik Houston discussion was held with the family at bedside with critical care service and family opted for comfort measures. Patient remained on comfort measures with numerous family members at bedside throughout the rest of his stay. He passed away peacefully at 1705 on 7/21/2023.

## 2023-07-21 NOTE — DISCHARGE SUMMARY
[address] [facility]  Discharge- Juan Pablo Dec 1934, 80 y.o. male MRN: [id number]  Unit/Bed#: [facility] 834-01 Encounter: [id number]  Primary Care Provider: Danyel Servin DO   Date and time admitted to hospital: 7/19/2023 12:15 PM    * Acute respiratory failure with hypoxia and hypercapnia [facility])  Assessment & Plan  · Patient recently hospitalized at this facility after a fall and C3 spinal cord injury with residual quadriplegia. He returned after approximately 48 hours due to shortness of breath, increased work of breathing, and hypoxia. · Work-up in the emergency room showed pleural effusions and mild hypercapnia. He was treated with BiPAP in the emergency room but transferred to the floor on the hospital service after improvement. · Shortly after admission and again patient became progressively somnolent with increased work of breathing. ABG revealed significant hypercapnia with CO2 level at 79. He was started on BiPAP with minimal improvement over the course of 4 hours. · Pulmonology, cardiology, and critical care were all consulted. · Ultimately it is felt that the patient has neuromuscular compromise secondary to his cervical injury affecting his diaphragmatic performance. His hypercapnic respiratory failure has been recurrent since the hospitalization and he has been refusing/intolerant of BiPAP as an outpatient. Further work-up or treatment would have most likely lead to permanent tracheostomy. Given his ongoing hyperkalemia, patient probably would have required hemodialysis as well. Family felt that this was not congruent with the patient's wishes. Yasmin Beck discussion was held with the family at bedside with critical care service and family opted for comfort measures. Patient remained on comfort measures with numerous family members at bedside throughout the rest of his stay. He passed away peacefully at 1705 on 7/21/2023.       Chronic diastolic congestive heart failure [facility])  Assessment & Plan  Comfort measures only      Atrial fibrillation (HCC)  Assessment & Plan  Comfort measures only      Pleural effusion on right  Assessment & Plan  Comfort measures only    Hyperkalemia  Assessment & Plan  Comfort measures only    Central cord syndrome [facility])  Assessment & Plan  Was discharged 2 days ago from trauma service, was admitted after fall and C3 central cord injury, leading to weakness in upper and lower extremity bilaterally. Now on comfort measures only    Stage 4 chronic kidney disease [facility])  Assessment & Plan  Comfort measures only    Syncope-resolved as of 7/21/2023  Assessment & Plan  As reported by nursing facility, they noted. Of unresponsiveness while patient was having increased work of breathing. CT head negative  Likely secondary to hypoxia, hypercarbia      Discharging Physician / Practitioner: Edgar Nettles DO  PCP: Antonio Flores DO  Admission Date:   Admission Orders (From admission, onward)     Ordered        07/19/23   [address]  Once                      Discharge Date: 07/21/23    Consultations During Hospital Stay:  · Pulmonary/Critical Care  · Nephrology  · Interventional radiology    Procedures Performed:   · none    Significant Findings / Test Results:   · Acute on chronic respiratory failure with hypercapnia and hypoxia  · Neuromuscular weakness secondary to C3 fracture and cord injury    Incidental Findings:   · none    Test Results Pending at Discharge (will require follow up):   · NA     Outpatient Tests Requested:  · NA    Complications:  NA    Reason for Admission: Respiratory failure    Hospital Course:   David Rodriguez is a 80 y.o. male patient who originally presented to the hospital on 7/19/2023 due to difficulty breathing and hypoxia. He had recently been hospitalized after a fall and C3 fracture with cord injury. On discharge he was functional quadriplegic secondary to his injury.   He had already been having some issues with hypoxic/hypercapnic respiratory failure prior to discharge and was discharged with BiPAP nightly. However patient could not tolerate the BiPAP machine and was not wearing this while at rehab. Over the course of 48 hours while at rehab he developed worsening respiratory distress and confusion. He presented back to the hospital and required BiPAP urgently in the ER for his respiratory distress. He was admitted to the hospital service on the floor but again quickly grew somnolent secondary to hypercarbia, and grew more dyspneic. Patient was placed on a trial of BiPAP for throughout the day without much improvement. Pulmonology/critical care was consulted for further management. A carlota and thorough discussion was held with the family. It was ultimately felt that the patient's respiratory failure was a consequence of his neuromuscular decline in the setting of his C3 fracture. Further intervention with likely intubation would only lead to permanent tracheostomy. Patient's family felt that this was not in line with the patient's wishes and decision was made to pursue comfort care. Patient remains on comfort care with numerous family throughout the rest of his hospital stay. On 2023 he passed away peacefully at 46 with multiple family members at bedside. Please see above list of diagnoses and related plan for additional information. Condition at Discharge: terminal    Discharge Day Visit / Exam:   Subjective: Patient seen and examined on the day of discharge. Pronounced  at 46 on 2023.   Vitals: Blood Pressure: 124/74 (23 1615)  Pulse: 93 (23 1615)  Temperature: 99 °F (37.2 °C) (23 1532)  Temp Source: Rectal (23 1217)  Respirations: (!) 52 (23 1538)  Weight - Scale: 104 kg (229 lb) (23 1544)  SpO2: 98 % (23 1632)  Exam:   Physical Exam     Discussion with Family: Updated  (wife) at bedside. Discharge instructions/Information to patient and family:   See after visit summary for information provided to patient and family. Provisions for Follow-Up Care:  See after visit summary for information related to follow-up care and any pertinent home health orders. Disposition:   Other:     Planned Readmission: no     Discharge Statement:  I spent 40 minutes discharging the patient. This time was spent on the day of discharge. I had direct contact with the patient on the day of discharge. Greater than 50% of the total time was spent examining patient, answering all patient questions, arranging and discussing plan of care with patient as well as directly providing post-discharge instructions. Additional time then spent on discharge activities. Discharge Medications:  See after visit summary for reconciled discharge medications provided to patient and/or family.       **Please Note: This note may have been constructed using a voice recognition system**

## 2023-07-21 NOTE — PROGRESS NOTES
Pt passed. Pronounced by MD at 5:05pm.  Notified supervisor and charge nurse. Family does not want  called. Family at bedside. Notified [facility] - spoke with Alexander De Jesus to give all details  Medical records notified by leaving a message.

## 2023-07-21 NOTE — CONSULTS
CRITICAL CARE CONSULT NOTE     Name: Jennifer Wilson   Age & Sex: 80 y.o. male   MRN: [id number]  Unit/Bed#: [facility] 834-01   Encounter: [id number]        Reason for consultation: Critical Care Evaluation    Requesting physician: Dr Fide Diaz:  Acute hypoxemic/hypercapnic respiratory failure due to neuromuscular weakness as a result of his recent C3 spinal injury  Recent C3 spinal injury early July 2023 with subsequent global weakness  Hyperkalemia  CKD    Recommendations:  I met with patient at bedside. He is awake, can name his visitors, but is confused. He reports he is short of breath. He has been on BiPAP for an extended period of time today. I explained to him and his family- wife, daughter, and 2 grandchildren that were present- that proceeding to intubate him would not be beneficial unless the patient was willing to accept a tracheostomy. He has progressive respiratory failure related to his recent C3 spinal injury and I do not believe there is a reversible process. The patient's family clearly states that tracheostomy is un unacceptable option. Therefore they agree to make him DNR/DNI. I also discussed Comfort-directed care. The patient is reporting dyspnea. I reported to family at this time if he is not to be intubated the only way to alleviate his dyspnea would be to transition to comfort-directed measures  - the patient's family, including his wife- agree that this is the appropriate next step. The patient will not be transferred to the ICU. I placed Level 4 order into [facility] and wrote a separate ACP note. I discussed the above with Dr Jt Lagos with [facility]. History of Present Illness   HPI:  Jennifer Wilson is a 80 y.o. male with PMHx who in early July had a fall leading to a C3 spinal injury with bilateral upper and lower extremity weakness presents from SNF with shortness of breath and acute hypoxemic/hypercapnic respiratory failure.   He was discharged recently on BiPAP qHS- was not tolerating this as an outpatient. Since arrival has had worsening respiratory failure requiring BiPAP. ICU is consulted for possible transfer to ICU. The patient reports he is short of breath. Review of systems:  12 point review of systems was completed and was otherwise negative except as listed in HPI.       Historical Information   Past Medical History:   Diagnosis Date   • Ankle edema     last assessed [id number]   • Depression     last assessed [id number]   • Hypertension    • Kidney stone    • Nephrolithiasis    • Nocturia     last assessed [id number]   • Posterior tibial tendinitis of right lower extremity     last assessed [id number]     Past Surgical History:   Procedure Laterality Date   • ANAL FISSURECTOMY     • CERVICAL FUSION N/A 7/4/2023    Procedure: Posterior C4-5 laminectomy, C3-6 fixation/fusion;  Surgeon: Luis Razo MD;  Location: [facility];  Service: Neurosurgery   • COLONOSCOPY  04/22/2009    every 2 years   • COLONOSCOPY  03/04/2013    2 yrs d/t h/o polyp   • COLONOSCOPY  02/15/2016    colo 2/15/16-repeat 2 yrs   • SHOULDER SURGERY       Family History   Problem Relation Age of Onset   • Clotting disorder Mother    • Nephrolithiasis Father    • Diabetes Sister    • Heart disease Daughter    • Fibromyalgia Daughter          Social History:   Social History     Tobacco Use   Smoking Status Never   Smokeless Tobacco Never         Meds/Allergies   Current Facility-Administered Medications   Medication Dose Route Frequency   • glycopyrrolate (ROBINUL) injection 0.1 mg  0.1 mg Intravenous Q4H PRN   • haloperidol lactate (HALDOL) injection 0.5 mg  0.5 mg Intravenous Q2H PRN   • LORazepam (ATIVAN) injection 1 mg  1 mg Intravenous Q10 Min PRN   • morphine injection 2 mg  2 mg Intravenous Q1H PRN     Medications Prior to Admission   Medication   • acetaminophen (TYLENOL) 325 mg tablet   • allopurinol (ZYLOPRIM) 100 mg tablet   • cefdinir (OMNICEF) 300 mg capsule   • cholecalciferol (VITAMIN D3) 1,000 units tablet   • folic acid (FOLVITE) 1 mg tablet   • guaiFENesin (MUCINEX) 600 mg 12 hr tablet   • Heparin Sodium, Porcine, (heparin, porcine,) 5,000 units/mL   • lidocaine (LIDODERM) 5 %   • Multiple Vitamins-Minerals (PRESERVISION AREDS) CAPS   • ofloxacin (OCUFLOX) 0.3 % ophthalmic solution   • polyethylene glycol (MIRALAX) 17 g packet   • senna-docusate sodium (SENOKOT S) 8.6-50 mg per tablet   • sulfaSALAzine (AZULFIDINE) 500 mg tablet   • tamsulosin (FLOMAX) 0.4 mg   • torsemide (DEMADEX) 20 mg tablet   • bisacodyl (DULCOLAX) 10 mg suppository     No Known Allergies    Vitals: Blood pressure 124/74, pulse 93, temperature 99 °F (37.2 °C), resp. rate 16, weight 104 kg (229 lb), SpO2 98 %. , BiPAP, Body mass index is 38.11 kg/m². Intake/Output Summary (Last 24 hours) at 7/20/2023 2222  Last data filed at 7/20/2023 1700  Gross per 24 hour   Intake 0 ml   Output 450 ml   Net -450 ml       Physical Exam  Vitals and nursing note reviewed. Constitutional:       General: He is not in acute distress. Appearance: He is well-developed. He is ill-appearing. HENT:      Head: Normocephalic and atraumatic. Mouth/Throat:      Mouth: Mucous membranes are dry. Eyes:      Conjunctiva/sclera: Conjunctivae normal.   Cardiovascular:      Rate and Rhythm: Normal rate and regular rhythm. Heart sounds: Normal heart sounds. No murmur heard. Pulmonary:      Effort: Pulmonary effort is normal. No respiratory distress. Breath sounds: Normal breath sounds. Comments: Diminished breath sounds  Abdominal:      Palpations: Abdomen is soft. Tenderness: There is no abdominal tenderness. Musculoskeletal:         General: No swelling. Cervical back: Neck supple. Skin:     General: Skin is warm and dry. Capillary Refill: Capillary refill takes less than 2 seconds. Neurological:      Mental Status: He is alert. He is disoriented.       Motor: Weakness (moves all extremities but globally weak) present. Psychiatric:         Mood and Affect: Mood normal.         Labs: I have personally reviewed pertinent lab results.   Laboratory and Diagnostics  Results from last 7 days   Lab Units 07/20/23  1219 07/20/23  0821 07/19/23  1645 07/19/23  1249 07/17/23  0545 07/15/23  2105 07/15/23  0643   WBC Thousand/uL  --  11.15* 11.25* 10.88* 11.15* 11.35* 13.38*   HEMOGLOBIN g/dL  --  10.6* 10.8* 10.3* 10.6* 9.7* 10.6*   I STAT HEMOGLOBIN g/dl 11.2*  --   --   --   --   --   --    HEMATOCRIT %  --  36.5 36.9 35.6* 35.3* 32.8* 35.3*   HEMATOCRIT, ISTAT % 33*  --   --   --   --   --   --    PLATELETS Thousands/uL  --  235 236 231 196 194 210   NEUTROS PCT %  --   --   --  76* 75 79* 78*   MONOS PCT %  --   --   --  12 11 10 11   EOS PCT %  --   --   --  3 3 3 2     Results from last 7 days   Lab Units 07/20/23  1223 07/20/23  1219 07/20/23  0821 07/19/23  1645 07/19/23  1249 07/17/23  0545 07/16/23  1036 07/15/23  2105 07/15/23  0643   SODIUM mmol/L 140  --  139  --  141 140 141 143 142   POTASSIUM mmol/L 6.2*  --  6.0* 5.7* 5.7* 5.0 4.7 5.2 5.1   CHLORIDE mmol/L 108  --  110*  --  110* 110* 111* 110* 111*   CO2 mmol/L 31  --  30  --  31 30 30 30 31   CO2, I-STAT mmol/L  --  36*  --   --   --   --   --   --   --    ANION GAP mmol/L 1  --  -1  --  0 0 0 3 0   BUN mg/dL 102*  --  107*  --  100* 99* 92* 94* 88*   CREATININE mg/dL 2.77*  --  2.69*  --  2.57* 2.60* 2.55* 2.43* 2.30*   CALCIUM mg/dL 9.2  --  9.1  --  9.0 8.8 8.5 8.5 8.9   GLUCOSE RANDOM mg/dL 181*  --  93  --  96 113 196* 172* 101   ALT U/L  --   --   --   --  31  --   --   --   --    AST U/L  --   --   --   --  25  --   --   --   --    ALK PHOS U/L  --   --   --   --  86  --   --   --   --    ALBUMIN g/dL  --   --   --   --  2.6*  --   --   --   --    TOTAL BILIRUBIN mg/dL  --   --   --   --  0.42  --   --   --   --      Results from last 7 days   Lab Units 07/19/23  1249 07/15/23  0643   MAGNESIUM mg/dL 2.7* 2.4   PHOSPHORUS mg/dL 4.7*  -- Results from last 7 days   Lab Units 07/20/23  1447 07/20/23  0821 07/20/23  0059 07/19/23  1645   INR   --  1.00  --  0.94   PTT seconds 75* 146* 141* 72*          Results from last 7 days   Lab Units 07/15/23  1010   LACTIC ACID mmol/L 1.2                     Results from last 7 days   Lab Units 07/19/23  1645 07/15/23  0643   PROCALCITONIN ng/ml 0.20 0.15       Microbiology:  Results from last 7 days   Lab Units 07/15/23  1428 07/15/23  1007 07/15/23  0959   BLOOD CULTURE   --  No Growth After 5 Days. No Growth After 5 Days. --    SPUTUM CULTURE  2+ Growth of Haemophilus influenzae*  4+ Growth of  --   --    GRAM STAIN RESULT  2+ Epithelial cells per low power field*  Rare Polys*  3+ Gram positive rods*  3+ Gram positive cocci in pairs, chains and clusters*  2+ Gram negative rods*  Rare Budding yeast*  --   --    MRSA CULTURE ONLY   --   --  No Methicillin Resistant Staphlyococcus aureus (MRSA) isolated       ABG: No results found for: "PHART", "PNL2XGL", "PO2ART", "XJH4AIU", "V5YGIVIZ", "BEART", "SOURCE"      Imaging and other studies: I have personally reviewed pertinent reports. and I have personally reviewed pertinent films in PACS  XR chest portable    Result Date: 7/20/2023  Impression: Persistent right basilar opacity, likely due to combination of atelectasis and small effusion although underlying airspace consolidation is not excluded. Workstation performed: [id number]     XR chest portable    Result Date: 7/20/2023  Impression: Hypoinflated lungs. Persistent right basilar opacity, likely due to a combination of atelectasis and small pleural effusion although underlying airspace consolidation is not excluded. Workstation performed: [id number]     CT head wo contrast    Result Date: 7/19/2023  Impression: No acute intracranial abnormality. Workstation performed: [id number]           EKG, Pathology, and Other Studies: I have personally reviewed pertinent reports.       Code Status: Level 4 - Viola Thomas MD  Attending Physician  Pulmonary and Critical Care Medicine

## 2023-07-21 NOTE — PROGRESS NOTES
Progress Note - Pulmonary   Derl Sin 80 y.o. male MRN: [id number]  Unit/Bed#: [facility] 834-01 Encounter: [id number]      Assessment:  Patient has transition to comfort oriented care. Plan:  - Continue on 3L NC to keep O2 sats > 88%  - Rest of care per palliative team    Subjective:   Patient seen and examined. Patient's family at bedside reports he is not responsive and has been receiving morphine for pain. The patient was sitting up but did not respond to questions    Objective:         Vitals: Blood pressure 124/74, pulse 93, temperature 99 °F (37.2 °C), resp. rate 16, weight 104 kg (229 lb), SpO2 98 %. , 3L NC, Body mass index is 38.11 kg/m². Intake/Output Summary (Last 24 hours) at 7/21/2023 0930  Last data filed at 7/20/2023 1700  Gross per 24 hour   Intake 0 ml   Output --   Net 0 ml         Physical Exam  Physical Exam  Vitals and nursing note reviewed. Constitutional:       Appearance: He is ill-appearing. Cardiovascular:      Rate and Rhythm: Normal rate and regular rhythm. Heart sounds: No murmur heard. Pulmonary:      Effort: Respiratory distress present. Breath sounds: Wheezing present. Labs: I have personally reviewed pertinent lab results.   Laboratory and Diagnostics  Results from last 7 days   Lab Units 07/20/23  1219 07/20/23  0821 07/19/23  1645 07/19/23  1249 07/17/23  0545 07/15/23  2105 07/15/23  0643   WBC Thousand/uL  --  11.15* 11.25* 10.88* 11.15* 11.35* 13.38*   HEMOGLOBIN g/dL  --  10.6* 10.8* 10.3* 10.6* 9.7* 10.6*   I STAT HEMOGLOBIN g/dl 11.2*  --   --   --   --   --   --    HEMATOCRIT %  --  36.5 36.9 35.6* 35.3* 32.8* 35.3*   HEMATOCRIT, ISTAT % 33*  --   --   --   --   --   --    PLATELETS Thousands/uL  --  235 236 231 196 194 210   NEUTROS PCT %  --   --   --  76* 75 79* 78*   MONOS PCT %  --   --   --  12 11 10 11   EOS PCT %  --   --   --  3 3 3 2     Results from last 7 days   Lab Units 07/20/23  1223 07/20/23  1219 07/20/23  0821 07/19/23  4194 07/19/23  1249 07/17/23  0545 07/16/23  1036 07/15/23  2105 07/15/23  0643   SODIUM mmol/L 140  --  139  --  141 140 141 143 142   POTASSIUM mmol/L 6.2*  --  6.0* 5.7* 5.7* 5.0 4.7 5.2 5.1   CHLORIDE mmol/L 108  --  110*  --  110* 110* 111* 110* 111*   CO2 mmol/L 31  --  30  --  31 30 30 30 31   CO2, I-STAT mmol/L  --  36*  --   --   --   --   --   --   --    ANION GAP mmol/L 1  --  -1  --  0 0 0 3 0   BUN mg/dL 102*  --  107*  --  100* 99* 92* 94* 88*   CREATININE mg/dL 2.77*  --  2.69*  --  2.57* 2.60* 2.55* 2.43* 2.30*   CALCIUM mg/dL 9.2  --  9.1  --  9.0 8.8 8.5 8.5 8.9   GLUCOSE RANDOM mg/dL 181*  --  93  --  96 113 196* 172* 101   ALT U/L  --   --   --   --  31  --   --   --   --    AST U/L  --   --   --   --  25  --   --   --   --    ALK PHOS U/L  --   --   --   --  86  --   --   --   --    ALBUMIN g/dL  --   --   --   --  2.6*  --   --   --   --    TOTAL BILIRUBIN mg/dL  --   --   --   --  0.42  --   --   --   --      Results from last 7 days   Lab Units 07/19/23  1249 07/15/23  0643   MAGNESIUM mg/dL 2.7* 2.4   PHOSPHORUS mg/dL 4.7*  --       Results from last 7 days   Lab Units 07/20/23  1447 07/20/23  0821 07/20/23  0059 07/19/23  1645   INR   --  1.00  --  0.94   PTT seconds 75* 146* 141* 72*          Results from last 7 days   Lab Units 07/15/23  1010   LACTIC ACID mmol/L 1.2                     Results from last 7 days   Lab Units 07/19/23  1645 07/15/23  0643   PROCALCITONIN ng/ml 0.20 0.15       ABG:           Microbiology:  Microbiology Results (last 21 days)     Collected Updated Procedure Result Status Patient Facility Result Comment      07/19/2023 1249 07/19/2023 1343 FLU/RSV/COVID - if FLU/RSV clinically relevant [[id number]]    Nares from Nose    Final result [address] - copy/paste COVID Guidelines URL to browser: [email or web address]   SARS-CoV-2 assay is a Nucleic Acid Amplification assay intended for the   qualitative detection of nucleic acid from SARS-CoV-2 in nasopharyngeal   swabs. Results are for the presumptive identification of SARS-CoV-2 RNA. Positive results are indicative of infection with SARS-CoV-2, the virus   causing COVID-19, but do not rule out bacterial infection or co-infection   with other viruses. Laboratories within the [facility] and its   territories are required to report all positive results to the appropriate   public health authorities. Negative results do not preclude SARS-CoV-2   infection and should not be used as the sole basis for treatment or other   patient management decisions. Negative results must be combined with   clinical observations, patient history, and epidemiological information. This test has not been FDA cleared or approved. This test has been authorized by FDA under an Emergency Use Authorization   (EUA). This test is only authorized for the duration of time the   declaration that circumstances exist justifying the authorization of the   emergency use of an in vitro diagnostic tests for detection of SARS-CoV-2   virus and/or diagnosis of COVID-19 infection under section 564(b)(1) of   the Act, 21 U. S.C. 076WQU-8(T)(5), unless the authorization is terminated   or revoked sooner. The test has been validated but independent review by FDA   and CLIA is pending. Test performed using [employer] GeneXpert: This RT-PCR assay targets N2,   a region unique to SARS-CoV-2. A conserved region in the E-gene was chosen   for pan-Sarbecovirus detection which includes SARS-CoV-2. According to CMS-2020-01-R, this platform meets the definition of high-throughput technology.     Component Value   SARS-CoV-2 Negative    INFLUENZA A PCR Negative    INFLUENZA B PCR Negative    RSV PCR Negative             07/15/2023 1428 07/19/2023 0725 Sputum culture and Gram stain [[id number]]    (Abnormal)   Expectorated Sputum    Final result [facility]  Component Value   Sputum Culture 2+ Growth of Haemophilus influenzae    Beta-lactamase Negative    4+ Growth of    Mixed Respiratory elva   Gram Stain Result 2+ Epithelial cells per low power field    Rare Polys    3+ Gram positive rods    3+ Gram positive cocci in pairs, chains and clusters    2+ Gram negative rods    Rare Budding yeast        Susceptibility     Haemophilus influenzae (1)     Antibiotic Interpretation Microscan Method Status    ZID Performed  Yes DARIUS Final    Beta Lactamase  Negative Not Specified Final    Ampicillin ($$) Susceptible  Not Specified Final    Trimethoprim + Sulfamethoxazole ($$$) Resistant  Not Specified Final    Ceftriaxone ($$) Susceptible  Not Specified Final    Cefuroxime-Sodium Susceptible  Not Specified Final                   07/15/2023 1007 07/20/2023 2201 Blood culture [[id number]]   Blood from Arm, Left    Final result [facility]  Component Value   Blood Culture No Growth After 5 Days. 07/15/2023 1007 07/20/2023 2201 Blood culture [[id number]]   Blood from Arm, Right    Final result [address]  [facility] Value   Blood Culture No Growth After 5 Days. 07/15/2023 0959 07/16/2023 2219 MRSA culture [[id number]]   Nares from Nose    Final result [address]  Component Value   MRSA Culture Only No Methicillin Resistant Staphlyococcus aureus (MRSA) isolated               07/04/2023 6555 07/05/2023 2137 MRSA culture [[id number]]   Nares from Nose    Final result [address]  Component Value   MRSA Culture Only No Methicillin Resistant Staphlyococcus aureus (MRSA) isolated                     Imaging and other studies: I have personally reviewed pertinent reports.         Urmila Mercedes  MS4      Disclaimer: Portions of the record may have been created with voice recognition software. Occasional wrong word or "sound a like" substitutions may have occurred due to the inherent limitations of voice recognition software. Careful consideration should be taken to recognize, using context, where substitutions have occurred.

## 2023-07-21 NOTE — DEATH NOTE
INPATIENT DEATH NOTE  Juvencio Schaeffer 80 y.o. male MRN: [id number]  [facility] Encounter: [id number]    Date, Time and Cause of Death    Date of Death: 23  Time of Death:  5:05 PM  Preliminary Cause of Death: Acute on chronic respiratory failure with hypoxia and hypercapnia ([address])  Entered by: Van Oconnor DO[PS1.1]     Attribution     PS1.1 DO Carmen 23 17:10           Patient's Information  Pronounced by: Van Oconnor DO  Did the patient's death occur in the ED?: No  Did the patient's death occur in the OR?: No  Did the patient's death occur less than 10 days post-op?: No  Did the patient's death occur within 24 hours of admission?: No  Was code status DNR at the time of death?: Yes    PHYSICAL EXAM:  Unresponsive to noxious stimuli, Spontaneous respirations absent, Breath sounds absent, Carotid pulse absent, Heart sounds absent and Pupillary light reflex absent    Medical Examiner notification criteria:  NONE APPLICABLE   Medical Examiner's office notified?:  No, does not meet ME notification criteria   Medical Examiner accepted case?:  No  Name of Medical Examiner:     Family Notification  Was the family notified?: Yes  Date Notified: 23  Time Notified: 9249  Notified by: Van Oconnor DO  Name of Family Notified of Death: Wife  and numerous other family members  Family Notification Route:  At bedside    Primary Service Attending Physician notified?:  yes - Attending:  Darlene Arvizu, *    Physician/Resident responsible for completing Discharge Summary:  Dr Van Oconnor

## 2023-07-21 NOTE — CASE MANAGEMENT
Case Management Discharge Planning Note    Patient name Jv Hassan  Location [address] 8[address]/[facility] 141-50 MRN [id number]  : 1934 Date 2023       Current Admission Date: 2023  Current Admission Diagnosis:Acute respiratory failure with hypoxia [facility])   Patient Active Problem List    Diagnosis Date Noted   • Syncope 2023   • Pleural effusion on right 2023   • Atrial fibrillation ([address]) 2023   • Chronic diastolic congestive heart failure ([address]) 2023   • Acute urinary retention 2023   • Hyperkalemia 2023   • Acute respiratory failure with hypoxia ([address]) 2023   • C3 spinal cord injury ([address]) 2023   • Central cord syndrome ([address]) 2023   • Weakness of both upper extremities 2023   • Fall 2023   • Proteinuria 2022   • Stage 4 chronic kidney disease ([address]) 2022   • Hypertriglyceridemia 2022   • Low HDL (under 40) 2020   • Nephrolithiasis 2018   • Severe obesity (BMI 35.0-39. 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      LOS (days): 2  Geometric Mean LOS (GMLOS) (days): 3.50  Days to GMLOS:1.5     OBJECTIVE:  Risk of Unplanned Readmission Score: 31.74         Current admission status: Inpatient   Preferred Pharmacy:   [facility] ([address] [facility] [address]  Phone: [phone] Fax: [phone]    [facility] [address] [place] [address]  Phone: [phone] Fax: [address] [facility] [place] ([facility] ) - [place], [address] [address]  Phone: [phone] Fax: [phone]    Primary Care Provider: Magy Powers DO    Primary Insurance: [employer] [facility]  Secondary Insurance:     DISCHARGE DETAILS:        Other Referral/Resources/Interventions Provided:  Interventions: Hospice  Referral Comments: Met with pt's family out side of room as nurse was with pt changing him. Present was Dtr Noamnilsashailesh Arzola ,wife Laura Wheatley and [employer]. In unison request at this time that pt is not moved from the hospital at this time. Discussed making Hospice referral which family was agreeable to but then said " we dont want him moved, he doesnt have much time left". Wife states she was a hospice volunteer at [facility] and is aware of the options. Referral not made at this time, update SLIM provider, Dr Zahra Moreno,  and made family aware if they change their mind to notify CM. Family expressed appreciation and verbalized understanding.

## 2023-07-21 NOTE — SPEECH THERAPY NOTE
Speech Language/Pathology  Pt and family have transitioned to comfort care. No further intvervention at this time.

## 2023-07-24 LAB
BASE EXCESS BLDA CALC-SCNC: 5 MMOL/L (ref -2–3)
CA-I BLD-SCNC: 1.24 MMOL/L (ref 1.12–1.32)
GLUCOSE SERPL-MCNC: 97 MG/DL (ref 65–140)
HCO3 BLDA-SCNC: 31.2 MMOL/L (ref 22–28)
HCT VFR BLD CALC: 30 % (ref 36.5–49.3)
HGB BLDA-MCNC: 10.2 G/DL (ref 12–17)
PCO2 BLD: 33 MMOL/L (ref 21–32)
PCO2 BLD: 54.6 MM HG (ref 36–44)
PH BLD: 7.37 [PH] (ref 7.35–7.45)
PO2 BLD: 118 MM HG (ref 75–129)
POTASSIUM BLD-SCNC: 6.4 MMOL/L (ref 3.5–5.3)
SAO2 % BLD FROM PO2: 98 % (ref 60–85)
SODIUM BLD-SCNC: 141 MMOL/L (ref 136–145)
SPECIMEN SOURCE: ABNORMAL

## 2023-07-24 NOTE — UTILIZATION REVIEW
NOTIFICATION OF ADMISSION DISCHARGE   This is a Notification of Discharge from [facility]. Please be advised that this patient has been discharge from our facility. Below you will find the admission and discharge date and time including the patient’s disposition. UTILIZATION REVIEW CONTACT:  Jaci Angeles  Utilization   Network Utilization Review Department  Phone: [phone] x carefully listen to the prompts. All voicemails are confidential.  Email: [email or web address]     ADMISSION INFORMATION  PRESENTATION DATE: 2023 12:15 PM  OBERVATION ADMISSION DATE:   INPATIENT ADMISSION DATE: 23  2:48 PM   DISCHARGE DATE: 2023  8:59 PM   DISPOSITION:    IMPORTANT INFORMATION:  Send all requests for admission clinical reviews, approved or denied determinations and any other requests to dedicated fax number below belonging to the campus where the patient is receiving treatment.  List of dedicated fax numbers:  [place] DENIALS (Administrative/Medical Necessity) [phone] [address] [facility] (Maternity/NICU/Pediatrics) [phone]   [facility] [phone]   [facility] [phone] [address] [facility] [phone]   [address] [place] [address] [phone]   [facility] [phone]   [address] [place], [place] [address] [address] [place] [address] [phone]   [address] [place] [address] [phone] 3[address] [facility] [phone] 27[address] [facility] 3000 32[facility] [phone]

## (undated) DEVICE — INTENDED FOR TISSUE SEPARATION, AND OTHER PROCEDURES THAT REQUIRE A SHARP SURGICAL BLADE TO PUNCTURE OR CUT.: Brand: BARD-PARKER ® CARBON RIB-BACK BLADES

## (undated) DEVICE — GLOVE INDICATOR PI UNDERGLOVE SZ 8 BLUE

## (undated) DEVICE — LIGHT HANDLE COVER SLEEVE DISP BLUE STELLAR

## (undated) DEVICE — DRAPE EQUIPMENT RF WAND

## (undated) DEVICE — GLOVE SRG BIOGEL ECLIPSE 8

## (undated) DEVICE — SUT ETHILON 2-0 FSLX 30 IN 1674H

## (undated) DEVICE — SPONGE PVP SCRUB WING STERILE

## (undated) DEVICE — SURGIFOAM 8.5 X 12.5

## (undated) DEVICE — DRAPE SHEET X-LG

## (undated) DEVICE — ELECTRODE BLADE E-Z CLEAN 4IN -0014A

## (undated) DEVICE — 3M™ TEGADERM™ TRANSPARENT FILM DRESSING FRAME STYLE, 1628, 6 IN X 8 IN (15 CM X 20 CM), 10/CT 8CT/CASE: Brand: 3M™ TEGADERM™

## (undated) DEVICE — INTENDED FOR TISSUE SEPARATION, AND OTHER PROCEDURES THAT REQUIRE A SHARP SURGICAL BLADE TO PUNCTURE OR CUT.: Brand: BARD-PARKER SAFETY BLADES SIZE 10, STERILE

## (undated) DEVICE — DRILL BIT G3606010 2.4MM

## (undated) DEVICE — GAUZE SPONGES,USP TYPE VII GAUZE, 12 PLY: Brand: CURITY

## (undated) DEVICE — ANTIBACTERIAL VIOLET BRAIDED (POLYGLACTIN 910), SYNTHETIC ABSORBABLE SUTURE: Brand: COATED VICRYL

## (undated) DEVICE — PREP SURGICAL PURPREP 26ML

## (undated) DEVICE — PROXIMATE PLUS MD MULTI-DIRECTIONAL RELEASE SKIN STAPLERS CONTAINS 35 STAINLESS STEEL STAPLES APPROXIMATE CLOSED DIMENSIONS: 6.9MM X 3.9MM WIDE: Brand: PROXIMATE

## (undated) DEVICE — SNAP KOVER: Brand: UNBRANDED

## (undated) DEVICE — BIPOLAR SEALER 23-113-1 AQM 2.3: Brand: AQUAMANTYS™

## (undated) DEVICE — MINOR PROCEDURE DRAPE: Brand: CONVERTORS

## (undated) DEVICE — SPECIMEN CONTAINER STERILE PEEL PACK

## (undated) DEVICE — MAYFIELD® DISPOSABLE ADULT SKULL PIN (PLASTIC BASE): Brand: MAYFIELD®

## (undated) DEVICE — MONITORING SPINAL IMPULSE CASE FEE

## (undated) DEVICE — SUPPLY FEE STD

## (undated) DEVICE — SILVER-COATED ANTIMICROBIAL BARRIER DRESSING: Brand: ACTICOAT   4" X 8"

## (undated) DEVICE — TOOL MR8-14BA20 MR8 14CM BALL 2MM: Brand: MIDAS REX MR8

## (undated) DEVICE — BETADINE OINTMENT FOIL PACK

## (undated) DEVICE — PLUMEPEN PRO 10FT

## (undated) DEVICE — BETHLEHEM UNIVERSAL SPINE, KIT: Brand: CARDINAL HEALTH

## (undated) DEVICE — TOOL MR8-14MH30 MR8 14CM MATCH 3MM: Brand: MIDAS REX MR8

## (undated) DEVICE — HEMOSTATIC MATRIX SURGIFLO 8ML W/THROMBIN

## (undated) DEVICE — DRESSING MEPILEX AG BORDER POST-OP 4 X 8 IN